# Patient Record
Sex: FEMALE | Race: WHITE | NOT HISPANIC OR LATINO | URBAN - METROPOLITAN AREA
[De-identification: names, ages, dates, MRNs, and addresses within clinical notes are randomized per-mention and may not be internally consistent; named-entity substitution may affect disease eponyms.]

---

## 2017-01-13 ENCOUNTER — INPATIENT (INPATIENT)
Facility: HOSPITAL | Age: 72
LOS: 1 days | Discharge: HOME | End: 2017-01-15
Attending: FAMILY MEDICINE | Admitting: FAMILY MEDICINE
Payer: MEDICARE

## 2017-01-13 DIAGNOSIS — Z98.89 OTHER SPECIFIED POSTPROCEDURAL STATES: Chronic | ICD-10-CM

## 2017-01-13 PROCEDURE — 99285 EMERGENCY DEPT VISIT HI MDM: CPT

## 2017-01-13 PROCEDURE — 74177 CT ABD & PELVIS W/CONTRAST: CPT | Mod: 26

## 2017-01-20 DIAGNOSIS — M81.0 AGE-RELATED OSTEOPOROSIS WITHOUT CURRENT PATHOLOGICAL FRACTURE: ICD-10-CM

## 2017-01-20 DIAGNOSIS — R10.9 UNSPECIFIED ABDOMINAL PAIN: ICD-10-CM

## 2017-01-20 DIAGNOSIS — E78.5 HYPERLIPIDEMIA, UNSPECIFIED: ICD-10-CM

## 2017-01-20 DIAGNOSIS — K86.1 OTHER CHRONIC PANCREATITIS: ICD-10-CM

## 2017-01-20 DIAGNOSIS — F41.9 ANXIETY DISORDER, UNSPECIFIED: ICD-10-CM

## 2017-01-20 DIAGNOSIS — K85.90 ACUTE PANCREATITIS WITHOUT NECROSIS OR INFECTION, UNSPECIFIED: ICD-10-CM

## 2017-01-20 DIAGNOSIS — I10 ESSENTIAL (PRIMARY) HYPERTENSION: ICD-10-CM

## 2017-02-04 ENCOUNTER — TRANSCRIPTION ENCOUNTER (OUTPATIENT)
Age: 72
End: 2017-02-04

## 2017-04-24 ENCOUNTER — RESULT REVIEW (OUTPATIENT)
Age: 72
End: 2017-04-24

## 2017-04-25 ENCOUNTER — OUTPATIENT (OUTPATIENT)
Dept: OUTPATIENT SERVICES | Facility: HOSPITAL | Age: 72
LOS: 1 days | Discharge: HOME | End: 2017-04-25
Payer: MEDICARE

## 2017-04-25 VITALS
OXYGEN SATURATION: 100 % | HEIGHT: 64 IN | WEIGHT: 145.06 LBS | SYSTOLIC BLOOD PRESSURE: 159 MMHG | HEART RATE: 60 BPM | DIASTOLIC BLOOD PRESSURE: 79 MMHG | TEMPERATURE: 97 F | RESPIRATION RATE: 12 BRPM

## 2017-04-25 DIAGNOSIS — Z98.89 OTHER SPECIFIED POSTPROCEDURAL STATES: Chronic | ICD-10-CM

## 2017-04-25 DIAGNOSIS — Z98.890 OTHER SPECIFIED POSTPROCEDURAL STATES: Chronic | ICD-10-CM

## 2017-04-25 DIAGNOSIS — Z90.89 ACQUIRED ABSENCE OF OTHER ORGANS: Chronic | ICD-10-CM

## 2017-04-25 PROCEDURE — 93010 ELECTROCARDIOGRAM REPORT: CPT

## 2017-04-25 PROCEDURE — 88305 TISSUE EXAM BY PATHOLOGIST: CPT | Mod: 26

## 2017-04-25 PROCEDURE — 88313 SPECIAL STAINS GROUP 2: CPT | Mod: 26

## 2017-04-25 PROCEDURE — 88312 SPECIAL STAINS GROUP 1: CPT | Mod: 26

## 2017-04-25 RX ORDER — SODIUM CHLORIDE 9 MG/ML
1000 INJECTION INTRAMUSCULAR; INTRAVENOUS; SUBCUTANEOUS
Qty: 0 | Refills: 0 | Status: DISCONTINUED | OUTPATIENT
Start: 2017-04-25 | End: 2017-05-10

## 2017-04-25 RX ADMIN — SODIUM CHLORIDE 75 MILLILITER(S): 9 INJECTION INTRAMUSCULAR; INTRAVENOUS; SUBCUTANEOUS at 10:34

## 2017-04-25 NOTE — ASU PATIENT PROFILE, ADULT - PMH
Arthritis    Fatty liver    GERD (gastroesophageal reflux disease)    High cholesterol    Hypertension    Hypothyroidism    Osteoarthritis    Pancreatitis

## 2017-04-25 NOTE — ASU PATIENT PROFILE, ADULT - PSH
History of back surgery    History of     History of laminectomy  with resection  History of tonsillectomy

## 2017-04-26 LAB — SURGICAL PATHOLOGY FINAL REPORT - CH: SIGNIFICANT CHANGE UP

## 2017-05-03 DIAGNOSIS — E78.5 HYPERLIPIDEMIA, UNSPECIFIED: ICD-10-CM

## 2017-05-03 DIAGNOSIS — E73.9 LACTOSE INTOLERANCE, UNSPECIFIED: ICD-10-CM

## 2017-05-03 DIAGNOSIS — M19.90 UNSPECIFIED OSTEOARTHRITIS, UNSPECIFIED SITE: ICD-10-CM

## 2017-05-03 DIAGNOSIS — Z87.891 PERSONAL HISTORY OF NICOTINE DEPENDENCE: ICD-10-CM

## 2017-05-03 DIAGNOSIS — K85.90 ACUTE PANCREATITIS WITHOUT NECROSIS OR INFECTION, UNSPECIFIED: ICD-10-CM

## 2017-05-03 DIAGNOSIS — K21.0 GASTRO-ESOPHAGEAL REFLUX DISEASE WITH ESOPHAGITIS: ICD-10-CM

## 2017-05-03 DIAGNOSIS — I10 ESSENTIAL (PRIMARY) HYPERTENSION: ICD-10-CM

## 2017-05-03 DIAGNOSIS — K29.50 UNSPECIFIED CHRONIC GASTRITIS WITHOUT BLEEDING: ICD-10-CM

## 2017-07-03 ENCOUNTER — TRANSCRIPTION ENCOUNTER (OUTPATIENT)
Age: 72
End: 2017-07-03

## 2017-10-25 ENCOUNTER — OUTPATIENT (OUTPATIENT)
Dept: OUTPATIENT SERVICES | Facility: HOSPITAL | Age: 72
LOS: 1 days | End: 2017-10-25
Payer: MEDICARE

## 2017-10-25 ENCOUNTER — TRANSCRIPTION ENCOUNTER (OUTPATIENT)
Age: 72
End: 2017-10-25

## 2017-10-25 VITALS
HEART RATE: 60 BPM | DIASTOLIC BLOOD PRESSURE: 54 MMHG | SYSTOLIC BLOOD PRESSURE: 119 MMHG | RESPIRATION RATE: 19 BRPM | OXYGEN SATURATION: 95 %

## 2017-10-25 VITALS
SYSTOLIC BLOOD PRESSURE: 128 MMHG | DIASTOLIC BLOOD PRESSURE: 57 MMHG | TEMPERATURE: 98 F | OXYGEN SATURATION: 98 % | WEIGHT: 143.3 LBS | HEIGHT: 64 IN | HEART RATE: 62 BPM | RESPIRATION RATE: 15 BRPM

## 2017-10-25 DIAGNOSIS — H25.12 AGE-RELATED NUCLEAR CATARACT, LEFT EYE: ICD-10-CM

## 2017-10-25 DIAGNOSIS — Z90.89 ACQUIRED ABSENCE OF OTHER ORGANS: Chronic | ICD-10-CM

## 2017-10-25 DIAGNOSIS — Z98.89 OTHER SPECIFIED POSTPROCEDURAL STATES: Chronic | ICD-10-CM

## 2017-10-25 DIAGNOSIS — Z98.890 OTHER SPECIFIED POSTPROCEDURAL STATES: Chronic | ICD-10-CM

## 2017-10-25 PROCEDURE — V2632: CPT

## 2017-10-25 PROCEDURE — 66984 XCAPSL CTRC RMVL W/O ECP: CPT | Mod: LT

## 2017-11-07 NOTE — ASU PATIENT PROFILE, ADULT - PSH
History of back surgery    History of     History of laminectomy  with resection  History of tonsillectomy History of back surgery    History of     History of laminectomy  with resection  History of tonsillectomy    S/P left cataract extraction

## 2017-11-08 ENCOUNTER — OUTPATIENT (OUTPATIENT)
Dept: OUTPATIENT SERVICES | Facility: HOSPITAL | Age: 72
LOS: 1 days | End: 2017-11-08
Payer: MEDICARE

## 2017-11-08 ENCOUNTER — TRANSCRIPTION ENCOUNTER (OUTPATIENT)
Age: 72
End: 2017-11-08

## 2017-11-08 VITALS
RESPIRATION RATE: 13 BRPM | HEIGHT: 64 IN | WEIGHT: 141.54 LBS | OXYGEN SATURATION: 94 % | HEART RATE: 63 BPM | TEMPERATURE: 98 F | DIASTOLIC BLOOD PRESSURE: 62 MMHG | SYSTOLIC BLOOD PRESSURE: 137 MMHG

## 2017-11-08 VITALS
SYSTOLIC BLOOD PRESSURE: 116 MMHG | HEART RATE: 62 BPM | DIASTOLIC BLOOD PRESSURE: 86 MMHG | OXYGEN SATURATION: 97 % | RESPIRATION RATE: 18 BRPM

## 2017-11-08 DIAGNOSIS — Z90.89 ACQUIRED ABSENCE OF OTHER ORGANS: Chronic | ICD-10-CM

## 2017-11-08 DIAGNOSIS — Z98.89 OTHER SPECIFIED POSTPROCEDURAL STATES: Chronic | ICD-10-CM

## 2017-11-08 DIAGNOSIS — Z98.42 CATARACT EXTRACTION STATUS, LEFT EYE: Chronic | ICD-10-CM

## 2017-11-08 DIAGNOSIS — Z98.890 OTHER SPECIFIED POSTPROCEDURAL STATES: Chronic | ICD-10-CM

## 2017-11-08 DIAGNOSIS — H25.11 AGE-RELATED NUCLEAR CATARACT, RIGHT EYE: ICD-10-CM

## 2017-11-08 PROCEDURE — 66984 XCAPSL CTRC RMVL W/O ECP: CPT | Mod: RT

## 2017-11-08 PROCEDURE — V2632: CPT

## 2017-12-28 ENCOUNTER — TRANSCRIPTION ENCOUNTER (OUTPATIENT)
Age: 72
End: 2017-12-28

## 2018-10-16 ENCOUNTER — TRANSCRIPTION ENCOUNTER (OUTPATIENT)
Age: 73
End: 2018-10-16

## 2018-10-25 ENCOUNTER — EMERGENCY (EMERGENCY)
Facility: HOSPITAL | Age: 73
LOS: 0 days | Discharge: HOME | End: 2018-10-25
Attending: EMERGENCY MEDICINE | Admitting: EMERGENCY MEDICINE
Payer: MEDICARE

## 2018-10-25 VITALS
DIASTOLIC BLOOD PRESSURE: 95 MMHG | HEIGHT: 63 IN | OXYGEN SATURATION: 99 % | HEART RATE: 70 BPM | RESPIRATION RATE: 16 BRPM | TEMPERATURE: 98 F | SYSTOLIC BLOOD PRESSURE: 162 MMHG | WEIGHT: 145.06 LBS

## 2018-10-25 VITALS
DIASTOLIC BLOOD PRESSURE: 71 MMHG | SYSTOLIC BLOOD PRESSURE: 138 MMHG | HEART RATE: 60 BPM | OXYGEN SATURATION: 100 % | RESPIRATION RATE: 16 BRPM | TEMPERATURE: 98 F

## 2018-10-25 DIAGNOSIS — Z90.89 ACQUIRED ABSENCE OF OTHER ORGANS: ICD-10-CM

## 2018-10-25 DIAGNOSIS — K21.9 GASTRO-ESOPHAGEAL REFLUX DISEASE WITHOUT ESOPHAGITIS: ICD-10-CM

## 2018-10-25 DIAGNOSIS — Z98.890 OTHER SPECIFIED POSTPROCEDURAL STATES: Chronic | ICD-10-CM

## 2018-10-25 DIAGNOSIS — Z98.89 OTHER SPECIFIED POSTPROCEDURAL STATES: Chronic | ICD-10-CM

## 2018-10-25 DIAGNOSIS — Z90.89 ACQUIRED ABSENCE OF OTHER ORGANS: Chronic | ICD-10-CM

## 2018-10-25 DIAGNOSIS — Z98.42 CATARACT EXTRACTION STATUS, LEFT EYE: ICD-10-CM

## 2018-10-25 DIAGNOSIS — R07.9 CHEST PAIN, UNSPECIFIED: ICD-10-CM

## 2018-10-25 DIAGNOSIS — Z87.19 PERSONAL HISTORY OF OTHER DISEASES OF THE DIGESTIVE SYSTEM: ICD-10-CM

## 2018-10-25 DIAGNOSIS — Z83.3 FAMILY HISTORY OF DIABETES MELLITUS: ICD-10-CM

## 2018-10-25 DIAGNOSIS — E78.00 PURE HYPERCHOLESTEROLEMIA, UNSPECIFIED: ICD-10-CM

## 2018-10-25 DIAGNOSIS — I10 ESSENTIAL (PRIMARY) HYPERTENSION: ICD-10-CM

## 2018-10-25 DIAGNOSIS — E03.9 HYPOTHYROIDISM, UNSPECIFIED: ICD-10-CM

## 2018-10-25 DIAGNOSIS — Z82.49 FAMILY HISTORY OF ISCHEMIC HEART DISEASE AND OTHER DISEASES OF THE CIRCULATORY SYSTEM: ICD-10-CM

## 2018-10-25 DIAGNOSIS — Z87.891 PERSONAL HISTORY OF NICOTINE DEPENDENCE: ICD-10-CM

## 2018-10-25 DIAGNOSIS — R07.89 OTHER CHEST PAIN: ICD-10-CM

## 2018-10-25 DIAGNOSIS — Z80.3 FAMILY HISTORY OF MALIGNANT NEOPLASM OF BREAST: ICD-10-CM

## 2018-10-25 DIAGNOSIS — Z98.42 CATARACT EXTRACTION STATUS, LEFT EYE: Chronic | ICD-10-CM

## 2018-10-25 DIAGNOSIS — M19.90 UNSPECIFIED OSTEOARTHRITIS, UNSPECIFIED SITE: ICD-10-CM

## 2018-10-25 DIAGNOSIS — K76.0 FATTY (CHANGE OF) LIVER, NOT ELSEWHERE CLASSIFIED: ICD-10-CM

## 2018-10-25 LAB
ADD ON TEST-SPECIMEN IN LAB: SIGNIFICANT CHANGE UP
ALBUMIN SERPL ELPH-MCNC: 3.9 G/DL — SIGNIFICANT CHANGE UP (ref 3.3–5)
ALP SERPL-CCNC: 63 U/L — SIGNIFICANT CHANGE UP (ref 40–120)
ALT FLD-CCNC: 39 U/L — SIGNIFICANT CHANGE UP (ref 12–78)
ANION GAP SERPL CALC-SCNC: 8 MMOL/L — SIGNIFICANT CHANGE UP (ref 5–17)
AST SERPL-CCNC: 23 U/L — SIGNIFICANT CHANGE UP (ref 15–37)
BASOPHILS # BLD AUTO: 0.03 K/UL — SIGNIFICANT CHANGE UP (ref 0–0.2)
BASOPHILS NFR BLD AUTO: 0.3 % — SIGNIFICANT CHANGE UP (ref 0–2)
BILIRUB SERPL-MCNC: 0.3 MG/DL — SIGNIFICANT CHANGE UP (ref 0.2–1.2)
BUN SERPL-MCNC: 13 MG/DL — SIGNIFICANT CHANGE UP (ref 7–23)
CALCIUM SERPL-MCNC: 10.6 MG/DL — HIGH (ref 8.5–10.1)
CHLORIDE SERPL-SCNC: 103 MMOL/L — SIGNIFICANT CHANGE UP (ref 96–108)
CO2 SERPL-SCNC: 26 MMOL/L — SIGNIFICANT CHANGE UP (ref 22–31)
CREAT SERPL-MCNC: 0.65 MG/DL — SIGNIFICANT CHANGE UP (ref 0.5–1.3)
EOSINOPHIL # BLD AUTO: 0.03 K/UL — SIGNIFICANT CHANGE UP (ref 0–0.5)
EOSINOPHIL NFR BLD AUTO: 0.3 % — SIGNIFICANT CHANGE UP (ref 0–6)
GLUCOSE SERPL-MCNC: 89 MG/DL — SIGNIFICANT CHANGE UP (ref 70–99)
HCT VFR BLD CALC: 41.8 % — SIGNIFICANT CHANGE UP (ref 34.5–45)
HGB BLD-MCNC: 13.9 G/DL — SIGNIFICANT CHANGE UP (ref 11.5–15.5)
IMM GRANULOCYTES NFR BLD AUTO: 0.6 % — SIGNIFICANT CHANGE UP (ref 0–1.5)
INR BLD: 1 RATIO — SIGNIFICANT CHANGE UP (ref 0.88–1.16)
LIDOCAIN IGE QN: 218 U/L — SIGNIFICANT CHANGE UP (ref 73–393)
LYMPHOCYTES # BLD AUTO: 27.6 % — SIGNIFICANT CHANGE UP (ref 13–44)
LYMPHOCYTES # BLD AUTO: 3 K/UL — SIGNIFICANT CHANGE UP (ref 1–3.3)
MAGNESIUM SERPL-MCNC: 2.1 MG/DL — SIGNIFICANT CHANGE UP (ref 1.6–2.6)
MCHC RBC-ENTMCNC: 29.8 PG — SIGNIFICANT CHANGE UP (ref 27–34)
MCHC RBC-ENTMCNC: 33.3 GM/DL — SIGNIFICANT CHANGE UP (ref 32–36)
MCV RBC AUTO: 89.7 FL — SIGNIFICANT CHANGE UP (ref 80–100)
MONOCYTES # BLD AUTO: 0.99 K/UL — HIGH (ref 0–0.9)
MONOCYTES NFR BLD AUTO: 9.1 % — SIGNIFICANT CHANGE UP (ref 2–14)
NEUTROPHILS # BLD AUTO: 6.75 K/UL — SIGNIFICANT CHANGE UP (ref 1.8–7.4)
NEUTROPHILS NFR BLD AUTO: 62.1 % — SIGNIFICANT CHANGE UP (ref 43–77)
NRBC # BLD: 0 /100 WBCS — SIGNIFICANT CHANGE UP (ref 0–0)
PLATELET # BLD AUTO: 261 K/UL — SIGNIFICANT CHANGE UP (ref 150–400)
POTASSIUM SERPL-MCNC: 4.1 MMOL/L — SIGNIFICANT CHANGE UP (ref 3.5–5.3)
POTASSIUM SERPL-SCNC: 4.1 MMOL/L — SIGNIFICANT CHANGE UP (ref 3.5–5.3)
PROT SERPL-MCNC: 7.7 GM/DL — SIGNIFICANT CHANGE UP (ref 6–8.3)
PROTHROM AB SERPL-ACNC: 10.8 SEC — SIGNIFICANT CHANGE UP (ref 9.8–12.7)
RBC # BLD: 4.66 M/UL — SIGNIFICANT CHANGE UP (ref 3.8–5.2)
RBC # FLD: 13.1 % — SIGNIFICANT CHANGE UP (ref 10.3–14.5)
SODIUM SERPL-SCNC: 137 MMOL/L — SIGNIFICANT CHANGE UP (ref 135–145)
TROPONIN I SERPL-MCNC: <0.015 NG/ML — SIGNIFICANT CHANGE UP (ref 0.01–0.04)
TROPONIN I SERPL-MCNC: <0.015 NG/ML — SIGNIFICANT CHANGE UP (ref 0.01–0.04)
WBC # BLD: 10.87 K/UL — HIGH (ref 3.8–10.5)
WBC # FLD AUTO: 10.87 K/UL — HIGH (ref 3.8–10.5)

## 2018-10-25 PROCEDURE — 93010 ELECTROCARDIOGRAM REPORT: CPT

## 2018-10-25 PROCEDURE — 99285 EMERGENCY DEPT VISIT HI MDM: CPT

## 2018-10-25 PROCEDURE — 71045 X-RAY EXAM CHEST 1 VIEW: CPT | Mod: 26

## 2018-10-25 RX ORDER — FAMOTIDINE 10 MG/ML
20 INJECTION INTRAVENOUS ONCE
Qty: 0 | Refills: 0 | Status: COMPLETED | OUTPATIENT
Start: 2018-10-25 | End: 2018-10-25

## 2018-10-25 RX ADMIN — FAMOTIDINE 20 MILLIGRAM(S): 10 INJECTION INTRAVENOUS at 17:33

## 2018-10-25 NOTE — ED ADULT NURSE REASSESSMENT NOTE - COMFORT CARE
ambulated to bathroom/plan of care explained
meal provided/plan of care explained
repositioned/plan of care explained/warm blanket provided/darkened lights/side rails up/wait time explained

## 2018-10-25 NOTE — ED ADULT NURSE NOTE - OBJECTIVE STATEMENT
sudden onset of substernal cp while getting her hair done. no n/v or diaphoresis.  cp has improved.  patient was given asa and ntg in the [place]

## 2018-10-25 NOTE — ED ADULT NURSE NOTE - NSIMPLEMENTINTERV_GEN_ALL_ED
Implemented All Fall with Harm Risk Interventions:  [place] to call system. Call bell, personal items and telephone within reach. Instruct patient to call for assistance. Room bathroom lighting operational. Non-slip footwear when patient is off stretcher. Physically safe environment: no spills, clutter or unnecessary equipment. Stretcher in lowest position, wheels locked, appropriate side rails in place. Provide visual cue, wrist band, yellow gown, etc. Monitor gait and stability. Monitor for mental status changes and reorient to person, place, and time. Review medications for side effects contributing to fall risk. Reinforce activity limits and safety measures with patient and family. Provide visual clues: red socks.

## 2018-10-25 NOTE — ED PROVIDER NOTE - OBJECTIVE STATEMENT
72 y/o female with a PMHx of HTN on Metoprolol, HLD on Simvastatin, hypothyroidism presents to the ED c/o sudden onset chest pain PTA. Pt was seated getting her hair done and had onset of sternal chest pain radiating to her throat. Pain is described as aching. Hair parlor called EMS and in ambulance pt received sublingual nitro and ASA, pt's blood pressure was 200 systolic. No associated SOB, diaphoresis, nausea, vomiting. No leg swelling. Pt is currently on abx for bronchitis, 2 more to take. Recently took Medrol dose pack. No h/o this pain. Former smoker for a few years (Quit 50 years ago). PMD: Dr. Jt Neely.

## 2018-10-25 NOTE — ED ADULT NURSE REASSESSMENT NOTE - NS ED NURSE REASSESS COMMENT FT1
patient ambulated to BR without sx. patient states she feels back to baseline. vss
Received pt awake A&Ox3 sitting erect in bed. Presented to ED c/o chest pain while at the hair salon today. Hx of pancreatitis and hyperparathyroidism. Lipase negative, 1st troponin negative.. Pending 2nd troponin and possible discharge. No acute distress noted. Safety/fall precautions maintained.

## 2018-10-25 NOTE — ED PROVIDER NOTE - PSH
History of back surgery    History of     History of laminectomy  with resection  History of tonsillectomy    S/P left cataract extraction

## 2018-10-26 PROBLEM — M19.90 UNSPECIFIED OSTEOARTHRITIS, UNSPECIFIED SITE: Chronic | Status: ACTIVE | Noted: 2017-04-25

## 2018-10-26 PROBLEM — K76.0 FATTY (CHANGE OF) LIVER, NOT ELSEWHERE CLASSIFIED: Chronic | Status: ACTIVE | Noted: 2017-04-25

## 2018-11-28 ENCOUNTER — RESULT REVIEW (OUTPATIENT)
Age: 73
End: 2018-11-28

## 2019-01-18 ENCOUNTER — TRANSCRIPTION ENCOUNTER (OUTPATIENT)
Age: 74
End: 2019-01-18

## 2019-04-26 ENCOUNTER — TRANSCRIPTION ENCOUNTER (OUTPATIENT)
Age: 74
End: 2019-04-26

## 2019-08-07 ENCOUNTER — INPATIENT (INPATIENT)
Facility: HOSPITAL | Age: 74
LOS: 0 days | Discharge: HOME | DRG: 312 | End: 2019-08-08
Attending: INTERNAL MEDICINE | Admitting: INTERNAL MEDICINE
Payer: MEDICARE

## 2019-08-07 VITALS
TEMPERATURE: 98 F | OXYGEN SATURATION: 96 % | HEIGHT: 63 IN | DIASTOLIC BLOOD PRESSURE: 53 MMHG | WEIGHT: 119.93 LBS | SYSTOLIC BLOOD PRESSURE: 119 MMHG | HEART RATE: 74 BPM | RESPIRATION RATE: 18 BRPM

## 2019-08-07 DIAGNOSIS — I10 ESSENTIAL (PRIMARY) HYPERTENSION: ICD-10-CM

## 2019-08-07 DIAGNOSIS — E78.00 PURE HYPERCHOLESTEROLEMIA, UNSPECIFIED: ICD-10-CM

## 2019-08-07 DIAGNOSIS — E03.9 HYPOTHYROIDISM, UNSPECIFIED: ICD-10-CM

## 2019-08-07 DIAGNOSIS — Z98.89 OTHER SPECIFIED POSTPROCEDURAL STATES: Chronic | ICD-10-CM

## 2019-08-07 DIAGNOSIS — Z90.89 ACQUIRED ABSENCE OF OTHER ORGANS: Chronic | ICD-10-CM

## 2019-08-07 DIAGNOSIS — K76.0 FATTY (CHANGE OF) LIVER, NOT ELSEWHERE CLASSIFIED: ICD-10-CM

## 2019-08-07 DIAGNOSIS — Z98.890 OTHER SPECIFIED POSTPROCEDURAL STATES: Chronic | ICD-10-CM

## 2019-08-07 DIAGNOSIS — K21.9 GASTRO-ESOPHAGEAL REFLUX DISEASE WITHOUT ESOPHAGITIS: ICD-10-CM

## 2019-08-07 DIAGNOSIS — R55 SYNCOPE AND COLLAPSE: ICD-10-CM

## 2019-08-07 DIAGNOSIS — Z98.42 CATARACT EXTRACTION STATUS, LEFT EYE: Chronic | ICD-10-CM

## 2019-08-07 LAB
ALBUMIN SERPL ELPH-MCNC: 3.8 G/DL — SIGNIFICANT CHANGE UP (ref 3.3–5)
ALP SERPL-CCNC: 42 U/L — SIGNIFICANT CHANGE UP (ref 40–120)
ALT FLD-CCNC: 34 U/L — SIGNIFICANT CHANGE UP (ref 12–78)
ANION GAP SERPL CALC-SCNC: 7 MMOL/L — SIGNIFICANT CHANGE UP (ref 5–17)
APPEARANCE UR: CLEAR — SIGNIFICANT CHANGE UP
APTT BLD: 31.4 SEC — SIGNIFICANT CHANGE UP (ref 27.5–36.3)
AST SERPL-CCNC: 32 U/L — SIGNIFICANT CHANGE UP (ref 15–37)
BILIRUB SERPL-MCNC: 0.3 MG/DL — SIGNIFICANT CHANGE UP (ref 0.2–1.2)
BILIRUB UR-MCNC: NEGATIVE — SIGNIFICANT CHANGE UP
BUN SERPL-MCNC: 14 MG/DL — SIGNIFICANT CHANGE UP (ref 7–23)
CALCIUM SERPL-MCNC: 8.9 MG/DL — SIGNIFICANT CHANGE UP (ref 8.5–10.1)
CHLORIDE SERPL-SCNC: 109 MMOL/L — HIGH (ref 96–108)
CHOLEST SERPL-MCNC: 151 MG/DL — SIGNIFICANT CHANGE UP (ref 10–199)
CO2 SERPL-SCNC: 25 MMOL/L — SIGNIFICANT CHANGE UP (ref 22–31)
COLOR SPEC: YELLOW — SIGNIFICANT CHANGE UP
CREAT SERPL-MCNC: 1.19 MG/DL — SIGNIFICANT CHANGE UP (ref 0.5–1.3)
D DIMER BLD IA.RAPID-MCNC: <150 NG/ML DDU — SIGNIFICANT CHANGE UP
DIFF PNL FLD: ABNORMAL
GLUCOSE SERPL-MCNC: 97 MG/DL — SIGNIFICANT CHANGE UP (ref 70–99)
GLUCOSE UR QL: NEGATIVE MG/DL — SIGNIFICANT CHANGE UP
HCT VFR BLD CALC: 38 % — SIGNIFICANT CHANGE UP (ref 34.5–45)
HDLC SERPL-MCNC: 81 MG/DL — SIGNIFICANT CHANGE UP
HGB BLD-MCNC: 12.7 G/DL — SIGNIFICANT CHANGE UP (ref 11.5–15.5)
INR BLD: 0.96 RATIO — SIGNIFICANT CHANGE UP (ref 0.88–1.16)
KETONES UR-MCNC: NEGATIVE — SIGNIFICANT CHANGE UP
LEUKOCYTE ESTERASE UR-ACNC: NEGATIVE — SIGNIFICANT CHANGE UP
LIPID PNL WITH DIRECT LDL SERPL: 62 MG/DL — SIGNIFICANT CHANGE UP
MAGNESIUM SERPL-MCNC: 2 MG/DL — SIGNIFICANT CHANGE UP (ref 1.6–2.6)
MCHC RBC-ENTMCNC: 30.8 PG — SIGNIFICANT CHANGE UP (ref 27–34)
MCHC RBC-ENTMCNC: 33.4 GM/DL — SIGNIFICANT CHANGE UP (ref 32–36)
MCV RBC AUTO: 92 FL — SIGNIFICANT CHANGE UP (ref 80–100)
NITRITE UR-MCNC: NEGATIVE — SIGNIFICANT CHANGE UP
NT-PROBNP SERPL-SCNC: 139 PG/ML — HIGH (ref 0–125)
PH UR: 6.5 — SIGNIFICANT CHANGE UP (ref 5–8)
PLATELET # BLD AUTO: 217 K/UL — SIGNIFICANT CHANGE UP (ref 150–400)
POTASSIUM SERPL-MCNC: 3.6 MMOL/L — SIGNIFICANT CHANGE UP (ref 3.5–5.3)
POTASSIUM SERPL-SCNC: 3.6 MMOL/L — SIGNIFICANT CHANGE UP (ref 3.5–5.3)
PROT SERPL-MCNC: 7.2 GM/DL — SIGNIFICANT CHANGE UP (ref 6–8.3)
PROT UR-MCNC: 30 MG/DL
PROTHROM AB SERPL-ACNC: 10.6 SEC — SIGNIFICANT CHANGE UP (ref 10–12.9)
RBC # BLD: 4.13 M/UL — SIGNIFICANT CHANGE UP (ref 3.8–5.2)
RBC # FLD: 14.5 % — SIGNIFICANT CHANGE UP (ref 10.3–14.5)
SODIUM SERPL-SCNC: 141 MMOL/L — SIGNIFICANT CHANGE UP (ref 135–145)
SP GR SPEC: 1 — LOW (ref 1.01–1.02)
TOTAL CHOLESTEROL/HDL RATIO MEASUREMENT: 1.9 RATIO — LOW (ref 3.3–7.1)
TRIGL SERPL-MCNC: 38 MG/DL — SIGNIFICANT CHANGE UP (ref 10–149)
TROPONIN I SERPL-MCNC: <0.015 NG/ML — SIGNIFICANT CHANGE UP (ref 0.01–0.04)
TSH SERPL-MCNC: 1.09 UU/ML — SIGNIFICANT CHANGE UP (ref 0.34–4.82)
UROBILINOGEN FLD QL: NEGATIVE MG/DL — SIGNIFICANT CHANGE UP
WBC # BLD: 7.76 K/UL — SIGNIFICANT CHANGE UP (ref 3.8–10.5)
WBC # FLD AUTO: 7.76 K/UL — SIGNIFICANT CHANGE UP (ref 3.8–10.5)

## 2019-08-07 PROCEDURE — 71250 CT THORAX DX C-: CPT | Mod: 26

## 2019-08-07 PROCEDURE — 84484 ASSAY OF TROPONIN QUANT: CPT | Mod: 91

## 2019-08-07 PROCEDURE — 85027 COMPLETE CBC AUTOMATED: CPT

## 2019-08-07 PROCEDURE — 93306 TTE W/DOPPLER COMPLETE: CPT

## 2019-08-07 PROCEDURE — 85379 FIBRIN DEGRADATION QUANT: CPT

## 2019-08-07 PROCEDURE — 36415 COLL VENOUS BLD VENIPUNCTURE: CPT

## 2019-08-07 PROCEDURE — 93306 TTE W/DOPPLER COMPLETE: CPT | Mod: 26

## 2019-08-07 PROCEDURE — 70450 CT HEAD/BRAIN W/O DYE: CPT | Mod: 26

## 2019-08-07 PROCEDURE — 80061 LIPID PANEL: CPT

## 2019-08-07 PROCEDURE — 84443 ASSAY THYROID STIM HORMONE: CPT

## 2019-08-07 PROCEDURE — 80048 BASIC METABOLIC PNL TOTAL CA: CPT

## 2019-08-07 PROCEDURE — 71045 X-RAY EXAM CHEST 1 VIEW: CPT | Mod: 26

## 2019-08-07 RX ORDER — ALPRAZOLAM 0.25 MG
0.25 TABLET ORAL DAILY
Refills: 0 | Status: DISCONTINUED | OUTPATIENT
Start: 2019-08-07 | End: 2019-08-08

## 2019-08-07 RX ORDER — ENOXAPARIN SODIUM 100 MG/ML
40 INJECTION SUBCUTANEOUS DAILY
Refills: 0 | Status: DISCONTINUED | OUTPATIENT
Start: 2019-08-07 | End: 2019-08-08

## 2019-08-07 RX ORDER — ASPIRIN/CALCIUM CARB/MAGNESIUM 324 MG
81 TABLET ORAL DAILY
Refills: 0 | Status: DISCONTINUED | OUTPATIENT
Start: 2019-08-07 | End: 2019-08-08

## 2019-08-07 RX ORDER — SIMVASTATIN 20 MG/1
20 TABLET, FILM COATED ORAL AT BEDTIME
Refills: 0 | Status: DISCONTINUED | OUTPATIENT
Start: 2019-08-07 | End: 2019-08-08

## 2019-08-07 RX ORDER — METOPROLOL TARTRATE 50 MG
25 TABLET ORAL DAILY
Refills: 0 | Status: DISCONTINUED | OUTPATIENT
Start: 2019-08-07 | End: 2019-08-08

## 2019-08-07 RX ORDER — ONDANSETRON 8 MG/1
4 TABLET, FILM COATED ORAL ONCE
Refills: 0 | Status: DISCONTINUED | OUTPATIENT
Start: 2019-08-07 | End: 2019-08-08

## 2019-08-07 RX ORDER — ACETAMINOPHEN 500 MG
650 TABLET ORAL EVERY 6 HOURS
Refills: 0 | Status: DISCONTINUED | OUTPATIENT
Start: 2019-08-07 | End: 2019-08-08

## 2019-08-07 RX ORDER — SIMVASTATIN 20 MG/1
0 TABLET, FILM COATED ORAL
Qty: 0 | Refills: 0 | DISCHARGE

## 2019-08-07 RX ORDER — SODIUM CHLORIDE 9 MG/ML
1000 INJECTION INTRAMUSCULAR; INTRAVENOUS; SUBCUTANEOUS ONCE
Refills: 0 | Status: COMPLETED | OUTPATIENT
Start: 2019-08-07 | End: 2019-08-07

## 2019-08-07 RX ORDER — POTASSIUM CHLORIDE 20 MEQ
20 PACKET (EA) ORAL ONCE
Refills: 0 | Status: COMPLETED | OUTPATIENT
Start: 2019-08-07 | End: 2019-08-07

## 2019-08-07 RX ORDER — PANTOPRAZOLE SODIUM 20 MG/1
40 TABLET, DELAYED RELEASE ORAL
Refills: 0 | Status: DISCONTINUED | OUTPATIENT
Start: 2019-08-07 | End: 2019-08-08

## 2019-08-07 RX ADMIN — Medication 81 MILLIGRAM(S): at 21:14

## 2019-08-07 RX ADMIN — Medication 25 MILLIGRAM(S): at 21:14

## 2019-08-07 RX ADMIN — Medication 0.25 MILLIGRAM(S): at 21:13

## 2019-08-07 RX ADMIN — SIMVASTATIN 20 MILLIGRAM(S): 20 TABLET, FILM COATED ORAL at 21:14

## 2019-08-07 RX ADMIN — Medication 650 MILLIGRAM(S): at 05:59

## 2019-08-07 RX ADMIN — Medication 650 MILLIGRAM(S): at 06:29

## 2019-08-07 RX ADMIN — SODIUM CHLORIDE 1000 MILLILITER(S): 9 INJECTION INTRAMUSCULAR; INTRAVENOUS; SUBCUTANEOUS at 02:28

## 2019-08-07 RX ADMIN — ENOXAPARIN SODIUM 40 MILLIGRAM(S): 100 INJECTION SUBCUTANEOUS at 13:43

## 2019-08-07 RX ADMIN — Medication 650 MILLIGRAM(S): at 21:14

## 2019-08-07 RX ADMIN — Medication 650 MILLIGRAM(S): at 21:44

## 2019-08-07 RX ADMIN — Medication 20 MILLIEQUIVALENT(S): at 13:44

## 2019-08-07 NOTE — PATIENT PROFILE ADULT - LAST BOWEL MOVEMENT DATE
PT order received, chart reviewed and contents noted.  PT eval completed and documented.
07-Aug-2019

## 2019-08-07 NOTE — ED ADULT TRIAGE NOTE - CHIEF COMPLAINT QUOTE
got up to use bathroom and passed out.  pt states she hasn't been feeling well the past few days.  denies pain or injury from syncope.

## 2019-08-07 NOTE — ED PROVIDER NOTE - OBJECTIVE STATEMENT
72 yo female biba from home asfter syncopal episode at home tonight. pt states she got up to go to the bathroom and then fond herself on the ground. She states she has not been feeling well all week,  mild myalgias, no fever, no recent travel

## 2019-08-07 NOTE — ED ADULT NURSE NOTE - NSIMPLEMENTINTERV_GEN_ALL_ED
Implemented All Fall Risk Interventions:  [place] to call system. Call bell, personal items and telephone within reach. Instruct patient to call for assistance. Room bathroom lighting operational. Non-slip footwear when patient is off stretcher. Physically safe environment: no spills, clutter or unnecessary equipment. Stretcher in lowest position, wheels locked, appropriate side rails in place. Provide visual cue, wrist band, yellow gown, etc. Monitor gait and stability. Monitor for mental status changes and reorient to person, place, and time. Review medications for side effects contributing to fall risk. Reinforce activity limits and safety measures with patient and family.

## 2019-08-07 NOTE — ED ADULT NURSE REASSESSMENT NOTE - NS ED NURSE REASSESS COMMENT FT1
Assumed care of patient. No signs of acute distress noted. Patient resting comfortably. Will continue to monitor.

## 2019-08-07 NOTE — H&P ADULT - HISTORY OF PRESENT ILLNESS
73 year old male with history of Arthritis/Osteoarthritis, Fatty liver, GERD (gastroesophageal reflux disease), High cholesterol, Hypertension, Hypothyroidism and pancreatitis 73 year old female with history of Arrhythmia, Arthritis/Osteoarthritis, Fatty liver, GERD (gastroesophageal reflux disease), High cholesterol, Hypertension, Hypothyroidism, pancreatitis and hypercalcemia s/p one parathyroid removal 03/2019 presented to ED via ambulance from home after an episode of passing out while sitting on toilet seat at home tonight. Patient states she got up to go to the bathroom and then fond herself on the ground. She states she has not been feeling well all week,  mild myalgias, no fever, no recent travel. Patient denies preceding chest pain, headache or dizziness. No bleeding or diarrhea. No fever, chills, shortness of breath. Patient has been feeling tired which is unusual and exercised for an hour on Monday after not feeling well last Thursday. 73 year old female with history of Arrhythmia, Arthritis/Osteoarthritis, Fatty liver, GERD (gastroesophageal reflux disease), High cholesterol, Hypertension, pancreatitis and hypercalcemia s/p one parathyroid removal 03/2019 presented to ED via ambulance from home after an episode of passing out while sitting on toilet seat at home tonight. Patient states she got up to go to the bathroom and then fond herself on the ground. She states she has not been feeling well all week,  mild myalgias, no fever, no recent travel. Patient denies preceding chest pain, headache or dizziness. No bleeding or diarrhea. No fever, chills, shortness of breath. Patient has been feeling tired which is unusual and exercised for an hour on Monday after not feeling well last Thursday.

## 2019-08-07 NOTE — H&P ADULT - NSICDXFAMILYHX_GEN_ALL_CORE_FT
FAMILY HISTORY:  Father  Still living? No  Family history of CHF (congestive heart failure), Age at diagnosis: Age Unknown  Family history of diabetes mellitus, Age at diagnosis: Age Unknown    Mother  Still living? No  Family history of breast cancer, Age at diagnosis: Age Unknown  Family history of diabetes mellitus, Age at diagnosis: Age Unknown    Sibling  Still living? No  Family history of AIDS, Age at diagnosis: Age Unknown

## 2019-08-07 NOTE — H&P ADULT - ATTENDING COMMENTS
Advance directives: Full code. Son Mr. Jamie Govea is the medical surrogate.     DVT prophylaxis: Lovenox subcu.

## 2019-08-07 NOTE — H&P ADULT - NSICDXPASTSURGICALHX_GEN_ALL_CORE_FT
PAST SURGICAL HISTORY:  History of back surgery     History of      History of laminectomy with resection    History of tonsillectomy     S/P left cataract extraction

## 2019-08-07 NOTE — H&P ADULT - NSICDXPASTMEDICALHX_GEN_ALL_CORE_FT
PAST MEDICAL HISTORY:  Arthritis     Fatty liver     GERD (gastroesophageal reflux disease)     High cholesterol     Hypertension     Hypothyroidism     Osteoarthritis     Pancreatitis

## 2019-08-07 NOTE — ED ADULT NURSE NOTE - OBJECTIVE STATEMENT
pt went to use b/r and passed out and woke up on the floor. pt unsure of how long she was "out" for. pt states she hasn't been feeling well the past few days. Went to a spin class this morning and unsure if she drank enough fluid. denies pain or injury from syncope, denies CP, SOB, palpitations, headache. c/o fatigue.

## 2019-08-07 NOTE — H&P ADULT - PROBLEM SELECTOR PLAN 1
Possible vasovagal etiology. CT head negative. CT chest non contrast negative. EKG NSR. Negative troponin x 2. Telemetry monitoring. Cardiology to advise. Check orthostatic vital signs. Possible vasovagal etiology. CT head negative. CT chest non contrast negative was done. EKG NSR. Negative troponin x 2. Telemetry monitoring. Cardiology to advise. Check orthostatic vital signs. No pleuritic chest pain in physically active person with no previous history of DVT/PE. Check D-dimer as BNP mildly elevated and if elevated will order VQ scan to rule out PE.

## 2019-08-07 NOTE — H&P ADULT - ASSESSMENT
73 year old female with history of Arrhythmia, Arthritis/Osteoarthritis, Fatty liver, GERD (gastroesophageal reflux disease), High cholesterol, Hypertension, Hypothyroidism, pancreatitis and hypercalcemia s/p one parathyroid removal 03/2019 presented to ED via ambulance from home after an episode of passing out while sitting on toilet seat at home tonight. Being admitted for Syncope with facial injury. 73 year old female with history of Arrhythmia, Arthritis/Osteoarthritis, Fatty liver, GERD (gastroesophageal reflux disease), High cholesterol, Hypertension, pancreatitis and hypercalcemia s/p one parathyroid removal 03/2019 presented to ED via ambulance from home after an episode of passing out while sitting on toilet seat at home tonight. Being admitted for Syncope with facial injury.

## 2019-08-08 ENCOUNTER — TRANSCRIPTION ENCOUNTER (OUTPATIENT)
Age: 74
End: 2019-08-08

## 2019-08-08 VITALS
SYSTOLIC BLOOD PRESSURE: 135 MMHG | OXYGEN SATURATION: 100 % | RESPIRATION RATE: 18 BRPM | DIASTOLIC BLOOD PRESSURE: 67 MMHG | HEART RATE: 64 BPM | TEMPERATURE: 98 F

## 2019-08-08 LAB
ANION GAP SERPL CALC-SCNC: 5 MMOL/L — SIGNIFICANT CHANGE UP (ref 5–17)
BUN SERPL-MCNC: 13 MG/DL — SIGNIFICANT CHANGE UP (ref 7–23)
CALCIUM SERPL-MCNC: 8.6 MG/DL — SIGNIFICANT CHANGE UP (ref 8.5–10.1)
CHLORIDE SERPL-SCNC: 108 MMOL/L — SIGNIFICANT CHANGE UP (ref 96–108)
CO2 SERPL-SCNC: 28 MMOL/L — SIGNIFICANT CHANGE UP (ref 22–31)
CREAT SERPL-MCNC: 0.69 MG/DL — SIGNIFICANT CHANGE UP (ref 0.5–1.3)
GLUCOSE SERPL-MCNC: 89 MG/DL — SIGNIFICANT CHANGE UP (ref 70–99)
HCT VFR BLD CALC: 37.1 % — SIGNIFICANT CHANGE UP (ref 34.5–45)
HCV AB S/CO SERPL IA: 0.08 S/CO — SIGNIFICANT CHANGE UP (ref 0–0.99)
HCV AB SERPL-IMP: SIGNIFICANT CHANGE UP
HGB BLD-MCNC: 12.2 G/DL — SIGNIFICANT CHANGE UP (ref 11.5–15.5)
MCHC RBC-ENTMCNC: 30.1 PG — SIGNIFICANT CHANGE UP (ref 27–34)
MCHC RBC-ENTMCNC: 32.9 GM/DL — SIGNIFICANT CHANGE UP (ref 32–36)
MCV RBC AUTO: 91.6 FL — SIGNIFICANT CHANGE UP (ref 80–100)
PLATELET # BLD AUTO: 201 K/UL — SIGNIFICANT CHANGE UP (ref 150–400)
POTASSIUM SERPL-MCNC: 4.1 MMOL/L — SIGNIFICANT CHANGE UP (ref 3.5–5.3)
POTASSIUM SERPL-SCNC: 4.1 MMOL/L — SIGNIFICANT CHANGE UP (ref 3.5–5.3)
RBC # BLD: 4.05 M/UL — SIGNIFICANT CHANGE UP (ref 3.8–5.2)
RBC # FLD: 14.4 % — SIGNIFICANT CHANGE UP (ref 10.3–14.5)
SODIUM SERPL-SCNC: 141 MMOL/L — SIGNIFICANT CHANGE UP (ref 135–145)
WBC # BLD: 6.5 K/UL — SIGNIFICANT CHANGE UP (ref 3.8–10.5)
WBC # FLD AUTO: 6.5 K/UL — SIGNIFICANT CHANGE UP (ref 3.8–10.5)

## 2019-08-08 RX ORDER — METOPROLOL TARTRATE 50 MG
25 TABLET ORAL AT BEDTIME
Refills: 0 | Status: DISCONTINUED | OUTPATIENT
Start: 2019-08-08 | End: 2019-08-08

## 2019-08-08 RX ADMIN — PANTOPRAZOLE SODIUM 40 MILLIGRAM(S): 20 TABLET, DELAYED RELEASE ORAL at 06:45

## 2019-08-08 NOTE — CONSULT NOTE ADULT - ASSESSMENT
8/8/19:  Pt with above history and syncope aggravated by her dehydration and ETOH along with her usual home stress.  She is eager to go home and stable from a cardiac standpoint so far for possible discharge if ok with medicine.  Will arrange for a multi-week home telemetry unit to continue to monitor her for arrhythmias.  Continue her other meds and treatment as outlined by medicine etal with continued hydration.  Will follow asa an outpt.

## 2019-08-08 NOTE — DISCHARGE NOTE PROVIDER - HOSPITAL COURSE
cc: Passed out.    History of Present Illness:     73 year old female with history of Arrhythmia, Arthritis/Osteoarthritis, Fatty liver, GERD (gastroesophageal reflux disease), High cholesterol, Hypertension, pancreatitis and hypercalcemia s/p one parathyroid removal 03/2019 presented to ED via ambulance from home after an episode of passing out while sitting on toilet seat at home tonight. Patient states she got up to go to the bathroom and then fond herself on the ground. She states she has not been feeling well all week,  mild myalgias, no fever, no recent travel. Patient denies preceding chest pain, headache or dizziness. No bleeding or diarrhea. No fever, chills, shortness of breath. Patient has been feeling tired which is unusual and exercised for an hour on Monday after not feeling well last Thursday.         8/8: No tele events. Pt feeling well, HD stable.  She is eager to go home.  Blood work without acute metabolic, electrolyte abnormalities.  Infectious process ruled out.  Pt cleared by cardio for d/c with outpatient cardiac monitoring.  CT head and chest unrevealing for acute pathology.         REVIEW OF SYSTEMS: All other review of systems is negative unless indicated above.        Vital Signs Last 24 Hrs    T(C): 36.4 (08 Aug 2019 06:40), Max: 36.7 (07 Aug 2019 20:18)    T(F): 97.5 (08 Aug 2019 06:40), Max: 98 (07 Aug 2019 20:18)    HR: 62 (08 Aug 2019 06:40) (62 - 66)    BP: 163/83 (08 Aug 2019 06:40) (147/61 - 163/83)    BP(mean): --    RR: 18 (08 Aug 2019 06:40) (18 - 18)    SpO2: 98% (08 Aug 2019 06:40) (98% - 100%)        PHYSICAL EXAM:        Constitutional: NAD, awake and alert, well-developed    HEENT: PERR, EOMI, Normal Hearing, MMM    Neck: Soft and supple    Respiratory: Breath sounds are clear bilaterally, No wheezing, rales or rhonchi    Cardiovascular: S1 and S2, regular rate and rhythm, no Murmurs, gallops or rubs    Gastrointestinal: Bowel Sounds present, soft, nontender, nondistended, no guarding, no rebound    Extremities: No peripheral edema    Neurological: A/O x 3, no focal deficits in my limited exam    Skin: No rashes        meds/labs: Reviewed        Assessment and Plan:  73 year old female with history of Arrhythmia, Arthritis/Osteoarthritis, Fatty liver, GERD (gastroesophageal reflux disease), High cholesterol, Hypertension, pancreatitis and hypercalcemia s/p one parathyroid removal 03/2019 presented to ED via ambulance from home after an episode of passing out while sitting on toilet seat at home tonight. Being admitted for Syncope with facial injury.         Syncope, unspecified syncope type.  Plan: Possible vasovagal etiology. CT head negative. CT chest non contrast negative was done. EKG NSR. Negative troponins. Telemetry monitoring unrevealing. Cardiology cleared for outpatient monitoring for arrythmia. No pleuritic chest pain in physically active person with no previous history of DVT/PE. d-dimer negative.        Problem/Plan - 2:    ·  Problem: Essential hypertension.  Plan: Continue medications. Monitor.         Problem/Plan - 3:    ·  Problem: Hypothyroidism, unspecified type.  Plan: Need to know synthroid dose. tsh wnl.        Problem/Plan - 4:    ·  Problem: Gastroesophageal reflux disease, esophagitis presence not specified.  Plan: PPI.         Problem/Plan - 5:    ·  Problem: High cholesterol with fatty later: statin therapy with outpatient follow up.        Problem/Plan - 6:    Problem: Fatty liver. Plan: LFTs with in normal limit.        Attending Statement: 40 minutes spent on total encounter and discharge planning.

## 2019-08-08 NOTE — CONSULT NOTE ADULT - SUBJECTIVE AND OBJECTIVE BOX
CHIEF COMPLAINT:  Patient is a 73y old  Female who presents with a chief complaint of Passed out. (07 Aug 2019 08:18)      HPI: 19:    73 year old female with history of Arrhythmia, Arthritis/Osteoarthritis, Fatty liver, GERD (gastroesophageal reflux disease), High cholesterol, Hypertension, pancreatitis and hypercalcemia s/p one parathyroid removal 2019 presented to ED via ambulance from home after an episode of passing out while sitting on toilet seat at home tonight. Patient states she got up to go to the bathroom and then fond herself on the ground. She states she has not been feeling well all week,  mild myalgias, no fever, no recent travel. Patient denies preceding chest pain, headache or dizziness. No bleeding or diarrhea. No fever, chills, shortness of breath. Patient has been feeling tired which is unusual and exercised for an hour on Monday after not feeling well last Thursday.  On the monitor on Telemetry, no significant clemente/tachyarrhythmias seen.  She is eager to go home.  She had a recent stress echo in my office showing no stress echo evidence for ischemia and an echo yesterday, here showing normal LV systolic function with LVEF=65-70 with insignificant MR and TR and mild-moderate pulmonary HTN as before.  She has been working out and had ETOH shortly before her syncopal event and has not been drinking much water and was likely dehydrated.        PMHx:  PAST MEDICAL & SURGICAL HISTORY:  Hypothyroidism  Osteoarthritis  Pancreatitis  Fatty liver  Arthritis  GERD (gastroesophageal reflux disease)  High cholesterol  Hypertension  S/P left cataract extraction  History of laminectomy: with resection  History of tonsillectomy  History of   History of back surgery      FAMILY HISTORY:   FAMILY HISTORY:  Family history of AIDS (Sibling): brother   Family history of CHF (congestive heart failure): Father   Family history of diabetes mellitus: Both parents -   Family history of breast cancer: Mother -  at age 99      ALLERGIES:  Allergies  adhesives (Rash)  Betadine (Unknown)  Sudafed (Other)        REVIEW OF SYSTEMS:    CONSTITUTIONAL: No fevers or chills  EYES/ENT: No visual changes;  No vertigo or throat pain   NECK: No pain or stiffness  RESPIRATORY: No cough, wheezing, hemoptysis; No shortness of breath  CARDIOVASCULAR: No chest pain or palpitations  GASTROINTESTINAL: No abdominal or epigastric pain. No nausea, vomiting, or hematemesis; No diarrhea or constipation. No melena or hematochezia.  GENITOURINARY: No dysuria, frequency or hematuria  NEUROLOGICAL: No numbness  SKIN: No itching, burning, rashes, or lesions   All other review of systems is negative unless indicated above    Vital Signs Last 24 Hrs  T(C): 36.7 (07 Aug 2019 20:18), Max: 36.8 (07 Aug 2019 11:00)  T(F): 98 (07 Aug 2019 20:18), Max: 98.2 (07 Aug 2019 11:00)  HR: 66 (07 Aug 2019 20:18) (65 - 66)  BP: 147/61 (07 Aug 2019 20:18) (132/78 - 147/61)  BP(mean): --  RR: 18 (07 Aug 2019 20:18) (17 - 18)  SpO2: 100% (07 Aug 2019 20:18) (97% - 100%)        PHYSICAL EXAM:   Constitutional: NAD, awake and alert, well-developed  HEENT: PERR, EOMI, Normal Hearing, MMM  Neck: Soft and supple, No LAD, No JVD  Respiratory: Breath sounds are clear bilaterally, No wheezing, rales or rhonchi  Cardiovascular: S1 and S2, regular rate and rhythm, soft UZAIR at LLSB and base as before, no gallops or rubs  Gastrointestinal: Bowel Sounds present, soft, nontender, nondistended, no guarding, no rebound  Extremities: No peripheral edema  Vascular: 2+ peripheral pulses  Neurological: A/O x 3, no focal deficits  Musculoskeletal: 5/5 strength b/l upper and lower extremities  Skin: No rashes      MEDICATIONS  (STANDING):  aspirin enteric coated 81 milliGRAM(s) Oral daily  enoxaparin Injectable 40 milliGRAM(s) SubCutaneous daily  metoprolol succinate ER 25 milliGRAM(s) Oral daily  pantoprazole    Tablet 40 milliGRAM(s) Oral before breakfast  simvastatin 20 milliGRAM(s) Oral at bedtime      LABS: All Labs Reviewed:                        12.7   7.76  )-----------( 217      ( 07 Aug 2019 02:10 )             38.0     08-07    141  |  109<H>  |  14  ----------------------------<  97  3.6   |  25  |  1.19    Ca    8.9      07 Aug 2019 02:10  Mg     2.0         TPro  7.2  /  Alb  3.8  /  TBili  0.3  /  DBili  x   /  AST  32  /  ALT  34  /  AlkPhos  42      PT/INR - ( 07 Aug 2019 02:10 )   PT: 10.6 sec;   INR: 0.96 ratio         PTT - ( 07 Aug 2019 02:10 )  PTT:31.4 sec  CARDIAC MARKERS ( 07 Aug 2019 13:07 )  <0.015 ng/mL / x     / x     / x     / x      CARDIAC MARKERS ( 07 Aug 2019 11:10 )  <0.015 ng/mL / x     / x     / x     / x      CARDIAC MARKERS ( 07 Aug 2019 05:41 )  <0.015 ng/mL / x     / x     / x     / x      CARDIAC MARKERS ( 07 Aug 2019 02:10 )  <0.015 ng/mL / x     / x     / x     / x          Serum Pro-Brain Natriuretic Peptide: 139 pg/mL ( @ 02:10)      BLOOD CULTURES:     LIPID PROFILE lipid profile                           @ 11:10  cholesterol           151 mg/dL  Direct LDL            62 mg/dL  HDL cholesterol       81 mg/dL  HDL/Total cholesterol --  Triglycerides, serum  38 mg/dL      RADIOLOGY:    CT of Chest: 19:  FINDINGS:  LUNGS AND AIRWAYS: Patent central airways.  No lung consolidation, suspicious nodule, or mass. Bibasilar dependent and platelike atelectasis. Punctate calcified granuloma in the right lower lobe.  PLEURA: No pleural effusion.  MEDIASTINUM AND DELFINO: No lymphadenopathy.  VESSELS: Normal caliber thoracic aorta and main pulmonary artery. Mild atherosclerotic calcification of the thoracic aorta.  HEART: Heart size is normal. No pericardial effusion.  CHEST WALL AND LOWER NECK: Surgical clip in the left side of the neck.  VISUALIZED UPPER ABDOMEN: Left kidney not well visualized.  BONES: Degenerative changes of the spine.  IMPRESSION:   No acute parenchymal or pleural lung disease.      CT of Brain: 19:  FINDINGS:  There is no acute intra-axial or extra-axial hemorrhage. There is no mass effect or shift of the midline. The basal cisterns are not effaced. There is mild cerebral and cerebellar volume loss with prominence of the ventricles, sulci, and cerebellar folia. There is symmetric prominence of the extra-axial spaces. A CSF filled sella turcica is noted. Minimal chronic ischemic changes are seen in the frontoparietal white matter. There is no CT evidence of an acute vascular territorial infarct. There are atherosclerotic calcifications of the intracranial carotid arteries.  The regional soft tissues and osseous structures are unremarkable apart from evidence of bilateral lens surgery. The visualized paranasal sinuses and tympanic/mastoid cavities are clear apart from minimal bilateral ethmoid mucosal thickening.  IMPRESSION:  No acute intracranial hemorrhage, mass effect, or CT evidence of an acute vascular territorial infarct.      CXR: 19:  Findings:   Lungs are clear. No pleural effusions. Cardiomediastinal silhouette is indeterminate in size due to technique. Bones are unremarkable.  IMPRESSION:  No radiographic evidence of active cardiopulmonary disease      EK19:  Normal sinus rhythm  Nonspecific ST and T wave abnormality  Otherwise WNL    TELEMETRY:  NSR    ECHO:  19:  M-Mode Measurements (cm)   LVEDd: 5.21 cm            LVESd: 2.43 cm   IVSEd: 0.96 cm   LVPWd: 1.1 cm             AO Root Dimension: 2.4 cm                        ACS: 2 cm    Doppler Measurements:                                  MV Peak E-Wave: 98.2 cm/s   TR Velocity:324 cm/s           MV Peak A-Wave: 96.3 cm/s   TR Gradient:41.9904 mmHg       MV E/A Ratio: 1.02 %   Estimated RAP:10 mmHg          MV Peak Gradient: 3.86 mmHg   RVSP:41 mmHg     Findings   Mitral Valve   Normal appearing mitral valve structure and function.   Trace mitral regurgitation is present.   EA reversal of the mitral inflow consistent with reduced compliance of the left ventricle     Aortic Valve   Mild aortic sclerosis is present with normal valvular opening.   Trace aortic regurgitation is present.     Tricuspid Valve   Mild (1+) tricuspid valve regurgitation is present.   Moderate pulmonary hypertension.     Pulmonic Valve   Normal appearing pulmonic valve structure and function.     Left Atrium   The left atrium is mildly dilated.     Left Ventricle   The left ventricle is normal in size, wall thickness, wall motion and contractility.   Estimated left ventricular ejection fraction is 65-70 %.     Right Atrium   Normal appearing right atrium.     Right Ventricle   Normal appearing right ventricle structure and function.     Pericardial Effusion   No evidence of pericardial effusion.     Pleural Effusion   No evidence of pleural effusion.     Miscellaneous   All visualized extra cardiac structures appears to be normal.     Summary   Mild aortic sclerosis is present with normal valvular opening.   Trace aortic regurgitation is present.   The left atrium is mildly dilated.   The left ventricle is normal in size, wall thickness, wall motion and contractility.   Estimated left ventricular ejection fraction is 65-70 %.   All visualized extra cardiac structures appears to be normal.   Normal appearing mitral valve structure and function.   Trace mitral regurgitation is present.  EA reversal of the mitral inflow consistent with reduced compliance of the left ventricle   No evidence of pericardial effusion.   No evidence of pleural effusion.   Normal appearing pulmonic valve structure and function.   Normal appearing right atrium.   Normal appearing right ventricle structure and function.   Mild (1+) tricuspid valve regurgitation is present.   Moderate pulmonary hypertension.

## 2019-08-08 NOTE — DISCHARGE NOTE PROVIDER - CARE PROVIDER_API CALL
Keshawn Neely)  Cardiovascular Disease; Internal Medicine  [address] [facility] [address]  [place] [address]  Phone: [phone]  Fax: [phone]  Follow Up Time: 1 week

## 2019-08-08 NOTE — DISCHARGE NOTE PROVIDER - NSDCCPCAREPLAN_GEN_ALL_CORE_FT
PRINCIPAL DISCHARGE DIAGNOSIS  Diagnosis: Syncope  Assessment and Plan of Treatment: probable vasovagal.  No acute neurological or cardiac abnormalities found.  Follow up with your cardiologist.      SECONDARY DISCHARGE DIAGNOSES  Diagnosis: Fatty liver  Assessment and Plan of Treatment: Diet modification and close outpatient follow up.

## 2019-08-08 NOTE — DISCHARGE NOTE NURSING/CASE MANAGEMENT/SOCIAL WORK - NSDCPEPTSTRK_GEN_ALL_CORE
Signs and symptoms of stroke/Stroke warning signs and symptoms/Stroke support groups for patients, families, and friends/Call 911 for stroke/Prescribed medications/Risk factors for stroke/Need for follow up after discharge/Stroke education booklet

## 2019-08-08 NOTE — DISCHARGE NOTE NURSING/CASE MANAGEMENT/SOCIAL WORK - NSDCDPATPORTLINK_GEN_ALL_CORE
You can access the [facility], offered by [facility], by registering with the following website: [email or web address]

## 2019-08-19 DIAGNOSIS — E86.0 DEHYDRATION: ICD-10-CM

## 2019-08-19 DIAGNOSIS — I10 ESSENTIAL (PRIMARY) HYPERTENSION: ICD-10-CM

## 2019-08-19 DIAGNOSIS — I49.9 CARDIAC ARRHYTHMIA, UNSPECIFIED: ICD-10-CM
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
## 2019-08-19 DIAGNOSIS — K21.9 GASTRO-ESOPHAGEAL REFLUX DISEASE WITHOUT ESOPHAGITIS: ICD-10-CM
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
## 2019-08-19 DIAGNOSIS — R55 SYNCOPE AND COLLAPSE: ICD-10-CM

## 2019-08-19 DIAGNOSIS — K76.0 FATTY (CHANGE OF) LIVER, NOT ELSEWHERE CLASSIFIED: ICD-10-CM
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
## 2019-08-19 DIAGNOSIS — E03.9 HYPOTHYROIDISM, UNSPECIFIED: ICD-10-CM

## 2019-08-19 DIAGNOSIS — E83.52 HYPERCALCEMIA: ICD-10-CM

## 2019-08-19 DIAGNOSIS — F43.9 REACTION TO SEVERE STRESS, UNSPECIFIED: ICD-10-CM

## 2019-08-19 DIAGNOSIS — Z88.8 ALLERGY STATUS TO OTHER DRUGS, MEDICAMENTS AND BIOLOGICAL SUBSTANCES STATUS: ICD-10-CM
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
## 2019-08-19 DIAGNOSIS — E89.2 POSTPROCEDURAL HYPOPARATHYROIDISM: ICD-10-CM

## 2019-08-19 DIAGNOSIS — Z91.048 OTHER NONMEDICINAL SUBSTANCE ALLERGY STATUS: ICD-10-CM

## 2019-08-19 DIAGNOSIS — S09.93XA UNSPECIFIED INJURY OF FACE, INITIAL ENCOUNTER: ICD-10-CM

## 2019-08-19 DIAGNOSIS — M19.90 UNSPECIFIED OSTEOARTHRITIS, UNSPECIFIED SITE: ICD-10-CM

## 2019-09-27 NOTE — ED PROVIDER NOTE - MEDICAL DECISION MAKING DETAILS
EKG nonischemic.  Troponin x 2.  CXR clear.  Pt feels better now.  Unlikely ACS.  Will have pt f/u with her PCP/Cardiologist.
No

## 2020-03-14 DIAGNOSIS — K21.9 GASTRO-ESOPHAGEAL REFLUX DISEASE W/OUT ESOPHAGITIS: ICD-10-CM

## 2020-09-01 ENCOUNTER — EMERGENCY (EMERGENCY)
Facility: HOSPITAL | Age: 75
LOS: 0 days | Discharge: HOME | End: 2020-09-01
Attending: EMERGENCY MEDICINE
Payer: MEDICARE

## 2020-09-01 VITALS
HEART RATE: 79 BPM | SYSTOLIC BLOOD PRESSURE: 167 MMHG | RESPIRATION RATE: 19 BRPM | TEMPERATURE: 100 F | OXYGEN SATURATION: 99 % | DIASTOLIC BLOOD PRESSURE: 80 MMHG

## 2020-09-01 VITALS — WEIGHT: 160.06 LBS | HEIGHT: 64 IN

## 2020-09-01 DIAGNOSIS — M19.90 UNSPECIFIED OSTEOARTHRITIS, UNSPECIFIED SITE: ICD-10-CM

## 2020-09-01 DIAGNOSIS — R21 RASH AND OTHER NONSPECIFIC SKIN ERUPTION: ICD-10-CM

## 2020-09-01 DIAGNOSIS — Z98.89 OTHER SPECIFIED POSTPROCEDURAL STATES: Chronic | ICD-10-CM

## 2020-09-01 DIAGNOSIS — B34.9 VIRAL INFECTION, UNSPECIFIED: ICD-10-CM

## 2020-09-01 DIAGNOSIS — R19.7 DIARRHEA, UNSPECIFIED: ICD-10-CM

## 2020-09-01 DIAGNOSIS — E78.5 HYPERLIPIDEMIA, UNSPECIFIED: ICD-10-CM

## 2020-09-01 DIAGNOSIS — Z98.42 CATARACT EXTRACTION STATUS, LEFT EYE: Chronic | ICD-10-CM

## 2020-09-01 DIAGNOSIS — E03.9 HYPOTHYROIDISM, UNSPECIFIED: ICD-10-CM

## 2020-09-01 DIAGNOSIS — R50.9 FEVER, UNSPECIFIED: ICD-10-CM

## 2020-09-01 DIAGNOSIS — I10 ESSENTIAL (PRIMARY) HYPERTENSION: ICD-10-CM

## 2020-09-01 DIAGNOSIS — Z90.89 ACQUIRED ABSENCE OF OTHER ORGANS: Chronic | ICD-10-CM

## 2020-09-01 DIAGNOSIS — Z98.890 OTHER SPECIFIED POSTPROCEDURAL STATES: Chronic | ICD-10-CM

## 2020-09-01 DIAGNOSIS — K21.9 GASTRO-ESOPHAGEAL REFLUX DISEASE WITHOUT ESOPHAGITIS: ICD-10-CM

## 2020-09-01 LAB
APPEARANCE UR: CLEAR — SIGNIFICANT CHANGE UP
BASOPHILS # BLD AUTO: 0.01 K/UL — SIGNIFICANT CHANGE UP (ref 0–0.2)
BASOPHILS NFR BLD AUTO: 0.2 % — SIGNIFICANT CHANGE UP (ref 0–2)
BILIRUB UR-MCNC: NEGATIVE — SIGNIFICANT CHANGE UP
COLOR SPEC: YELLOW — SIGNIFICANT CHANGE UP
DIFF PNL FLD: ABNORMAL
EOSINOPHIL # BLD AUTO: 0.01 K/UL — SIGNIFICANT CHANGE UP (ref 0–0.5)
EOSINOPHIL NFR BLD AUTO: 0.2 % — SIGNIFICANT CHANGE UP (ref 0–6)
GLUCOSE UR QL: NEGATIVE MG/DL — SIGNIFICANT CHANGE UP
HCT VFR BLD CALC: 40.9 % — SIGNIFICANT CHANGE UP (ref 34.5–45)
HGB BLD-MCNC: 13.7 G/DL — SIGNIFICANT CHANGE UP (ref 11.5–15.5)
IMM GRANULOCYTES NFR BLD AUTO: 0.2 % — SIGNIFICANT CHANGE UP (ref 0–1.5)
KETONES UR-MCNC: NEGATIVE — SIGNIFICANT CHANGE UP
LEUKOCYTE ESTERASE UR-ACNC: ABNORMAL
LYMPHOCYTES # BLD AUTO: 1.44 K/UL — SIGNIFICANT CHANGE UP (ref 1–3.3)
LYMPHOCYTES # BLD AUTO: 31.6 % — SIGNIFICANT CHANGE UP (ref 13–44)
MCHC RBC-ENTMCNC: 30.2 PG — SIGNIFICANT CHANGE UP (ref 27–34)
MCHC RBC-ENTMCNC: 33.5 GM/DL — SIGNIFICANT CHANGE UP (ref 32–36)
MCV RBC AUTO: 90.3 FL — SIGNIFICANT CHANGE UP (ref 80–100)
MONOCYTES # BLD AUTO: 0.34 K/UL — SIGNIFICANT CHANGE UP (ref 0–0.9)
MONOCYTES NFR BLD AUTO: 7.5 % — SIGNIFICANT CHANGE UP (ref 2–14)
NEUTROPHILS # BLD AUTO: 2.75 K/UL — SIGNIFICANT CHANGE UP (ref 1.8–7.4)
NEUTROPHILS NFR BLD AUTO: 60.3 % — SIGNIFICANT CHANGE UP (ref 43–77)
NITRITE UR-MCNC: NEGATIVE — SIGNIFICANT CHANGE UP
PH UR: 7 — SIGNIFICANT CHANGE UP (ref 5–8)
PLATELET # BLD AUTO: 212 K/UL — SIGNIFICANT CHANGE UP (ref 150–400)
PROT UR-MCNC: 30 MG/DL
RBC # BLD: 4.53 M/UL — SIGNIFICANT CHANGE UP (ref 3.8–5.2)
RBC # FLD: 13.3 % — SIGNIFICANT CHANGE UP (ref 10.3–14.5)
SARS-COV-2 RNA SPEC QL NAA+PROBE: SIGNIFICANT CHANGE UP
SP GR SPEC: 1 — LOW (ref 1.01–1.02)
UROBILINOGEN FLD QL: NEGATIVE MG/DL — SIGNIFICANT CHANGE UP
WBC # BLD: 4.56 K/UL — SIGNIFICANT CHANGE UP (ref 3.8–10.5)
WBC # FLD AUTO: 4.56 K/UL — SIGNIFICANT CHANGE UP (ref 3.8–10.5)

## 2020-09-01 PROCEDURE — 99284 EMERGENCY DEPT VISIT MOD MDM: CPT

## 2020-09-01 PROCEDURE — 71046 X-RAY EXAM CHEST 2 VIEWS: CPT | Mod: 26

## 2020-09-01 PROCEDURE — 83605 ASSAY OF LACTIC ACID: CPT

## 2020-09-01 PROCEDURE — 93005 ELECTROCARDIOGRAM TRACING: CPT

## 2020-09-01 PROCEDURE — 71046 X-RAY EXAM CHEST 2 VIEWS: CPT

## 2020-09-01 PROCEDURE — 87086 URINE CULTURE/COLONY COUNT: CPT

## 2020-09-01 PROCEDURE — 93010 ELECTROCARDIOGRAM REPORT: CPT

## 2020-09-01 PROCEDURE — U0003: CPT

## 2020-09-01 PROCEDURE — 36415 COLL VENOUS BLD VENIPUNCTURE: CPT

## 2020-09-01 PROCEDURE — 80053 COMPREHEN METABOLIC PANEL: CPT

## 2020-09-01 PROCEDURE — 85025 COMPLETE CBC W/AUTO DIFF WBC: CPT

## 2020-09-01 PROCEDURE — 99284 EMERGENCY DEPT VISIT MOD MDM: CPT | Mod: 25

## 2020-09-01 PROCEDURE — 81001 URINALYSIS AUTO W/SCOPE: CPT

## 2020-09-01 PROCEDURE — 83735 ASSAY OF MAGNESIUM: CPT

## 2020-09-01 PROCEDURE — 87040 BLOOD CULTURE FOR BACTERIA: CPT | Mod: 91

## 2020-09-01 PROCEDURE — 84100 ASSAY OF PHOSPHORUS: CPT

## 2020-09-01 RX ORDER — ACETAMINOPHEN 500 MG
1000 TABLET ORAL ONCE
Refills: 0 | Status: COMPLETED | OUTPATIENT
Start: 2020-09-01 | End: 2020-09-01

## 2020-09-01 RX ORDER — SODIUM CHLORIDE 9 MG/ML
1000 INJECTION INTRAMUSCULAR; INTRAVENOUS; SUBCUTANEOUS ONCE
Refills: 0 | Status: COMPLETED | OUTPATIENT
Start: 2020-09-01 | End: 2020-09-01

## 2020-09-01 RX ADMIN — SODIUM CHLORIDE 2000 MILLILITER(S): 9 INJECTION INTRAMUSCULAR; INTRAVENOUS; SUBCUTANEOUS at 14:24

## 2020-09-01 RX ADMIN — Medication 1000 MILLIGRAM(S): at 14:24

## 2020-09-01 NOTE — ED STATDOCS - ATTENDING CONTRIBUTION TO CARE
I, Modesta Flores MD,  performed the initial face to face bedside interview with this patient regarding history of present illness, review of symptoms and relevant past medical, social and family history.  I completed an independent physical examination.  I was the initial provider who evaluated this patient. I have signed out the follow up of any pending tests (i.e. labs, radiological studies) to the ACP.  I have communicated the patient’s plan of care and disposition with the ACP.  The history, relevant review of systems, past medical and surgical history, medical decision making, and physical examination was documented by the scribe in my presence and I attest to the accuracy of the documentation.

## 2020-09-01 NOTE — ED STATDOCS - OBJECTIVE STATEMENT
74 y/o female with a PMHx of arthritis, fatty liver, GERD, HLD, HTN, hypothyroid, osteoarthritis, pancreatitis, presents to the ED c/o fever (Tmax 100.2). +rash, +sores in mouth, +HA, +diarrhea. Denies cough. No recent travel. No known sick contacts. No other complaints at this time. PCP: Dr. Neely.

## 2020-09-01 NOTE — ED STATDOCS - ENMT, MLM
Nasal mucosa clear.  Canker shores right lower lip and back of tongue.  Throat has no vesicles, no oropharyngeal exudates and uvula is midline.

## 2020-09-01 NOTE — ED STATDOCS - PROGRESS NOTE DETAILS
Patient seen and evaluated s/p workup.  Feeling better.  TOlerating PO of sandwich.  No acute findings, possibly mild UTI, patient denies symptoms and would like to wait for culture results to confirm if she needs abx.  COVID test pending.  Patient likely with a viral syndrome.  Return precautions for new or worsening symptoms, persistent fevers reviewed with patient -Blake Oden PA-C

## 2020-09-01 NOTE — ED ADULT NURSE NOTE - OBJECTIVE STATEMENT
Patient presents to the ER not feeling well for the past week with fever, diarrhea, nausea, headaches, Canker sores in the mouth. She states that she has not been feeling well for the past week but fever started today and states it was 102 orally at home. Patient has red blotches on both of her arms and also on her face. Patient denies any SOB, and chest pain. Patient rates a pain score of a 3 with regards to her headache that is intermediate pain.

## 2020-09-01 NOTE — ED ADULT TRIAGE NOTE - CHIEF COMPLAINT QUOTE
c/o fatigue, chills, abdominal pain, rash to face and arms, c/o low grade temp 100.2 today, denies cough and sore throat

## 2020-09-02 LAB
CULTURE RESULTS: SIGNIFICANT CHANGE UP
SPECIMEN SOURCE: SIGNIFICANT CHANGE UP

## 2020-09-02 NOTE — ASU PREOP CHECKLIST - BP NONINVASIVE DIASTOLIC (MM HG)
Called and spoke to mom, scheduled virtual visit for this Friday 9/4/20 at [phone] in [place] office, mom states that she understands apt time and virtual visit instructions
Please schedule for virtual visit
57

## 2020-09-06 LAB
CULTURE RESULTS: SIGNIFICANT CHANGE UP
CULTURE RESULTS: SIGNIFICANT CHANGE UP
SPECIMEN SOURCE: SIGNIFICANT CHANGE UP
SPECIMEN SOURCE: SIGNIFICANT CHANGE UP

## 2020-09-15 ENCOUNTER — EMERGENCY (EMERGENCY)
Facility: HOSPITAL | Age: 75
LOS: 0 days | Discharge: HOME | End: 2020-09-15
Attending: STUDENT IN AN ORGANIZED HEALTH CARE EDUCATION/TRAINING PROGRAM
Payer: MEDICARE

## 2020-09-15 VITALS — HEIGHT: 64 IN

## 2020-09-15 VITALS — TEMPERATURE: 99 F

## 2020-09-15 DIAGNOSIS — E78.5 HYPERLIPIDEMIA, UNSPECIFIED: ICD-10-CM

## 2020-09-15 DIAGNOSIS — Z98.89 OTHER SPECIFIED POSTPROCEDURAL STATES: Chronic | ICD-10-CM

## 2020-09-15 DIAGNOSIS — K21.9 GASTRO-ESOPHAGEAL REFLUX DISEASE WITHOUT ESOPHAGITIS: ICD-10-CM

## 2020-09-15 DIAGNOSIS — Z98.890 OTHER SPECIFIED POSTPROCEDURAL STATES: Chronic | ICD-10-CM

## 2020-09-15 DIAGNOSIS — H53.8 OTHER VISUAL DISTURBANCES: ICD-10-CM

## 2020-09-15 DIAGNOSIS — R53.83 OTHER FATIGUE: ICD-10-CM

## 2020-09-15 DIAGNOSIS — Z98.42 CATARACT EXTRACTION STATUS, LEFT EYE: Chronic | ICD-10-CM

## 2020-09-15 DIAGNOSIS — I10 ESSENTIAL (PRIMARY) HYPERTENSION: ICD-10-CM

## 2020-09-15 DIAGNOSIS — Z90.89 ACQUIRED ABSENCE OF OTHER ORGANS: Chronic | ICD-10-CM

## 2020-09-15 DIAGNOSIS — R51 HEADACHE: ICD-10-CM

## 2020-09-15 DIAGNOSIS — E03.9 HYPOTHYROIDISM, UNSPECIFIED: ICD-10-CM

## 2020-09-15 DIAGNOSIS — R53.1 WEAKNESS: ICD-10-CM

## 2020-09-15 DIAGNOSIS — M19.90 UNSPECIFIED OSTEOARTHRITIS, UNSPECIFIED SITE: ICD-10-CM

## 2020-09-15 LAB
ADD ON TEST-SPECIMEN IN LAB: SIGNIFICANT CHANGE UP
ALBUMIN SERPL ELPH-MCNC: 4 G/DL — SIGNIFICANT CHANGE UP (ref 3.3–5)
ALP SERPL-CCNC: 46 U/L — SIGNIFICANT CHANGE UP (ref 40–120)
ALT FLD-CCNC: 35 U/L — SIGNIFICANT CHANGE UP (ref 12–78)
ANION GAP SERPL CALC-SCNC: 5 MMOL/L — SIGNIFICANT CHANGE UP (ref 5–17)
APPEARANCE UR: CLEAR — SIGNIFICANT CHANGE UP
AST SERPL-CCNC: 19 U/L — SIGNIFICANT CHANGE UP (ref 15–37)
BASOPHILS # BLD AUTO: 0.01 K/UL — SIGNIFICANT CHANGE UP (ref 0–0.2)
BASOPHILS NFR BLD AUTO: 0.1 % — SIGNIFICANT CHANGE UP (ref 0–2)
BILIRUB SERPL-MCNC: 0.4 MG/DL — SIGNIFICANT CHANGE UP (ref 0.2–1.2)
BILIRUB UR-MCNC: NEGATIVE — SIGNIFICANT CHANGE UP
BUN SERPL-MCNC: 8 MG/DL — SIGNIFICANT CHANGE UP (ref 7–23)
CALCIUM SERPL-MCNC: 9.3 MG/DL — SIGNIFICANT CHANGE UP (ref 8.5–10.1)
CHLORIDE SERPL-SCNC: 102 MMOL/L — SIGNIFICANT CHANGE UP (ref 96–108)
CO2 SERPL-SCNC: 27 MMOL/L — SIGNIFICANT CHANGE UP (ref 22–31)
COLOR SPEC: YELLOW — SIGNIFICANT CHANGE UP
CREAT SERPL-MCNC: 0.8 MG/DL — SIGNIFICANT CHANGE UP (ref 0.5–1.3)
DIFF PNL FLD: ABNORMAL
EOSINOPHIL # BLD AUTO: 0.02 K/UL — SIGNIFICANT CHANGE UP (ref 0–0.5)
EOSINOPHIL NFR BLD AUTO: 0.2 % — SIGNIFICANT CHANGE UP (ref 0–6)
GLUCOSE SERPL-MCNC: 90 MG/DL — SIGNIFICANT CHANGE UP (ref 70–99)
GLUCOSE UR QL: NEGATIVE MG/DL — SIGNIFICANT CHANGE UP
HCT VFR BLD CALC: 40.4 % — SIGNIFICANT CHANGE UP (ref 34.5–45)
HGB BLD-MCNC: 13.6 G/DL — SIGNIFICANT CHANGE UP (ref 11.5–15.5)
HIV 1 & 2 AB SERPL IA.RAPID: SIGNIFICANT CHANGE UP
IMM GRANULOCYTES NFR BLD AUTO: 0.2 % — SIGNIFICANT CHANGE UP (ref 0–1.5)
KETONES UR-MCNC: NEGATIVE — SIGNIFICANT CHANGE UP
LACTATE SERPL-SCNC: 1.5 MMOL/L — SIGNIFICANT CHANGE UP (ref 0.7–2)
LEUKOCYTE ESTERASE UR-ACNC: NEGATIVE — SIGNIFICANT CHANGE UP
LIDOCAIN IGE QN: 434 U/L — HIGH (ref 73–393)
LYMPHOCYTES # BLD AUTO: 1.81 K/UL — SIGNIFICANT CHANGE UP (ref 1–3.3)
LYMPHOCYTES # BLD AUTO: 20.2 % — SIGNIFICANT CHANGE UP (ref 13–44)
MAGNESIUM SERPL-MCNC: 2.2 MG/DL — SIGNIFICANT CHANGE UP (ref 1.6–2.6)
MCHC RBC-ENTMCNC: 30.3 PG — SIGNIFICANT CHANGE UP (ref 27–34)
MCHC RBC-ENTMCNC: 33.7 GM/DL — SIGNIFICANT CHANGE UP (ref 32–36)
MCV RBC AUTO: 90 FL — SIGNIFICANT CHANGE UP (ref 80–100)
MONOCYTES # BLD AUTO: 0.55 K/UL — SIGNIFICANT CHANGE UP (ref 0–0.9)
MONOCYTES NFR BLD AUTO: 6.1 % — SIGNIFICANT CHANGE UP (ref 2–14)
NEUTROPHILS # BLD AUTO: 6.54 K/UL — SIGNIFICANT CHANGE UP (ref 1.8–7.4)
NEUTROPHILS NFR BLD AUTO: 73.2 % — SIGNIFICANT CHANGE UP (ref 43–77)
NITRITE UR-MCNC: NEGATIVE — SIGNIFICANT CHANGE UP
PH UR: 7 — SIGNIFICANT CHANGE UP (ref 5–8)
PHOSPHATE SERPL-MCNC: 2.8 MG/DL — SIGNIFICANT CHANGE UP (ref 2.5–4.5)
PLATELET # BLD AUTO: 411 K/UL — HIGH (ref 150–400)
POTASSIUM SERPL-MCNC: 3.7 MMOL/L — SIGNIFICANT CHANGE UP (ref 3.5–5.3)
POTASSIUM SERPL-SCNC: 3.7 MMOL/L — SIGNIFICANT CHANGE UP (ref 3.5–5.3)
PROT SERPL-MCNC: 8.1 GM/DL — SIGNIFICANT CHANGE UP (ref 6–8.3)
PROT UR-MCNC: NEGATIVE MG/DL — SIGNIFICANT CHANGE UP
RBC # BLD: 4.49 M/UL — SIGNIFICANT CHANGE UP (ref 3.8–5.2)
RBC # FLD: 12.6 % — SIGNIFICANT CHANGE UP (ref 10.3–14.5)
SARS-COV-2 RNA SPEC QL NAA+PROBE: SIGNIFICANT CHANGE UP
SODIUM SERPL-SCNC: 134 MMOL/L — LOW (ref 135–145)
SP GR SPEC: 1.01 — SIGNIFICANT CHANGE UP (ref 1.01–1.02)
UROBILINOGEN FLD QL: NEGATIVE MG/DL — SIGNIFICANT CHANGE UP
WBC # BLD: 8.95 K/UL — SIGNIFICANT CHANGE UP (ref 3.8–10.5)
WBC # FLD AUTO: 8.95 K/UL — SIGNIFICANT CHANGE UP (ref 3.8–10.5)

## 2020-09-15 PROCEDURE — 85025 COMPLETE CBC W/AUTO DIFF WBC: CPT

## 2020-09-15 PROCEDURE — 71046 X-RAY EXAM CHEST 2 VIEWS: CPT

## 2020-09-15 PROCEDURE — 70450 CT HEAD/BRAIN W/O DYE: CPT | Mod: 26

## 2020-09-15 PROCEDURE — 71046 X-RAY EXAM CHEST 2 VIEWS: CPT | Mod: 26,CS

## 2020-09-15 PROCEDURE — 96360 HYDRATION IV INFUSION INIT: CPT

## 2020-09-15 PROCEDURE — 83735 ASSAY OF MAGNESIUM: CPT

## 2020-09-15 PROCEDURE — 70450 CT HEAD/BRAIN W/O DYE: CPT

## 2020-09-15 PROCEDURE — 84484 ASSAY OF TROPONIN QUANT: CPT

## 2020-09-15 PROCEDURE — 36415 COLL VENOUS BLD VENIPUNCTURE: CPT

## 2020-09-15 PROCEDURE — 99284 EMERGENCY DEPT VISIT MOD MDM: CPT | Mod: 25

## 2020-09-15 PROCEDURE — 80053 COMPREHEN METABOLIC PANEL: CPT

## 2020-09-15 PROCEDURE — 83605 ASSAY OF LACTIC ACID: CPT

## 2020-09-15 PROCEDURE — 83690 ASSAY OF LIPASE: CPT

## 2020-09-15 PROCEDURE — 99284 EMERGENCY DEPT VISIT MOD MDM: CPT

## 2020-09-15 PROCEDURE — 81001 URINALYSIS AUTO W/SCOPE: CPT

## 2020-09-15 PROCEDURE — U0003: CPT

## 2020-09-15 PROCEDURE — 86703 HIV-1/HIV-2 1 RESULT ANTBDY: CPT

## 2020-09-15 PROCEDURE — 84100 ASSAY OF PHOSPHORUS: CPT

## 2020-09-15 PROCEDURE — 87086 URINE CULTURE/COLONY COUNT: CPT

## 2020-09-15 RX ORDER — SODIUM CHLORIDE 9 MG/ML
1000 INJECTION INTRAMUSCULAR; INTRAVENOUS; SUBCUTANEOUS ONCE
Refills: 0 | Status: COMPLETED | OUTPATIENT
Start: 2020-09-15 | End: 2020-09-15

## 2020-09-15 RX ADMIN — SODIUM CHLORIDE 2000 MILLILITER(S): 9 INJECTION INTRAMUSCULAR; INTRAVENOUS; SUBCUTANEOUS at 13:44

## 2020-09-15 RX ADMIN — SODIUM CHLORIDE 1000 MILLILITER(S): 9 INJECTION INTRAMUSCULAR; INTRAVENOUS; SUBCUTANEOUS at 14:45

## 2020-09-15 NOTE — ED STATDOCS - PHYSICAL EXAMINATION
PHYSICAL EXAM:   General:  appears stated age, not in extremis   HEENT: NC/AT, PERRLA, EOMI full without nystagmus, visual fields full, no oral mucosal lesions noted  right eye 20/40  left eye 20/30  Both eyes: 20/25  airway patent  Cardiovascular: regular rate and rhythm, + S1/S2, no murmurs, rubs, gallops appreciated  Respiratory: clear to auscultation bilaterally, good aeration bilaterally, nonlabored respirations  Abdominal: soft, mild epigastric TTP, nondistended, no rebound, guarding or rigidity  Back: no rashes noted  Neuro: Alert and oriented x3. CN2-12 grossly intact, Strength 4/5 in b/l upper and 5/5 b/l lower extremities. Sensation intact to light touch in upper and lower extremities. broad based gait, finger-to-nose and rapid alternating hand movements WNL.   Psychiatric: flat affect  -Breanna Miles PGY-3

## 2020-09-15 NOTE — ED STATDOCS - CLINICAL SUMMARY MEDICAL DECISION MAKING FREE TEXT BOX
76 yo F PMH HTN, HLD, GERD, arthritis p/w generalized fatigue, intermittent b/l temporal/frontal HA, blurred vision, decreased appeatitis and unsteady gait in the setting of recent fevers/rash/mouth sores. fevers/mouth sores resolved. will get labs to eval for metabolic/electrolyte abnormalities, UA/chest xray/HIV to screen for infectious etiology, although not febrile and no localizing symptoms on exam. CT head given unsteady/broad based gait, reported blurred vision and mental haziness to eval for intracranial pathology, ekg/trop given age/generalized weakness to assess for cardiac etiology. Reassess. 76 yo F PMH HTN, HLD, GERD, arthritis p/w generalized fatigue, intermittent b/l temporal/frontal HA, blurred vision, decreased appeatitis and unsteady gait in the setting of recent fevers/rash/mouth sores. fevers/mouth sores/diarrhea resolved. will get labs to eval for metabolic/electrolyte abnormalities, UA/chest xray/HIV to screen for infectious etiology, although not febrile and no localizing symptoms on exam. CT head given unsteady/broad based gait, reported blurred vision and mental haziness to eval for intracranial pathology, ekg/trop given age/generalized weakness to assess for cardiac etiology. Reassess.

## 2020-09-15 NOTE — ED STATDOCS - ATTENDING CONTRIBUTION TO CARE
I, Asif Hackett DO, personally saw the patient with Resident.  I have personally performed a face to face diagnostic evaluation on this patient.  I have reviewed the Resident note and agree with the history, exam, and plan of care, except as noted.    75 F with generalized fatigue, headache    Exam:   Gen: non-toxic  Heart: regular rate and rhythm  Lungs: clear to ausculation  Neuro: moving all extremities, CN 2-12 intact.    Check labs, XR/CT brain, re-evaluate.

## 2020-09-15 NOTE — ED STATDOCS - PROGRESS NOTE DETAILS
Breanna Miles MD PGY-3 spoke with dr joaquim rodriguez radiologist regarding calcifications seen on CT head, calcification on 2-14 is pineal gland calcification and calcifications are likely unrelated to patients current presentation. .KL labs with nonspecific thrombocytosis. Also with elevated lipase (hx of pancreatitis) but clinical picture does not support acute pancreatitis. UA neg for infection, trace blood, cxray unremarkable for acute pathology. will d/c with return precautions and pmd follow up Breanna Miles MD PGY-3  labs with nonspecific thrombocytosis. Also with elevated lipase (hx of pancreatitis) but clinical picture does not support acute pancreatitis. UA neg for infection, trace blood, cxray unremarkable for acute pathology. will d/c with return precautions and pmd follow up Breanna Miles MD PGY-3  spoke with Dr. Neely, updated him of clinical findings, agrees with plan. will send patient home and Dr. Neely will make time to see her in office in next day or so. Breanna Miles MD PGY-3  pt informed of results and possible need for TSH testing outpatient Breanna Miles MD PGY-3  pt informed of results and possible need for TSH testing outpatient. Ambulated patient in ER, states she does not feel as though she will fall.

## 2020-09-15 NOTE — ED STATDOCS - OBJECTIVE STATEMENT
74 yo F PMH HTN, HLD, GERD, arthritis p/w generalized fatigue, intermittent b/l temporal/frontal HA, blurred vision, decreased appeatitis and unsteady gait. Symptoms began 8/25, was seen here 9/1 for fever, rash, mouth sores, diarrhea. states mouth sores resolved, fever resolved. Had one episode of vomiting 76 yo F PMH HTN, HLD, GERD, arthritis p/w generalized fatigue, intermittent b/l temporal/frontal HA, blurred vision, decreased appeatitis and unsteady gait. Symptoms began 8/25, was seen here 9/1 for fever, rash, mouth sores, diarrhea. states mouth sores resolved, fever resolved. Had one episode of vomiting after discharge. Called her PMD today who told her to come to the ER. Also endorses intermittent epigastric pain which improves with pepto bismol. No chest pain, no difficulty breathing. States she feels "mentally foggy" 76 yo F PMH HTN, HLD, GERD, arthritis p/w generalized fatigue, intermittent b/l temporal/frontal HA, blurred vision, decreased appeatitis and unsteady gait. Symptoms began 8/25, was seen here 9/1 for fever, rash, mouth sores, diarrhea, now resolved. Had one episode of vomiting after discharge. Called her PMD today who told her to come to the ER. Also endorses intermittent epigastric pain which improves with pepto bismol. No chest pain, no difficulty breathing. No melena, no hematochezia. States she feels "mentally foggy"

## 2020-09-15 NOTE — ED STATDOCS - NS ED ROS FT
REVIEW OF SYSTEMS:  General:  no fever except as per HPI  HEENT: +HA, vision changes per HPI,+mucosal lesions improved  Cardiac: no chest pain, no palpitations  Respiratory: no shortness of breath  Gastrointestinal:+ abdominal pain per HPI, no nausea, + one episode of vomiting per HPI  Genitourinary: no hematuria, no dysuria, no urinary frequency, no urinary hesitancy   Neuro: no focal weakness, no numbness/tingling of the extremities, no decreased sensation, +unsteady gait  -Breanna Miles PGY-3 REVIEW OF SYSTEMS:  General:  no fever except as per HPI  HEENT: +HA, vision changes per HPI,+mucosal lesions improved  Cardiac: no chest pain, no palpitations  Respiratory: no shortness of breath  Gastrointestinal:+ abdominal pain per HPI, no nausea, + one episode of vomiting per HPI, diarrhea resolved, no melena, no hematochezia  Genitourinary: no hematuria, no dysuria, no urinary frequency, no urinary hesitancy   Neuro: no focal weakness, no numbness/tingling of the extremities, no decreased sensation, +unsteady gait  -Breanna Miles PGY-3

## 2020-09-15 NOTE — ED STATDOCS - NSFOLLOWUPINSTRUCTIONS_ED_ALL_ED_FT
1. You were seen in the Emergency Room.  2. Please continue to take all medications as prescribed.   3. Please follow up with your primary doctor in 24-48 hours.   4. Return to the emergency room or seek immediate assistance for any new or concerning symptoms (such as fevers, chills, headache that is new, worse or different, confusion, changes in strength or sensation, worsening vision changes), or if you get worse.   5. Copies of your tests were discussed with you and provided to you for follow-up.  You must address all your findings with your doctor. 1. You were seen in the Emergency Room.  2. Please continue to take all medications as prescribed.   3. Please follow up with your primary doctor in 24-48 hours.   4. Return to the emergency room or seek immediate assistance for any new or concerning symptoms (such as fevers, chills, headache that is new, worse or different, confusion, changes in strength or sensation, worsening vision changes), or if you get worse.   5. Copies of your tests were discussed with you and provided to you for follow-up.  You must address all your findings with your doctor.    Please call Dr. Neely's office.   He will see you within the next few days

## 2020-09-15 NOTE — ED ADULT NURSE NOTE - OBJECTIVE STATEMENT
Pt presents with headache and weakness x days with no relief.  Denies fever, cough, chest pain or abdomijnal pain. VSS

## 2020-09-15 NOTE — ED ADULT NURSE NOTE - SUICIDE SCREENING QUESTION 1
"Chief Complaint   Patient presents with     Well Child       Initial Pulse 107  Temp 97.6  F (36.4  C) (Tympanic)  Ht 2' 7\" (0.787 m)  Wt 27 lb (12.2 kg)  HC 19\" (48.3 cm)  SpO2 100%  BMI 19.75 kg/m2 Estimated body mass index is 19.75 kg/(m^2) as calculated from the following:    Height as of this encounter: 2' 7\" (0.787 m).    Weight as of this encounter: 27 lb (12.2 kg).  Medication Reconciliation: complete  Buzz Gonzalez CMA    "
No

## 2020-09-15 NOTE — ED ADULT TRIAGE NOTE - CHIEF COMPLAINT QUOTE
c/o weakness , lethargy , decrease appetite since 09/01, pt was seen in ED for similar symptoms , oral sores and rash  healed

## 2020-09-16 LAB
CULTURE RESULTS: SIGNIFICANT CHANGE UP
SPECIMEN SOURCE: SIGNIFICANT CHANGE UP

## 2020-10-08 ENCOUNTER — APPOINTMENT (OUTPATIENT)
Dept: UROLOGY | Facility: CLINIC | Age: 75
End: 2020-10-08
Payer: MEDICARE

## 2020-10-08 VITALS
HEART RATE: 70 BPM | SYSTOLIC BLOOD PRESSURE: 177 MMHG | OXYGEN SATURATION: 98 % | TEMPERATURE: 98 F | WEIGHT: 153 LBS | HEIGHT: 64 IN | BODY MASS INDEX: 26.12 KG/M2 | DIASTOLIC BLOOD PRESSURE: 92 MMHG

## 2020-10-08 VITALS — SYSTOLIC BLOOD PRESSURE: 143 MMHG | DIASTOLIC BLOOD PRESSURE: 90 MMHG | OXYGEN SATURATION: 97 % | HEART RATE: 109 BPM

## 2020-10-08 DIAGNOSIS — Z82.49 FAMILY HISTORY OF ISCHEMIC HEART DISEASE AND OTHER DISEASES OF THE CIRCULATORY SYSTEM: ICD-10-CM

## 2020-10-08 DIAGNOSIS — Z72.89 OTHER PROBLEMS RELATED TO LIFESTYLE: ICD-10-CM

## 2020-10-08 DIAGNOSIS — Z87.891 PERSONAL HISTORY OF NICOTINE DEPENDENCE: ICD-10-CM

## 2020-10-08 DIAGNOSIS — Z63.5 DISRUPTION OF FAMILY BY SEPARATION AND DIVORCE: ICD-10-CM

## 2020-10-08 PROCEDURE — 99203 OFFICE O/P NEW LOW 30 MIN: CPT

## 2020-10-08 RX ORDER — ASPIRIN 81 MG
81 TABLET, DELAYED RELEASE (ENTERIC COATED) ORAL
Refills: 0 | Status: ACTIVE | COMMUNITY

## 2020-10-08 RX ORDER — CHOLECALCIFEROL (VITAMIN D3) 25 MCG
TABLET ORAL
Refills: 0 | Status: ACTIVE | COMMUNITY

## 2020-10-08 RX ORDER — SIMVASTATIN 20 MG/1
20 TABLET, FILM COATED ORAL
Refills: 0 | Status: ACTIVE | COMMUNITY

## 2020-10-08 RX ORDER — UBIDECARENONE 100 MG
100 CAPSULE ORAL
Refills: 0 | Status: ACTIVE | COMMUNITY

## 2020-10-08 RX ORDER — MULTIVITAMIN
TABLET ORAL
Refills: 0 | Status: ACTIVE | COMMUNITY

## 2020-10-08 RX ORDER — PSYLLIUM HUSK 0.4 G
CAPSULE ORAL
Refills: 0 | Status: ACTIVE | COMMUNITY

## 2020-10-08 SDOH — SOCIAL STABILITY - SOCIAL INSECURITY: DISRUPTION OF FAMILY BY SEPARATION AND DIVORCE: Z63.5

## 2020-10-08 NOTE — REVIEW OF SYSTEMS
[Fever] : fever [Chills] : chills [Feeling Tired] : feeling tired [Dry Eyes] : dryness of the eyes [Heartburn] : heartburn [see HPI] : see HPI [Loss of interest] : loss of interest in sexual activity [Other ___] : [unfilled] [Told you have blood in urine on a urine test] : told blood was present in a urine test [Dizziness] : dizziness [Feelings Of Weakness] : feelings of weakness [Negative] : Heme/Lymph

## 2020-10-08 NOTE — ASSESSMENT
[FreeTextEntry1] : 75 year old woman with BLOOD on UA, but not clinically significant number of RBCs on microscopy. 9/15/20 UA: 0-2 RBCs/HPF. We discussed that microscopic hematuria work up is recommended for >3 RBCs/hpf (>5 RBCs for [facility] lab). We discussed that for microscopic hematuria, the differential diagnosis includes both benign and malignant conditions including renal stones, BPH, urinary tract infections, and cancer of the bladder or ureter or kidney. Cystoscopy would be recommended to rule out pathology in the bladder. CT Urogram would be recommended to evaluate for presence of nephroureteral stones or malignancies. Urinalysis and urine culture were recommended to recheck for urinary RBCS and urinary tract infection. Pt agrees and understands.\par \par Abnormal Urinalysis\par - UA, UCx\par - AMH work up if UA reveals significant RBCs\par - If not, RTO in 3 months for repeat urine studies

## 2020-10-08 NOTE — HISTORY OF PRESENT ILLNESS
[FreeTextEntry1] : 75 year old woman  with complaint of microscopic hematuria. This began on 9/15/20 when it was seen on routine screening UA. Patient reports she was seen at  for a viral infection and then was found to have trace blood in her urine. She is asymptomatic and has never seen blood in her urine.  \par It is mild in severity as the patient denies any gross hematuria. Nothing makes symptoms occur. \par It is associated with nothing.\par No gross hematuria, no dysuria, no frequency, no urgency, no hesitancy, no straining. No incontinence. \par No fevers, no chills, no nausea, no vomiting, no flank pain. \par \par No family history contributory to microscopic hematuria.\par

## 2020-10-09 LAB
APPEARANCE: CLEAR
BACTERIA: NEGATIVE
BILIRUBIN URINE: NEGATIVE
BLOOD URINE: NEGATIVE
COLOR: NORMAL
GLUCOSE QUALITATIVE U: NEGATIVE
HYALINE CASTS: 0 /LPF
KETONES URINE: NEGATIVE
LEUKOCYTE ESTERASE URINE: NEGATIVE
MICROSCOPIC-UA: NORMAL
NITRITE URINE: NEGATIVE
PH URINE: 6.5
PROTEIN URINE: NEGATIVE
RED BLOOD CELLS URINE: 0 /HPF
SPECIFIC GRAVITY URINE: 1
SQUAMOUS EPITHELIAL CELLS: 0 /HPF
UROBILINOGEN URINE: NORMAL
WHITE BLOOD CELLS URINE: 0 /HPF

## 2020-10-12 LAB — BACTERIA UR CULT: NORMAL

## 2021-01-12 ENCOUNTER — APPOINTMENT (OUTPATIENT)
Dept: UROLOGY | Facility: CLINIC | Age: 76
End: 2021-01-12
Payer: MEDICARE

## 2021-01-12 VITALS
BODY MASS INDEX: 23.56 KG/M2 | SYSTOLIC BLOOD PRESSURE: 200 MMHG | OXYGEN SATURATION: 99 % | TEMPERATURE: 96.8 F | HEART RATE: 62 BPM | DIASTOLIC BLOOD PRESSURE: 84 MMHG | WEIGHT: 138 LBS | HEIGHT: 64 IN

## 2021-01-12 VITALS — DIASTOLIC BLOOD PRESSURE: 87 MMHG | SYSTOLIC BLOOD PRESSURE: 196 MMHG | HEART RATE: 65 BPM

## 2021-01-12 DIAGNOSIS — R31.21 ASYMPTOMATIC MICROSCOPIC HEMATURIA: ICD-10-CM

## 2021-01-12 PROCEDURE — 99072 ADDL SUPL MATRL&STAF TM PHE: CPT

## 2021-01-12 PROCEDURE — 99213 OFFICE O/P EST LOW 20 MIN: CPT

## 2021-01-12 RX ORDER — PAROXETINE HYDROCHLORIDE 10 MG/1
10 TABLET, FILM COATED ORAL
Qty: 30 | Refills: 0 | Status: ACTIVE | COMMUNITY
Start: 2020-12-26

## 2021-01-12 RX ORDER — ALPRAZOLAM 0.25 MG/1
0.25 TABLET ORAL
Qty: 90 | Refills: 0 | Status: ACTIVE | COMMUNITY
Start: 2020-11-30

## 2021-01-12 NOTE — ASSESSMENT
[FreeTextEntry1] : 75 year old woman with BLOOD on UA, but not clinically significant number of RBCs on microscopy. 9/15/20 UA: 0-2 RBCs/HPF, repeat 0 RBCs. Microscopic hematuria work up is recommended for >3 RBCs/hpf (>5 RBCs for [facility] lab). We discussed that for microscopic hematuria, the differential diagnosis includes both benign and malignant conditions including renal stones, urinary tract infections, and cancer of the bladder or ureter or kidney. Cystoscopy would be recommended to rule out pathology in the bladder. CT Urogram would be recommended to evaluate for presence of nephroureteral stones or malignancies. Urinalysis and urine culture were recommended to recheck for urinary RBCS and urinary tract infection. Pt agrees and understands.\par \par Abnormal Urinalysis\par - UA, UCx\par - AMH work up if UA reveals significant RBCs\par - If not, RTO PRN

## 2021-01-13 LAB
APPEARANCE: CLEAR
BACTERIA: NEGATIVE
BILIRUBIN URINE: NEGATIVE
BLOOD URINE: NEGATIVE
COLOR: COLORLESS
GLUCOSE QUALITATIVE U: NEGATIVE
HYALINE CASTS: 0 /LPF
KETONES URINE: NEGATIVE
LEUKOCYTE ESTERASE URINE: NEGATIVE
MICROSCOPIC-UA: NORMAL
NITRITE URINE: NEGATIVE
PH URINE: 6.5
PROTEIN URINE: NEGATIVE
RED BLOOD CELLS URINE: 0 /HPF
SPECIFIC GRAVITY URINE: 1
SQUAMOUS EPITHELIAL CELLS: 1 /HPF
UROBILINOGEN URINE: NORMAL
WHITE BLOOD CELLS URINE: 0 /HPF

## 2021-01-14 LAB — BACTERIA UR CULT: NORMAL

## 2021-01-15 ENCOUNTER — NON-APPOINTMENT (OUTPATIENT)
Age: 76
End: 2021-01-15

## 2021-07-16 ENCOUNTER — RX RENEWAL (OUTPATIENT)
Age: 76
End: 2021-07-16

## 2021-11-02 ENCOUNTER — INPATIENT (INPATIENT)
Facility: HOSPITAL | Age: 76
LOS: 1 days | Discharge: HOME | DRG: 439 | End: 2021-11-04
Attending: HOSPITALIST | Admitting: STUDENT IN AN ORGANIZED HEALTH CARE EDUCATION/TRAINING PROGRAM
Payer: MEDICARE

## 2021-11-02 VITALS — HEIGHT: 64 IN | WEIGHT: 154.98 LBS

## 2021-11-02 DIAGNOSIS — Z98.89 OTHER SPECIFIED POSTPROCEDURAL STATES: Chronic | ICD-10-CM

## 2021-11-02 DIAGNOSIS — K85.90 ACUTE PANCREATITIS WITHOUT NECROSIS OR INFECTION, UNSPECIFIED: ICD-10-CM

## 2021-11-02 DIAGNOSIS — Z90.89 ACQUIRED ABSENCE OF OTHER ORGANS: Chronic | ICD-10-CM

## 2021-11-02 DIAGNOSIS — Z98.890 OTHER SPECIFIED POSTPROCEDURAL STATES: Chronic | ICD-10-CM

## 2021-11-02 DIAGNOSIS — Z98.42 CATARACT EXTRACTION STATUS, LEFT EYE: Chronic | ICD-10-CM

## 2021-11-02 LAB
ALBUMIN SERPL ELPH-MCNC: 3.7 G/DL — SIGNIFICANT CHANGE UP (ref 3.3–5)
ALP SERPL-CCNC: 61 U/L — SIGNIFICANT CHANGE UP (ref 40–120)
ALT FLD-CCNC: 34 U/L — SIGNIFICANT CHANGE UP (ref 12–78)
ANION GAP SERPL CALC-SCNC: 8 MMOL/L — SIGNIFICANT CHANGE UP (ref 5–17)
APPEARANCE UR: CLEAR — SIGNIFICANT CHANGE UP
AST SERPL-CCNC: 23 U/L — SIGNIFICANT CHANGE UP (ref 15–37)
BASOPHILS # BLD AUTO: 0.02 K/UL — SIGNIFICANT CHANGE UP (ref 0–0.2)
BASOPHILS NFR BLD AUTO: 0.3 % — SIGNIFICANT CHANGE UP (ref 0–2)
BILIRUB SERPL-MCNC: 0.3 MG/DL — SIGNIFICANT CHANGE UP (ref 0.2–1.2)
BILIRUB UR-MCNC: NEGATIVE — SIGNIFICANT CHANGE UP
BUN SERPL-MCNC: 18 MG/DL — SIGNIFICANT CHANGE UP (ref 7–23)
CALCIUM SERPL-MCNC: 9 MG/DL — SIGNIFICANT CHANGE UP (ref 8.5–10.1)
CHLORIDE SERPL-SCNC: 101 MMOL/L — SIGNIFICANT CHANGE UP (ref 96–108)
CO2 SERPL-SCNC: 24 MMOL/L — SIGNIFICANT CHANGE UP (ref 22–31)
COLOR SPEC: YELLOW — SIGNIFICANT CHANGE UP
CREAT SERPL-MCNC: 0.59 MG/DL — SIGNIFICANT CHANGE UP (ref 0.5–1.3)
DIFF PNL FLD: ABNORMAL
EOSINOPHIL # BLD AUTO: 0.1 K/UL — SIGNIFICANT CHANGE UP (ref 0–0.5)
EOSINOPHIL NFR BLD AUTO: 1.3 % — SIGNIFICANT CHANGE UP (ref 0–6)
GLUCOSE SERPL-MCNC: 107 MG/DL — HIGH (ref 70–99)
GLUCOSE UR QL: NEGATIVE MG/DL — SIGNIFICANT CHANGE UP
HCT VFR BLD CALC: 37.2 % — SIGNIFICANT CHANGE UP (ref 34.5–45)
HGB BLD-MCNC: 12.7 G/DL — SIGNIFICANT CHANGE UP (ref 11.5–15.5)
IMM GRANULOCYTES NFR BLD AUTO: 0.3 % — SIGNIFICANT CHANGE UP (ref 0–1.5)
KETONES UR-MCNC: NEGATIVE — SIGNIFICANT CHANGE UP
LACTATE SERPL-SCNC: 0.8 MMOL/L — SIGNIFICANT CHANGE UP (ref 0.7–2)
LEUKOCYTE ESTERASE UR-ACNC: NEGATIVE — SIGNIFICANT CHANGE UP
LIDOCAIN IGE QN: HIGH U/L (ref 73–393)
LYMPHOCYTES # BLD AUTO: 1.8 K/UL — SIGNIFICANT CHANGE UP (ref 1–3.3)
LYMPHOCYTES # BLD AUTO: 23.7 % — SIGNIFICANT CHANGE UP (ref 13–44)
MCHC RBC-ENTMCNC: 30 PG — SIGNIFICANT CHANGE UP (ref 27–34)
MCHC RBC-ENTMCNC: 34.1 GM/DL — SIGNIFICANT CHANGE UP (ref 32–36)
MCV RBC AUTO: 87.9 FL — SIGNIFICANT CHANGE UP (ref 80–100)
MONOCYTES # BLD AUTO: 0.63 K/UL — SIGNIFICANT CHANGE UP (ref 0–0.9)
MONOCYTES NFR BLD AUTO: 8.3 % — SIGNIFICANT CHANGE UP (ref 2–14)
NEUTROPHILS # BLD AUTO: 5.04 K/UL — SIGNIFICANT CHANGE UP (ref 1.8–7.4)
NEUTROPHILS NFR BLD AUTO: 66.1 % — SIGNIFICANT CHANGE UP (ref 43–77)
NITRITE UR-MCNC: NEGATIVE — SIGNIFICANT CHANGE UP
PH UR: 5 — SIGNIFICANT CHANGE UP (ref 5–8)
PLATELET # BLD AUTO: 243 K/UL — SIGNIFICANT CHANGE UP (ref 150–400)
POTASSIUM SERPL-MCNC: 3.6 MMOL/L — SIGNIFICANT CHANGE UP (ref 3.5–5.3)
POTASSIUM SERPL-SCNC: 3.6 MMOL/L — SIGNIFICANT CHANGE UP (ref 3.5–5.3)
PROT SERPL-MCNC: 7.6 GM/DL — SIGNIFICANT CHANGE UP (ref 6–8.3)
PROT UR-MCNC: 15 MG/DL
RBC # BLD: 4.23 M/UL — SIGNIFICANT CHANGE UP (ref 3.8–5.2)
RBC # FLD: 12.5 % — SIGNIFICANT CHANGE UP (ref 10.3–14.5)
SODIUM SERPL-SCNC: 133 MMOL/L — LOW (ref 135–145)
SP GR SPEC: 1.02 — SIGNIFICANT CHANGE UP (ref 1.01–1.02)
TROPONIN I, HIGH SENSITIVITY RESULT: 4.95 NG/L — SIGNIFICANT CHANGE UP
UROBILINOGEN FLD QL: NEGATIVE MG/DL — SIGNIFICANT CHANGE UP
WBC # BLD: 7.61 K/UL — SIGNIFICANT CHANGE UP (ref 3.8–10.5)
WBC # FLD AUTO: 7.61 K/UL — SIGNIFICANT CHANGE UP (ref 3.8–10.5)

## 2021-11-02 PROCEDURE — 84100 ASSAY OF PHOSPHORUS: CPT

## 2021-11-02 PROCEDURE — 93005 ELECTROCARDIOGRAM TRACING: CPT

## 2021-11-02 PROCEDURE — 36415 COLL VENOUS BLD VENIPUNCTURE: CPT

## 2021-11-02 PROCEDURE — 83690 ASSAY OF LIPASE: CPT

## 2021-11-02 PROCEDURE — C9113: CPT

## 2021-11-02 PROCEDURE — 99285 EMERGENCY DEPT VISIT HI MDM: CPT

## 2021-11-02 PROCEDURE — 85025 COMPLETE CBC W/AUTO DIFF WBC: CPT

## 2021-11-02 PROCEDURE — U0005: CPT

## 2021-11-02 PROCEDURE — 83735 ASSAY OF MAGNESIUM: CPT

## 2021-11-02 PROCEDURE — 74177 CT ABD & PELVIS W/CONTRAST: CPT | Mod: 26,MA

## 2021-11-02 PROCEDURE — 74183 MRI ABD W/O CNTR FLWD CNTR: CPT

## 2021-11-02 PROCEDURE — U0003: CPT

## 2021-11-02 PROCEDURE — 80048 BASIC METABOLIC PNL TOTAL CA: CPT

## 2021-11-02 PROCEDURE — A9579: CPT

## 2021-11-02 PROCEDURE — 82248 BILIRUBIN DIRECT: CPT

## 2021-11-02 PROCEDURE — 86769 SARS-COV-2 COVID-19 ANTIBODY: CPT

## 2021-11-02 PROCEDURE — 80053 COMPREHEN METABOLIC PANEL: CPT

## 2021-11-02 PROCEDURE — 71045 X-RAY EXAM CHEST 1 VIEW: CPT | Mod: 26

## 2021-11-02 PROCEDURE — 80076 HEPATIC FUNCTION PANEL: CPT

## 2021-11-02 PROCEDURE — 80061 LIPID PANEL: CPT

## 2021-11-02 RX ORDER — SODIUM CHLORIDE 9 MG/ML
1000 INJECTION INTRAMUSCULAR; INTRAVENOUS; SUBCUTANEOUS
Refills: 0 | Status: COMPLETED | OUTPATIENT
Start: 2021-11-02 | End: 2021-11-03

## 2021-11-02 RX ORDER — FAMOTIDINE 10 MG/ML
20 INJECTION INTRAVENOUS ONCE
Refills: 0 | Status: COMPLETED | OUTPATIENT
Start: 2021-11-02 | End: 2021-11-02

## 2021-11-02 RX ORDER — SODIUM CHLORIDE 9 MG/ML
1000 INJECTION INTRAMUSCULAR; INTRAVENOUS; SUBCUTANEOUS ONCE
Refills: 0 | Status: COMPLETED | OUTPATIENT
Start: 2021-11-02 | End: 2021-11-02

## 2021-11-02 RX ORDER — HYDROMORPHONE HYDROCHLORIDE 2 MG/ML
0.5 INJECTION INTRAMUSCULAR; INTRAVENOUS; SUBCUTANEOUS ONCE
Refills: 0 | Status: DISCONTINUED | OUTPATIENT
Start: 2021-11-02 | End: 2021-11-02

## 2021-11-02 RX ORDER — ONDANSETRON 8 MG/1
4 TABLET, FILM COATED ORAL EVERY 6 HOURS
Refills: 0 | Status: DISCONTINUED | OUTPATIENT
Start: 2021-11-02 | End: 2021-11-04

## 2021-11-02 RX ORDER — ASPIRIN/CALCIUM CARB/MAGNESIUM 324 MG
1 TABLET ORAL
Qty: 0 | Refills: 0 | DISCHARGE

## 2021-11-02 RX ORDER — METOPROLOL TARTRATE 50 MG
1 TABLET ORAL
Qty: 0 | Refills: 0 | DISCHARGE

## 2021-11-02 RX ORDER — ONDANSETRON 8 MG/1
4 TABLET, FILM COATED ORAL ONCE
Refills: 0 | Status: COMPLETED | OUTPATIENT
Start: 2021-11-02 | End: 2021-11-02

## 2021-11-02 RX ORDER — MORPHINE SULFATE 50 MG/1
4 CAPSULE, EXTENDED RELEASE ORAL EVERY 4 HOURS
Refills: 0 | Status: DISCONTINUED | OUTPATIENT
Start: 2021-11-02 | End: 2021-11-03

## 2021-11-02 RX ADMIN — SODIUM CHLORIDE 1000 MILLILITER(S): 9 INJECTION INTRAMUSCULAR; INTRAVENOUS; SUBCUTANEOUS at 22:29

## 2021-11-02 RX ADMIN — SODIUM CHLORIDE 1000 MILLILITER(S): 9 INJECTION INTRAMUSCULAR; INTRAVENOUS; SUBCUTANEOUS at 23:10

## 2021-11-02 RX ADMIN — HYDROMORPHONE HYDROCHLORIDE 0.5 MILLIGRAM(S): 2 INJECTION INTRAMUSCULAR; INTRAVENOUS; SUBCUTANEOUS at 21:28

## 2021-11-02 RX ADMIN — HYDROMORPHONE HYDROCHLORIDE 0.5 MILLIGRAM(S): 2 INJECTION INTRAMUSCULAR; INTRAVENOUS; SUBCUTANEOUS at 23:10

## 2021-11-02 RX ADMIN — ONDANSETRON 4 MILLIGRAM(S): 8 TABLET, FILM COATED ORAL at 21:28

## 2021-11-02 RX ADMIN — FAMOTIDINE 20 MILLIGRAM(S): 10 INJECTION INTRAVENOUS at 21:28

## 2021-11-02 RX ADMIN — SODIUM CHLORIDE 1000 MILLILITER(S): 9 INJECTION INTRAMUSCULAR; INTRAVENOUS; SUBCUTANEOUS at 21:29

## 2021-11-02 NOTE — ED STATDOCS - NSICDXFAMILYHX_GEN_ALL_CORE_FT
FAMILY HISTORY:  Family history of breast cancer, Mother -  at age 99  Family history of CHF (congestive heart failure), Father   Family history of diabetes mellitus, Both parents -     Sibling  Still living? No  Family history of AIDS, Age at diagnosis: Age Unknown

## 2021-11-02 NOTE — ED ADULT TRIAGE NOTE - CHIEF COMPLAINT QUOTE
patient ambulatory into the ed co abdominal pain, ongoing for a week but worsening today. endorses associated diarrhea occasionally. denies n/v. able to tolerate po. also endorses occasional lightheadedness

## 2021-11-02 NOTE — ED STATDOCS - ATTENDING CONTRIBUTION TO CARE
Dr. Villanueva: I performed a face to face bedside interview with patient regarding history of present illness, review of symptoms and past medical history. I completed an independent physical exam.  I have discussed patient's plan of care with PA.   I agree with note as stated above, having amended the EMR as needed to reflect my findings.   This includes HISTORY OF PRESENT ILLNESS, HIV, PAST MEDICAL/SURGICAL/FAMILY/SOCIAL HISTORY, ALLERGIES AND HOME MEDICATIONS, REVIEW OF SYSTEMS, PHYSICAL EXAM, and any PROGRESS NOTES during the time I functioned as the attending physician for this patient.

## 2021-11-02 NOTE — ED STATDOCS - OBJECTIVE STATEMENT
77 y/o female with a PMHx of arthritis, fatty liver, GERD, HTN, HLD, hypothyroid, ostearthritis, pancreatitis, h/o cholecystectomy presents to the ED c/o epigastric pain. Pt states her brother passed away a few months ago and she has been drinking more than usual. Last EtOH use one week ago. Pt reports she developed epigastric pain 5 days ago. Pain improved over the weekend and returned again tonight. +nausea. Denies vomiting.  No other complaints at this time.

## 2021-11-02 NOTE — ED STATDOCS - PROGRESS NOTE DETAILS
signed Theresa Worthington PA-C Pt seen initially in intake by Dr. Villanueva.   76F c/o abdominal pain, intermittent since 10/29. Resolved for the past few days but then returned today. Lipase >30,000. Pt say she has had pancreatitis several times but then had her gallbladder removed several years ago. Pt says she used to drink but had stopped, then started again when her brother passed recently (1-2 drinks/day) but says she hasn't drank in over a week. denies fever/N/V. PMD/card [facility] [place]. Pt alert, NAD. Awaiting CT. Will admit for pancreatitis. Pt agrees with admission and plan of care. signed Theresa Worthington PA-C   Case discussed with and admission accepted by hospitalist Dr. Hampton. CT results still pending,

## 2021-11-03 DIAGNOSIS — Z90.49 ACQUIRED ABSENCE OF OTHER SPECIFIED PARTS OF DIGESTIVE TRACT: Chronic | ICD-10-CM

## 2021-11-03 DIAGNOSIS — E89.2 POSTPROCEDURAL HYPOPARATHYROIDISM: Chronic | ICD-10-CM

## 2021-11-03 DIAGNOSIS — Z98.890 OTHER SPECIFIED POSTPROCEDURAL STATES: Chronic | ICD-10-CM

## 2021-11-03 LAB
ALBUMIN SERPL ELPH-MCNC: 3.1 G/DL — LOW (ref 3.3–5)
ALP SERPL-CCNC: 52 U/L — SIGNIFICANT CHANGE UP (ref 40–120)
ALT FLD-CCNC: 26 U/L — SIGNIFICANT CHANGE UP (ref 12–78)
ANION GAP SERPL CALC-SCNC: 6 MMOL/L — SIGNIFICANT CHANGE UP (ref 5–17)
AST SERPL-CCNC: 20 U/L — SIGNIFICANT CHANGE UP (ref 15–37)
BASOPHILS # BLD AUTO: 0.02 K/UL — SIGNIFICANT CHANGE UP (ref 0–0.2)
BASOPHILS NFR BLD AUTO: 0.3 % — SIGNIFICANT CHANGE UP (ref 0–2)
BILIRUB DIRECT SERPL-MCNC: <0.1 MG/DL — SIGNIFICANT CHANGE UP (ref 0–0.2)
BILIRUB SERPL-MCNC: 0.3 MG/DL — SIGNIFICANT CHANGE UP (ref 0.2–1.2)
BUN SERPL-MCNC: 11 MG/DL — SIGNIFICANT CHANGE UP (ref 7–23)
CALCIUM SERPL-MCNC: 8.4 MG/DL — LOW (ref 8.5–10.1)
CHLORIDE SERPL-SCNC: 111 MMOL/L — HIGH (ref 96–108)
CHOLEST SERPL-MCNC: 109 MG/DL — SIGNIFICANT CHANGE UP
CO2 SERPL-SCNC: 25 MMOL/L — SIGNIFICANT CHANGE UP (ref 22–31)
COVID-19 NUCLEOCAPSID GAM AB INTERP: NEGATIVE — SIGNIFICANT CHANGE UP
COVID-19 NUCLEOCAPSID TOTAL GAM ANTIBODY RESULT: 0.08 INDEX — SIGNIFICANT CHANGE UP
COVID-19 SPIKE DOMAIN AB INTERP: POSITIVE
COVID-19 SPIKE DOMAIN ANTIBODY RESULT: >250 U/ML — HIGH
CREAT SERPL-MCNC: 0.52 MG/DL — SIGNIFICANT CHANGE UP (ref 0.5–1.3)
EOSINOPHIL # BLD AUTO: 0.08 K/UL — SIGNIFICANT CHANGE UP (ref 0–0.5)
EOSINOPHIL NFR BLD AUTO: 1.4 % — SIGNIFICANT CHANGE UP (ref 0–6)
GLUCOSE SERPL-MCNC: 95 MG/DL — SIGNIFICANT CHANGE UP (ref 70–99)
HCT VFR BLD CALC: 33.6 % — LOW (ref 34.5–45)
HDLC SERPL-MCNC: 43 MG/DL — LOW
HGB BLD-MCNC: 11.1 G/DL — LOW (ref 11.5–15.5)
IMM GRANULOCYTES NFR BLD AUTO: 0.3 % — SIGNIFICANT CHANGE UP (ref 0–1.5)
LIDOCAIN IGE QN: 3294 U/L — HIGH (ref 73–393)
LIPID PNL WITH DIRECT LDL SERPL: 57 MG/DL — SIGNIFICANT CHANGE UP
LYMPHOCYTES # BLD AUTO: 1.84 K/UL — SIGNIFICANT CHANGE UP (ref 1–3.3)
LYMPHOCYTES # BLD AUTO: 31.1 % — SIGNIFICANT CHANGE UP (ref 13–44)
MAGNESIUM SERPL-MCNC: 2 MG/DL — SIGNIFICANT CHANGE UP (ref 1.6–2.6)
MCHC RBC-ENTMCNC: 30 PG — SIGNIFICANT CHANGE UP (ref 27–34)
MCHC RBC-ENTMCNC: 33 GM/DL — SIGNIFICANT CHANGE UP (ref 32–36)
MCV RBC AUTO: 90.8 FL — SIGNIFICANT CHANGE UP (ref 80–100)
MONOCYTES # BLD AUTO: 0.63 K/UL — SIGNIFICANT CHANGE UP (ref 0–0.9)
MONOCYTES NFR BLD AUTO: 10.7 % — SIGNIFICANT CHANGE UP (ref 2–14)
NEUTROPHILS # BLD AUTO: 3.32 K/UL — SIGNIFICANT CHANGE UP (ref 1.8–7.4)
NEUTROPHILS NFR BLD AUTO: 56.2 % — SIGNIFICANT CHANGE UP (ref 43–77)
NON HDL CHOLESTEROL: 66 MG/DL — SIGNIFICANT CHANGE UP
PHOSPHATE SERPL-MCNC: 3.4 MG/DL — SIGNIFICANT CHANGE UP (ref 2.5–4.5)
PLATELET # BLD AUTO: 207 K/UL — SIGNIFICANT CHANGE UP (ref 150–400)
POTASSIUM SERPL-MCNC: 3.8 MMOL/L — SIGNIFICANT CHANGE UP (ref 3.5–5.3)
POTASSIUM SERPL-SCNC: 3.8 MMOL/L — SIGNIFICANT CHANGE UP (ref 3.5–5.3)
PROT SERPL-MCNC: 6.1 GM/DL — SIGNIFICANT CHANGE UP (ref 6–8.3)
RBC # BLD: 3.7 M/UL — LOW (ref 3.8–5.2)
RBC # FLD: 12.7 % — SIGNIFICANT CHANGE UP (ref 10.3–14.5)
SARS-COV-2 IGG+IGM SERPL QL IA: 0.08 INDEX — SIGNIFICANT CHANGE UP
SARS-COV-2 IGG+IGM SERPL QL IA: >250 U/ML — HIGH
SARS-COV-2 IGG+IGM SERPL QL IA: NEGATIVE — SIGNIFICANT CHANGE UP
SARS-COV-2 IGG+IGM SERPL QL IA: POSITIVE
SARS-COV-2 RNA SPEC QL NAA+PROBE: SIGNIFICANT CHANGE UP
SODIUM SERPL-SCNC: 142 MMOL/L — SIGNIFICANT CHANGE UP (ref 135–145)
TRIGL SERPL-MCNC: 44 MG/DL — SIGNIFICANT CHANGE UP
WBC # BLD: 5.91 K/UL — SIGNIFICANT CHANGE UP (ref 3.8–10.5)
WBC # FLD AUTO: 5.91 K/UL — SIGNIFICANT CHANGE UP (ref 3.8–10.5)

## 2021-11-03 PROCEDURE — 99222 1ST HOSP IP/OBS MODERATE 55: CPT

## 2021-11-03 PROCEDURE — 99223 1ST HOSP IP/OBS HIGH 75: CPT

## 2021-11-03 PROCEDURE — 74183 MRI ABD W/O CNTR FLWD CNTR: CPT | Mod: 26

## 2021-11-03 PROCEDURE — 93010 ELECTROCARDIOGRAM REPORT: CPT

## 2021-11-03 RX ORDER — SODIUM CHLORIDE 9 MG/ML
1000 INJECTION INTRAMUSCULAR; INTRAVENOUS; SUBCUTANEOUS
Refills: 0 | Status: DISCONTINUED | OUTPATIENT
Start: 2021-11-03 | End: 2021-11-03

## 2021-11-03 RX ORDER — ASPIRIN/CALCIUM CARB/MAGNESIUM 324 MG
81 TABLET ORAL DAILY
Refills: 0 | Status: DISCONTINUED | OUTPATIENT
Start: 2021-11-02 | End: 2021-11-04

## 2021-11-03 RX ORDER — MAGNESIUM OXIDE 400 MG ORAL TABLET 241.3 MG
400 TABLET ORAL DAILY
Refills: 0 | Status: DISCONTINUED | OUTPATIENT
Start: 2021-11-02 | End: 2021-11-04

## 2021-11-03 RX ORDER — SIMVASTATIN 20 MG/1
20 TABLET, FILM COATED ORAL AT BEDTIME
Refills: 0 | Status: DISCONTINUED | OUTPATIENT
Start: 2021-11-02 | End: 2021-11-04

## 2021-11-03 RX ORDER — FOLIC ACID 0.8 MG
1 TABLET ORAL DAILY
Refills: 0 | Status: DISCONTINUED | OUTPATIENT
Start: 2021-11-03 | End: 2021-11-04

## 2021-11-03 RX ORDER — ENOXAPARIN SODIUM 100 MG/ML
40 INJECTION SUBCUTANEOUS DAILY
Refills: 0 | Status: DISCONTINUED | OUTPATIENT
Start: 2021-11-03 | End: 2021-11-04

## 2021-11-03 RX ORDER — CALCIUM CARBONATE 500(1250)
1 TABLET ORAL DAILY
Refills: 0 | Status: DISCONTINUED | OUTPATIENT
Start: 2021-11-02 | End: 2021-11-04

## 2021-11-03 RX ORDER — LANOLIN ALCOHOL/MO/W.PET/CERES
3 CREAM (GRAM) TOPICAL AT BEDTIME
Refills: 0 | Status: DISCONTINUED | OUTPATIENT
Start: 2021-11-03 | End: 2021-11-04

## 2021-11-03 RX ORDER — HYDROMORPHONE HYDROCHLORIDE 2 MG/ML
0.5 INJECTION INTRAMUSCULAR; INTRAVENOUS; SUBCUTANEOUS EVERY 4 HOURS
Refills: 0 | Status: DISCONTINUED | OUTPATIENT
Start: 2021-11-03 | End: 2021-11-04

## 2021-11-03 RX ORDER — ACETAMINOPHEN 500 MG
650 TABLET ORAL EVERY 6 HOURS
Refills: 0 | Status: DISCONTINUED | OUTPATIENT
Start: 2021-11-03 | End: 2021-11-04

## 2021-11-03 RX ORDER — SODIUM CHLORIDE 9 MG/ML
1000 INJECTION, SOLUTION INTRAVENOUS
Refills: 0 | Status: DISCONTINUED | OUTPATIENT
Start: 2021-11-03 | End: 2021-11-03

## 2021-11-03 RX ORDER — HYDROMORPHONE HYDROCHLORIDE 2 MG/ML
1 INJECTION INTRAMUSCULAR; INTRAVENOUS; SUBCUTANEOUS EVERY 4 HOURS
Refills: 0 | Status: DISCONTINUED | OUTPATIENT
Start: 2021-11-03 | End: 2021-11-04

## 2021-11-03 RX ORDER — HYDROCHLOROTHIAZIDE 25 MG
12.5 TABLET ORAL DAILY
Refills: 0 | Status: DISCONTINUED | OUTPATIENT
Start: 2021-11-02 | End: 2021-11-03

## 2021-11-03 RX ORDER — LOSARTAN POTASSIUM 100 MG/1
100 TABLET, FILM COATED ORAL DAILY
Refills: 0 | Status: DISCONTINUED | OUTPATIENT
Start: 2021-11-02 | End: 2021-11-04

## 2021-11-03 RX ORDER — METOPROLOL TARTRATE 50 MG
25 TABLET ORAL ONCE
Refills: 0 | Status: COMPLETED | OUTPATIENT
Start: 2021-11-03 | End: 2021-11-03

## 2021-11-03 RX ORDER — HYDRALAZINE HCL 50 MG
10 TABLET ORAL EVERY 6 HOURS
Refills: 0 | Status: DISCONTINUED | OUTPATIENT
Start: 2021-11-03 | End: 2021-11-04

## 2021-11-03 RX ORDER — THIAMINE MONONITRATE (VIT B1) 100 MG
100 TABLET ORAL DAILY
Refills: 0 | Status: DISCONTINUED | OUTPATIENT
Start: 2021-11-03 | End: 2021-11-04

## 2021-11-03 RX ORDER — METOPROLOL TARTRATE 50 MG
25 TABLET ORAL DAILY
Refills: 0 | Status: DISCONTINUED | OUTPATIENT
Start: 2021-11-02 | End: 2021-11-03

## 2021-11-03 RX ORDER — ASCORBIC ACID 60 MG
500 TABLET,CHEWABLE ORAL DAILY
Refills: 0 | Status: DISCONTINUED | OUTPATIENT
Start: 2021-11-03 | End: 2021-11-04

## 2021-11-03 RX ORDER — HYDROMORPHONE HYDROCHLORIDE 2 MG/ML
1 INJECTION INTRAMUSCULAR; INTRAVENOUS; SUBCUTANEOUS ONCE
Refills: 0 | Status: DISCONTINUED | OUTPATIENT
Start: 2021-11-03 | End: 2021-11-03

## 2021-11-03 RX ORDER — METOPROLOL TARTRATE 50 MG
1 TABLET ORAL
Qty: 0 | Refills: 0 | DISCHARGE

## 2021-11-03 RX ORDER — CHOLECALCIFEROL (VITAMIN D3) 125 MCG
1000 CAPSULE ORAL DAILY
Refills: 0 | Status: DISCONTINUED | OUTPATIENT
Start: 2021-11-03 | End: 2021-11-04

## 2021-11-03 RX ORDER — METOPROLOL TARTRATE 50 MG
25 TABLET ORAL AT BEDTIME
Refills: 0 | Status: DISCONTINUED | OUTPATIENT
Start: 2021-11-03 | End: 2021-11-04

## 2021-11-03 RX ORDER — OMEGA-3 ACID ETHYL ESTERS 1 G
4 CAPSULE ORAL DAILY
Refills: 0 | Status: DISCONTINUED | OUTPATIENT
Start: 2021-11-03 | End: 2021-11-04

## 2021-11-03 RX ORDER — SODIUM CHLORIDE 9 MG/ML
1000 INJECTION, SOLUTION INTRAVENOUS
Refills: 0 | Status: COMPLETED | OUTPATIENT
Start: 2021-11-03 | End: 2021-11-04

## 2021-11-03 RX ORDER — PANTOPRAZOLE SODIUM 20 MG/1
40 TABLET, DELAYED RELEASE ORAL DAILY
Refills: 0 | Status: DISCONTINUED | OUTPATIENT
Start: 2021-11-03 | End: 2021-11-04

## 2021-11-03 RX ADMIN — SODIUM CHLORIDE 250 MILLILITER(S): 9 INJECTION INTRAMUSCULAR; INTRAVENOUS; SUBCUTANEOUS at 07:02

## 2021-11-03 RX ADMIN — SODIUM CHLORIDE 250 MILLILITER(S): 9 INJECTION, SOLUTION INTRAVENOUS at 21:36

## 2021-11-03 RX ADMIN — Medication 100 MILLIGRAM(S): at 09:48

## 2021-11-03 RX ADMIN — SIMVASTATIN 20 MILLIGRAM(S): 20 TABLET, FILM COATED ORAL at 21:36

## 2021-11-03 RX ADMIN — Medication 1 TABLET(S): at 09:48

## 2021-11-03 RX ADMIN — Medication 12.5 MILLIGRAM(S): at 09:48

## 2021-11-03 RX ADMIN — SODIUM CHLORIDE 250 MILLILITER(S): 9 INJECTION, SOLUTION INTRAVENOUS at 11:07

## 2021-11-03 RX ADMIN — HYDROMORPHONE HYDROCHLORIDE 1 MILLIGRAM(S): 2 INJECTION INTRAMUSCULAR; INTRAVENOUS; SUBCUTANEOUS at 18:26

## 2021-11-03 RX ADMIN — Medication 4 GRAM(S): at 10:01

## 2021-11-03 RX ADMIN — SODIUM CHLORIDE 250 MILLILITER(S): 9 INJECTION, SOLUTION INTRAVENOUS at 15:24

## 2021-11-03 RX ADMIN — Medication 10 MILLIGRAM(S): at 11:05

## 2021-11-03 RX ADMIN — Medication 1 MILLIGRAM(S): at 09:48

## 2021-11-03 RX ADMIN — PANTOPRAZOLE SODIUM 40 MILLIGRAM(S): 20 TABLET, DELAYED RELEASE ORAL at 09:48

## 2021-11-03 RX ADMIN — HYDROMORPHONE HYDROCHLORIDE 1 MILLIGRAM(S): 2 INJECTION INTRAMUSCULAR; INTRAVENOUS; SUBCUTANEOUS at 11:00

## 2021-11-03 RX ADMIN — Medication 500 MILLIGRAM(S): at 09:49

## 2021-11-03 RX ADMIN — Medication 25 MILLIGRAM(S): at 21:36

## 2021-11-03 RX ADMIN — ENOXAPARIN SODIUM 40 MILLIGRAM(S): 100 INJECTION SUBCUTANEOUS at 09:48

## 2021-11-03 RX ADMIN — HYDROMORPHONE HYDROCHLORIDE 1 MILLIGRAM(S): 2 INJECTION INTRAMUSCULAR; INTRAVENOUS; SUBCUTANEOUS at 02:39

## 2021-11-03 RX ADMIN — MAGNESIUM OXIDE 400 MG ORAL TABLET 400 MILLIGRAM(S): 241.3 TABLET ORAL at 09:48

## 2021-11-03 RX ADMIN — Medication 1 TABLET(S): at 10:01

## 2021-11-03 RX ADMIN — LOSARTAN POTASSIUM 100 MILLIGRAM(S): 100 TABLET, FILM COATED ORAL at 10:02

## 2021-11-03 RX ADMIN — Medication 1000 UNIT(S): at 09:48

## 2021-11-03 RX ADMIN — SODIUM CHLORIDE 250 MILLILITER(S): 9 INJECTION INTRAMUSCULAR; INTRAVENOUS; SUBCUTANEOUS at 02:20

## 2021-11-03 RX ADMIN — HYDROMORPHONE HYDROCHLORIDE 1 MILLIGRAM(S): 2 INJECTION INTRAMUSCULAR; INTRAVENOUS; SUBCUTANEOUS at 12:18

## 2021-11-03 RX ADMIN — Medication 25 MILLIGRAM(S): at 01:31

## 2021-11-03 RX ADMIN — HYDROMORPHONE HYDROCHLORIDE 1 MILLIGRAM(S): 2 INJECTION INTRAMUSCULAR; INTRAVENOUS; SUBCUTANEOUS at 03:10

## 2021-11-03 RX ADMIN — Medication 81 MILLIGRAM(S): at 09:48

## 2021-11-03 NOTE — ED ADULT NURSE NOTE - HOW OFTEN DO YOU HAVE A DRINK CONTAINING ALCOHOL?
Bibasilar dependent atelectasis  ?aspiration event inta-op  -Incentive spirometer/OOB, ambulate as tolerated Two to four times a month

## 2021-11-03 NOTE — ED ADULT NURSE NOTE - OBJECTIVE STATEMENT
Pt. reports to ED for abdominal pain.  Pt reports for past 5 days has had epigastric pain and nausea. Pt. reports today had general abdominal pain.   Pt. denies vomiting.  Pt. reports increased drinking. Pt. reports to ED for abdominal pain.  Pt reports for past 5 days has had epigastric pain and nausea. Pt. reports today had general abdominal pain.   Pt. denies vomiting, diarrhea.  VSS.

## 2021-11-03 NOTE — H&P ADULT - NSHPREVIEWOFSYSTEMS_GEN_ALL_CORE
Constitutional: positive for fatigue, negative for fever, negative for chills, positive for decreased appetite.  Skin: negative for rashes, negative for open wounds, negative for jaundice.   Eyes: negative for blurry vision, negative for double vision.   Ears, nose, throat: negative for ear pain, negative for nasal congestion, negative for sore throat, negative for lymph node swelling.   Cardiovascular: negative for chest pain, negative for palpitations, negative for lower extremity swelling.   Respiratory: negative for shortness of breath, negative for wheezing, negative for cough.   Gastrointestinal: positive for abdominal pain, negative for nausea, negative for vomiting, positive for 1 loose stool, negative for diarrhea, negative for constipation, negative for blood in the stool, negative for black tarry stools.   Genitourinary: negative for burning on urination, negative for urinary urgency or frequency, negative for blood in the urine.   Endocrine: negative for cold intolerance, negative for heat intolerance, negative for increased thirst.   Hematologic: negative for easy bruising or bleeding.   Musculoskeletal: negative for muscle/joint pain, negative for decreased range of motion.   Neurological: negative for dizziness, positive for headaches, negative for loss of consciousness, negative for motor weakness, negative for sensory deficits.   Psychiatric: negative for depression, negative for anxiety.

## 2021-11-03 NOTE — H&P ADULT - ASSESSMENT
77 y/o F presents with abdominal pain    1. Pancreatitis likely secondary to alcohol use   - Admit to med/surg   - s/p 2L of NS in the ER   - c/w NS at 250 ml/hr   - NPO, advance diet as tolerated   - Protonix for heartburn, Zofran for nausea   - Pain control with Tylenol, Dilaudid IVP   - GI consult - Dr. Terrell   - JOHNNYWA scale   - Multvitamin, folate, thiamine     2. Hypertensive urgency   - /79, monitor closely   - Hydralazine PRN for SBP > 160   - c/w home blood pressure medications     3. Hematuria   - Would repeat UA in 4-6 weeks as an outpatient   - If persistent hematuria, pt will need urology f/u     4. History of arthritis, fatty liver, GERD, HLD, HTN, hypothyroidism, osteoarthritis, pancreatitis   - c/w home medications   - Counseled on alcohol cessation     DVT ppx: Lovenox 40 mg subcutaneous daily   Code status: Full code (pt agrees to chest compressions and intubation if required).  75 y/o F presents with abdominal pain    1. Pancreatitis likely secondary to alcohol use   - Admit to med/surg   - s/p 2L of NS in the ER   - c/w NS at 250 ml/hr   - NPO, advance diet as tolerated   - Protonix for heartburn, Zofran for nausea   - Pain control with Tylenol, Dilaudid IVP   - f/u AM lipase level   - CIWA scale in place for possible alcohol withdrawal   - Multivitamin, thiamine, folate   - GI consult - Dr. Terrell     2. Hypertensive urgency   - /79, monitor closely   - Hydralazine PRN for SBP > 160   - c/w home blood pressure medications     3. Hematuria   - Would repeat UA in 4-6 weeks as an outpatient   - If persistent hematuria, pt will need urology f/u     4. History of arthritis, fatty liver, GERD, HLD, HTN, hypothyroidism, osteoarthritis, pancreatitis   - c/w home medications   - Counseled on alcohol cessation     DVT ppx: Lovenox 40 mg subcutaneous daily   Code status: Full code (pt agrees to chest compressions and intubation if required).

## 2021-11-03 NOTE — H&P ADULT - NSICDXPASTSURGICALHX_GEN_ALL_CORE_FT
PAST SURGICAL HISTORY:  H/O hand surgery     H/O parathyroidectomy     History of back surgery     History of      History of laminectomy with resection    History of tonsillectomy     S/P cholecystectomy     S/P left cataract extraction

## 2021-11-03 NOTE — H&P ADULT - NSHPPHYSICALEXAM_GEN_ALL_CORE
ICU Vital Signs Last 24 Hrs  T(C): 36.8 (02 Nov 2021 20:10), Max: 36.8 (02 Nov 2021 20:10)  T(F): 98.2 (02 Nov 2021 20:10), Max: 98.2 (02 Nov 2021 20:10)  HR: 75 (02 Nov 2021 20:10) (75 - 75)  BP: 185/79 (02 Nov 2021 20:10) (185/79 - 185/79)  RR: 18 (02 Nov 2021 20:10) (18 - 18)  SpO2: 95% (02 Nov 2021 20:10) (95% - 95%)    General: Awake and alert, cooperative with exam. No acute distress.   Skin: Warm, dry, and pink.   Eyes: Pupils equal and reactive to light. Extraocular eye movements intact. No conjunctival injection, discharge, or scleral icterus.   HEENT: Atraumatic, normocephalic. Moist mucus membranes.   Cardiology: Normal S1, S2. No murmurs, rubs, or gallops. Regular rate and rhythm.   Respiratory: Lungs clear to ascultation bilaterally. Good air exchange. No wheezes, rales, or rhonchi. Normal chest expansion.   Gastrointestinal: Positive bowel sounds. Soft, non-distended. Tenderness on palpation of epigastric region. No guarding, rigidity, or rebound tenderness. No hepatosplenomegaly. No CVA tenderness.   Musculoskeletal: 5/5 motor strength in all extremities. Normal range of motion.   Extremities: No peripheral edema bilaterally. Dorsalis pedis pulses 2+ bilaterally.   Neurological: A+Ox3 (person, place, and time). Cranial nerves 2-12 intact. Normal speech. No facial droop. No focal neurological deficits.   Psychiatric: Normal affect. Normal mood.

## 2021-11-03 NOTE — CONSULT NOTE ADULT - ASSESSMENT
ASSESSMENT:  75 y/o female PMHX fatty liver, HTN, HLD, hypothyroidism, pancreatitis presenting c/o progressively worsening abdominal pain.    PLAN:  MRI MRCP with and w/o contrast to r/o biliary cause  Increase LR to 250 mL/hour  Pain control as needed  Advance diet as tolerated  Encourage ETOH cessation     ASSESSMENT:  77 y/o female PMHX fatty liver, HTN, HLD, hypothyroidism, pancreatitis presenting c/o progressively worsening abdominal pain.    PLAN:  MRI MRCP with and w/o contrast to r/o biliary cause  Increase LR to 250 mL/hour  Pain control as needed  Advance diet as tolerated  Encourage ETOH cessation    Discussed with Dr. Mckay.

## 2021-11-03 NOTE — H&P ADULT - NSHPSOCIALHISTORY_GEN_ALL_CORE
Lives by herself. Independent with ADLs and IADLs. Smoked during her teens, quit many years ago. Patient quit drinking alcohol for a while, but pt's brother passed away 5 months ago and she started drinking 2 jiggers of liquor nightly (3 oz) for the past 5 months.

## 2021-11-03 NOTE — CONSULT NOTE ADULT - SUBJECTIVE AND OBJECTIVE BOX
History of Present Illness:     77 y/o female PMHX fatty liver, HTN, HLD, hypothyroidism, pancreatitis presenting c/o progressively worsening abdominal pain. Epigastric pain initially began on Friday AM, and worsened throughout the day, but suddenly "disappeared". Pain began to return on , but was manageable.  Yesterday pain worsened to an 8/10 and is described as sharp. Reports pain does not radiate. Of note she reports 1 loose BM yesterday, but took Pepto Bismol which relived some of her abd pain. No recent ABX. use. Pain has improved since arrival, reports last dose of pain medication was @ 0400. Previous pancreatitis episode was s/p cholecystectomy. Reports increased nightly ETOH use since her brother passed away 5 months ago.     Denies radiation of the pain, nausea, vomiting, chest pain, palpitations, heart burn, burning on urination, blurry vision.     ROS:  Constitutional: + fatigue, decreased PO intake, negative fever/chills chills,  Skin: negative jaundice.   Eyes: negative scleral icterus  Cardiovascular: negative cp, palpitations  Respiratory: negative shortness of breath  Gastrointestinal: + abdominal pain, 1 episode diarrhea, negative n/v, constipation, hematochezia, melena.     Allergies and Intolerances:        Allergies:  	adhesives: Miscellaneous, Rash  	Sudafed: Drug, Other, Originally Entered as [Other allergic reaction: UNABLE TO SLEEP, racing heart, high blood pressure] reaction to [Sudafed]  	Betadine: Drug, Unknown, rash and racing heart    Home Medications:   * Patient Currently Takes Medications as of 2021 23:58 documented in Structured Notes  · 	simvastatin 20 mg oral tablet: Last Dose Taken:  , 1 tab(s) orally once a day (at bedtime)  · 	Aspirin Enteric Coated 81 mg oral delayed release tablet: Last Dose Taken:  , 1 tab(s) orally once a day  · 	CoQ10 300 mg oral capsule: Last Dose Taken:  , 1 cap(s) orally once a day  · 	Fish Oil oral capsule: Last Dose Taken:  , 1 cap(s) orally once a day  · 	Vitamin D3 25 mcg (1000 intl units) oral tablet: Last Dose Taken:  , 1 tab(s) orally once a day  · 	Oysco 500 (1250 mg calcium carbonate) oral tablet: Last Dose Taken:  , 1 tab(s) orally once a day  · 	omeprazole 20 mg oral delayed release capsule: Last Dose Taken:  , 1 cap(s) orally once a day  · 	olmesartan 40 mg oral tablet: Last Dose Taken:  , 1 tab(s) orally once a day  · 	hydroCHLOROthiazide 12.5 mg oral capsule: Last Dose Taken:  , 1 cap(s) orally once a day  · 	metoprolol succinate 25 mg oral tablet, extended release: Last Dose Taken:  , 1 tab(s) orally once a day  · 	PARoxetine 10 mg oral tablet: Last Dose Taken:  , 1 tab(s) orally once a day  · 	Multiple Vitamins oral tablet: Last Dose Taken:  , 1 tab(s) orally once a day  · 	ascorbic acid 500 mg oral tablet: Last Dose Taken:  , 1 tab(s) orally once a day  · 	Mag-Ox 400 oral tablet: Last Dose Taken:  , 1 tab(s) orally once a day      PAST MEDICAL HISTORY:  Arthritis   Fatty liver   GERD (gastroesophageal reflux disease)   High cholesterol   Hypertension   Osteoarthritis   Pancreatitis.     PAST SURGICAL HISTORY:  H/O hand surgery   H/O parathyroidectomy   History of back surgery   History of    History of laminectomy with resection  History of tonsillectomy   S/P cholecystectomy   S/P left cataract extraction.     FAMILY HISTORY:  Family history of breast cancer, Mother -  at age 99  Family history of CHF (congestive heart failure), Father   Family history of diabetes mellitus, Both parents -     Social History:   Lives alone, independent with ADLs. Former smoker. Drinking 2 liquor drinks per night for the last 5 months.      Physical Exam:   Physical Exam: ICU Vital Signs Last 24 Hrs  T(C): 36.8 (2021 20:10), Max: 36.8 (2021 20:10)  T(F): 98.2 (2021 20:10), Max: 98.2 (2021 20:10)  HR: 75 (2021 20:10) (75 - 75)  BP: 185/79 (2021 20:10) (185/79 - 185/79)  RR: 18 (2021 20:10) (18 - 18)  SpO2: 95% (2021 20:10) (95% - 95%)    General: Awake and alert, cooperative with exam. No acute distress.   Skin: Warm, dry, and pink.   Eyes: Extraocular eye movements intact. No conjunctival injection, discharge, or scleral icterus.   HEENT: Atraumatic, normocephalic. Moist mucus membranes.   Cardiology: Normal S1, S2. No murmurs, rubs, or gallops. Regular rate and rhythm.   Respiratory: Lungs clear to ascultation bilaterally. No adventitious breath sounds. Normal chest expansion.   Gastrointestinal: Soft, non-distended, mild tenderness present in the epigastric region. No guarding.  Extremities: No peripheral edema bilaterally.  Neurological: A+Ox3 (person, place, and time).  Normal speech. No facial droop.  Psychiatric: Normal affect. Normal mood.      Laboratory:   General Chemistry:    2021 21:08, Comprehensive Metabolic Panel  Sodium, Serum:   133, [135 - 145 mmol/L]  Potassium, Serum: 3.6, [3.5 - 5.3 mmol/L]  Chloride, Serum: 101, [96 - 108 mmol/L]  Carbon Dioxide, Serum: 24, [22 - 31 mmol/L]  Anion Gap, Serum: 8, [5 - 17 mmol/L]  Blood Urea Nitrogen, Serum: 18, [7 - 23 mg/dL]  Creatinine, Serum: 0.59, [0.50 - 1.30 mg/dL]  Glucose, Serum:   107, [70 - 99 mg/dL]  Calcium, Total Serum: 9.0, [8.5 - 10.1 mg/dL]  Protein Total, Serum: 7.6, [6.0 - 8.3 gm/dL]  Albumin, Serum: 3.7, [3.3 - 5.0 g/dL]  Bilirubin Total, Serum: 0.3, [0.2 - 1.2 mg/dL]  Alkaline Phosphatase, Serum: 61, [40 - 120 U/L]  Aspartate Aminotransferase (AST/SGOT): 23, [15 - 37 U/L]  Alanine Aminotransferase (ALT/SGPT): 34, [12 - 78 U/L]  eGFR if Non African American: 90, [>=60 mL/min/1.73M2], Interpretative comment  	The units for eGFR are mL/min/1.73M2 (normalized body surface area). The  	eGFR is calculated from a serum creatinine using the CKD-EPI equation.  	Other variables required for calculation are race, age and sex. Among  	patients with chronic kidney disease (CKD), the eGFR is useful in  	determining the stage of disease according to KDOQI CKD classification.  	All eGFR results are reported numerically with the following  	interpretation.  	        GFR                    With                 Without  	   (ml/min/1.73 m2)    Kidney Damage       Kidney Damage  	      >= 90                    Stage 1                     Normal  	      60-89                    Stage 2                     Decreased GFR  	      30-59     Stage 3                     Stage 3  	      15-29                    Stage 4                     Stage 4  	      < 15                      Stage 5                     Stage 5  	Each stage of CKD assumes that the associated GFR level has been in  	effect for at least 3 months. Determination of stages one and two (with  	eGFR > 59 ml/min/m2) requires estimation of kidney damage for at least 3  	months as defined by structural or functional abnormalities.  	Limitations: All estimates of GFR will be less accurate for patients at  	extremes of muscle mass (including but not limited to frail elderly,  	critically ill, or cancer patients), those with unusual diets, and those  	with conditions associated with reduced secretion or extrarenal  	elimination of creatinine. The eGFR equation is not recommended for use  	in patients with unstable creatinine levels.  eGFR if : 104, [>=60 mL/min/1.73M2]    2021 21:08, Lactate, Blood  Lactate, Blood: 0.8, [0.7 - 2.0 mmol/L]    2021 21:08, Lipase, Serum  Lipase, Serum:   >77562, [73 - 393 U/L]  Hematology:    2021 21:08, Complete Blood Count + Automated Diff  WBC Count: 7.61, [3.80 - 10.50 K/uL]  RBC Count: 4.23, [3.80 - 5.20 M/uL]  Hemoglobin: 12.7, [11.5 - 15.5 g/dL]  Hematocrit: 37.2, [34.5 - 45.0 %]  Mean Cell Volume: 87.9, [80.0 - 100.0 fl]  Mean Cell Hemoglobin: 30.0, [27.0 - 34.0 pg]  Mean Cell Hemoglobin Conc: 34.1, [32.0 - 36.0 gm/dL]  Red Cell Distrib Width: 12.5, [10.3 - 14.5 %]  Platelet Count - Automated: 243, [150 - 400 K/uL]  Auto Neutrophil #: 5.04, [1.80 - 7.40 K/uL]  Auto Lymphocyte #: 1.80, [1.00 - 3.30 K/uL]  Auto Monocyte #: 0.63, [0.00 - 0.90 K/uL]  Auto Eosinophil #: 0.10, [0.00 - 0.50 K/uL]  Auto Basophil #: 0.02, [0.00 - 0.20 K/uL]  Auto Neutrophil %: 66.1, [43.0 - 77.0 %], Differential percentages must be correlated with absolute numbers for  	clinical significance.  Auto Lymphocyte %: 23.7, [13.0 - 44.0 %]  Auto Monocyte %: 8.3, [2.0 - 14.0 %]  Auto Eosinophil %: 1.3, [0.0 - 6.0 %]  Auto Basophil %: 0.3, [0.0 - 2.0 %]  Auto Immature Granulocyte %: 0.3, [0.0 - 1.5 %], (Includes meta, myelo and promyelocytes)  Urine:    2021 21:12, Urinalysis  Color: Yellow, [Yellow]  Urine Appearance: Clear, [Clear]  Bilirubin: Negative, [Negative]  Ketone - Urine: Negative, [Negative]  Specific Gravity: 1.020, [1.010 - 1.025]  Protein, Urine:   15, [Negative mg/dL]  Urobilinogen: Negative, [Negative mg/dL]  Nitrite: Negative, [Negative]  Leukocyte Esterase Concentration: Negative, [Negative]  pH Urine: 5.0, [5.0 - 8.0]  Blood, Urine:   Moderate, [Negative]  Glucose Qualitative, Urine: Negative, [Negative mg/dL]    2021 21:12, Urine Microscopic-Add On (NC)  White Blood Cell - Urine: Negative, [Negative]  Red Blood Cell - Urine:   6-10, [0 - 4 /HPF]  Epithelial Cells: Occasional, [Neg. - Few]  Bacteria: Negative, [Negative]  Comment - Urine: Few mucous    Radiology:   CT A&P - mild inflammatory changes present in the fat adjacent to the pancreatic body/tail reflecting pancreatitis.    History of Present Illness:     75 y/o female PMHX fatty liver, HTN, HLD, hypothyroidism, pancreatitis presenting c/o progressively worsening abdominal pain. Epigastric pain initially began on Friday AM, and worsened throughout the day, but suddenly "disappeared". Pain began to return on , but was manageable.  Yesterday pain worsened to an 8/10 and is described as sharp. Reports pain does not radiate. Of note she reports 1 loose BM yesterday, but took Pepto Bismol which relieved some of her abd pain. No recent ABX. use. Pain has improved since arrival, reports last dose of pain medication was @ 0400. Previous pancreatitis episode was s/p cholecystectomy. Reports increased nightly ETOH use since her brother passed away 5 months ago.    ROS:  Constitutional: + fatigue, decreased PO intake, denies fever/chills.  Skin: denies jaundice.   Eyes: denies scleral icterus  Cardiovascular: denies cp, palpitations  Respiratory: denies shortness of breath  Gastrointestinal: + abdominal pain, 1 episode diarrhea, denies n/v, constipation, hematochezia, melena.     Allergies and Intolerances:        Allergies:  	adhesives: Miscellaneous, Rash  	Sudafed: Drug, Other, Originally Entered as [Other allergic reaction: UNABLE TO SLEEP, racing heart, high blood pressure] reaction to [Sudafed]  	Betadine: Drug, Unknown, rash and racing heart    Home Medications:   * Patient Currently Takes Medications as of 2021 23:58 documented in Structured Notes  · 	simvastatin 20 mg oral tablet: Last Dose Taken:  , 1 tab(s) orally once a day (at bedtime)  · 	Aspirin Enteric Coated 81 mg oral delayed release tablet: Last Dose Taken:  , 1 tab(s) orally once a day  · 	CoQ10 300 mg oral capsule: Last Dose Taken:  , 1 cap(s) orally once a day  · 	Fish Oil oral capsule: Last Dose Taken:  , 1 cap(s) orally once a day  · 	Vitamin D3 25 mcg (1000 intl units) oral tablet: Last Dose Taken:  , 1 tab(s) orally once a day  · 	Oysco 500 (1250 mg calcium carbonate) oral tablet: Last Dose Taken:  , 1 tab(s) orally once a day  · 	omeprazole 20 mg oral delayed release capsule: Last Dose Taken:  , 1 cap(s) orally once a day  · 	olmesartan 40 mg oral tablet: Last Dose Taken:  , 1 tab(s) orally once a day  · 	hydroCHLOROthiazide 12.5 mg oral capsule: Last Dose Taken:  , 1 cap(s) orally once a day  · 	metoprolol succinate 25 mg oral tablet, extended release: Last Dose Taken:  , 1 tab(s) orally once a day  · 	PARoxetine 10 mg oral tablet: Last Dose Taken:  , 1 tab(s) orally once a day  · 	Multiple Vitamins oral tablet: Last Dose Taken:  , 1 tab(s) orally once a day  · 	ascorbic acid 500 mg oral tablet: Last Dose Taken:  , 1 tab(s) orally once a day  · 	Mag-Ox 400 oral tablet: Last Dose Taken:  , 1 tab(s) orally once a day      PAST MEDICAL HISTORY:  Arthritis   Fatty liver   GERD (gastroesophageal reflux disease)   High cholesterol   Hypertension   Osteoarthritis   Pancreatitis.     PAST SURGICAL HISTORY:  H/O hand surgery   H/O parathyroidectomy   History of back surgery   History of    History of laminectomy with resection  History of tonsillectomy   S/P cholecystectomy   S/P left cataract extraction.     FAMILY HISTORY:  Family history of breast cancer, Mother -  at age 99  Family history of CHF (congestive heart failure), Father   Family history of diabetes mellitus, Both parents -     Social History:   Lives alone, independent with ADLs. Former smoker. Drinking 2 liquor drinks per night for the last 5 months.    Physical Exam:   Physical Exam: ICU Vital Signs Last 24 Hrs  T(C): 36.8 (2021 20:10), Max: 36.8 (2021 20:10)  T(F): 98.2 (2021 20:10), Max: 98.2 (2021 20:10)  HR: 75 (2021 20:10) (75 - 75)  BP: 185/79 (2021 20:10) (185/79 - 185/79)  RR: 18 (2021 20:10) (18 - 18)  SpO2: 95% (2021 20:10) (95% - 95%)    General: Awake and alert, cooperative with exam. No acute distress.   Skin: Warm, dry, and pink.   Eyes: Extraocular eye movements intact. No conjunctival injection, discharge, or scleral icterus.   HEENT: Atraumatic, normocephalic. Moist mucus membranes.   Cardiology: Normal S1, S2. No murmurs, rubs, or gallops. Regular rate and rhythm.   Respiratory: Lungs clear to ascultation bilaterally. No adventitious breath sounds. Normal chest expansion.   Gastrointestinal: Soft, non-distended, mild tenderness present in the epigastric region. No guarding.  Extremities: No peripheral edema bilaterally.  Neurological: A+Ox3 (person, place, and time).  Normal speech. No facial droop.  Psychiatric: Normal affect. Normal mood.      Laboratory:   General Chemistry:    2021 21:08, Comprehensive Metabolic Panel  Sodium, Serum:   133, [135 - 145 mmol/L]  Potassium, Serum: 3.6, [3.5 - 5.3 mmol/L]  Chloride, Serum: 101, [96 - 108 mmol/L]  Carbon Dioxide, Serum: 24, [22 - 31 mmol/L]  Anion Gap, Serum: 8, [5 - 17 mmol/L]  Blood Urea Nitrogen, Serum: 18, [7 - 23 mg/dL]  Creatinine, Serum: 0.59, [0.50 - 1.30 mg/dL]  Glucose, Serum:   107, [70 - 99 mg/dL]  Calcium, Total Serum: 9.0, [8.5 - 10.1 mg/dL]  Protein Total, Serum: 7.6, [6.0 - 8.3 gm/dL]  Albumin, Serum: 3.7, [3.3 - 5.0 g/dL]  Bilirubin Total, Serum: 0.3, [0.2 - 1.2 mg/dL]  Alkaline Phosphatase, Serum: 61, [40 - 120 U/L]  Aspartate Aminotransferase (AST/SGOT): 23, [15 - 37 U/L]  Alanine Aminotransferase (ALT/SGPT): 34, [12 - 78 U/L]  eGFR if Non African American: 90, [>=60 mL/min/1.73M2], Interpretative comment  	The units for eGFR are mL/min/1.73M2 (normalized body surface area). The  	eGFR is calculated from a serum creatinine using the CKD-EPI equation.  	Other variables required for calculation are race, age and sex. Among  	patients with chronic kidney disease (CKD), the eGFR is useful in  	determining the stage of disease according to KDOQI CKD classification.  	All eGFR results are reported numerically with the following  	interpretation.  	        GFR                    With                 Without  	   (ml/min/1.73 m2)    Kidney Damage       Kidney Damage  	      >= 90                    Stage 1                     Normal  	      60-89                    Stage 2                     Decreased GFR  	      30-59     Stage 3                     Stage 3  	      15-29                    Stage 4                     Stage 4  	      < 15                      Stage 5                     Stage 5  	Each stage of CKD assumes that the associated GFR level has been in  	effect for at least 3 months. Determination of stages one and two (with  	eGFR > 59 ml/min/m2) requires estimation of kidney damage for at least 3  	months as defined by structural or functional abnormalities.  	Limitations: All estimates of GFR will be less accurate for patients at  	extremes of muscle mass (including but not limited to frail elderly,  	critically ill, or cancer patients), those with unusual diets, and those  	with conditions associated with reduced secretion or extrarenal  	elimination of creatinine. The eGFR equation is not recommended for use  	in patients with unstable creatinine levels.  eGFR if : 104, [>=60 mL/min/1.73M2]    2021 21:08, Lactate, Blood  Lactate, Blood: 0.8, [0.7 - 2.0 mmol/L]    2021 21:08, Lipase, Serum  Lipase, Serum:   >[id number], [73 - 393 U/L]  Hematology:    2021 21:08, Complete Blood Count + Automated Diff  WBC Count: 7.61, [3.80 - 10.50 K/uL]  RBC Count: 4.23, [3.80 - 5.20 M/uL]  Hemoglobin: 12.7, [11.5 - 15.5 g/dL]  Hematocrit: 37.2, [34.5 - 45.0 %]  Mean Cell Volume: 87.9, [80.0 - 100.0 fl]  Mean Cell Hemoglobin: 30.0, [27.0 - 34.0 pg]  Mean Cell Hemoglobin Conc: 34.1, [32.0 - 36.0 gm/dL]  Red Cell Distrib Width: 12.5, [10.3 - 14.5 %]  Platelet Count - Automated: 243, [150 - 400 K/uL]  Auto Neutrophil #: 5.04, [1.80 - 7.40 K/uL]  Auto Lymphocyte #: 1.80, [1.00 - 3.30 K/uL]  Auto Monocyte #: 0.63, [0.00 - 0.90 K/uL]  Auto Eosinophil #: 0.10, [0.00 - 0.50 K/uL]  Auto Basophil #: 0.02, [0.00 - 0.20 K/uL]  Auto Neutrophil %: 66.1, [43.0 - 77.0 %], Differential percentages must be correlated with absolute numbers for  	clinical significance.  Auto Lymphocyte %: 23.7, [13.0 - 44.0 %]  Auto Monocyte %: 8.3, [2.0 - 14.0 %]  Auto Eosinophil %: 1.3, [0.0 - 6.0 %]  Auto Basophil %: 0.3, [0.0 - 2.0 %]  Auto Immature Granulocyte %: 0.3, [0.0 - 1.5 %], (Includes meta, myelo and promyelocytes)  Urine:    2021 21:12, Urinalysis  Color: Yellow, [Yellow]  Urine Appearance: Clear, [Clear]  Bilirubin: Negative, [Negative]  Ketone - Urine: Negative, [Negative]  Specific Gravity: 1.020, [1.010 - 1.025]  Protein, Urine:   15, [Negative mg/dL]  Urobilinogen: Negative, [Negative mg/dL]  Nitrite: Negative, [Negative]  Leukocyte Esterase Concentration: Negative, [Negative]  pH Urine: 5.0, [5.0 - 8.0]  Blood, Urine:   Moderate, [Negative]  Glucose Qualitative, Urine: Negative, [Negative mg/dL]    2021 21:12, Urine Microscopic-Add On (NC)  White Blood Cell - Urine: Negative, [Negative]  Red Blood Cell - Urine:   6-10, [0 - 4 /HPF]  Epithelial Cells: Occasional, [Neg. - Few]  Bacteria: Negative, [Negative]  Comment - Urine: Few mucous    Radiology:   CT A&P - mild inflammatory changes present in the fat adjacent to the pancreatic body/tail reflecting pancreatitis.

## 2021-11-03 NOTE — CONSULT NOTE ADULT - ATTENDING COMMENTS
76 yaer old woman with recurrent pancreatitis, panc divisum, with acute pancreatitis.     Likely due to combination of divisum (which primes her pancreas with issues, ?cause previously) and heavy drinking due to her alcohol use secondary to brother's death.     MRCP to confirm divisum, look for stricture, mass  Aggressive LR in the first 24 hours.   Advance diet as tolerated, early refeeding important in pancreatitis.

## 2021-11-03 NOTE — PROGRESS NOTE ADULT - ASSESSMENT
75 y/o woman with HTN, HLD, fatty liver, past pancreatitis s/p cholecystectomy, also hx of hypothyroidism, presenting c/o progressively worsening abdominal pain, epigastric, initially began 10/29, worsened throughout the day then resolved, but once again recurred 10/31, manageable at that time but on 11/1 became severe, sharp, partially relieved with some Pepto Bismol, sought evaluation in the ED here where she was found to have markedly elevated lipase and CT findings consistent with acute pancreatitis, no collection, no necrosis. Reports increased alcohol use, several days per week, few mixed drinks per sitting, related to depressed mood and stress ever since her brother passed away 5 months ago. Given aggressive IV fluid hydration, analgesics, and admitted to Medicine.    Acute pancreatitis  Thought to be due to increased alcohol intake since her brother's death several months ago. 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 Would defer to GI as to whether that anatomic abnormality is contributing. Lipid profile is ok. Overall feeling a bit better today and eager to return home in the next 24-48 hrs. Continue supportive care with aggressive hydration, pain control, trend WBC, Hgb, lytes, Cr. F/u with GI. Would stop thiazide diuretic as well.     HTN  Continue losartan. Would stop HCTZ given the pancreatitis.      Diet: clears, advance as tolerated  DVT px: LMWH  Code status: Full  Dispo: Anticipate DC to home once clinically improved and inpatient workup is complete, possible in 24-48 hrs

## 2021-11-03 NOTE — H&P ADULT - HISTORY OF PRESENT ILLNESS
75 y/o F with PMH arthritis, fatty liver, GERD, HLD, HTN, hypothyroidism, osteoarthritis, pancreatitis presents for abdominal pain. Her pain initially started Friday morning and was located in the epigastric region. It worsened over the course of the day but improved. Today she felt fine until 7:30 pm when she started having severe abdominal pain that was generalized. Described as an aching pain rated 8/10 in intensity. Dilaudid decreased her pain to 6/10 in intensity. She had 1 loose stool today, fatigue, headache. Denies radiation of the pain, nausea, vomiting, chest pain, palpitations, heart burn, burning on urination, blurry vision. The patient has a history of pancreatitis which was thought to be related to gallstones and she had a cholecystecomy. She did have 1 episode of pancreatitis after her gallbladder was removed. Of note, pt's brother passed away 5 months ago and she started drinking 2 jiggers of liquor nightly (3 oz) for the past 5 months.     ER course: /79. Labs: CBC unremarkable. Na 133, Glucose 107, Lipase >30,000. UA: moderate blood, RBC 6-10.     EKG: pending.     Imaging:   - CXR: no consolidation, effusion, or pneumothorax (personally reviewed).   - CT abd/pelvis: Inflammatory changes in the fat adjacent to the pancreatic body and tail likely reflecting pancreatitis. Correlate clinically. Pancreatic divisium with prominent dorsal duct. No evidence for pancreatic necrosis or abscess or pseudocyst. The adjacent vessels appear normal.    Pt was given 2L of NS, pepcid 20 mg IVP, Dilaudid, Zofran. She was placed NPO and is being admitted to med/surg for further management.

## 2021-11-03 NOTE — PROGRESS NOTE ADULT - SUBJECTIVE AND OBJECTIVE BOX
Chief complaint: Abdominal pain    Interval History: No acute events overnight. Patient seen and evaluated at bedside this AM. Continues to report epigastric pain, radiating to back, overall improved compared to yesterday, relieved with Dilaudid. Eager to advance diet. Has been tolerating clears. No fevers or chills. No nausea or emesis. No other complaints.     ROS: Multisystem review is comprehensively negative x 10 systems except for the lingering abdominal discomfort    Exam:  Vital Signs Last 24 Hrs  T(F): 98.1 (03 Nov 2021 18:00), Max: 98.6 (03 Nov 2021 05:45)  HR: 80 (03 Nov 2021 18:00) (68 - 83)  BP: 159/74 (03 Nov 2021 18:00) (124/61 - 185/79)  RR: 18 (03 Nov 2021 18:00) (16 - 18)  SpO2: 98% (03 Nov 2021 18:00) (95% - 98%)    Gen: No acute distress, appears slightly uncomfortable due to abdominal pain  HEENT: NCAT PERRL EOMI  Neck: Supple  Chest: CTA B/L  CVS: s1 s2 normal RRR  Abd: +BS, soft, minimally tender in epigastrium, no distension, no guarding or rebound  Ext: No edema or calf tenderness  Skin: Warm, dry and intact  Neuro: AO+x3, CN intact, no focal deficits, no tremulousness  Mood: Calm and pleasant    Labs:    CBC 11/3                        11.1   5.91  )-----------( 207               33.6     BMP 11/3    142  |  111 |  11  ----------------------------<  95  3.8   |   25  |  0.52    Ca 8.4  Phos 3.4  Mg 2.0    LFTs 11/3    TPro  6.1  /  Alb  3.1<L>  /  TBili  0.3  /  DBili  <0.1  /  AST  20  /  ALT  26  /  AlkPhos  52      Lipase 3284 (11/3) <-- >30,000 (11/2)    Lipid profile: Trig 44 HDL 43 LDL 57    Blood lactate WNL 11/2    UA negative (11/2)    Micro:  COVID19 PCR 11/2: Negative    Imaging:  MRCP 11/3: Mild pancreatitis. Pancreas ductus divisum. Ptotic left kidney.    CT AP W/ 11/2: Inflammatory changes in the fat adjacent to the pancreatic body and tail likely reflecting pancreatitis. Correlate clinically. Pancreatic divisium with prominent dorsal duct. No evidence for pancreatic necrosis or abscess or pseudocyst. The adjacent vessels appear normal.    CXR 11/2: clear lungs    Meds:  MEDICATIONS  (STANDING):  ascorbic acid 500 milliGRAM(s) Oral daily  aspirin enteric coated 81 milliGRAM(s) Oral daily  calcium carbonate   1250 mG (OsCal) 1 Tablet(s) Oral daily  cholecalciferol 1000 Unit(s) Oral daily  enoxaparin Injectable 40 milliGRAM(s) SubCutaneous daily  folic acid 1 milliGRAM(s) Oral daily  hydrochlorothiazide 12.5 milliGRAM(s) Oral daily  lactated ringers. 1000 milliLiter(s) (250 mL/Hr) IV Continuous <Continuous>  losartan 100 milliGRAM(s) Oral daily  magnesium oxide 400 milliGRAM(s) Oral daily  metoprolol succinate ER 25 milliGRAM(s) Oral at bedtime  multivitamin 1 Tablet(s) Oral daily  omega-3-Acid Ethyl Esters 4 Gram(s) Oral daily  pantoprazole  Injectable 40 milliGRAM(s) IV Push daily  PARoxetine 10 milliGRAM(s) Oral daily  simvastatin 20 milliGRAM(s) Oral at bedtime  thiamine 100 milliGRAM(s) Oral daily    MEDICATIONS  (PRN):  acetaminophen     Tablet .. 650 milliGRAM(s) Oral every 6 hours PRN Temp greater or equal to 38C (100.4F), Mild Pain (1 - 3)  aluminum hydroxide/magnesium hydroxide/simethicone Suspension 30 milliLiter(s) Oral every 4 hours PRN Dyspepsia  hydrALAZINE Injectable 10 milliGRAM(s) IV Push every 6 hours PRN SBP > 160  HYDROmorphone  Injectable 0.5 milliGRAM(s) IV Push every 4 hours PRN Moderate Pain (4 - 6)  HYDROmorphone  Injectable 1 milliGRAM(s) IV Push every 4 hours PRN Severe Pain (7 - 10)  LORazepam   Injectable 2 milliGRAM(s) IV Push every 2 hours PRN Symptom-triggered: 2 point increase in CIWA -Ar score and a total score of 7 or LESS  melatonin 3 milliGRAM(s) Oral at bedtime PRN Insomnia  ondansetron Injectable 4 milliGRAM(s) IV Push every 6 hours PRN Nausea and/or Vomiting

## 2021-11-03 NOTE — H&P ADULT - NSICDXPASTMEDICALHX_GEN_ALL_CORE_FT
PAST MEDICAL HISTORY:  Arthritis     Fatty liver     GERD (gastroesophageal reflux disease)     High cholesterol     Hypertension     Osteoarthritis     Pancreatitis

## 2021-11-04 ENCOUNTER — TRANSCRIPTION ENCOUNTER (OUTPATIENT)
Age: 76
End: 2021-11-04

## 2021-11-04 VITALS
DIASTOLIC BLOOD PRESSURE: 69 MMHG | RESPIRATION RATE: 18 BRPM | SYSTOLIC BLOOD PRESSURE: 150 MMHG | HEART RATE: 82 BPM | OXYGEN SATURATION: 98 % | TEMPERATURE: 99 F

## 2021-11-04 DIAGNOSIS — E78.00 PURE HYPERCHOLESTEROLEMIA, UNSPECIFIED: ICD-10-CM

## 2021-11-04 DIAGNOSIS — I35.1 NONRHEUMATIC AORTIC (VALVE) INSUFFICIENCY: ICD-10-CM

## 2021-11-04 DIAGNOSIS — I10 ESSENTIAL (PRIMARY) HYPERTENSION: ICD-10-CM

## 2021-11-04 DIAGNOSIS — K85.90 ACUTE PANCREATITIS WITHOUT NECROSIS OR INFECTION, UNSPECIFIED: ICD-10-CM

## 2021-11-04 DIAGNOSIS — I36.1 NONRHEUMATIC TRICUSPID (VALVE) INSUFFICIENCY: ICD-10-CM

## 2021-11-04 DIAGNOSIS — I34.0 NONRHEUMATIC MITRAL (VALVE) INSUFFICIENCY: ICD-10-CM

## 2021-11-04 LAB
ALBUMIN SERPL ELPH-MCNC: 3.1 G/DL — LOW (ref 3.3–5)
ALP SERPL-CCNC: 57 U/L — SIGNIFICANT CHANGE UP (ref 40–120)
ALT FLD-CCNC: 27 U/L — SIGNIFICANT CHANGE UP (ref 12–78)
ANION GAP SERPL CALC-SCNC: 7 MMOL/L — SIGNIFICANT CHANGE UP (ref 5–17)
AST SERPL-CCNC: 21 U/L — SIGNIFICANT CHANGE UP (ref 15–37)
BASOPHILS # BLD AUTO: 0.03 K/UL — SIGNIFICANT CHANGE UP (ref 0–0.2)
BASOPHILS NFR BLD AUTO: 0.5 % — SIGNIFICANT CHANGE UP (ref 0–2)
BILIRUB DIRECT SERPL-MCNC: 0.1 MG/DL — SIGNIFICANT CHANGE UP (ref 0–0.2)
BILIRUB INDIRECT FLD-MCNC: 0.3 MG/DL — SIGNIFICANT CHANGE UP (ref 0.2–1)
BILIRUB SERPL-MCNC: 0.4 MG/DL — SIGNIFICANT CHANGE UP (ref 0.2–1.2)
BUN SERPL-MCNC: 4 MG/DL — LOW (ref 7–23)
CALCIUM SERPL-MCNC: 9 MG/DL — SIGNIFICANT CHANGE UP (ref 8.5–10.1)
CHLORIDE SERPL-SCNC: 105 MMOL/L — SIGNIFICANT CHANGE UP (ref 96–108)
CO2 SERPL-SCNC: 28 MMOL/L — SIGNIFICANT CHANGE UP (ref 22–31)
CREAT SERPL-MCNC: 0.58 MG/DL — SIGNIFICANT CHANGE UP (ref 0.5–1.3)
EOSINOPHIL # BLD AUTO: 0.25 K/UL — SIGNIFICANT CHANGE UP (ref 0–0.5)
EOSINOPHIL NFR BLD AUTO: 4.4 % — SIGNIFICANT CHANGE UP (ref 0–6)
GLUCOSE SERPL-MCNC: 156 MG/DL — HIGH (ref 70–99)
HCT VFR BLD CALC: 33.2 % — LOW (ref 34.5–45)
HGB BLD-MCNC: 11.2 G/DL — LOW (ref 11.5–15.5)
IMM GRANULOCYTES NFR BLD AUTO: 0.3 % — SIGNIFICANT CHANGE UP (ref 0–1.5)
LIDOCAIN IGE QN: 479 U/L — HIGH (ref 73–393)
LYMPHOCYTES # BLD AUTO: 1.87 K/UL — SIGNIFICANT CHANGE UP (ref 1–3.3)
LYMPHOCYTES # BLD AUTO: 32.6 % — SIGNIFICANT CHANGE UP (ref 13–44)
MAGNESIUM SERPL-MCNC: 1.8 MG/DL — SIGNIFICANT CHANGE UP (ref 1.6–2.6)
MCHC RBC-ENTMCNC: 30.3 PG — SIGNIFICANT CHANGE UP (ref 27–34)
MCHC RBC-ENTMCNC: 33.7 GM/DL — SIGNIFICANT CHANGE UP (ref 32–36)
MCV RBC AUTO: 89.7 FL — SIGNIFICANT CHANGE UP (ref 80–100)
MONOCYTES # BLD AUTO: 0.59 K/UL — SIGNIFICANT CHANGE UP (ref 0–0.9)
MONOCYTES NFR BLD AUTO: 10.3 % — SIGNIFICANT CHANGE UP (ref 2–14)
NEUTROPHILS # BLD AUTO: 2.98 K/UL — SIGNIFICANT CHANGE UP (ref 1.8–7.4)
NEUTROPHILS NFR BLD AUTO: 51.9 % — SIGNIFICANT CHANGE UP (ref 43–77)
PHOSPHATE SERPL-MCNC: 3.4 MG/DL — SIGNIFICANT CHANGE UP (ref 2.5–4.5)
PLATELET # BLD AUTO: 214 K/UL — SIGNIFICANT CHANGE UP (ref 150–400)
POTASSIUM SERPL-MCNC: 3.4 MMOL/L — LOW (ref 3.5–5.3)
POTASSIUM SERPL-SCNC: 3.4 MMOL/L — LOW (ref 3.5–5.3)
PROT SERPL-MCNC: 6.2 GM/DL — SIGNIFICANT CHANGE UP (ref 6–8.3)
RBC # BLD: 3.7 M/UL — LOW (ref 3.8–5.2)
RBC # FLD: 12.5 % — SIGNIFICANT CHANGE UP (ref 10.3–14.5)
SODIUM SERPL-SCNC: 140 MMOL/L — SIGNIFICANT CHANGE UP (ref 135–145)
WBC # BLD: 5.74 K/UL — SIGNIFICANT CHANGE UP (ref 3.8–10.5)
WBC # FLD AUTO: 5.74 K/UL — SIGNIFICANT CHANGE UP (ref 3.8–10.5)

## 2021-11-04 PROCEDURE — 99232 SBSQ HOSP IP/OBS MODERATE 35: CPT

## 2021-11-04 PROCEDURE — 99239 HOSP IP/OBS DSCHRG MGMT >30: CPT

## 2021-11-04 RX ORDER — CX-024414 0.2 MG/ML
0.25 INJECTION, SUSPENSION INTRAMUSCULAR ONCE
Refills: 0 | Status: COMPLETED | OUTPATIENT
Start: 2021-11-04 | End: 2021-11-04

## 2021-11-04 RX ORDER — THIAMINE MONONITRATE (VIT B1) 100 MG
1 TABLET ORAL
Qty: 30 | Refills: 0
Start: 2021-11-04

## 2021-11-04 RX ORDER — FOLIC ACID 0.8 MG
1 TABLET ORAL
Qty: 30 | Refills: 0
Start: 2021-11-04

## 2021-11-04 RX ORDER — SODIUM CHLORIDE 9 MG/ML
1000 INJECTION, SOLUTION INTRAVENOUS
Refills: 0 | Status: DISCONTINUED | OUTPATIENT
Start: 2021-11-04 | End: 2021-11-04

## 2021-11-04 RX ADMIN — SODIUM CHLORIDE 250 MILLILITER(S): 9 INJECTION, SOLUTION INTRAVENOUS at 01:20

## 2021-11-04 RX ADMIN — SODIUM CHLORIDE 250 MILLILITER(S): 9 INJECTION, SOLUTION INTRAVENOUS at 05:30

## 2021-11-04 RX ADMIN — Medication 100 MILLIGRAM(S): at 10:45

## 2021-11-04 RX ADMIN — HYDROMORPHONE HYDROCHLORIDE 0.5 MILLIGRAM(S): 2 INJECTION INTRAMUSCULAR; INTRAVENOUS; SUBCUTANEOUS at 05:49

## 2021-11-04 RX ADMIN — PANTOPRAZOLE SODIUM 40 MILLIGRAM(S): 20 TABLET, DELAYED RELEASE ORAL at 10:45

## 2021-11-04 RX ADMIN — Medication 1 TABLET(S): at 10:46

## 2021-11-04 RX ADMIN — LOSARTAN POTASSIUM 100 MILLIGRAM(S): 100 TABLET, FILM COATED ORAL at 10:46

## 2021-11-04 RX ADMIN — Medication 81 MILLIGRAM(S): at 10:45

## 2021-11-04 RX ADMIN — SODIUM CHLORIDE 125 MILLILITER(S): 9 INJECTION, SOLUTION INTRAVENOUS at 11:54

## 2021-11-04 RX ADMIN — Medication 4 GRAM(S): at 10:46

## 2021-11-04 RX ADMIN — ENOXAPARIN SODIUM 40 MILLIGRAM(S): 100 INJECTION SUBCUTANEOUS at 10:45

## 2021-11-04 RX ADMIN — CX-024414 0.25 MILLILITER(S): 0.2 INJECTION, SUSPENSION INTRAMUSCULAR at 17:08

## 2021-11-04 RX ADMIN — MAGNESIUM OXIDE 400 MG ORAL TABLET 400 MILLIGRAM(S): 241.3 TABLET ORAL at 10:45

## 2021-11-04 RX ADMIN — Medication 1000 UNIT(S): at 10:45

## 2021-11-04 RX ADMIN — Medication 500 MILLIGRAM(S): at 10:45

## 2021-11-04 RX ADMIN — Medication 1 MILLIGRAM(S): at 10:46

## 2021-11-04 RX ADMIN — SODIUM CHLORIDE 250 MILLILITER(S): 9 INJECTION, SOLUTION INTRAVENOUS at 09:40

## 2021-11-04 RX ADMIN — Medication 10 MILLIGRAM(S): at 10:47

## 2021-11-04 RX ADMIN — Medication 1 TABLET(S): at 10:45

## 2021-11-04 NOTE — PROGRESS NOTE ADULT - SUBJECTIVE AND OBJECTIVE BOX
HPI:  77 y/o female with PMH arthritis, fatty liver, GERD, HLD, HTN, hypothyroidism, osteoarthritis, pancreatitis admitted for abdominal pain.  Epigastric pain began on Friday AM, and worsened over the course of the day, but improved. On Monday pain returned and pain became severe, and persisted 8/10. At time of evaluation, denies abd. pain, nausea, vomiting, diarrhea, constipation, chest pain, palpitations, heart burn, burning on urination, blurry vision. The patient has a history of pancreatitis which was thought to be related to gallstones and she had a cholecystecomy. She did have 1 episode of pancreatitis after her gallbladder was removed. Of note, pt's brother passed away 5 months ago and she started drinking 2 jiggers of liquor nightly (3 oz) for the past 5 months. At time of evaluation patient is well appearing.    PAST MEDICAL & SURGICAL HISTORY:  Hypertension  High cholesterol  GERD (gastroesophageal reflux disease)  Arthritis  Fatty liver  Pancreatitis  Osteoarthritis  History of back surgery  History of   History of tonsillectomy  History of laminectomy with resection  S/P left cataract extraction  S/P cholecystectomy  H/O parathyroidectomy  H/O hand surgery    MEDICATIONS  (STANDING):  ascorbic acid 500 milliGRAM(s) Oral daily  aspirin enteric coated 81 milliGRAM(s) Oral daily  calcium carbonate   1250 mG (OsCal) 1 Tablet(s) Oral daily  cholecalciferol 1000 Unit(s) Oral daily  enoxaparin Injectable 40 milliGRAM(s) SubCutaneous daily  folic acid 1 milliGRAM(s) Oral daily  lactated ringers. 1000 milliLiter(s) (125 mL/Hr) IV Continuous <Continuous>  losartan 100 milliGRAM(s) Oral daily  magnesium oxide 400 milliGRAM(s) Oral daily  metoprolol succinate ER 25 milliGRAM(s) Oral at bedtime  multivitamin 1 Tablet(s) Oral daily  omega-3-Acid Ethyl Esters 4 Gram(s) Oral daily  pantoprazole  Injectable 40 milliGRAM(s) IV Push daily  PARoxetine 10 milliGRAM(s) Oral daily  simvastatin 20 milliGRAM(s) Oral at bedtime  thiamine 100 milliGRAM(s) Oral daily    MEDICATIONS  (PRN):  acetaminophen     Tablet .. 650 milliGRAM(s) Oral every 6 hours PRN Temp greater or equal to 38C (100.4F), Mild Pain (1 - 3)  aluminum hydroxide/magnesium hydroxide/simethicone Suspension 30 milliLiter(s) Oral every 4 hours PRN Dyspepsia  hydrALAZINE Injectable 10 milliGRAM(s) IV Push every 6 hours PRN SBP > 160  HYDROmorphone  Injectable 0.5 milliGRAM(s) IV Push every 4 hours PRN Moderate Pain (4 - 6)  HYDROmorphone  Injectable 1 milliGRAM(s) IV Push every 4 hours PRN Severe Pain (7 - 10)  LORazepam   Injectable 2 milliGRAM(s) IV Push every 2 hours PRN Symptom-triggered: 2 point increase in CIWA -Ar score and a total score of 7 or LESS  melatonin 3 milliGRAM(s) Oral at bedtime PRN Insomnia  ondansetron Injectable 4 milliGRAM(s) IV Push every 6 hours PRN Nausea and/or Vomiting    Allergie  adhesives (Rash)  Betadine (Unknown)  Sudafed (Other)    FAMILY HISTORY:  Family history of breast cancer Mother -  at age 99  Family history of diabetes mellitus  Both parents -   Family history of CHF (congestive heart failure)  Father   Family history of AIDS (Sibling)  brother       REVIEW OF SYSTEMS:  CONSTITUTIONAL: No weakness, fevers or chills  EYES/ENT: No visual changes;  No vertigo or throat pain   NECK: No pain or stiffness  RESPIRATORY: No cough, wheezing, hemoptysis; No shortness of breath  CARDIOVASCULAR: No chest pain or palpitations  GASTROINTESTINAL: No abdominal or epigastric pain. No nausea, vomiting, or hematemesis; No diarrhea or constipation. No melena or hematochezia.  GENITOURINARY: No dysuria, frequency or hematuria  NEUROLOGICAL: No numbness or weakness  SKIN: No itching, burning, rashes, or lesions   PSYCH: Normal mood and affect  All other review of systems is negative unless indicated above.  Vital Signs Last 24 Hrs  T(C): 36.6 (2021 10:30), Max: 37.2 (2021 21:33)  T(F): 97.9 (2021 10:30), Max: 98.9 (2021 21:33)  HR: 67 (2021 10:30) (67 - 83)  BP: 144/62 (2021 10:30) (141/61 - 173/70)  BP(mean): --  RR: 18 (2021 10:30) (16 - 18)  SpO2: 95% (2021 10:30) (95% - 98%)    PHYSICAL EXAM:  Constitutional: NAD, well-developed  HEENT: MMM  Neck: No LAD  Respiratory: CTAB  Cardiovascular: S1 and S2, RRR, no M/G/R  Gastrointestinal: BS+, soft, NT/ND  Extremities: No peripheral edema  Vascular: 2+ peripheral pulses  Neurological: A/O x 3, no focal deficits  Psychiatric: Normal mood, normal affect  Skin: No rashes    LABS:                        11.2   5.74  )-----------( 214      ( 2021 09:05 )             33.2     11-04    140  |  105  |  4<L>  ----------------------------<  156<H>  3.4<L>   |  28  |  0.58    Ca    9.0      2021 09:05  Phos  3.4     11-04  Mg     1.8     -    TPro  6.2  /  Alb  3.1<L>  /  TBili  0.4  /  DBili  0.1  /  AST  21  /  ALT  27  /  AlkPhos  57  11-04      LIVER FUNCTIONS - ( 2021 09:05 )  Alb: 3.1 g/dL / Pro: 6.2 gm/dL / ALK PHOS: 57 U/L / ALT: 27 U/L / AST: 21 U/L / GGT: x             RADIOLOGY & ADDITIONAL STUDIES:  11/3/2021 MRCP shows mild pancreatitis Patient admitted with chief complaint of abdominal pain, follow up for ongoing symptom assessment.     Patent today toleratiung PO intake. No acute complaints. Went for MRI.     PAST MEDICAL & SURGICAL HISTORY:  Hypertension  High cholesterol  GERD (gastroesophageal reflux disease)  Arthritis  Fatty liver  Pancreatitis  Osteoarthritis  History of back surgery  History of   History of tonsillectomy  History of laminectomy with resection  S/P left cataract extraction  S/P cholecystectomy  H/O parathyroidectomy  H/O hand surgery    MEDICATIONS  (STANDING):  ascorbic acid 500 milliGRAM(s) Oral daily  aspirin enteric coated 81 milliGRAM(s) Oral daily  calcium carbonate   1250 mG (OsCal) 1 Tablet(s) Oral daily  cholecalciferol 1000 Unit(s) Oral daily  enoxaparin Injectable 40 milliGRAM(s) SubCutaneous daily  folic acid 1 milliGRAM(s) Oral daily  lactated ringers. 1000 milliLiter(s) (125 mL/Hr) IV Continuous <Continuous>  losartan 100 milliGRAM(s) Oral daily  magnesium oxide 400 milliGRAM(s) Oral daily  metoprolol succinate ER 25 milliGRAM(s) Oral at bedtime  multivitamin 1 Tablet(s) Oral daily  omega-3-Acid Ethyl Esters 4 Gram(s) Oral daily  pantoprazole  Injectable 40 milliGRAM(s) IV Push daily  PARoxetine 10 milliGRAM(s) Oral daily  simvastatin 20 milliGRAM(s) Oral at bedtime  thiamine 100 milliGRAM(s) Oral daily    MEDICATIONS  (PRN):  acetaminophen     Tablet .. 650 milliGRAM(s) Oral every 6 hours PRN Temp greater or equal to 38C (100.4F), Mild Pain (1 - 3)  aluminum hydroxide/magnesium hydroxide/simethicone Suspension 30 milliLiter(s) Oral every 4 hours PRN Dyspepsia  hydrALAZINE Injectable 10 milliGRAM(s) IV Push every 6 hours PRN SBP > 160  HYDROmorphone  Injectable 0.5 milliGRAM(s) IV Push every 4 hours PRN Moderate Pain (4 - 6)  HYDROmorphone  Injectable 1 milliGRAM(s) IV Push every 4 hours PRN Severe Pain (7 - 10)  LORazepam   Injectable 2 milliGRAM(s) IV Push every 2 hours PRN Symptom-triggered: 2 point increase in CIWA -Ar score and a total score of 7 or LESS  melatonin 3 milliGRAM(s) Oral at bedtime PRN Insomnia  ondansetron Injectable 4 milliGRAM(s) IV Push every 6 hours PRN Nausea and/or Vomiting    Allergie  adhesives (Rash)  Betadine (Unknown)  Sudafed (Other)    FAMILY HISTORY:  Family history of breast cancer Mother -  at age 99  Family history of diabetes mellitus  Both parents -   Family history of CHF (congestive heart failure)  Father   Family history of AIDS (Sibling)  brother       REVIEW OF SYSTEMS:  CONSTITUTIONAL: No weakness, fevers or chills  EYES/ENT: No visual changes;  No vertigo or throat pain   NECK: No pain or stiffness  RESPIRATORY: No cough, wheezing, hemoptysis; No shortness of breath  CARDIOVASCULAR: No chest pain or palpitations  GASTROINTESTINAL: No abdominal or epigastric pain. No nausea, vomiting, or hematemesis; No diarrhea or constipation. No melena or hematochezia.  GENITOURINARY: No dysuria, frequency or hematuria  NEUROLOGICAL: No numbness or weakness  SKIN: No itching, burning, rashes, or lesions   PSYCH: Normal mood and affect  All other review of systems is negative unless indicated above.  Vital Signs Last 24 Hrs  T(C): 36.6 (2021 10:30), Max: 37.2 (2021 21:33)  T(F): 97.9 (2021 10:30), Max: 98.9 (2021 21:33)  HR: 67 (2021 10:30) (67 - 83)  BP: 144/62 (2021 10:30) (141/61 - 173/70)  BP(mean): --  RR: 18 (2021 10:30) (16 - 18)  SpO2: 95% (2021 10:30) (95% - 98%)    PHYSICAL EXAM:  Constitutional: NAD, well-developed  HEENT: MMM  Neck: No LAD  Respiratory: CTAB  Cardiovascular: S1 and S2, RRR, no M/G/R  Gastrointestinal: BS+, soft, NT/ND  Extremities: No peripheral edema  Vascular: 2+ peripheral pulses  Neurological: A/O x 3, no focal deficits  Psychiatric: Normal mood, normal affect  Skin: No rashes    LABS:                        11.2   5.74  )-----------( 214      ( 2021 09:05 )             33.2     11-04    140  |  105  |  4<L>  ----------------------------<  156<H>  3.4<L>   |  28  |  0.58    Ca    9.0      2021 09:05  Phos  3.4       Mg     1.8         TPro  6.2  /  Alb  3.1<L>  /  TBili  0.4  /  DBili  0.1  /  AST  21  /  ALT  27  /  AlkPhos  57        LIVER FUNCTIONS - ( 2021 09:05 )  Alb: 3.1 g/dL / Pro: 6.2 gm/dL / ALK PHOS: 57 U/L / ALT: 27 U/L / AST: 21 U/L / GGT: x             RADIOLOGY & ADDITIONAL STUDIES:  11/3/2021 MRCP shows mild pancreatitis

## 2021-11-04 NOTE — CONSULT NOTE ADULT - ASSESSMENT
11/4/21;  Pt with above history and acute pancreatitis.  She has been drinking more ETOH since the death of her brother but MRCP showing Pancreatic Ductus Divisum and ? whether this is also contributing to her pancreatitis.  Agree with stopping the HCTZ and will monitor her BP as an outpt.  No indication for further cardiac testing at this time.  Continue as per medicine etal.  Home when ok with above.  Will follow intermittently prn and as an outpt as needed.

## 2021-11-04 NOTE — DISCHARGE NOTE NURSING/CASE MANAGEMENT/SOCIAL WORK - NSDCVIVACCINE_GEN_ALL_CORE_FT
COVID-19, mRNA, LNP-S, PF, 100 mcg/ 0.5 mL dose (Moderna); 04-Nov-2021 17:08; Yanci Garcia (MAGAL[employer]); [employer] [id number] (Exp. Date: 14-Nov-2021); IntraMuscular; Deltoid Left.; 0.25 milliLiter(s);

## 2021-11-04 NOTE — DISCHARGE NOTE NURSING/CASE MANAGEMENT/SOCIAL WORK - PATIENT PORTAL LINK FT
You can access the FollowMyHealth Patient Portal offered by [facility] by registering at the following website: [email or web address]. By joining [facility]’s FollowMyHealth portal, you will also be able to view your health information using other applications (apps) compatible with our system.

## 2021-11-04 NOTE — PROGRESS NOTE ADULT - SUBJECTIVE AND OBJECTIVE BOX
Chief complaint: Abdominal pain    Interval History: No acute events overnight. Patient seen and evaluated at bedside this AM. Reports significant improvement in epigastric pain. Minimal at this point. Tolerated clears yesterday. Advanced to low fiber bland diet, tolerated that as well but did have a bit of loose stool. No other complaints. No fever. No rigors. No vomiting. Still eager to leave hospital today.     ROS: Multisystem review is comprehensively negative x 10 systems except for the lingering abdominal discomfort    Exam:  Vital Signs Last 24 Hrs  T(C): 37.3 (04 Nov 2021 15:09), Max: 37.3 (04 Nov 2021 15:09)  T(F): 99.2 (04 Nov 2021 15:09), Max: 99.2 (04 Nov 2021 15:09)  HR: 82 (04 Nov 2021 15:09) (67 - 82)  BP: 150/69 (04 Nov 2021 15:09) (143/53 - 173/70)  RR: 18 (04 Nov 2021 15:09) (16 - 18)  SpO2: 98% (04 Nov 2021 15:09) (95% - 98%)    Gen: Comfortable  HEENT: NCAT PERRL EOMI  Neck: Supple  Chest: CTA B/L  CVS: s1 s2 normal RRR  Abd: +BS, soft, NT ND  Ext: No edema or calf tenderness  Skin: Warm, dry and intact  Neuro: AO+x3, CN intact, no focal deficits, no tremulousness  Mood: Calm and pleasant    Labs:    CBC 11/4                        11.2   5.74  )-----------( 214               33.2     BMP 11/4    140  |  105 |  28  ----------------------------<  156  3.4   |   28  |  0.58    Ca 9.0  Phos 3.4  Mg 1.8    LFTs 11/4    TPro  6.2  /  Alb  3.1  /  TBili 0.4  /  DBili 0.1  /  AST  21  /  ALT  27  /  AlkPhos  57      Lipase 479 (11/4) <-- 3284 (11/3) <-- >30,000 (11/2)    Lipid profile: Trig 44 HDL 43 LDL 57    Blood lactate WNL 11/2    UA negative (11/2)    Micro:  COVID19 PCR 11/2: Negative    Imaging:  MRCP 11/3: Mild pancreatitis. Pancreas ductus divisum. Ptotic left kidney.    CT AP W/ 11/2: Inflammatory changes in the fat adjacent to the pancreatic body and tail likely reflecting pancreatitis. Correlate clinically. Pancreatic divisium with prominent dorsal duct. No evidence for pancreatic necrosis or abscess or pseudocyst. The adjacent vessels appear normal.    CXR 11/2: clear lungs    Meds:  MEDICATIONS  (STANDING):  ascorbic acid 500 milliGRAM(s) Oral daily  aspirin enteric coated 81 milliGRAM(s) Oral daily  calcium carbonate   1250 mG (OsCal) 1 Tablet(s) Oral daily  cholecalciferol 1000 Unit(s) Oral daily  coronavirus (EUA) Booster Vaccine (MODERNA) 0.25 milliLiter(s) IntraMuscular once  enoxaparin Injectable 40 milliGRAM(s) SubCutaneous daily  folic acid 1 milliGRAM(s) Oral daily  lactated ringers. 1000 milliLiter(s) (125 mL/Hr) IV Continuous <Continuous>  losartan 100 milliGRAM(s) Oral daily  magnesium oxide 400 milliGRAM(s) Oral daily  metoprolol succinate ER 25 milliGRAM(s) Oral at bedtime  multivitamin 1 Tablet(s) Oral daily  omega-3-Acid Ethyl Esters 4 Gram(s) Oral daily  pantoprazole  Injectable 40 milliGRAM(s) IV Push daily  PARoxetine 10 milliGRAM(s) Oral daily  simvastatin 20 milliGRAM(s) Oral at bedtime  thiamine 100 milliGRAM(s) Oral daily    MEDICATIONS  (PRN):  acetaminophen     Tablet .. 650 milliGRAM(s) Oral every 6 hours PRN Temp greater or equal to 38C (100.4F), Mild Pain (1 - 3)  aluminum hydroxide/magnesium hydroxide/simethicone Suspension 30 milliLiter(s) Oral every 4 hours PRN Dyspepsia  hydrALAZINE Injectable 10 milliGRAM(s) IV Push every 6 hours PRN SBP > 160  HYDROmorphone  Injectable 0.5 milliGRAM(s) IV Push every 4 hours PRN Moderate Pain (4 - 6)  HYDROmorphone  Injectable 1 milliGRAM(s) IV Push every 4 hours PRN Severe Pain (7 - 10)  LORazepam   Injectable 2 milliGRAM(s) IV Push every 2 hours PRN Symptom-triggered: 2 point increase in CIWA -Ar score and a total score of 7 or LESS  melatonin 3 milliGRAM(s) Oral at bedtime PRN Insomnia  ondansetron Injectable 4 milliGRAM(s) IV Push every 6 hours PRN Nausea and/or Vomiting

## 2021-11-04 NOTE — DISCHARGE NOTE PROVIDER - HOSPITAL COURSE
75 y/o woman with HTN, HLD, fatty liver, past pancreatitis s/p cholecystectomy, also hx of hypothyroidism, presenting c/o progressively worsening abdominal pain, epigastric, initially began 10/29, worsened throughout the day then resolved, but once again recurred 10/31, manageable at that time but on 11/1 became severe, sharp, partially relieved with some Pepto Bismol, sought evaluation in the ED here where she was found to have markedly elevated lipase and CT findings consistent with acute pancreatitis, no collection, no necrosis. Reports increased alcohol use, several days per week, few mixed drinks per sitting, related to depressed mood and stress ever since her brother passed away 5 months ago. Given aggressive IV fluid hydration, analgesics, and admitted to Medicine.    Acute pancreatitis  Thought to be due to increased alcohol intake since her brother's death several months ago. However, MRCP done this admission showed pancreas ductus divisum. Would defer to GI as to whether that anatomic abnormality is contributing. Lipid profile is ok. Also has been on thiazide diuretic, stopped this admission. Overall feeling markedly better now having received aggressive hydration, pain control. Lipase improved >26984 now down to 49.     Alcohol use disorder  Complicated by pancreatitis. Encouraged abstaining or substantially cutting back on alcohol use. SW referral. May benefit from AA. Prescribed thiamine, folate, continue home MVI.     Possible adjustment disorder  Depressed mood and increased stress since the loss of her brother. Outpatient Behavioral Health advised. May benefit from SSRI if abstaining from alcohol.     HTN  Stopped home HCTZ given the pancreatitis. Continue other home BP medications. PCP follow up for BP check.     Exam:  Afebrile  /60 HR 80 RR 18 O2 98%  Gen: Comfortable  HEENT: NCAT PERRL EOMI  Neck: Supple  Chest: CTA B/L  CVS: s1 s2 normal RRR  Abd: +BS, soft, NT ND  Ext: No edema or calf tenderness  Skin: Warm, dry and intact  Neuro: AO+x3, CN intact, no focal deficits, no tremulousness  Mood: Calm and pleasant    Labs:    CBC 11/4                        11.2   5.74  )-----------( 214               33.2     BMP 11/4    140  |  105 |  28  ----------------------------<  156  3.4   |   28  |  0.58    Ca 9.0  Phos 3.4  Mg 1.8    LFTs 11/4    TPro  6.2  /  Alb  3.1  /  TBili 0.4  /  DBili 0.1  /  AST  21  /  ALT  27  /  AlkPhos  57      Lipase 479 (11/4) <-- 3284 (11/3) <-- >30,000 (11/2)    Lipid profile: Trig 44 HDL 43 LDL 57    Blood lactate WNL 11/2    UA negative (11/2)    Micro:  COVID19 PCR 11/2: Negative    Imaging:  MRCP 11/3: Mild pancreatitis. Pancreas ductus divisum. Ptotic left kidney.    CT AP W/ 11/2: Inflammatory changes in the fat adjacent to the pancreatic body and tail likely reflecting pancreatitis. Correlate clinically. Pancreatic divisium with prominent dorsal duct. No evidence for pancreatic necrosis or abscess or pseudocyst. The adjacent vessels appear normal.    CXR 11/2: Clear lungs 77 y/o woman with HTN, HLD, fatty liver, past pancreatitis s/p cholecystectomy, also hx of hypothyroidism, presenting c/o progressively worsening abdominal pain, epigastric, initially began 10/29, worsened throughout the day then resolved, but once again recurred 10/31, manageable at that time but on 11/1 became severe, sharp, partially relieved with some Pepto Bismol, sought evaluation in the ED here where she was found to have markedly elevated lipase and CT findings consistent with acute pancreatitis, no collection, no necrosis. Reports increased alcohol use, several days per week, few mixed drinks per sitting, related to depressed mood and stress ever since her brother passed away 5 months ago. Given aggressive IV fluid hydration, analgesics, and admitted to Medicine.    Acute pancreatitis  Thought to be due to increased alcohol intake since her brother's death several months ago. However, MRCP done this admission showed pancreas ductus divisum. Would defer to GI as to whether that anatomic abnormality is contributing. Lipid profile is ok. Also has been on thiazide diuretic, stopped this admission. Overall feeling markedly better now having received aggressive hydration, pain control. Lipase improved >55665 now down to 49.     Alcohol use disorder  Complicated by pancreatitis. Encouraged abstaining or substantially cutting back on alcohol use. SW referral. May benefit from AA. Prescribed thiamine, folate, continue home MVI.     Depressed mood, anxiety, possible adjustment disorder  Depressed mood and increased stress since the loss of her brother. Outpatient Behavioral Health advised. On Paxil as outpatient but not feeling much benefit. May need a dose adjustment or switch over to another SSRI.    HTN  Stopped home HCTZ given the pancreatitis. Continue other home BP medications. PCP follow up for BP check.     Exam:  Afebrile  /60 HR 80 RR 18 O2 98%  Gen: Comfortable  HEENT: NCAT PERRL EOMI  Neck: Supple  Chest: CTA B/L  CVS: s1 s2 normal RRR  Abd: +BS, soft, NT ND  Ext: No edema or calf tenderness  Skin: Warm, dry and intact  Neuro: AO+x3, CN intact, no focal deficits, no tremulousness  Mood: Calm and pleasant    Labs:    CBC 11/4                        11.2   5.74  )-----------( 214               33.2     BMP 11/4    140  |  105 |  28  ----------------------------<  156  3.4   |   28  |  0.58    Ca 9.0  Phos 3.4  Mg 1.8    LFTs 11/4    TPro  6.2  /  Alb  3.1  /  TBili 0.4  /  DBili 0.1  /  AST  21  /  ALT  27  /  AlkPhos  57      Lipase 479 (11/4) <-- 3284 (11/3) <-- >30,000 (11/2)    Lipid profile: Trig 44 HDL 43 LDL 57    Blood lactate WNL 11/2    UA negative (11/2)    Micro:  COVID19 PCR 11/2: Negative    Imaging:  MRCP 11/3: Mild pancreatitis. Pancreas ductus divisum. Ptotic left kidney.    CT AP W/ 11/2: Inflammatory changes in the fat adjacent to the pancreatic body and tail likely reflecting pancreatitis. Correlate clinically. Pancreatic divisium with prominent dorsal duct. No evidence for pancreatic necrosis or abscess or pseudocyst. The adjacent vessels appear normal.    CXR 11/2: Clear lungs

## 2021-11-04 NOTE — CONSULT NOTE ADULT - SUBJECTIVE AND OBJECTIVE BOX
CHIEF COMPLAINT:  Patient is a 76y old  Female who presents with a chief complaint of Abdominal pain (2021 18:46)      HPI: 21:  77 y/o F well known to me with PMH arthritis, fatty liver, GERD, HLD, HTN, hypothyroidism, osteoarthritis, pancreatitis presents for abdominal pain. Her pain initially started Friday morning and was located in the epigastric region. It worsened over the course of the day but improved. Today she felt fine until 7:30 pm when she started having severe abdominal pain that was generalized. Described as an aching pain rated 8/10 in intensity. Dilaudid decreased her pain to 6/10 in intensity. She had 1 loose stool today, fatigue, headache. Denies radiation of the pain, nausea, vomiting, chest pain, palpitations, heart burn, burning on urination, blurry vision. The patient has a history of pancreatitis which was thought to be related to gallstones and she had a cholecystecomy. She did have 1 episode of pancreatitis after her gallbladder was removed. Of note, pt's brother passed away 5 months ago and she started drinking 2 jiggers of liquor nightly (3 oz) for the past 5 months.      ER course: /79. Labs: CBC unremarkable. Na 133, Glucose 107, Lipase >30,000. UA: moderate blood, RBC 6-10.   Imaging:   - CXR: no consolidation, effusion, or pneumothorax (personally reviewed).   - CT abd/pelvis: Inflammatory changes in the fat adjacent to the pancreatic body and tail likely reflecting pancreatitis. Correlate clinically. Pancreatic divisium with prominent dorsal duct. No evidence for pancreatic necrosis or abscess or pseudocyst. The adjacent vessels appear normal.  Pt was given 2L of NS, pepcid 20 mg IVP, Dilaudid, Zofran. She was placed NPO and is being admitted to med/surg for further management.    She had a stress echo in my office on 2020 showing no evidence for ischemia with LVEF=65-70% and mild MR and TR but otherwise a normal study.  Currently she is improving without anginal chest pains or increased SOB.  Abd pains improving.  Pt eager to go home.  No new cardiac issues so far.        PMHx:  PAST MEDICAL & SURGICAL HISTORY:  Hypertension  High cholesterol  GERD (gastroesophageal reflux disease)  Arthritis  Fatty liver  Pancreatitis  Osteoarthritis  History of back surgery  History of   History of tonsillectomy  History of laminectomy with resection  S/P left cataract extraction  S/P cholecystectomy  H/O parathyroidectomy  H/O hand surgery      FAMILY HISTORY:   FAMILY HISTORY:  Family history of breast cancer-Mother -  at age 99  Family history of diabetes mellitus-Both parents -   Family history of CHF (congestive heart failure)-Father  at age 87  Family history of AIDS (Sibling)-brother         ALLERGIES:  Allergies  adhesives (Rash)  Betadine (Unknown)  Sudafed (Other)      REVIEW OF SYSTEMS:  10 point ROS was obtained  Pertinent positives and negatives are as above  All other review of systems is negative unless indicated above      Vital Signs Last 24 Hrs  T(C): 36.7 (:22), Max: 37.2 (2021 21:33)  T(F): 98 (:), Max: 98.9 (2021 21:33)  HR: 73 (:) (70 - 83)  BP: 143/53 (:22) (124/61 - 159/74)  BP(mean): --  RR: 16 (:22) (16 - 18)  SpO2: 98% (:22) (95% - 98%)    I&O's Summary    2021 07:01  -  2021 07:00  --------------------------------------------------------  IN: 1000 mL / OUT: 0 mL / NET: 1000 mL    2021 07:01  -  2021 06:48  --------------------------------------------------------  IN: 3000 mL / OUT: 0 mL / NET: 3000 mL      PHYSICAL EXAM:   Constitutional: NAD, awake and alert, well-developed  HEENT: PERR, EOMI, Normal Hearing, MMM  Neck: Soft and supple, No LAD, No JVD  Respiratory: Breath sounds are clear bilaterally, No wheezing, rales or rhonchi  Cardiovascular: S1 and S2, regular rate and rhythm, soft UZAIR at LLSB and base as before, no gallops or rubs  Gastrointestinal: Bowel Sounds present, soft, minimallytender, nondistended, no guarding, no rebound  Extremities: No peripheral edema  Vascular: 2+ peripheral pulses  Neurological: A/O x 3, no focal deficits  Musculoskeletal: 5/5 strength b/l upper and lower extremities  Skin: No rashes      MEDICATIONS  (STANDING):  ascorbic acid 500 milliGRAM(s) Oral daily  aspirin enteric coated 81 milliGRAM(s) Oral daily  calcium carbonate   1250 mG (OsCal) 1 Tablet(s) Oral daily  cholecalciferol 1000 Unit(s) Oral daily  enoxaparin Injectable 40 milliGRAM(s) SubCutaneous daily  folic acid 1 milliGRAM(s) Oral daily  lactated ringers. 1000 milliLiter(s) (250 mL/Hr) IV Continuous <Continuous>  losartan 100 milliGRAM(s) Oral daily  magnesium oxide 400 milliGRAM(s) Oral daily  metoprolol succinate ER 25 milliGRAM(s) Oral at bedtime  multivitamin 1 Tablet(s) Oral daily  omega-3-Acid Ethyl Esters 4 Gram(s) Oral daily  pantoprazole  Injectable 40 milliGRAM(s) IV Push daily  PARoxetine 10 milliGRAM(s) Oral daily  simvastatin 20 milliGRAM(s) Oral at bedtime  thiamine 100 milliGRAM(s) Oral daily    MEDICATIONS  (PRN):  acetaminophen     Tablet .. 650 milliGRAM(s) Oral every 6 hours PRN Temp greater or equal to 38C (100.4F), Mild Pain (1 - 3)  aluminum hydroxide/magnesium hydroxide/simethicone Suspension 30 milliLiter(s) Oral every 4 hours PRN Dyspepsia  hydrALAZINE Injectable 10 milliGRAM(s) IV Push every 6 hours PRN SBP > 160  HYDROmorphone  Injectable 0.5 milliGRAM(s) IV Push every 4 hours PRN Moderate Pain (4 - 6)  HYDROmorphone  Injectable 1 milliGRAM(s) IV Push every 4 hours PRN Severe Pain (7 - 10)  LORazepam   Injectable 2 milliGRAM(s) IV Push every 2 hours PRN Symptom-triggered: 2 point increase in CIWA -Ar score and a total score of 7 or LESS  melatonin 3 milliGRAM(s) Oral at bedtime PRN Insomnia  ondansetron Injectable 4 milliGRAM(s) IV Push every 6 hours PRN Nausea and/or Vomiting      LABS: All Labs Reviewed:                           5.  )-----------( 207      ( 2021 08:30 )             33.6     -    142  |  111<H>  |  11  ----------------------------<  95  3.8   |  25  |  0.52    Ca    8.4<L>      2021 08:30  Phos  3.4       Mg     2.0         TPro  6.1  /  Alb  3.1<L>  /  TBili  0.3  /  DBili  <0.1  /  AST  20  /  ALT  26  /  AlkPhos  52  -      BLOOD CULTURES:     LIPID PROFILE lipid profile                           @ 08:30  cholesterol           109 mg/dL  Direct LDL            --  HDL cholesterol       43 mg/dL  HDL/Total cholesterol --  Triglycerides, serum  44 mg/dL      RADIOLOGY:    CXR: 21:  FINDINGS:  Redemonstration of upper chest surgical clip to the left of the midline.  The cardiomediastinal silhouette is within normal limits.  Lungs are clear. No pleural effusion or pneumothorax.  Degenerative changes of the thoracic spine.  No free subdiaphragmatic air.  IMPRESSION:  Clear lungs.  No free subdiaphragmatic air.    MRCP: 11/3/21:  FINDINGS:  LOWER CHEST: Within normal limits.  LIVER: Within normal limits.  BILE DUCTS: There is no intra or extrahepatic biliary ductal dilatation. The common bile duct measures 5 mm. There is no evidence of filling defect in the common bile duct to suggest choledocholithiasis. No stricturing or beading of the biliary tree is noted.  GALLBLADDER: Removed.  SPLEEN: Within normal limits. Splenic artery and vein are patent.  PANCREAS: Again is noted a mildly prominent pancreas ductus divisum. There is mildT2 hyperintense fullness of the pancreas with mild peripancreatic stranding extending into the retroperitoneum and left anterior pararenal space, compatible with pancreatitis. No evidence of pancreatic necrosis or intraparenchymal pseudocyst.  ADRENALS: Within normal limits.  KIDNEYS/URETERS: Ptotic left kidney. Small bilateral renal cysts. 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 Otherwise, the kidneys are symmetric in appearance, signal characteristics, and enhancement.  VISUALIZED PORTIONS:  BOWEL: Within normal limits.  PERITONEUM: No ascites.  VESSELS: Within normal limits.  RETROPERITONEUM/LYMPH NODES: No lymphadenopathy.  ABDOMINAL WALL: Within normal limits.  BONES: Transpedicular screw and kathya fixation of the lower lumbar spine.  IMPRESSION: Mild pancreatitis. Pancreas ductus divisum. Ptotic left kidney.    CT of Abd & Pelvis: 21:  FINDINGS:  LOWER CHEST: Within normal limits.  LIVER: Within normal limits.  BILE DUCTS: Normal caliber.  GALLBLADDER: Not visualized likely cholecystectomy.  SPLEEN: Within normal limits.  PANCREAS: Inflammatory changes in the fat adjacent to the pancreatic body and tail likely reflecting pancreatitis. Correlate clinically. Pancreatic divisium with prominent dorsal duct. No evidence for pancreatic necrosis or abscess or pseudocyst. The adjacent vessels appear normal.  ADRENALS: Within normal limits.  KIDNEYS/URETERS: Ectopic left kidney and lower abdomen/pelvis. Small right renal cyst and hypodensity too small to characterize. No hydronephrosis.  BLADDER: Within normal limits.  REPRODUCTIVE ORGANS: Probable large fundal uterine fibroid. Poorly evaluated on this exam  BOWEL: No bowel obstruction. Appendix is normal. Diverticulosis.  PERITONEUM: No ascites.  VESSELS: Atherosclerotic changes.  RETROPERITONEUM/LYMPH NODES: No lymphadenopathy.  ABDOMINAL WALL: Within normal limits.  BONES: Degenerative changes. Lumbar orthopedic hardware. Grade 1 anterolisthesis of L3 on 4, slightly more pronounced since the previous exam  IMPRESSION:  Inflammatory changes in the fat adjacent to the pancreatic body and tail likely reflecting pancreatitis. Correlate clinically. Pancreatic divisium with prominent dorsal duct. No evidence for pancreatic necrosis or abscess or pseudocyst. The adjacent vessels appear normal.      EK/3/21:   Normal sinus rhythm  Normal ECG      TELEMETRY:      ECHO:    Stress echo: 2020:  stress echo in my office on 2020 showing no evidence for ischemia with LVEF=65-70% and mild MR, AI and TR but otherwise a normal study.    TTE: 19:  M-Mode Measurements (cm)   LVEDd: 5.21 cm            LVESd: 2.43 cm   IVSEd: 0.96 cm   LVPWd: 1.1 cm             AO Root Dimension: 2.4 cm                        ACS: 2 cm  Doppler Measurements:                                  MV Peak E-Wave: 98.2 cm/s   TR Velocity:324 cm/s           MV Peak A-Wave: 96.3 cm/s   TR Gradient:41.9904 mmHg       MV E/A Ratio: 1.02 %   Estimated RAP:10 mmHg          MV Peak Gradient: 3.86 mmHg   RVSP:41 mmHg    Findings  Mitral Valve   Normal appearing mitral valve structure and function.   Trace mitral regurgitation is present.   EA reversal of the mitral inflow consistent with reduced compliance of the left ventricle    Aortic Valve   Mild aortic sclerosis is present with normal valvular opening.   Trace aortic regurgitation is present.    Tricuspid Valve   Mild (1+) tricuspid valve regurgitation is present.   Moderate pulmonary hypertension.    Pulmonic Valve   Normal appearing pulmonic valve structure and function.    Left Atrium   The left atrium is mildly dilated.    Left Ventricle   The left ventricle is normal in size, wall thickness, wall motion and contractility.   Estimated left ventricular ejection fraction is 65-70 %.    Right Atrium   Normal appearing right atrium.    Right Ventricle   Normal appearing right ventricle structure and function.    Pericardial Effusion   No evidence of pericardial effusion.    Pleural Effusion   No evidence of pleural effusion.    Miscellaneous   All visualized extra cardiac structures appears to be normal.    Summary   Mild aortic sclerosis is present with normal valvular opening.   Trace aortic regurgitation is present.   The left atrium is mildly dilated.   The left ventricle is normal in size, wall thickness, wall motion and contractility.   Estimated left ventricular ejection fraction is 65-70 %.   All visualized extra cardiac structures appears to be normal.   Normal appearing mitral valve structure and function.   Trace mitral regurgitation is present.  EA reversal of the mitral inflow consistent with reduced compliance of the left ventricle   No evidence of pericardial effusion.   No evidence of pleural effusion.   Normal appearing pulmonic valve structure and function.   Normal appearing right atrium.   Normal appearing right ventricle structure and function.   Mild (1+) tricuspid valve regurgitation is present.   Moderate pulmonary hypertension.

## 2021-11-04 NOTE — DISCHARGE NOTE PROVIDER - NSDCMRMEDTOKEN_GEN_ALL_CORE_FT
ascorbic acid 500 mg oral tablet: 1 tab(s) orally once a day  Aspirin Enteric Coated 81 mg oral delayed release tablet: 1 tab(s) orally once a day  CoQ10 300 mg oral capsule: 1 cap(s) orally once a day  Fish Oil oral capsule: 1 cap(s) orally once a day  folic acid 1 mg oral tablet: 1 tab(s) orally once a day  Mag-Ox 400 oral tablet: 1 tab(s) orally once a day  metoprolol succinate 25 mg oral tablet, extended release: 1 tab(s) orally once a day (at bedtime)  Multiple Vitamins oral tablet: 1 tab(s) orally once a day  olmesartan 40 mg oral tablet: 1 tab(s) orally once a day  omeprazole 20 mg oral delayed release capsule: 1 cap(s) orally once a day  Oysco 500 (1250 mg calcium carbonate) oral tablet: 1 tab(s) orally once a day  PARoxetine 10 mg oral tablet: 1 tab(s) orally once a day  simvastatin 20 mg oral tablet: 1 tab(s) orally once a day (at bedtime)  thiamine 100 mg oral tablet: 1 tab(s) orally once a day  Vitamin D3 25 mcg (1000 intl units) oral tablet: 1 tab(s) orally once a day

## 2021-11-04 NOTE — PROGRESS NOTE ADULT - ATTENDING COMMENTS
76 year old woman admitted with ETOH pancreatitis, doing well.     MRCP without ductal stricture, no mass. or pseudocyst.    Outpatient follow up for repeat imaging in 2 months.   Needs AA, social work for counselling.

## 2021-11-04 NOTE — DISCHARGE NOTE PROVIDER - CARE PROVIDER_API CALL
Primary Care Provider,   Follow up with Primary Care Provider for post discharge check up and BP check within 1 week  Phone: (   )    -  Fax: (   )    -  Follow Up Time: 1 week    Behavioral Health,   Advise follow up with Behavioral Health (Psychology and or Psychiatry) for further management of possible adjustment disorder, depressed mood, stress  Phone: (   )    -  Fax: (   )    -  Follow Up Time: 2 weeks

## 2021-11-04 NOTE — PROGRESS NOTE ADULT - ASSESSMENT
75 y/o female with PMH arthritis, fatty liver, GERD, HLD, HTN, hypothyroidism, osteoarthritis, pancreatitis admitted for abdominal pain. Well appearing    PLAN:  tolerating PO well, advance diet as tolerated  encourage ETOH cessation  follow up with GI outpatient  can be d/c from a GI perspective   77 y/o female with PMH arthritis, fatty liver, GERD, HLD, HTN, hypothyroidism, osteoarthritis, pancreatitis admitted for abdominal pain. Well appearing    PLAN:  tolerating PO well, advance diet as tolerated  encourage ETOH cessation  follow up with GI outpatient  can be d/c from a GI perspective    Discussed with Dr. Mckay

## 2021-11-04 NOTE — DISCHARGE NOTE PROVIDER - NSDCFUADDINST_GEN_ALL_CORE_FT
Consider clear liquid diet for another 24 hours then gradually advance diet to bland diet. Follow up with outpatient providers. Stop hydrochlorothiazide.

## 2021-11-04 NOTE — PROGRESS NOTE ADULT - ASSESSMENT
75 y/o woman with HTN, HLD, fatty liver, past pancreatitis s/p cholecystectomy, also hx of hypothyroidism, presenting c/o progressively worsening abdominal pain, epigastric, initially began 10/29, worsened throughout the day then resolved, but once again recurred 10/31, manageable at that time but on 11/1 became severe, sharp, partially relieved with some Pepto Bismol, sought evaluation in the ED here where she was found to have markedly elevated lipase and CT findings consistent with acute pancreatitis, no collection, no necrosis. Reports increased alcohol use, several days per week, few mixed drinks per sitting, related to depressed mood and stress ever since her brother passed away 5 months ago. Given aggressive IV fluid hydration, analgesics, and admitted to Medicine.    Acute pancreatitis  Thought to be due to increased alcohol intake since her brother's death several months ago. However, MRCP done this admission showed pancreas ductus divisum. Would defer to GI as to whether that anatomic abnormality is contributing. Lipid profile is ok. Also has been on thiazide diuretic, stopped this admission. Overall feeling markedly better now having received aggressive hydration, pain control. Lipase improved >80386 now down to 49. Encouraged abstaining or substantially cutting back on alcohol use. SW referral. May benefit from AA.     HTN  Continue losartan. Stopped HCTZ given the pancreatitis. PCP follow up for BP check.       Diet: [place] diet  DVT px: LMWH  Code status: Full  Dispo: Anticipate DC to home, possibly later today   77 y/o woman with HTN, HLD, fatty liver, past pancreatitis s/p cholecystectomy, also hx of hypothyroidism, presenting c/o progressively worsening abdominal pain, epigastric, initially began 10/29, worsened throughout the day then resolved, but once again recurred 10/31, manageable at that time but on 11/1 became severe, sharp, partially relieved with some Pepto Bismol, sought evaluation in the ED here where she was found to have markedly elevated lipase and CT findings consistent with acute pancreatitis, no collection, no necrosis. Reports increased alcohol use, several days per week, few mixed drinks per sitting, related to depressed mood and stress ever since her brother passed away 5 months ago. Given aggressive IV fluid hydration, analgesics, and admitted to Medicine.    Acute pancreatitis  Thought to be due to increased alcohol intake since her brother's death several months ago. However, MRCP done this admission showed pancreas ductus divisum. Would defer to GI as to whether that anatomic abnormality is contributing. Lipid profile is ok. Also has been on thiazide diuretic, stopped this admission. Overall feeling markedly better now having received aggressive hydration, pain control. Lipase improved >17249 now down to 49. Encouraged abstaining or substantially cutting back on alcohol use. SW referral. May benefit from AA.     HTN  Continue losartan. Stopped HCTZ given the pancreatitis. PCP follow up for BP check.     Depressed mood, anxiety, possible adjustment disorder  On Paxil as outpatient but not feeling much benefit. May need a dose adjustment or switch over to another SSRI      Diet: Opp diet  DVT px: LMWH  Code status: Full  Dispo: Anticipate DC to home, possibly later today

## 2021-11-04 NOTE — DISCHARGE NOTE PROVIDER - NSDCCPCAREPLAN_GEN_ALL_CORE_FT
PRINCIPAL DISCHARGE DIAGNOSIS  Diagnosis: Acute pancreatitis  Assessment and Plan of Treatment: Thought to be due to increased alcohol intake since her brother's death several months ago. However, MRCP done this admission showed pancreas ductus divisum. Would defer to GI as to whether that anatomic abnormality is contributing. Lipid profile is ok. Also has been on thiazide diuretic, stopped this admission. Overall feeling markedly better now having received aggressive hydration, pain control. Lipase improved >84806 now down to 49.      SECONDARY DISCHARGE DIAGNOSES  Diagnosis: Mild alcohol use disorder  Assessment and Plan of Treatment: No hx of withdrawl. However, has alcohol intake complicated by pancreatitis. Encouraged abstaining or substantially cutting back on alcohol use. SW referral. May benefit from AA. Prescribed thiamine, folate, continue home MVI.    Diagnosis: Adjustment disorder  Assessment and Plan of Treatment: Diagnosis considered, suspected. Depressed mood and increased stress since the loss of her brother. Outpatient Behavioral Health advised. May benefit from SSRI if abstaining from alcohol.    Diagnosis: HTN (hypertension)  Assessment and Plan of Treatment: Stopped home HCTZ given the pancreatitis. Continue other home BP medications. PCP follow up for BP check.     PRINCIPAL DISCHARGE DIAGNOSIS  Diagnosis: Acute pancreatitis  Assessment and Plan of Treatment: Thought to be due to increased alcohol intake since her brother's death several months ago. However, MRCP done this admission showed pancreas ductus divisum. Would defer to GI as to whether that anatomic abnormality is contributing. Lipid profile is ok. Also has been on thiazide diuretic, stopped this admission. Overall feeling markedly better now having received aggressive hydration, pain control. Lipase improved >77597 now down to 49.      SECONDARY DISCHARGE DIAGNOSES  Diagnosis: Mild alcohol use disorder  Assessment and Plan of Treatment: No hx of withdrawl. However, has alcohol intake complicated by pancreatitis. Encouraged abstaining or substantially cutting back on alcohol use. SW referral. May benefit from AA. Prescribed thiamine, folate, continue home MVI.    Diagnosis: Adjustment disorder  Assessment and Plan of Treatment: Depressed mood and increased stress since the loss of her brother. Outpatient Behavioral Health advised. On Paxil as outpatient but not feeling much benefit. May need a dose adjustment or switch over to another SSRI.    Diagnosis: HTN (hypertension)  Assessment and Plan of Treatment: Stopped home HCTZ given the pancreatitis. Continue other home BP medications. PCP follow up for BP check.

## 2021-11-04 NOTE — DISCHARGE NOTE PROVIDER - PROVIDER TOKENS
FREE:[LAST:[Primary Care Provider],PHONE:[(   )    -],FAX:[(   )    -],ADDRESS:[Follow up with Primary Care Provider for post discharge check up and BP check within 1 week],FOLLOWUP:[1 week]],FREE:[LAST:[Behavioral Health],PHONE:[(   )    -],FAX:[(   )    -],ADDRESS:[Advise follow up with Behavioral Health (Psychology and or Psychiatry) for further management of possible adjustment disorder, depressed mood, stress],FOLLOWUP:[2 weeks]]

## 2021-11-09 DIAGNOSIS — I16.0 HYPERTENSIVE URGENCY: ICD-10-CM

## 2021-11-09 DIAGNOSIS — Z79.82 LONG TERM (CURRENT) USE OF ASPIRIN: ICD-10-CM

## 2021-11-09 DIAGNOSIS — I10 ESSENTIAL (PRIMARY) HYPERTENSION: ICD-10-CM

## 2021-11-09 DIAGNOSIS — K85.20 ALCOHOL INDUCED ACUTE PANCREATITIS WITHOUT NECROSIS OR INFECTION: ICD-10-CM

## 2021-11-09 DIAGNOSIS — I08.3 COMBINED RHEUMATIC DISORDERS OF MITRAL, AORTIC AND TRICUSPID VALVES: ICD-10-CM

## 2021-11-09 DIAGNOSIS — Z88.8 ALLERGY STATUS TO OTHER DRUGS, MEDICAMENTS AND BIOLOGICAL SUBSTANCES STATUS: ICD-10-CM

## 2021-11-09 DIAGNOSIS — R19.7 DIARRHEA, UNSPECIFIED: ICD-10-CM

## 2021-11-09 DIAGNOSIS — E03.9 HYPOTHYROIDISM, UNSPECIFIED: ICD-10-CM

## 2021-11-09 DIAGNOSIS — K21.9 GASTRO-ESOPHAGEAL REFLUX DISEASE WITHOUT ESOPHAGITIS: ICD-10-CM

## 2021-11-09 DIAGNOSIS — F32.A DEPRESSION, UNSPECIFIED: ICD-10-CM

## 2021-11-09 DIAGNOSIS — F41.9 ANXIETY DISORDER, UNSPECIFIED: ICD-10-CM

## 2021-11-09 DIAGNOSIS — E78.5 HYPERLIPIDEMIA, UNSPECIFIED: ICD-10-CM

## 2021-11-09 DIAGNOSIS — M19.90 UNSPECIFIED OSTEOARTHRITIS, UNSPECIFIED SITE: ICD-10-CM

## 2021-11-09 DIAGNOSIS — K85.90 ACUTE PANCREATITIS WITHOUT NECROSIS OR INFECTION, UNSPECIFIED: ICD-10-CM

## 2021-11-09 DIAGNOSIS — F43.20 ADJUSTMENT DISORDER, UNSPECIFIED: ICD-10-CM

## 2021-11-09 DIAGNOSIS — R31.9 HEMATURIA, UNSPECIFIED: ICD-10-CM

## 2021-11-09 DIAGNOSIS — Q45.3 OTHER CONGENITAL MALFORMATIONS OF PANCREAS AND PANCREATIC DUCT: ICD-10-CM

## 2021-11-09 DIAGNOSIS — Z87.891 PERSONAL HISTORY OF NICOTINE DEPENDENCE: ICD-10-CM

## 2021-11-09 DIAGNOSIS — K76.0 FATTY (CHANGE OF) LIVER, NOT ELSEWHERE CLASSIFIED: ICD-10-CM

## 2021-11-23 ENCOUNTER — APPOINTMENT (OUTPATIENT)
Dept: GASTROENTEROLOGY | Facility: CLINIC | Age: 76
End: 2021-11-23
Payer: MEDICARE

## 2021-11-23 VITALS — HEIGHT: 64 IN | WEIGHT: 160 LBS | BODY MASS INDEX: 27.31 KG/M2

## 2021-11-23 PROCEDURE — 99213 OFFICE O/P EST LOW 20 MIN: CPT

## 2021-11-23 NOTE — ASSESSMENT
[FreeTextEntry1] : 75 y/o female presenting for evaluation post hospitalization for pancreatitis.\par Patient should continue with ETOH cessation, healthy diet and exercise.\par Advise to have repeat EGD/Colonoscopy in 2023 as polyps were seen on last evaluation.\par \par TO continue with omeprazole, and follow up in office as needed.\par \par Discussed with Dr. Brown

## 2021-11-23 NOTE — PHYSICAL EXAM
[General Appearance - Alert] : alert [General Appearance - In No Acute Distress] : in no acute distress [Sclera] : the sclera and conjunctiva were normal [PERRL With Normal Accommodation] : pupils were equal in size, round, and reactive to light [Extraocular Movements] : extraocular movements were intact [Auscultation Breath Sounds / Voice Sounds] : lungs were clear to auscultation bilaterally [Heart Rate And Rhythm] : heart rate was normal and rhythm regular [Heart Sounds] : normal S1 and S2 [Heart Sounds Gallop] : no gallops [Murmurs] : no murmurs [Heart Sounds Pericardial Friction Rub] : no pericardial rub [Edema] : there was no peripheral edema [Bowel Sounds] : normal bowel sounds [Abdomen Soft] : soft [Abdomen Tenderness] : non-tender [Abdomen Mass (___ Cm)] : no abdominal mass palpated [Abnormal Walk] : normal gait [Nail Clubbing] : no clubbing  or cyanosis of the fingernails [Musculoskeletal - Swelling] : no joint swelling seen [Motor Tone] : muscle strength and tone were normal [Skin Color & Pigmentation] : normal skin color and pigmentation [Skin Turgor] : normal skin turgor [] : no rash [Oriented To Time, Place, And Person] : oriented to person, place, and time [Impaired Insight] : insight and judgment were intact [Affect] : the affect was normal

## 2021-12-18 ENCOUNTER — INPATIENT (INPATIENT)
Facility: HOSPITAL | Age: 76
LOS: 1 days | Discharge: HOME | DRG: 439 | End: 2021-12-20
Attending: HOSPITALIST | Admitting: INTERNAL MEDICINE
Payer: MEDICARE

## 2021-12-18 VITALS — HEIGHT: 64 IN | WEIGHT: 149.91 LBS

## 2021-12-18 DIAGNOSIS — E89.2 POSTPROCEDURAL HYPOPARATHYROIDISM: Chronic | ICD-10-CM

## 2021-12-18 DIAGNOSIS — Z98.890 OTHER SPECIFIED POSTPROCEDURAL STATES: Chronic | ICD-10-CM

## 2021-12-18 DIAGNOSIS — K85.90 ACUTE PANCREATITIS WITHOUT NECROSIS OR INFECTION, UNSPECIFIED: ICD-10-CM

## 2021-12-18 DIAGNOSIS — Z98.89 OTHER SPECIFIED POSTPROCEDURAL STATES: Chronic | ICD-10-CM

## 2021-12-18 DIAGNOSIS — Z98.42 CATARACT EXTRACTION STATUS, LEFT EYE: Chronic | ICD-10-CM

## 2021-12-18 DIAGNOSIS — Z90.49 ACQUIRED ABSENCE OF OTHER SPECIFIED PARTS OF DIGESTIVE TRACT: Chronic | ICD-10-CM

## 2021-12-18 DIAGNOSIS — Z90.89 ACQUIRED ABSENCE OF OTHER ORGANS: Chronic | ICD-10-CM

## 2021-12-18 LAB
ALBUMIN SERPL ELPH-MCNC: 3.8 G/DL — SIGNIFICANT CHANGE UP (ref 3.3–5)
ALP SERPL-CCNC: 59 U/L — SIGNIFICANT CHANGE UP (ref 40–120)
ALT FLD-CCNC: 45 U/L — SIGNIFICANT CHANGE UP (ref 12–78)
ANION GAP SERPL CALC-SCNC: 3 MMOL/L — LOW (ref 5–17)
APPEARANCE UR: CLEAR — SIGNIFICANT CHANGE UP
AST SERPL-CCNC: 36 U/L — SIGNIFICANT CHANGE UP (ref 15–37)
BASOPHILS # BLD AUTO: 0.04 K/UL — SIGNIFICANT CHANGE UP (ref 0–0.2)
BASOPHILS NFR BLD AUTO: 0.3 % — SIGNIFICANT CHANGE UP (ref 0–2)
BILIRUB SERPL-MCNC: 0.3 MG/DL — SIGNIFICANT CHANGE UP (ref 0.2–1.2)
BILIRUB UR-MCNC: NEGATIVE — SIGNIFICANT CHANGE UP
BUN SERPL-MCNC: 17 MG/DL — SIGNIFICANT CHANGE UP (ref 7–23)
CALCIUM SERPL-MCNC: 9.5 MG/DL — SIGNIFICANT CHANGE UP (ref 8.5–10.1)
CHLORIDE SERPL-SCNC: 106 MMOL/L — SIGNIFICANT CHANGE UP (ref 96–108)
CHOLEST SERPL-MCNC: 153 MG/DL — SIGNIFICANT CHANGE UP
CO2 SERPL-SCNC: 28 MMOL/L — SIGNIFICANT CHANGE UP (ref 22–31)
COLOR SPEC: YELLOW — SIGNIFICANT CHANGE UP
CREAT SERPL-MCNC: 0.66 MG/DL — SIGNIFICANT CHANGE UP (ref 0.5–1.3)
DIFF PNL FLD: ABNORMAL
EOSINOPHIL # BLD AUTO: 0.03 K/UL — SIGNIFICANT CHANGE UP (ref 0–0.5)
EOSINOPHIL NFR BLD AUTO: 0.3 % — SIGNIFICANT CHANGE UP (ref 0–6)
FLUAV AG NPH QL: SIGNIFICANT CHANGE UP
FLUBV AG NPH QL: SIGNIFICANT CHANGE UP
GLUCOSE SERPL-MCNC: 140 MG/DL — HIGH (ref 70–99)
GLUCOSE UR QL: NEGATIVE MG/DL — SIGNIFICANT CHANGE UP
HCT VFR BLD CALC: 44 % — SIGNIFICANT CHANGE UP (ref 34.5–45)
HDLC SERPL-MCNC: 62 MG/DL — SIGNIFICANT CHANGE UP
HGB BLD-MCNC: 14.3 G/DL — SIGNIFICANT CHANGE UP (ref 11.5–15.5)
IMM GRANULOCYTES NFR BLD AUTO: 0.3 % — SIGNIFICANT CHANGE UP (ref 0–1.5)
KETONES UR-MCNC: NEGATIVE — SIGNIFICANT CHANGE UP
LEUKOCYTE ESTERASE UR-ACNC: NEGATIVE — SIGNIFICANT CHANGE UP
LIDOCAIN IGE QN: HIGH U/L (ref 73–393)
LIPID PNL WITH DIRECT LDL SERPL: 75 MG/DL — SIGNIFICANT CHANGE UP
LYMPHOCYTES # BLD AUTO: 1.4 K/UL — SIGNIFICANT CHANGE UP (ref 1–3.3)
LYMPHOCYTES # BLD AUTO: 12 % — LOW (ref 13–44)
MCHC RBC-ENTMCNC: 29.2 PG — SIGNIFICANT CHANGE UP (ref 27–34)
MCHC RBC-ENTMCNC: 32.5 GM/DL — SIGNIFICANT CHANGE UP (ref 32–36)
MCV RBC AUTO: 90 FL — SIGNIFICANT CHANGE UP (ref 80–100)
MONOCYTES # BLD AUTO: 0.54 K/UL — SIGNIFICANT CHANGE UP (ref 0–0.9)
MONOCYTES NFR BLD AUTO: 4.6 % — SIGNIFICANT CHANGE UP (ref 2–14)
NEUTROPHILS # BLD AUTO: 9.62 K/UL — HIGH (ref 1.8–7.4)
NEUTROPHILS NFR BLD AUTO: 82.5 % — HIGH (ref 43–77)
NITRITE UR-MCNC: NEGATIVE — SIGNIFICANT CHANGE UP
NON HDL CHOLESTEROL: 92 MG/DL — SIGNIFICANT CHANGE UP
PH UR: 6.5 — SIGNIFICANT CHANGE UP (ref 5–8)
PLATELET # BLD AUTO: 242 K/UL — SIGNIFICANT CHANGE UP (ref 150–400)
POTASSIUM SERPL-MCNC: 4.6 MMOL/L — SIGNIFICANT CHANGE UP (ref 3.5–5.3)
POTASSIUM SERPL-SCNC: 4.6 MMOL/L — SIGNIFICANT CHANGE UP (ref 3.5–5.3)
PROT SERPL-MCNC: 7.6 GM/DL — SIGNIFICANT CHANGE UP (ref 6–8.3)
PROT UR-MCNC: NEGATIVE MG/DL — SIGNIFICANT CHANGE UP
RBC # BLD: 4.89 M/UL — SIGNIFICANT CHANGE UP (ref 3.8–5.2)
RBC # FLD: 13.1 % — SIGNIFICANT CHANGE UP (ref 10.3–14.5)
RSV RNA NPH QL NAA+NON-PROBE: SIGNIFICANT CHANGE UP
SARS-COV-2 RNA SPEC QL NAA+PROBE: SIGNIFICANT CHANGE UP
SODIUM SERPL-SCNC: 137 MMOL/L — SIGNIFICANT CHANGE UP (ref 135–145)
SP GR SPEC: 1 — LOW (ref 1.01–1.02)
TRIGL SERPL-MCNC: 81 MG/DL — SIGNIFICANT CHANGE UP
UROBILINOGEN FLD QL: NEGATIVE MG/DL — SIGNIFICANT CHANGE UP
WBC # BLD: 11.66 K/UL — HIGH (ref 3.8–10.5)
WBC # FLD AUTO: 11.66 K/UL — HIGH (ref 3.8–10.5)

## 2021-12-18 PROCEDURE — 85025 COMPLETE CBC W/AUTO DIFF WBC: CPT

## 2021-12-18 PROCEDURE — 84100 ASSAY OF PHOSPHORUS: CPT

## 2021-12-18 PROCEDURE — 36415 COLL VENOUS BLD VENIPUNCTURE: CPT

## 2021-12-18 PROCEDURE — 80053 COMPREHEN METABOLIC PANEL: CPT

## 2021-12-18 PROCEDURE — 74177 CT ABD & PELVIS W/CONTRAST: CPT | Mod: 26,MG

## 2021-12-18 PROCEDURE — G1004: CPT

## 2021-12-18 PROCEDURE — 99285 EMERGENCY DEPT VISIT HI MDM: CPT

## 2021-12-18 PROCEDURE — 81001 URINALYSIS AUTO W/SCOPE: CPT

## 2021-12-18 PROCEDURE — 83690 ASSAY OF LIPASE: CPT

## 2021-12-18 PROCEDURE — 99223 1ST HOSP IP/OBS HIGH 75: CPT

## 2021-12-18 PROCEDURE — 83735 ASSAY OF MAGNESIUM: CPT

## 2021-12-18 RX ORDER — CHOLECALCIFEROL (VITAMIN D3) 125 MCG
1000 CAPSULE ORAL DAILY
Refills: 0 | Status: DISCONTINUED | OUTPATIENT
Start: 2021-12-18 | End: 2021-12-20

## 2021-12-18 RX ORDER — FOLIC ACID 0.8 MG
1 TABLET ORAL DAILY
Refills: 0 | Status: DISCONTINUED | OUTPATIENT
Start: 2021-12-18 | End: 2021-12-20

## 2021-12-18 RX ORDER — ONDANSETRON 8 MG/1
4 TABLET, FILM COATED ORAL ONCE
Refills: 0 | Status: COMPLETED | OUTPATIENT
Start: 2021-12-18 | End: 2021-12-18

## 2021-12-18 RX ORDER — CALCIUM CARBONATE 500(1250)
1 TABLET ORAL DAILY
Refills: 0 | Status: DISCONTINUED | OUTPATIENT
Start: 2021-12-18 | End: 2021-12-20

## 2021-12-18 RX ORDER — ONDANSETRON 8 MG/1
4 TABLET, FILM COATED ORAL EVERY 8 HOURS
Refills: 0 | Status: DISCONTINUED | OUTPATIENT
Start: 2021-12-18 | End: 2021-12-20

## 2021-12-18 RX ORDER — SODIUM CHLORIDE 9 MG/ML
1000 INJECTION INTRAMUSCULAR; INTRAVENOUS; SUBCUTANEOUS ONCE
Refills: 0 | Status: COMPLETED | OUTPATIENT
Start: 2021-12-18 | End: 2021-12-18

## 2021-12-18 RX ORDER — ASPIRIN/CALCIUM CARB/MAGNESIUM 324 MG
81 TABLET ORAL DAILY
Refills: 0 | Status: DISCONTINUED | OUTPATIENT
Start: 2021-12-18 | End: 2021-12-20

## 2021-12-18 RX ORDER — METOPROLOL TARTRATE 50 MG
25 TABLET ORAL AT BEDTIME
Refills: 0 | Status: DISCONTINUED | OUTPATIENT
Start: 2021-12-18 | End: 2021-12-20

## 2021-12-18 RX ORDER — HYDROMORPHONE HYDROCHLORIDE 2 MG/ML
1 INJECTION INTRAMUSCULAR; INTRAVENOUS; SUBCUTANEOUS
Refills: 0 | Status: DISCONTINUED | OUTPATIENT
Start: 2021-12-18 | End: 2021-12-20

## 2021-12-18 RX ORDER — HYDROMORPHONE HYDROCHLORIDE 2 MG/ML
1 INJECTION INTRAMUSCULAR; INTRAVENOUS; SUBCUTANEOUS ONCE
Refills: 0 | Status: DISCONTINUED | OUTPATIENT
Start: 2021-12-18 | End: 2021-12-18

## 2021-12-18 RX ORDER — LOSARTAN POTASSIUM 100 MG/1
100 TABLET, FILM COATED ORAL DAILY
Refills: 0 | Status: DISCONTINUED | OUTPATIENT
Start: 2021-12-18 | End: 2021-12-20

## 2021-12-18 RX ORDER — SODIUM CHLORIDE 9 MG/ML
1000 INJECTION INTRAMUSCULAR; INTRAVENOUS; SUBCUTANEOUS
Refills: 0 | Status: COMPLETED | OUTPATIENT
Start: 2021-12-18 | End: 2021-12-18

## 2021-12-18 RX ORDER — ASCORBIC ACID 60 MG
500 TABLET,CHEWABLE ORAL DAILY
Refills: 0 | Status: DISCONTINUED | OUTPATIENT
Start: 2021-12-18 | End: 2021-12-20

## 2021-12-18 RX ORDER — ACETAMINOPHEN 500 MG
650 TABLET ORAL EVERY 6 HOURS
Refills: 0 | Status: DISCONTINUED | OUTPATIENT
Start: 2021-12-18 | End: 2021-12-20

## 2021-12-18 RX ORDER — SIMVASTATIN 20 MG/1
20 TABLET, FILM COATED ORAL AT BEDTIME
Refills: 0 | Status: DISCONTINUED | OUTPATIENT
Start: 2021-12-18 | End: 2021-12-20

## 2021-12-18 RX ORDER — THIAMINE MONONITRATE (VIT B1) 100 MG
100 TABLET ORAL DAILY
Refills: 0 | Status: DISCONTINUED | OUTPATIENT
Start: 2021-12-18 | End: 2021-12-20

## 2021-12-18 RX ORDER — HYDROMORPHONE HYDROCHLORIDE 2 MG/ML
0.5 INJECTION INTRAMUSCULAR; INTRAVENOUS; SUBCUTANEOUS
Refills: 0 | Status: DISCONTINUED | OUTPATIENT
Start: 2021-12-18 | End: 2021-12-20

## 2021-12-18 RX ORDER — PANTOPRAZOLE SODIUM 20 MG/1
40 TABLET, DELAYED RELEASE ORAL
Refills: 0 | Status: DISCONTINUED | OUTPATIENT
Start: 2021-12-18 | End: 2021-12-20

## 2021-12-18 RX ADMIN — HYDROMORPHONE HYDROCHLORIDE 1 MILLIGRAM(S): 2 INJECTION INTRAMUSCULAR; INTRAVENOUS; SUBCUTANEOUS at 16:17

## 2021-12-18 RX ADMIN — HYDROMORPHONE HYDROCHLORIDE 1 MILLIGRAM(S): 2 INJECTION INTRAMUSCULAR; INTRAVENOUS; SUBCUTANEOUS at 23:29

## 2021-12-18 RX ADMIN — SODIUM CHLORIDE 200 MILLILITER(S): 9 INJECTION INTRAMUSCULAR; INTRAVENOUS; SUBCUTANEOUS at 23:29

## 2021-12-18 RX ADMIN — ONDANSETRON 4 MILLIGRAM(S): 8 TABLET, FILM COATED ORAL at 18:56

## 2021-12-18 RX ADMIN — ONDANSETRON 4 MILLIGRAM(S): 8 TABLET, FILM COATED ORAL at 16:16

## 2021-12-18 RX ADMIN — SODIUM CHLORIDE 2000 MILLILITER(S): 9 INJECTION INTRAMUSCULAR; INTRAVENOUS; SUBCUTANEOUS at 16:17

## 2021-12-18 RX ADMIN — HYDROMORPHONE HYDROCHLORIDE 1 MILLIGRAM(S): 2 INJECTION INTRAMUSCULAR; INTRAVENOUS; SUBCUTANEOUS at 18:56

## 2021-12-18 RX ADMIN — HYDROMORPHONE HYDROCHLORIDE 1 MILLIGRAM(S): 2 INJECTION INTRAMUSCULAR; INTRAVENOUS; SUBCUTANEOUS at 23:59

## 2021-12-18 NOTE — ED STATDOCS - OBJECTIVE STATEMENT
75 y/o female with a PMHx of arthritis, fatty liver, GERD, HTN, HLD, osteoarthritis, pancreatitis, h/o cholecystectomy presents to the ED c/o abd pain and n/v starting earlier today. Denies fevers. Pt had flu vaccine this year. No other complaints at this time. GI: Dr. Terrell.

## 2021-12-18 NOTE — H&P ADULT - NSHPPHYSICALEXAM_GEN_ALL_CORE
Vital Signs Last 24 Hrs  T(C): 36.9 (18 Dec 2021 15:30), Max: 36.9 (18 Dec 2021 15:30)  T(F): 98.5 (18 Dec 2021 15:30), Max: 98.5 (18 Dec 2021 15:30)  HR: 58 (18 Dec 2021 15:30) (58 - 58)  BP: 124/64 (18 Dec 2021 15:30) (124/64 - 124/64)  BP(mean): 81 (18 Dec 2021 15:30) (81 - 81)  RR: 17 (18 Dec 2021 15:30) (17 - 17)  SpO2: 100% (18 Dec 2021 15:30) (100% - 100%)

## 2021-12-18 NOTE — ED STATDOCS - GASTROINTESTINAL, MLM
abdomen soft, and non-distended. Bowel sounds present. Diffuse abd tenderness, worse in epigastrium.

## 2021-12-18 NOTE — ED ADULT NURSE NOTE - OBJECTIVE STATEMENT
Pt reports hx pancreatitis, was a former drinker and last drink was 1 month ago.  Pt reports N/V, abdominal pain.

## 2021-12-18 NOTE — H&P ADULT - HISTORY OF PRESENT ILLNESS
77 y/o F PMHx arthritis, fatty liver, GERD, HTN, HLD, osteoarthritis, pancreatitis, h/o cholecystectomy presents to the [facility] with c/o severe generalized abdominal pain associated with nausea and vomiting (non-bloody). The patient states that her symptoms began earlier in the day. She denies any associated fevers, or sick contacts. Of note the patient was recently hospitalized from 11/2 - 11/4/2021 for management of pancreatitis. Of note the patient denies any recent hx of alcohol use/abuse. Labs => WBC 11.66, Glu 140, Lipase >78363. CT ABD/Pelvis => Mild acute pancreatitis without complication

## 2021-12-18 NOTE — H&P ADULT - ASSESSMENT
77 y/o F PMHx arthritis, fatty liver, GERD, HTN, HLD, osteoarthritis, pancreatitis, h/o cholecystectomy presents to the [facility] with c/o severe generalized abdominal pain associated with nausea and vomiting (non-bloody). The patient states that her symptoms began earlier in the day. She denies any associated fevers, or sick contacts. Of note the patient was recently hospitalized from 11/2 - 11/4/2021 for management of pancreatitis. Of note the patient denies any recent hx of alcohol use/abuse. Labs => WBC 11.66, Glu 140, Lipase >03503. CT ABD/Pelvis => Mild acute pancreatitis without complication.    #Acute pancreatitis  ~as noted the etiology of her pancreatitis was thought to be due to increased alcohol intake, although the patient denies having any alcohol intake prior to her last admission over one month ago. Of note the patient had an MRI on last admission which revealed an anatomic anomaly called pancreatic ductal divisum which can lead to recurrent pancreatitis.  ~cont. pain management  ~cont. IV hydration  ~strict I/Os  ~advance diet as tolerated  ~per patient had had her "diuretic " stopped since last admission as was thought to be a contributing agent.  ~f/u repeat labs including Lipase.    #Alcohol use disorder  ~as noted above patient has abstained from drinking alcohol since prior to her last admission on 11/2/2021.  ~would cont. Thiamine 100mg po daily  ~would cont. Folic acis 1mg po daily  ~would cont. her MVI 1tab po daily    #Depressed mood, anxiety, possible adjustment disorder  ~cont. Paroxetine 10mg po daily    #HTN  ~as noted above patient had her home HCTZ discontinued on prior admission as was thought to be a possible causative agent in her pancreatitis.  ~cont. Olmesartan 40mg po daily  ~cont. Metoprolol ER 25mg po qhs    #Hyperlipidemia  ~cont. Simvastatin 20mg po qhs    #Vte ppx  ~IMPROVE Vte Risk Score is 1  ~cont. SCDs for now

## 2021-12-18 NOTE — ED ADULT NURSE NOTE - SUICIDE SCREENING QUESTION 1
Chief Complaint   Patient presents with   • Office Visit   • Follow-up     3 months, not fasting       HPI  Here for f/u.  Last visit 9/14/20.  Had been tried on escitalopram but didn't make a difference and med was dc'ed.     \"doing better.\"  Saw the heart doctor last week, \"he was happy with all the stuff that was going on.\"  No changes in med.      DM:  No polydipsia.  +urinary frequency for long time.      Htn:  No CP.  No SOB.    Lipid:  No myalgia.      Review of Systems   Constitutional: Negative.    Respiratory: Negative.    Cardiovascular: Negative.    Endocrine: Negative.    Psychiatric/Behavioral: Positive for dysphoric mood.        \"sad once in a while.\"       Past Medical History:   Diagnosis Date   • Arthritis    • Chronic kidney disease    • Depression    • Diabetes (CMS/HCC)    • Essential (primary) hypertension    • High cholesterol    • Hyperlipoproteinemia    • Sleep apnea     patient is not compaint with CPAP   • TIA (transient ischemic attack)    • Urinary tract infection    • Vertigo     acute   • Viral pneumonia 1/13/2020       Patient Active Problem List   Diagnosis   • Essential hypertension   • Hypercholesterolemia   • Glaucoma   • GERD (gastroesophageal reflux disease)   • Type 2 diabetes mellitus with other diabetic kidney complication (CMS/HCC)   • Chronic renal insufficiency, stage III (moderate) (CMS/HCC)   • Obstructive sleep apnea syndrome   • Idiopathic sleep related nonobstructive alveolar hypoventilation   • Personal history of TIA (transient ischemic attack)   • Vitamin D deficiency   • Renal osteodystrophy   • Proteinuria   • Memory loss   • Encephalopathy   • Cerebrovascular disease   • Abnormal gait   • Cataract   • Mild major depression (CMS/HCC)       Past Surgical History:   Procedure Laterality Date   • Cholecystectomy     • Cholecystectomy     • Total knee replacement Right    • Total knee replacement Left        Family History   Problem Relation Age of Onset   • Other  Mother         Hemorrahge    • Myocardial Infarction Father    • Stroke Father        Social History     Socioeconomic History   • Marital status: /Civil Union     Spouse name: Not on file   • Number of children: Not on file   • Years of education: Not on file   • Highest education level: Not on file   Occupational History   • Not on file   Social Needs   • Financial resource strain: Not on file   • Food insecurity     Worry: Not on file     Inability: Not on file   • Transportation needs     Medical: Not on file     Non-medical: Not on file   Tobacco Use   • Smoking status: Never Smoker   • Smokeless tobacco: Never Used   Substance and Sexual Activity   • Alcohol use: Never     Frequency: Never   • Drug use: Never   • Sexual activity: Not on file   Lifestyle   • Physical activity     Days per week: 0 days     Minutes per session: 0 min   • Stress: Not on file   Relationships   • Social connections     Talks on phone: Not on file     Gets together: Not on file     Attends [employer] service: Not on file     Active member of club or organization: Not on file     Attends meetings of clubs or organizations: Not on file     Relationship status: Not on file   • Intimate partner violence     Fear of current or ex partner: Not on file     Emotionally abused: Not on file     Physically abused: Not on file     Forced sexual activity: Not on file   Other Topics Concern   • Not on file   Social History Narrative   • Not on file       Current Outpatient Medications   Medication Sig Dispense Refill   • atenolol (TENORMIN) 50 MG tablet Take 1 tablet by mouth nightly. 90 tablet 1   • atorvastatin (LIPITOR) 80 MG tablet Take 1 tablet by mouth nightly. 90 tablet 1   • metFORMIN (GLUCOPHAGE) 500 MG tablet Take 1 tablet by mouth 2 times daily (with meals). 180 tablet 0   • Omeprazole 20 MG Tablet Delayed Release Dispersible Take 20 mg by mouth daily. 30 tablet 11   • rivastigmine (EXELON) 4.6 MG/24HR patch ALBERT 1 PA EXT TO THE  SKIN QD     • amLODIPine (NORVASC) 5 MG tablet Take 1 tablet by mouth daily. 90 tablet 1   • clopidogrel (PLAVIX) 75 MG tablet Take 1 tablet by mouth daily. 90 tablet 1   • losartan (COZAAR) 25 MG tablet Take 1 tablet by mouth daily. 90 tablet 1   • LANTUS SOLOSTAR 100 UNIT/ML pen-injector 25 units qhs subcutaneously 15 mL 1   • Microlet Lancets Misc tid 100 each 11   • SEGUNDO CONTOUR NEXT TEST test strip USE TWICE DAILY AS DIRECTED 200 strip 0   • Insulin Pen Needle (B-D U/F PEN NEEDLE 5/16\") 31G X 8 MM Misc TWICE DAILY 200 each 0   • donepezil (ARICEPT) 10 MG tablet Take 10 mg by mouth nightly. Do not start before Winsome 3, 2020.     • famotidine (PEPCID) 20 MG tablet Take 1 tablet by mouth 2 times daily. 180 tablet 3   • hydrALAZINE (APRESOLINE) 25 MG tablet TAKE 1 TABLET BY MOUTH THREE TIMES DAILY (Patient taking differently: Take 25 mg by mouth 2 times daily. ) 270 tablet 0   • magnesium 250 MG tablet Take 250 mg by mouth daily.     • dorzolamide-timolol (COSOPT) 22.3-6.8 MG/ML ophthalmic solution Place 1 drop into both eyes 2 times daily.     • acetaminophen (TYLENOL) 650 MG CR tablet Take 650 mg by mouth every 8 hours as needed for Pain. Do not start before January 14, 2020. mild pain     • latanoprost (XALATAN) 0.005 % ophthalmic solution Place 1 drop into both eyes nightly.       No current facility-administered medications for this visit.        ALLERGIES:   Allergen Reactions   • Shellfish Allergy   (Food Or Med) HIVES and RASH   • Eggs Or Egg-Derived Products   (Food Or Med) RASH   • Hydrocodone NAUSEA   • Rofecoxib Other (See Comments)       Visit Vitals  BP (!) 146/77   Pulse 72   Temp 98.4 °F (36.9 °C)   Ht 5' 1\" (1.549 m)   Wt 79.2 kg (174 lb 11.4 oz)   LMP  (LMP Unknown)   BMI 33.01 kg/m²       Physical Exam   Constitutional: She appears well-developed and well-nourished.   Cardiovascular: Normal rate, regular rhythm and normal heart sounds.   Pulmonary/Chest: Effort normal and breath sounds normal.    Abdominal: Soft. There is no abdominal tenderness.   Musculoskeletal:         General: No tenderness or edema.   Psychiatric: She has a normal mood and affect. Her behavior is normal. Judgment and thought content normal.       Assessment and Plan  Type 2 diabetes mellitus with other diabetic kidney complication (CMS/HCC)  (primary encounter diagnosis)  Comment:  Doing well.  Urinary frequency for a long time.  Plan: GLYCOHEMOGLOBIN            Essential hypertension  Comment:  Reasonable control.  Cont current med.  Plan: atenolol (TENORMIN) 50 MG tablet, BASIC         METABOLIC PANEL            Hypercholesterolemia  Comment: tolerating high dose statin.    Plan: atorvastatin (LIPITOR) 80 MG tablet, LIPID         PANEL WITHOUT REFLEX, SGOT, SGPT               No

## 2021-12-18 NOTE — ED STATDOCS - PROGRESS NOTE DETAILS
Pt. is a 76 year old female Hx OA, fatty liver, HTN, HLD, GERD, pancreatitis, cholecystectomy presents to ED with N/V and abdominal pain.  Pt. states symptoms started today.  Pt. hospitalized early last month for the same.  History of alcoholic pancreatitis  GI Dr. Terrell.    Audrey Medina PA-C LIpase over 30,000, CT demonstrating mild pancreatitis.  will admit.  Audrey Medina PA-C

## 2021-12-18 NOTE — ED ADULT TRIAGE NOTE - CHIEF COMPLAINT QUOTE
Pt. to the ED C/O Severe Abdominal Pain with Nausea- Pt. reports hx of Pancreatitis x 1 month ago and ETOH Abuse ( Last Drink One month ago)

## 2021-12-18 NOTE — ED STATDOCS - CLINICAL SUMMARY MEDICAL DECISION MAKING FREE TEXT BOX
Pt with hx of pancreatitis with epigastric abd pain consistent with prior episodes of pancreatitis. Will pain control, antiemetics, likely admit.

## 2021-12-19 LAB
ADD ON TEST-SPECIMEN IN LAB: SIGNIFICANT CHANGE UP
ALBUMIN SERPL ELPH-MCNC: 3 G/DL — LOW (ref 3.3–5)
ALP SERPL-CCNC: 47 U/L — SIGNIFICANT CHANGE UP (ref 40–120)
ALT FLD-CCNC: 34 U/L — SIGNIFICANT CHANGE UP (ref 12–78)
ANION GAP SERPL CALC-SCNC: 3 MMOL/L — LOW (ref 5–17)
AST SERPL-CCNC: 24 U/L — SIGNIFICANT CHANGE UP (ref 15–37)
BASOPHILS # BLD AUTO: 0.02 K/UL — SIGNIFICANT CHANGE UP (ref 0–0.2)
BASOPHILS NFR BLD AUTO: 0.3 % — SIGNIFICANT CHANGE UP (ref 0–2)
BILIRUB SERPL-MCNC: 0.4 MG/DL — SIGNIFICANT CHANGE UP (ref 0.2–1.2)
BUN SERPL-MCNC: 11 MG/DL — SIGNIFICANT CHANGE UP (ref 7–23)
CALCIUM SERPL-MCNC: 8.5 MG/DL — SIGNIFICANT CHANGE UP (ref 8.5–10.1)
CHLORIDE SERPL-SCNC: 111 MMOL/L — HIGH (ref 96–108)
CO2 SERPL-SCNC: 28 MMOL/L — SIGNIFICANT CHANGE UP (ref 22–31)
CREAT SERPL-MCNC: 0.62 MG/DL — SIGNIFICANT CHANGE UP (ref 0.5–1.3)
EOSINOPHIL # BLD AUTO: 0.06 K/UL — SIGNIFICANT CHANGE UP (ref 0–0.5)
EOSINOPHIL NFR BLD AUTO: 0.9 % — SIGNIFICANT CHANGE UP (ref 0–6)
GLUCOSE SERPL-MCNC: 94 MG/DL — SIGNIFICANT CHANGE UP (ref 70–99)
HCT VFR BLD CALC: 35.9 % — SIGNIFICANT CHANGE UP (ref 34.5–45)
HGB BLD-MCNC: 11.7 G/DL — SIGNIFICANT CHANGE UP (ref 11.5–15.5)
IMM GRANULOCYTES NFR BLD AUTO: 0.3 % — SIGNIFICANT CHANGE UP (ref 0–1.5)
LIDOCAIN IGE QN: HIGH U/L (ref 73–393)
LYMPHOCYTES # BLD AUTO: 2.24 K/UL — SIGNIFICANT CHANGE UP (ref 1–3.3)
LYMPHOCYTES # BLD AUTO: 34.3 % — SIGNIFICANT CHANGE UP (ref 13–44)
MCHC RBC-ENTMCNC: 29.6 PG — SIGNIFICANT CHANGE UP (ref 27–34)
MCHC RBC-ENTMCNC: 32.6 GM/DL — SIGNIFICANT CHANGE UP (ref 32–36)
MCV RBC AUTO: 90.9 FL — SIGNIFICANT CHANGE UP (ref 80–100)
MONOCYTES # BLD AUTO: 0.52 K/UL — SIGNIFICANT CHANGE UP (ref 0–0.9)
MONOCYTES NFR BLD AUTO: 8 % — SIGNIFICANT CHANGE UP (ref 2–14)
NEUTROPHILS # BLD AUTO: 3.68 K/UL — SIGNIFICANT CHANGE UP (ref 1.8–7.4)
NEUTROPHILS NFR BLD AUTO: 56.2 % — SIGNIFICANT CHANGE UP (ref 43–77)
PLATELET # BLD AUTO: 211 K/UL — SIGNIFICANT CHANGE UP (ref 150–400)
POTASSIUM SERPL-MCNC: 4 MMOL/L — SIGNIFICANT CHANGE UP (ref 3.5–5.3)
POTASSIUM SERPL-SCNC: 4 MMOL/L — SIGNIFICANT CHANGE UP (ref 3.5–5.3)
PROT SERPL-MCNC: 6.1 GM/DL — SIGNIFICANT CHANGE UP (ref 6–8.3)
RBC # BLD: 3.95 M/UL — SIGNIFICANT CHANGE UP (ref 3.8–5.2)
RBC # FLD: 13.2 % — SIGNIFICANT CHANGE UP (ref 10.3–14.5)
SODIUM SERPL-SCNC: 142 MMOL/L — SIGNIFICANT CHANGE UP (ref 135–145)
WBC # BLD: 6.54 K/UL — SIGNIFICANT CHANGE UP (ref 3.8–10.5)
WBC # FLD AUTO: 6.54 K/UL — SIGNIFICANT CHANGE UP (ref 3.8–10.5)

## 2021-12-19 PROCEDURE — 99233 SBSQ HOSP IP/OBS HIGH 50: CPT

## 2021-12-19 RX ORDER — SODIUM CHLORIDE 9 MG/ML
1000 INJECTION, SOLUTION INTRAVENOUS
Refills: 0 | Status: DISCONTINUED | OUTPATIENT
Start: 2021-12-19 | End: 2021-12-20

## 2021-12-19 RX ORDER — POLYETHYLENE GLYCOL 3350 17 G/17G
17 POWDER, FOR SOLUTION ORAL DAILY
Refills: 0 | Status: DISCONTINUED | OUTPATIENT
Start: 2021-12-19 | End: 2021-12-20

## 2021-12-19 RX ORDER — SENNA PLUS 8.6 MG/1
2 TABLET ORAL AT BEDTIME
Refills: 0 | Status: DISCONTINUED | OUTPATIENT
Start: 2021-12-19 | End: 2021-12-20

## 2021-12-19 RX ADMIN — Medication 1 TABLET(S): at 10:07

## 2021-12-19 RX ADMIN — SODIUM CHLORIDE 250 MILLILITER(S): 9 INJECTION, SOLUTION INTRAVENOUS at 14:12

## 2021-12-19 RX ADMIN — HYDROMORPHONE HYDROCHLORIDE 0.5 MILLIGRAM(S): 2 INJECTION INTRAMUSCULAR; INTRAVENOUS; SUBCUTANEOUS at 07:20

## 2021-12-19 RX ADMIN — Medication 25 MILLIGRAM(S): at 01:16

## 2021-12-19 RX ADMIN — Medication 100 MILLIGRAM(S): at 10:07

## 2021-12-19 RX ADMIN — HYDROMORPHONE HYDROCHLORIDE 0.5 MILLIGRAM(S): 2 INJECTION INTRAMUSCULAR; INTRAVENOUS; SUBCUTANEOUS at 13:59

## 2021-12-19 RX ADMIN — HYDROMORPHONE HYDROCHLORIDE 0.5 MILLIGRAM(S): 2 INJECTION INTRAMUSCULAR; INTRAVENOUS; SUBCUTANEOUS at 01:16

## 2021-12-19 RX ADMIN — HYDROMORPHONE HYDROCHLORIDE 0.5 MILLIGRAM(S): 2 INJECTION INTRAMUSCULAR; INTRAVENOUS; SUBCUTANEOUS at 04:49

## 2021-12-19 RX ADMIN — Medication 10 MILLIGRAM(S): at 10:07

## 2021-12-19 RX ADMIN — Medication 81 MILLIGRAM(S): at 10:07

## 2021-12-19 RX ADMIN — Medication 1000 UNIT(S): at 10:07

## 2021-12-19 RX ADMIN — HYDROMORPHONE HYDROCHLORIDE 1 MILLIGRAM(S): 2 INJECTION INTRAMUSCULAR; INTRAVENOUS; SUBCUTANEOUS at 09:10

## 2021-12-19 RX ADMIN — LOSARTAN POTASSIUM 100 MILLIGRAM(S): 100 TABLET, FILM COATED ORAL at 10:07

## 2021-12-19 RX ADMIN — SIMVASTATIN 20 MILLIGRAM(S): 20 TABLET, FILM COATED ORAL at 21:26

## 2021-12-19 RX ADMIN — Medication 1 MILLIGRAM(S): at 10:07

## 2021-12-19 RX ADMIN — SODIUM CHLORIDE 250 MILLILITER(S): 9 INJECTION, SOLUTION INTRAVENOUS at 17:55

## 2021-12-19 RX ADMIN — HYDROMORPHONE HYDROCHLORIDE 1 MILLIGRAM(S): 2 INJECTION INTRAMUSCULAR; INTRAVENOUS; SUBCUTANEOUS at 18:52

## 2021-12-19 RX ADMIN — SODIUM CHLORIDE 250 MILLILITER(S): 9 INJECTION, SOLUTION INTRAVENOUS at 21:26

## 2021-12-19 RX ADMIN — SIMVASTATIN 20 MILLIGRAM(S): 20 TABLET, FILM COATED ORAL at 01:16

## 2021-12-19 RX ADMIN — HYDROMORPHONE HYDROCHLORIDE 1 MILLIGRAM(S): 2 INJECTION INTRAMUSCULAR; INTRAVENOUS; SUBCUTANEOUS at 10:00

## 2021-12-19 RX ADMIN — HYDROMORPHONE HYDROCHLORIDE 0.5 MILLIGRAM(S): 2 INJECTION INTRAMUSCULAR; INTRAVENOUS; SUBCUTANEOUS at 14:44

## 2021-12-19 RX ADMIN — PANTOPRAZOLE SODIUM 40 MILLIGRAM(S): 20 TABLET, DELAYED RELEASE ORAL at 07:01

## 2021-12-19 RX ADMIN — SODIUM CHLORIDE 250 MILLILITER(S): 9 INJECTION, SOLUTION INTRAVENOUS at 10:07

## 2021-12-19 RX ADMIN — Medication 25 MILLIGRAM(S): at 21:26

## 2021-12-19 RX ADMIN — Medication 500 MILLIGRAM(S): at 10:07

## 2021-12-19 NOTE — PATIENT PROFILE ADULT - DO YOU FEEL THREATENED BY OTHERS?
Contact patient to schedule R urs with laser litho with dr pierre pt undecided on the date given(3-4) at this time  
no

## 2021-12-19 NOTE — PATIENT PROFILE ADULT - FALL HARM RISK - HARM RISK INTERVENTIONS

## 2021-12-19 NOTE — PATIENT PROFILE ADULT - VISION (WITH CORRECTIVE LENSES IF THE PATIENT USUALLY WEARS THEM):
pt wears eyeglasses/Normal vision: sees adequately in most situations; can see medication labels, newsprint

## 2021-12-20 ENCOUNTER — TRANSCRIPTION ENCOUNTER (OUTPATIENT)
Age: 76
End: 2021-12-20

## 2021-12-20 VITALS
TEMPERATURE: 98 F | SYSTOLIC BLOOD PRESSURE: 142 MMHG | HEART RATE: 65 BPM | DIASTOLIC BLOOD PRESSURE: 64 MMHG | RESPIRATION RATE: 17 BRPM | OXYGEN SATURATION: 95 %

## 2021-12-20 DIAGNOSIS — I10 ESSENTIAL (PRIMARY) HYPERTENSION: ICD-10-CM

## 2021-12-20 DIAGNOSIS — K85.90 ACUTE PANCREATITIS WITHOUT NECROSIS OR INFECTION, UNSPECIFIED: ICD-10-CM

## 2021-12-20 DIAGNOSIS — I34.0 NONRHEUMATIC MITRAL (VALVE) INSUFFICIENCY: ICD-10-CM

## 2021-12-20 DIAGNOSIS — I36.1 NONRHEUMATIC TRICUSPID (VALVE) INSUFFICIENCY: ICD-10-CM

## 2021-12-20 LAB
ALBUMIN SERPL ELPH-MCNC: 2.9 G/DL — LOW (ref 3.3–5)
ALP SERPL-CCNC: 49 U/L — SIGNIFICANT CHANGE UP (ref 40–120)
ALT FLD-CCNC: 32 U/L — SIGNIFICANT CHANGE UP (ref 12–78)
ANION GAP SERPL CALC-SCNC: 3 MMOL/L — LOW (ref 5–17)
AST SERPL-CCNC: 22 U/L — SIGNIFICANT CHANGE UP (ref 15–37)
BASOPHILS # BLD AUTO: 0.01 K/UL — SIGNIFICANT CHANGE UP (ref 0–0.2)
BASOPHILS NFR BLD AUTO: 0.2 % — SIGNIFICANT CHANGE UP (ref 0–2)
BILIRUB SERPL-MCNC: 0.4 MG/DL — SIGNIFICANT CHANGE UP (ref 0.2–1.2)
BUN SERPL-MCNC: 7 MG/DL — SIGNIFICANT CHANGE UP (ref 7–23)
CALCIUM SERPL-MCNC: 8.8 MG/DL — SIGNIFICANT CHANGE UP (ref 8.5–10.1)
CHLORIDE SERPL-SCNC: 108 MMOL/L — SIGNIFICANT CHANGE UP (ref 96–108)
CO2 SERPL-SCNC: 31 MMOL/L — SIGNIFICANT CHANGE UP (ref 22–31)
CREAT SERPL-MCNC: 0.56 MG/DL — SIGNIFICANT CHANGE UP (ref 0.5–1.3)
EOSINOPHIL # BLD AUTO: 0.15 K/UL — SIGNIFICANT CHANGE UP (ref 0–0.5)
EOSINOPHIL NFR BLD AUTO: 2.4 % — SIGNIFICANT CHANGE UP (ref 0–6)
GLUCOSE SERPL-MCNC: 100 MG/DL — HIGH (ref 70–99)
HCT VFR BLD CALC: 33.5 % — LOW (ref 34.5–45)
HGB BLD-MCNC: 11.1 G/DL — LOW (ref 11.5–15.5)
IMM GRANULOCYTES NFR BLD AUTO: 0.2 % — SIGNIFICANT CHANGE UP (ref 0–1.5)
LIDOCAIN IGE QN: 1055 U/L — HIGH (ref 73–393)
LYMPHOCYTES # BLD AUTO: 2.15 K/UL — SIGNIFICANT CHANGE UP (ref 1–3.3)
LYMPHOCYTES # BLD AUTO: 35 % — SIGNIFICANT CHANGE UP (ref 13–44)
MAGNESIUM SERPL-MCNC: 1.7 MG/DL — SIGNIFICANT CHANGE UP (ref 1.6–2.6)
MCHC RBC-ENTMCNC: 29.6 PG — SIGNIFICANT CHANGE UP (ref 27–34)
MCHC RBC-ENTMCNC: 33.1 GM/DL — SIGNIFICANT CHANGE UP (ref 32–36)
MCV RBC AUTO: 89.3 FL — SIGNIFICANT CHANGE UP (ref 80–100)
MONOCYTES # BLD AUTO: 0.6 K/UL — SIGNIFICANT CHANGE UP (ref 0–0.9)
MONOCYTES NFR BLD AUTO: 9.8 % — SIGNIFICANT CHANGE UP (ref 2–14)
NEUTROPHILS # BLD AUTO: 3.22 K/UL — SIGNIFICANT CHANGE UP (ref 1.8–7.4)
NEUTROPHILS NFR BLD AUTO: 52.4 % — SIGNIFICANT CHANGE UP (ref 43–77)
PHOSPHATE SERPL-MCNC: 4 MG/DL — SIGNIFICANT CHANGE UP (ref 2.5–4.5)
PLATELET # BLD AUTO: 201 K/UL — SIGNIFICANT CHANGE UP (ref 150–400)
POTASSIUM SERPL-MCNC: 3.7 MMOL/L — SIGNIFICANT CHANGE UP (ref 3.5–5.3)
POTASSIUM SERPL-SCNC: 3.7 MMOL/L — SIGNIFICANT CHANGE UP (ref 3.5–5.3)
PROT SERPL-MCNC: 5.9 GM/DL — LOW (ref 6–8.3)
RBC # BLD: 3.75 M/UL — LOW (ref 3.8–5.2)
RBC # FLD: 13.1 % — SIGNIFICANT CHANGE UP (ref 10.3–14.5)
SODIUM SERPL-SCNC: 142 MMOL/L — SIGNIFICANT CHANGE UP (ref 135–145)
WBC # BLD: 6.14 K/UL — SIGNIFICANT CHANGE UP (ref 3.8–10.5)
WBC # FLD AUTO: 6.14 K/UL — SIGNIFICANT CHANGE UP (ref 3.8–10.5)

## 2021-12-20 PROCEDURE — 99232 SBSQ HOSP IP/OBS MODERATE 35: CPT

## 2021-12-20 PROCEDURE — 99239 HOSP IP/OBS DSCHRG MGMT >30: CPT

## 2021-12-20 RX ADMIN — LOSARTAN POTASSIUM 100 MILLIGRAM(S): 100 TABLET, FILM COATED ORAL at 10:45

## 2021-12-20 RX ADMIN — Medication 10 MILLIGRAM(S): at 10:45

## 2021-12-20 RX ADMIN — Medication 1 TABLET(S): at 10:45

## 2021-12-20 RX ADMIN — Medication 1000 UNIT(S): at 10:45

## 2021-12-20 RX ADMIN — Medication 81 MILLIGRAM(S): at 10:45

## 2021-12-20 RX ADMIN — PANTOPRAZOLE SODIUM 40 MILLIGRAM(S): 20 TABLET, DELAYED RELEASE ORAL at 05:11

## 2021-12-20 RX ADMIN — POLYETHYLENE GLYCOL 3350 17 GRAM(S): 17 POWDER, FOR SOLUTION ORAL at 06:45

## 2021-12-20 RX ADMIN — SODIUM CHLORIDE 250 MILLILITER(S): 9 INJECTION, SOLUTION INTRAVENOUS at 05:11

## 2021-12-20 RX ADMIN — SODIUM CHLORIDE 250 MILLILITER(S): 9 INJECTION, SOLUTION INTRAVENOUS at 06:45

## 2021-12-20 RX ADMIN — Medication 100 MILLIGRAM(S): at 10:45

## 2021-12-20 RX ADMIN — SODIUM CHLORIDE 250 MILLILITER(S): 9 INJECTION, SOLUTION INTRAVENOUS at 02:36

## 2021-12-20 RX ADMIN — Medication 1 MILLIGRAM(S): at 10:45

## 2021-12-20 RX ADMIN — Medication 500 MILLIGRAM(S): at 10:45

## 2021-12-20 NOTE — PROGRESS NOTE ADULT - ATTENDING COMMENTS
76 yaer old woman with recent ETOH induced pancreatitis, readmitted with pancreatitis.     Patient adamant that hasn't been drinking (was drinking heavilty due to the death of her brother). Ddx now could be PD stricture, or now her panc divisum as now her pancreas is "activated" from her previous episode of pancreatitis.     Will need MRI/MRCP in 6-8 weeks and likely an EUS. She is feeling much better and can be discharged today and follow up as outpatient.

## 2021-12-20 NOTE — DISCHARGE NOTE NURSING/CASE MANAGEMENT/SOCIAL WORK - NSDCPEFALRISK_GEN_ALL_CORE
For information on Fall & Injury Prevention, visit: [email or web address] OR  [email or web address] OR  https://www.cdc.gov/steadi/patient.html

## 2021-12-20 NOTE — DISCHARGE NOTE NURSING/CASE MANAGEMENT/SOCIAL WORK - PATIENT PORTAL LINK FT
You can access the FollowMyHealth Patient Portal offered by [facility] by registering at the following website: [email or web address]. By joining [facility]’s FollowMyHealth portal, you will also be able to view your health information using other applications (apps) compatible with our system.

## 2021-12-20 NOTE — CONSULT NOTE ADULT - SUBJECTIVE AND OBJECTIVE BOX
GI consult  Pt of Dr. Terrell    HPI:  77 y/o F PMHx arthritis, fatty liver, GERD, HTN, HLD, osteoarthritis, pancreatitis, h/o cholecystectomy presents to the [facility] with c/o severe generalized abdominal pain associated with nausea and vomiting (non-bloody). The patient states that her symptoms began earlier in the day. She denies any associated fevers, or sick contacts. Of note the patient was recently hospitalized from  - 2021 for management of pancreatitis. Of note the patient denies any recent hx of alcohol use/abuse. Labs => WBC 11.66, Glu 140, Lipase >46309. CT ABD/Pelvis => Mild acute pancreatitis without complication (18 Dec 2021 18:14)    Abd pain started yesterday. Gas and RUQ pain, vomited once. No EtOH x 2 months. History of cholecystectomy for sludge in past. Today was given low fat meal and tolerated. Has pancreas divisum. First episode of pancreatitis in 60s.    PAST MEDICAL & SURGICAL HISTORY:  Hypertension    High cholesterol    GERD (gastroesophageal reflux disease)    Arthritis    Fatty liver    Pancreatitis    Osteoarthritis    History of back surgery    History of     History of tonsillectomy    History of laminectomy  with resection    S/P left cataract extraction    S/P cholecystectomy    H/O parathyroidectomy    H/O hand surgery        Home Medications:  ascorbic acid 500 mg oral tablet: 1 tab(s) orally once a day (:13)  Aspirin Enteric Coated 81 mg oral delayed release tablet: 1 tab(s) orally once a day (:13)  CoQ10 300 mg oral capsule: 1 cap(s) orally once a day (:13)  Fish Oil oral capsule: 1 cap(s) orally once a day (:13)  Mag-Ox 400 oral tablet: 1 tab(s) orally once a day (:13)  metoprolol succinate 25 mg oral tablet, extended release: 1 tab(s) orally once a day (at bedtime) (:13)  Multiple Vitamins oral tablet: 1 tab(s) orally once a day (:13)  olmesartan 40 mg oral tablet: 1 tab(s) orally once a day (:13)  omeprazole 20 mg oral delayed release capsule: 1 cap(s) orally once a day (2021 01:13)  Oysco 500 (1250 mg calcium carbonate) oral tablet: 1 tab(s) orally once a day (2021 01:13)  PARoxetine 10 mg oral tablet: 1 tab(s) orally once a day (2021 01:13)  simvastatin 20 mg oral tablet: 1 tab(s) orally once a day (at bedtime) (2021 01:13)  Vitamin D3 25 mcg (1000 intl units) oral tablet: 1 tab(s) orally once a day (2021 01:13)      MEDICATIONS  (STANDING):  ascorbic acid 500 milliGRAM(s) Oral daily  aspirin enteric coated 81 milliGRAM(s) Oral daily  calcium carbonate   1250 mG (OsCal) 1 Tablet(s) Oral daily  cholecalciferol 1000 Unit(s) Oral daily  folic acid 1 milliGRAM(s) Oral daily  lactated ringers. 1000 milliLiter(s) (250 mL/Hr) IV Continuous <Continuous>  losartan 100 milliGRAM(s) Oral daily  metoprolol succinate ER 25 milliGRAM(s) Oral at bedtime  multivitamin 1 Tablet(s) Oral daily  pantoprazole    Tablet 40 milliGRAM(s) Oral before breakfast  PARoxetine 10 milliGRAM(s) Oral daily  simvastatin 20 milliGRAM(s) Oral at bedtime  thiamine 100 milliGRAM(s) Oral daily    MEDICATIONS  (PRN):  acetaminophen     Tablet .. 650 milliGRAM(s) Oral every 6 hours PRN Temp greater or equal to 38C (100.4F), Mild Pain (1 - 3)  aluminum hydroxide/magnesium hydroxide/simethicone Suspension 30 milliLiter(s) Oral every 4 hours PRN Dyspepsia  HYDROmorphone  Injectable 0.5 milliGRAM(s) IV Push every 3 hours PRN Moderate Pain (4 - 6)  HYDROmorphone  Injectable 1 milliGRAM(s) IV Push every 3 hours PRN Severe Pain (7 - 10)  ondansetron Injectable 4 milliGRAM(s) IV Push every 8 hours PRN Nausea and/or Vomiting  polyethylene glycol 3350 17 Gram(s) Oral daily PRN Constipation  senna 2 Tablet(s) Oral at bedtime PRN Constipation      Allergies    adhesives (Rash)  Betadine (Unknown)  Sudafed (Other)    Intolerances        SOCIAL HISTORY: no EtOH x 2 m    FAMILY HISTORY:  Family history of breast cancer  Mother -  at age 99    Family history of diabetes mellitus  Both parents -     Family history of CHF (congestive heart failure)  Father     Family history of AIDS (Sibling)  brother         ROS  As above  Otherwise unremarkable, all systems reviewed    PE:  Vital Signs Last 24 Hrs  T(C): 37.4 (19 Dec 2021 15:32), Max: 37.4 (19 Dec 2021 15:32)  T(F): 99.3 (19 Dec 2021 15:32), Max: 99.3 (19 Dec 2021 15:32)  HR: 73 (19 Dec 2021 15:32) (62 - 73)  BP: 143/63 (19 Dec 2021 15:32) (125/48 - 153/62)  BP(mean): 82 (18 Dec 2021 23:23) (82 - 84)  RR: 16 (19 Dec 2021 15:32) (16 - 18)  SpO2: 92% (19 Dec 2021 15:32) (92% - 100%)    Constitutional: NAD, well-developed, A+Ox3  Anicteric   Respiratory: CTABL, breathing comfortably  Cardiovascular: S1 and S2, RRR  Gastrointestinal: +BS, soft, mild upper abdominal tenderness, non distended, no mass  Extremities: warm, well perfused, no edema  Psychiatric: Normal mood, normal affect  Neuro: moves all extremities, grossly intact  Skin: No rashes or lesions    LABS:                        11.7   6.54  )-----------( 211      ( 19 Dec 2021 07:38 )             35.9     12-    142  |  111<H>  |  11  ----------------------------<  94  4.0   |  28  |  0.62    Ca    8.5      19 Dec 2021 07:38  Phos  4.1       Mg     2.1         TPro  6.1  /  Alb  3.0<L>  /  TBili  0.4  /  DBili  x   /  AST  24  /  ALT  34  /  AlkPhos  47        LIVER FUNCTIONS - ( 19 Dec 2021 07:38 )  Alb: 3.0 g/dL / Pro: 6.1 gm/dL / ALK PHOS: 47 U/L / ALT: 34 U/L / AST: 24 U/L / GGT: x           Lipase 30K->10K today    RADIOLOGY & ADDITIONAL STUDIES:    ACC: [id number] EXAM:  CT ABDOMEN AND PELVIS IC                          PROCEDURE DATE:  2021          INTERPRETATION:  CLINICAL INFORMATION: Abdominal pain. Pancreatitis   suspected.    COMPARISON: MRI 11/3/2021, CT 2021    CONTRAST/COMPLICATIONS:  IV Contrast: Omnipaque 350  90 cc administered   10 cc discarded  Oral Contrast: NONE  Complications: None reported at time of study completion    PROCEDURE:  CT of the Abdomen and Pelvis was performed.  Sagittal and coronal reformats were performed.    FINDINGS:  LOWER CHEST: Mild bibasilar linear side atelectasis.    LIVER: Within normal limits.  BILE DUCTS: Normal caliber.  GALLBLADDER: Cholecystectomy.  SPLEEN: Within normal limits.  PANCREAS: Homogeneous pancreatic enhancement. Peripancreatic inflammation   and small fluid consistent with acute pancreatitis. No discrete   peripancreatic collection. Pancreatic duct is normal in caliber.   Pancreatic divisum again noted.  ADRENALS: Within normal limits.  KIDNEYS/URETERS: Small right renal cyst. Ptotic left kidney.    BLADDER: Within normal limits.  REPRODUCTIVE ORGANS: Fibroid uterus. No adnexal mass.    BOWEL: No bowel obstruction. Colonic diverticulosis. Appendix is normal.  PERITONEUM: No ascites.  VESSELS: Splenic vein, SMV and portal veins are patent.  RETROPERITONEUM/LYMPH NODES: No lymphadenopathy.  ABDOMINAL WALL: Within normal limits.  BONES: Degenerative changes. Status post L4-L5 fusion.    IMPRESSION:  Mild acute pancreatitis without complication.        --- End of Report ---            IVÁN JAMIL MD; Attending Radiologist  This document has been electronically signed. Dec 18 2021  5:36PM  
  CHIEF COMPLAINT:  Patient is a 76y old  Female who presents with a chief complaint of Abdominal Pain (19 Dec 2021 16:59)      HPI: 21:  75 y/o F well known tome with PMHx arthritis, fatty liver, GERD, HTN, HLD, osteoarthritis, pancreatitis, h/o cholecystectomy presents to the [facility] with c/o severe generalized abdominal pain associated with nausea and vomiting (non-bloody). The patient states that her symptoms began earlier in the day. She denies any associated fevers, or sick contacts. Of note the patient was recently hospitalized from  - 2021 for management of pancreatitis. Of note the patient denies any recent hx of alcohol use/abuse. Labs => WBC 11.66, Glu 140, Lipase >41898. CT ABD/Pelvis => Mild acute pancreatitis without complication.  She had a stress echo in my office on 2020 showing no evidence for ischemia with LVEF=65-70% and mild MR and TR but otherwise a normal study.  Currently she is improving without anginal chest pains or increased SOB.  Abd pains improving.  No new cardiac issues so far.        PMHx:  PAST MEDICAL & SURGICAL HISTORY:  High cholesterol  GERD (gastroesophageal reflux disease)  Arthritis  Fatty liver  Osteoarthritis  Hypertension  Pancreatitis  History of back surgery  History of   History of tonsillectomy  History of laminectomy-with resection  S/P left cataract extraction  S/P cholecystectomy  H/O parathyroidectomy  H/O hand surgery        FAMILY HISTORY:   FAMILY HISTORY:  Family history of breast cancer-Mother -  at age 99  Family history of diabetes mellitus-Both parents -   Family history of CHF (congestive heart failure)-Father   Family history of AIDS (Sibling)-brother       ALLERGIES:  Allergies  adhesives (Rash)  Betadine (Unknown)  Sudafed (Other)        REVIEW OF SYSTEMS:  10 point ROS was obtained  Pertinent positives and negatives are as above  All other review of systems is negative unless indicated above      Vital Signs Last 24 Hrs  T(C): 37.2 (20 Dec 2021 05:11), Max: 37.4 (19 Dec 2021 15:32)  T(F): 98.9 (20 Dec 2021 05:11), Max: 99.3 (19 Dec 2021 15:32)  HR: 77 (20 Dec 2021 05:11) (65 - 85)  BP: 143/65 (20 Dec 2021 05:11) (125/48 - 151/64)  BP(mean): --  RR: 17 (20 Dec 2021 05:11) (16 - 17)  SpO2: 95% (20 Dec 2021 05:11) (92% - 95%)    I&O's Summary    19 Dec 2021 07:01  -  20 Dec 2021 07:00  --------------------------------------------------------  IN: 1900 mL / OUT: 0 mL / NET: 1900 mL        PHYSICAL EXAM:   Constitutional: NAD, awake and alert, well-developed  HEENT: PERR, EOMI, Normal Hearing, MMM  Neck: Soft and supple, No LAD, No JVD  Respiratory: Breath sounds are clear bilaterally, No wheezing, rales or rhonchi  Cardiovascular: S1 and S2, regular rate and rhythm, soft UZAIR at LLSB and base as before, no gallops or rubs  Gastrointestinal: Bowel Sounds present, soft, mildly tender, nondistended, no guarding, no rebound  Extremities: No peripheral edema  Vascular: 2+ peripheral pulses  Neurological: A/O x 3, no focal deficits  Musculoskeletal: 5/5 strength b/l upper and lower extremities  Skin: No rashes      MEDICATIONS  (STANDING):  ascorbic acid 500 milliGRAM(s) Oral daily  aspirin enteric coated 81 milliGRAM(s) Oral daily  calcium carbonate   1250 mG (OsCal) 1 Tablet(s) Oral daily  cholecalciferol 1000 Unit(s) Oral daily  folic acid 1 milliGRAM(s) Oral daily  lactated ringers. 1000 milliLiter(s) (250 mL/Hr) IV Continuous <Continuous>  losartan 100 milliGRAM(s) Oral daily  metoprolol succinate ER 25 milliGRAM(s) Oral at bedtime  multivitamin 1 Tablet(s) Oral daily  pantoprazole    Tablet 40 milliGRAM(s) Oral before breakfast  PARoxetine 10 milliGRAM(s) Oral daily  simvastatin 20 milliGRAM(s) Oral at bedtime  thiamine 100 milliGRAM(s) Oral daily    MEDICATIONS  (PRN):  acetaminophen     Tablet .. 650 milliGRAM(s) Oral every 6 hours PRN Temp greater or equal to 38C (100.4F), Mild Pain (1 - 3)  aluminum hydroxide/magnesium hydroxide/simethicone Suspension 30 milliLiter(s) Oral every 4 hours PRN Dyspepsia  HYDROmorphone  Injectable 0.5 milliGRAM(s) IV Push every 3 hours PRN Moderate Pain (4 - 6)  HYDROmorphone  Injectable 1 milliGRAM(s) IV Push every 3 hours PRN Severe Pain (7 - 10)  ondansetron Injectable 4 milliGRAM(s) IV Push every 8 hours PRN Nausea and/or Vomiting  polyethylene glycol 3350 17 Gram(s) Oral daily PRN Constipation  senna 2 Tablet(s) Oral at bedtime PRN Constipation      LABS: All Labs Reviewed:                        11.7   6.54  )-----------( 211      ( 19 Dec 2021 07:38 )             35.9     -    142  |  111<H>  |  11  ----------------------------<  94  4.0   |  28  |  0.62    Ca    8.5      19 Dec 2021 07:38  Phos  4.1       Mg     2.1         TPro  6.1  /  Alb  3.0<L>  /  TBili  0.4  /  DBili  x   /  AST  24  /  ALT  34  /  AlkPhos  47      Lipase, Serum in AM (21 @ 07:38): 90452 U/L     BLOOD CULTURES:     LIPID PROFILE lipid profile                           @ 15:55  cholesterol           153 mg/dL  Direct LDL            --  HDL cholesterol       62 mg/dL  HDL/Total cholesterol --  Triglycerides, serum  81 mg/dL      RADIOLOGY:    CT of Abd & Pelvis: 21:  FINDINGS:  LOWER CHEST: Mild bibasilar linear side atelectasis.  LIVER: Within normal limits.  BILE DUCTS: Normal caliber.  GALLBLADDER: Cholecystectomy.  SPLEEN: Within normal limits.  PANCREAS: Homogeneous pancreatic enhancement. Peripancreatic inflammation   and small fluid consistent with acute pancreatitis. No discrete   peripancreatic collection. Pancreatic duct is normal in caliber.   Pancreatic divisum again noted.  ADRENALS: Within normal limits.  KIDNEYS/URETERS: Small right renal cyst. Ptotic left kidney.  BLADDER: Within normal limits.  REPRODUCTIVE ORGANS: Fibroid uterus. No adnexal mass.  BOWEL: No bowel obstruction. Colonic diverticulosis. Appendix is normal.  PERITONEUM: No ascites.  VESSELS: Splenic vein, SMV and portal veins are patent.  RETROPERITONEUM/LYMPH NODES: No lymphadenopathy.  ABDOMINAL WALL: Within normal limits.  BONES: Degenerative changes. Status post L4-L5 fusion.  IMPRESSION:  Mild acute pancreatitis without complication.      EK/3/21:  Normal sinus rhythm  Normal ECG      TELEMETRY:      ECHO:    Stress echo: 2020:  stress echo in my office on 2020 showing no evidence for ischemia with LVEF=65-70% and mild MR, AI and TR but otherwise a normal study.    TTE: 19:  M-Mode Measurements (cm)   LVEDd: 5.21 cm            LVESd: 2.43 cm   IVSEd: 0.96 cm   LVPWd: 1.1 cm             AO Root Dimension: 2.4 cm                        ACS: 2 cm  Doppler Measurements:                                  MV Peak E-Wave: 98.2 cm/s   TR Velocity:324 cm/s           MV Peak A-Wave: 96.3 cm/s   TR Gradient:41.9904 mmHg       MV E/A Ratio: 1.02 %   Estimated RAP:10 mmHg          MV Peak Gradient: 3.86 mmHg   RVSP:41 mmHg    Findings  Mitral Valve   Normal appearing mitral valve structure and function.   Trace mitral regurgitation is present.   EA reversal of the mitral inflow consistent with reduced compliance of the left ventricle    Aortic Valve   Mild aortic sclerosis is present with normal valvular opening.   Trace aortic regurgitation is present.    Tricuspid Valve   Mild (1+) tricuspid valve regurgitation is present.   Moderate pulmonary hypertension.    Pulmonic Valve   Normal appearing pulmonic valve structure and function.    Left Atrium   The left atrium is mildly dilated.    Left Ventricle   The left ventricle is normal in size, wall thickness, wall motion and contractility.   Estimated left ventricular ejection fraction is 65-70 %.    Right Atrium   Normal appearing right atrium.    Right Ventricle   Normal appearing right ventricle structure and function.    Pericardial Effusion   No evidence of pericardial effusion.    Pleural Effusion   No evidence of pleural effusion.    Miscellaneous   All visualized extra cardiac structures appears to be normal.    Summary   Mild aortic sclerosis is present with normal valvular opening.   Trace aortic regurgitation is present.   The left atrium is mildly dilated.   The left ventricle is normal in size, wall thickness, wall motion and contractility.   Estimated left ventricular ejection fraction is 65-70 %.   All visualized extra cardiac structures appears to be normal.   Normal appearing mitral valve structure and function.   Trace mitral regurgitation is present.  EA reversal of the mitral inflow consistent with reduced compliance of the left ventricle   No evidence of pericardial effusion.   No evidence of pleural effusion.   Normal appearing pulmonic valve structure and function.   Normal appearing right atrium.   Normal appearing right ventricle structure and function.   Mild (1+) tricuspid valve regurgitation is present.   Moderate pulmonary hypertension.

## 2021-12-20 NOTE — CONSULT NOTE ADULT - ASSESSMENT
Recurrent mild pancreatitis, ?related to recent episode, PD stones, retained CBD stones/sludge, EtOH?    -cont supportive care with IVF  -low fat diet as vera  -Dr. Mckay to eval patient for EUS, but likely can be done as outpt  -Continue to avoid EtOH.  -d/w pt in detail
12/20/21:  Pt with above history and recurrent pancreatitis.  No new cardiac symptoms ro ne cardiac issues.  She was scheduled for a repeat stress echo in my office this week and I will cancel that.  No need to do in the hospital and will do when more stable after the holidays.  She is stable from acardiac standpoint for procedures should they be needed and other plans as outlined by medicine and GI etal.  Will follow as work up and treatment progresses.

## 2021-12-20 NOTE — PROGRESS NOTE ADULT - ASSESSMENT
77 y/o F PMHx arthritis, fatty liver, GERD, HTN, HLD, osteoarthritis, pancreatitis, h/o cholecystectomy presents to the [facility] with c/o severe generalized abdominal pain associated with nausea and vomiting (non-bloody). The patient states that her symptoms began earlier in the day. She denies any associated fevers, or sick contacts. Of note the patient was recently hospitalized from 11/2 - 11/4/2021 for management of pancreatitis. Of note the patient denies any recent hx of alcohol use/abuse. Labs => WBC 11.66, Glu 140, Lipase >09508. CT ABD/Pelvis => Mild acute pancreatitis without complication.    #Acute on Chronic pancreatitis  ~as noted the etiology of her pancreatitis was thought to be due to increased alcohol intake, although the patient denies having any alcohol intake prior to her last admission over one month ago. Of note the patient had an MRI on last admission which revealed an anatomic anomaly called pancreatic ductal divisum which can lead to recurrent pancreatitis.  ~Tri WNL  ~cont. pain management  ~cont. IV hydration  ~strict I/Os  ~advance diet as tolerated  ~per patient had had her "diuretic " stopped since last admission as was thought to be a contributing agent.  ~f/u repeat labs including Lipase.    #Alcohol use disorder  ~as noted above patient has abstained from drinking alcohol since prior to her last admission on 11/2/2021.  ~would cont. Thiamine 100mg po daily  ~would cont. Folic acis 1mg po daily  ~would cont. her MVI 1tab po daily    #Depressed mood, anxiety, possible adjustment disorder  ~cont. Paroxetine 10mg po daily    #HTN  ~as noted above patient had her home HCTZ discontinued on prior admission as was thought to be a possible causative agent in her pancreatitis.  ~cont. Olmesartan 40mg po daily  ~cont. Metoprolol ER 25mg po qhs    #Hyperlipidemia  ~cont. Simvastatin 20mg po qhs    #Vte ppx  ~cont. SCDs for now.   
77 yo female w/ abdominal pain with significant hx of pancreatitis of unclear etilogy.    Possibly ?related to recent episode (few weeks ago), known pancreas divisum, ETOH (reports no significant use since last occurrence retained CBD stones/sludge, but less likely as LFTs and TB are normal but can't fully exclude.      Overall patient appears to be improving and she is tolerating PO. ETOH cessation further detailed.     If pt continues to tolerate PO, ok to d/c home and would repeat EUS as an outpatient  Patient reports last EUS was about 7 years ago and was normal (done in [place] by Dr. WOOD? but can't remember full name).     Discussed with Dr. Mckay.

## 2021-12-20 NOTE — DISCHARGE NOTE PROVIDER - HOSPITAL COURSE
FROM H&P:    "77 y/o F PMHx arthritis, fatty liver, GERD, HTN, HLD, osteoarthritis, pancreatitis, h/o cholecystectomy presents to the [facility] with c/o severe generalized abdominal pain associated with nausea and vomiting (non-bloody). The patient states that her symptoms began earlier in the day. She denies any associated fevers, or sick contacts. Of note the patient was recently hospitalized from 11/2 - 11/4/2021 for management of pancreatitis. Of note the patient denies any recent hx of alcohol use/abuse. Labs => WBC 11.66, Glu 140, Lipase >01123. CT ABD/Pelvis => Mild acute pancreatitis without complication  "    #Acute on Chronic pancreatitis  ~as noted the etiology of her pancreatitis was thought to be due to increased alcohol intake, although the patient denies having any alcohol intake prior to her last admission over one month ago. Of note the patient had an MRI on last admission which revealed an anatomic anomaly called pancreatic ductal divisum which can lead to recurrent pancreatitis.  ~Tri WNL  ~cont. pain management  ~cont. IV hydration  ~strict I/Os  ~advance diet as tolerated  ~per patient had had her "diuretic " stopped since last admission as was thought to be a contributing agent.  ~f/u repeat labs including Lipase.    #Alcohol use disorder  ~as noted above patient has abstained from drinking alcohol since prior to her last admission on 11/2/2021.  ~would cont. Thiamine 100mg po daily  ~would cont. Folic acis 1mg po daily  ~would cont. her MVI 1tab po daily    #Depressed mood, anxiety, possible adjustment disorder  ~cont. Paroxetine 10mg po daily    #HTN  ~as noted above patient had her home HCTZ discontinued on prior admission as was thought to be a possible causative agent in her pancreatitis.  ~cont. Olmesartan 40mg po daily  ~cont. Metoprolol ER 25mg po qhs    #Hyperlipidemia  ~cont. Simvastatin 20mg po qhs  Symptoms resolved. Evaluated by GI. Cleared for close outpatient follow up and EUS in the near futures. Patient asymptomatic, hemodynamically stable and tolerating diet. Discharge home in stable condition and close outpatient follow up with PMD and GI.   PHYSICAL EXAM:    T(C): 36.8 (12-20-21 @ 09:41), Max: 37.4 (12-19-21 @ 15:32)  HR: 65 (12-20-21 @ 09:41) (65 - 85)  BP: 142/64 (12-20-21 @ 09:41) (142/64 - 151/64)  RR: 17 (12-20-21 @ 09:41) (16 - 17)  SpO2: 95% (12-20-21 @ 09:41) (92% - 95%)    General: AAOx3; NAD  Head: AT/NC  ENT: Moist Mucous Membranes; No Injury  Eyes: EOMI; PERRL  Neck: Non-tender; No JVD  CVS: RRR, S1&S2, No murmur, No edema  Respiratory: Lungs CTA B/L; Normal Respiratory Effort  Abdomen/GI: Soft, non-tender, non-distended, no guarding, no rebound, normal bowel sounds  : No bladder distention, No Nathan  Extremities: No cyanosis, No clubbing, No edema  MSK: No CVA tenderness, Normal ROM, No injury  Neuro: AAOx3, CNII-XII grossly intact, non-focal  Psych: Appropriate, Cooperative, No depression, No anxiety  Skin: Clean, Dry and Intact  Discharge Management: 35 minutes  Date of Discharge/Service: 12/20/2021

## 2021-12-20 NOTE — DISCHARGE NOTE PROVIDER - PROVIDER TOKENS
PROVIDER:[TOKEN:[[id number]],FOLLOWUP:[1 week],ESTABLISHEDPATIENT:[T]],PROVIDER:[TOKEN:[[id number]],FOLLOWUP:[2 weeks],ESTABLISHEDPATIENT:[T]]

## 2021-12-20 NOTE — PROGRESS NOTE ADULT - SUBJECTIVE AND OBJECTIVE BOX
CHIEF COMPLAINT: Abdominal pain, nausea, vomiting    SUBJECTIVE: Abdominal pain improved with Rx. Nausea resolved. Denies fever, chills, chest pain, SOB    SIGNIFICANT INTERVAL EVENTS/OVERNIGHT EVENTS: None    Review of Systems: 14 Point review of systems reviewed and reported as negative unless otherwise stated in HPI    FROM H&P:  "77 y/o F PMHx arthritis, fatty liver, GERD, HTN, HLD, osteoarthritis, pancreatitis, h/o cholecystectomy presents to the [facility] with c/o severe generalized abdominal pain associated with nausea and vomiting (non-bloody). The patient states that her symptoms began earlier in the day. She denies any associated fevers, or sick contacts. Of note the patient was recently hospitalized from 11/2 - 11/4/2021 for management of pancreatitis. Of note the patient denies any recent hx of alcohol use/abuse. Labs => WBC 11.66, Glu 140, Lipase >29032. CT ABD/Pelvis => Mild acute pancreatitis without complication  "    PHYSICAL EXAM:    T(C): 36.8 (12-19-21 @ 07:40), Max: 36.9 (12-18-21 @ 15:30)  HR: 65 (12-19-21 @ 07:40) (58 - 66)  BP: 125/48 (12-19-21 @ 07:40) (124/64 - 153/62)  RR: 17 (12-19-21 @ 07:40) (16 - 18)  SpO2: 95% (12-19-21 @ 07:40) (95% - 100%)    General: AAOx3; NAD  Head: AT/NC  ENT: Moist Mucous Membranes; No Injury  Eyes: EOMI; PERRL  Neck: Non-tender; No JVD  CVS: RRR, S1&S2, No murmur, No edema  Respiratory: Lungs CTA B/L; Normal Respiratory Effort  Abdomen/GI: Soft, Epigastric/RUQ abdominal tenderness, non-distended, no guarding, no rebound, normal bowel sounds  : No bladder distention, No Nathan  Extremities: No cyanosis, No clubbing, No edema  MSK: No CVA tenderness, Normal ROM, No injury  Neuro: AAOx3, CNII-XII grossly intact, non-focal  Psych: Appropriate, Cooperative, No depression, No anxiety  Skin: Clean, Dry and Intact      LABS:                          11.7   6.54  )-----------( 211      ( 19 Dec 2021 07:38 )             35.9     12-19    142  |  111<H>  |  11  ----------------------------<  94  4.0   |  28  |  0.62    Ca    8.5      19 Dec 2021 07:38  Phos  4.1     12-19  Mg     2.1     12-19    TPro  6.1  /  Alb  3.0<L>  /  TBili  0.4  /  DBili  x   /  AST  24  /  ALT  34  /  AlkPhos  47  12-19    COVID-19 PCR: NotDetec (02 Nov 2021 23:06)    CAPILLARY BLOOD GLUCOSE  Lipase, Serum: 64425 U/L (12.19.21 @ 07:38)   Lipase, Serum: >75874 U/L (12.18.21 @ 15:55) Urinalysis (12.18.21 @ 18:59)   pH Urine: 6.5   Glucose Qualitative, Urine: Negative mg/dL   Blood, Urine: Trace   Color: Yellow   Urine Appearance: Clear   Bilirubin: Negative   Ketone - Urine: Negative   Specific Gravity: 1.005   Protein, Urine: Negative mg/dL   Urobilinogen: Negative mg/dL   Nitrite: Negative   Leukocyte Esterase Concentration: Negative Lipid Profile (12.18.21 @ 15:55)   Cholesterol, Serum: 153 mg/dL   Triglycerides, Serum: 81 mg/dL   HDL Cholesterol, Serum: 62 mg/dL   Non HDL Cholesterol: 92            RADIOLOGY:  < from: CT Abdomen and Pelvis w/ IV Cont (12.18.21 @ 17:24) >  IMPRESSION:  Mild acute pancreatitis without complication.    < end of copied text >  < from: MR MRCP w/wo IV Cont (11.03.21 @ 17:31) >  IMPRESSION: Mild pancreatitis. Pancreas ductus divisum. Ptotic left kidney.    < end of copied text >          I personally reviewed labs, imaging, orders and vitals.    Discussed case with:  [X]RN  []REMA/DAVE  [X]Patient  []Family  []Specialist:        MEDICATIONS  (STANDING):  ascorbic acid 500 milliGRAM(s) Oral daily  aspirin enteric coated 81 milliGRAM(s) Oral daily  calcium carbonate   1250 mG (OsCal) 1 Tablet(s) Oral daily  cholecalciferol 1000 Unit(s) Oral daily  folic acid 1 milliGRAM(s) Oral daily  lactated ringers. 1000 milliLiter(s) (250 mL/Hr) IV Continuous <Continuous>  losartan 100 milliGRAM(s) Oral daily  metoprolol succinate ER 25 milliGRAM(s) Oral at bedtime  multivitamin 1 Tablet(s) Oral daily  pantoprazole    Tablet 40 milliGRAM(s) Oral before breakfast  PARoxetine 10 milliGRAM(s) Oral daily  simvastatin 20 milliGRAM(s) Oral at bedtime  thiamine 100 milliGRAM(s) Oral daily    MEDICATIONS  (PRN):  acetaminophen     Tablet .. 650 milliGRAM(s) Oral every 6 hours PRN Temp greater or equal to 38C (100.4F), Mild Pain (1 - 3)  aluminum hydroxide/magnesium hydroxide/simethicone Suspension 30 milliLiter(s) Oral every 4 hours PRN Dyspepsia  HYDROmorphone  Injectable 0.5 milliGRAM(s) IV Push every 3 hours PRN Moderate Pain (4 - 6)  HYDROmorphone  Injectable 1 milliGRAM(s) IV Push every 3 hours PRN Severe Pain (7 - 10)  ondansetron Injectable 4 milliGRAM(s) IV Push every 8 hours PRN Nausea and/or Vomiting  polyethylene glycol 3350 17 Gram(s) Oral daily PRN Constipation  senna 2 Tablet(s) Oral at bedtime PRN Constipation    
Patient is a 76y old  Female who presents with a chief complaint of Abdominal Pain (20 Dec 2021 07:34)    HPI:  77 yo female slowly improving.   Abdominal pain improving, no nausea.   Tolerating PO.   Last pain medication was taken around 7pm.   + constipation.     PAST MEDICAL & SURGICAL HISTORY:  Hypertension    High cholesterol    GERD (gastroesophageal reflux disease)    Arthritis    Fatty liver    Pancreatitis    Osteoarthritis    History of back surgery    History of     History of tonsillectomy    History of laminectomy  with resection    S/P left cataract extraction    S/P cholecystectomy    H/O parathyroidectomy    H/O hand surgery      MEDICATIONS  (STANDING):  ascorbic acid 500 milliGRAM(s) Oral daily  aspirin enteric coated 81 milliGRAM(s) Oral daily  calcium carbonate   1250 mG (OsCal) 1 Tablet(s) Oral daily  cholecalciferol 1000 Unit(s) Oral daily  folic acid 1 milliGRAM(s) Oral daily  lactated ringers. 1000 milliLiter(s) (250 mL/Hr) IV Continuous <Continuous>  losartan 100 milliGRAM(s) Oral daily  metoprolol succinate ER 25 milliGRAM(s) Oral at bedtime  multivitamin 1 Tablet(s) Oral daily  pantoprazole    Tablet 40 milliGRAM(s) Oral before breakfast  PARoxetine 10 milliGRAM(s) Oral daily  simvastatin 20 milliGRAM(s) Oral at bedtime  thiamine 100 milliGRAM(s) Oral daily    MEDICATIONS  (PRN):  acetaminophen     Tablet .. 650 milliGRAM(s) Oral every 6 hours PRN Temp greater or equal to 38C (100.4F), Mild Pain (1 - 3)  aluminum hydroxide/magnesium hydroxide/simethicone Suspension 30 milliLiter(s) Oral every 4 hours PRN Dyspepsia  HYDROmorphone  Injectable 0.5 milliGRAM(s) IV Push every 3 hours PRN Moderate Pain (4 - 6)  HYDROmorphone  Injectable 1 milliGRAM(s) IV Push every 3 hours PRN Severe Pain (7 - 10)  ondansetron Injectable 4 milliGRAM(s) IV Push every 8 hours PRN Nausea and/or Vomiting  polyethylene glycol 3350 17 Gram(s) Oral daily PRN Constipation  senna 2 Tablet(s) Oral at bedtime PRN Constipation    Allergies    adhesives (Rash)  Betadine (Unknown)  Sudafed (Other)    Intolerances      SOCIAL HISTORY: etoh abuse.   FAMILY HISTORY:  Family history of breast cancer  Mother -  at age 99    Family history of diabetes mellitus  Both parents -     Family history of CHF (congestive heart failure)  Father     Family history of AIDS (Sibling)  brother       REVIEW OF SYSTEMS:  CONSTITUTIONAL: No weakness, fevers or chills  RESPIRATORY: No cough, wheezing, hemoptysis; No shortness of breath  CARDIOVASCULAR: No chest pain or palpitations  GASTROINTESTINAL: Per HPI.     Vital Signs Last 24 Hrs  T(C): 37.2 (20 Dec 2021 05:11), Max: 37.4 (19 Dec 2021 15:32)  T(F): 98.9 (20 Dec 2021 05:11), Max: 99.3 (19 Dec 2021 15:32)  HR: 77 (20 Dec 2021 05:11) (73 - 85)  BP: 143/65 (20 Dec 2021 05:11) (143/63 - 151/64)  BP(mean): --  RR: 17 (20 Dec 2021 05:11) (16 - 17)  SpO2: 95% (20 Dec 2021 05:11) (92% - 95%)    PHYSICAL EXAM:  Constitutional: NAD, well-developed  Respiratory: CTAB  Cardiovascular: S1 and S2, RRR, no M/G/R  Gastrointestinal: + epigastric tenderness, nd, +BS    LABS:                        11.1   6.14  )-----------( 201      ( 20 Dec 2021 07:25 )             33.5     12-20    142  |  108  |  7   ----------------------------<  100<H>  3.7   |  31  |  0.56    Ca    8.8      20 Dec 2021 07:25  Phos  4.0     12-20  Mg     1.7     12-20    TPro  5.9<L>  /  Alb  2.9<L>  /  TBili  0.4  /  DBili  x   /  AST  22  /  ALT  32  /  AlkPhos  49  12-20      LIVER FUNCTIONS - ( 20 Dec 2021 07:25 )  Alb: 2.9 g/dL / Pro: 5.9 gm/dL / ALK PHOS: 49 U/L / ALT: 32 U/L / AST: 22 U/L / GGT: x             RADIOLOGY & ADDITIONAL STUDIES:

## 2021-12-20 NOTE — DISCHARGE NOTE NURSING/CASE MANAGEMENT/SOCIAL WORK - NSDCVIVACCINE_GEN_ALL_CORE_FT
COVID-19, mRNA, LNP-S, PF, 100 mcg/ 0.5 mL dose (Moderna); 04-Nov-2021 17:08; Yanci Garcia (MAGALIS); [employer] [id number] (Exp. Date: 14-Nov-2021); IntraMuscular; Deltoid Left.; 0.25 milliLiter(s);

## 2021-12-21 ENCOUNTER — APPOINTMENT (OUTPATIENT)
Dept: GASTROENTEROLOGY | Facility: CLINIC | Age: 76
End: 2021-12-21

## 2021-12-27 DIAGNOSIS — Z88.8 ALLERGY STATUS TO OTHER DRUGS, MEDICAMENTS AND BIOLOGICAL SUBSTANCES STATUS: ICD-10-CM

## 2021-12-27 DIAGNOSIS — K86.1 OTHER CHRONIC PANCREATITIS: ICD-10-CM

## 2021-12-27 DIAGNOSIS — M19.90 UNSPECIFIED OSTEOARTHRITIS, UNSPECIFIED SITE: ICD-10-CM

## 2021-12-27 DIAGNOSIS — F41.9 ANXIETY DISORDER, UNSPECIFIED: ICD-10-CM

## 2021-12-27 DIAGNOSIS — K76.0 FATTY (CHANGE OF) LIVER, NOT ELSEWHERE CLASSIFIED: ICD-10-CM

## 2021-12-27 DIAGNOSIS — I07.1 RHEUMATIC TRICUSPID INSUFFICIENCY: ICD-10-CM

## 2021-12-27 DIAGNOSIS — K85.90 ACUTE PANCREATITIS WITHOUT NECROSIS OR INFECTION, UNSPECIFIED: ICD-10-CM

## 2021-12-27 DIAGNOSIS — K21.9 GASTRO-ESOPHAGEAL REFLUX DISEASE WITHOUT ESOPHAGITIS: ICD-10-CM

## 2021-12-27 DIAGNOSIS — F43.21 ADJUSTMENT DISORDER WITH DEPRESSED MOOD: ICD-10-CM

## 2021-12-27 DIAGNOSIS — F10.21 ALCOHOL DEPENDENCE, IN REMISSION: ICD-10-CM

## 2021-12-27 DIAGNOSIS — Q45.3 OTHER CONGENITAL MALFORMATIONS OF PANCREAS AND PANCREATIC DUCT: ICD-10-CM

## 2021-12-27 DIAGNOSIS — I10 ESSENTIAL (PRIMARY) HYPERTENSION: ICD-10-CM

## 2021-12-27 DIAGNOSIS — I34.0 NONRHEUMATIC MITRAL (VALVE) INSUFFICIENCY: ICD-10-CM

## 2021-12-27 DIAGNOSIS — E78.5 HYPERLIPIDEMIA, UNSPECIFIED: ICD-10-CM

## 2022-02-01 ENCOUNTER — APPOINTMENT (OUTPATIENT)
Dept: GASTROENTEROLOGY | Facility: CLINIC | Age: 77
End: 2022-02-01
Payer: COMMERCIAL

## 2022-02-01 VITALS — WEIGHT: 161 LBS | HEIGHT: 64 IN | BODY MASS INDEX: 27.49 KG/M2

## 2022-02-01 PROCEDURE — 99213 OFFICE O/P EST LOW 20 MIN: CPT

## 2022-02-02 LAB
ALBUMIN SERPL ELPH-MCNC: 4.8 G/DL
ALP BLD-CCNC: 66 U/L
ALT SERPL-CCNC: 28 U/L
ANION GAP SERPL CALC-SCNC: 15 MMOL/L
AST SERPL-CCNC: 30 U/L
BASOPHILS # BLD AUTO: 0.02 K/UL
BASOPHILS NFR BLD AUTO: 0.3 %
BILIRUB SERPL-MCNC: 0.2 MG/DL
BUN SERPL-MCNC: 17 MG/DL
CALCIUM SERPL-MCNC: 10.2 MG/DL
CHLORIDE SERPL-SCNC: 99 MMOL/L
CO2 SERPL-SCNC: 22 MMOL/L
CREAT SERPL-MCNC: 0.72 MG/DL
EOSINOPHIL # BLD AUTO: 0.08 K/UL
EOSINOPHIL NFR BLD AUTO: 1.2 %
GLUCOSE SERPL-MCNC: 83 MG/DL
HCT VFR BLD CALC: 40.8 %
HGB BLD-MCNC: 13.3 G/DL
IMM GRANULOCYTES NFR BLD AUTO: 0.2 %
LPL SERPL-CCNC: 76 U/L
LYMPHOCYTES # BLD AUTO: 2.56 K/UL
LYMPHOCYTES NFR BLD AUTO: 39.1 %
MAN DIFF?: NORMAL
MCHC RBC-ENTMCNC: 28.5 PG
MCHC RBC-ENTMCNC: 32.6 GM/DL
MCV RBC AUTO: 87.6 FL
MONOCYTES # BLD AUTO: 0.6 K/UL
MONOCYTES NFR BLD AUTO: 9.2 %
NEUTROPHILS # BLD AUTO: 3.28 K/UL
NEUTROPHILS NFR BLD AUTO: 50 %
PLATELET # BLD AUTO: 267 K/UL
POTASSIUM SERPL-SCNC: 5.4 MMOL/L
PROT SERPL-MCNC: 7.1 G/DL
RBC # BLD: 4.66 M/UL
RBC # FLD: 13.5 %
SODIUM SERPL-SCNC: 137 MMOL/L
WBC # FLD AUTO: 6.55 K/UL

## 2022-02-03 NOTE — ASSESSMENT
[FreeTextEntry1] : 75 y/o female presenting for f/u after pancreatitis. She no longer has pain, and has been tolerating PO well. Reviewed hospital record, last CT was 12/18/21 showing mild pancreatitis. Will check labs to assess pancreas. She can follow up PRN. Will have repeat colonoscopy next year. \par \par Discussed with Dr. Brown

## 2022-02-03 NOTE — HISTORY OF PRESENT ILLNESS
[FreeTextEntry1] : 75 y/o female presenting for f/u after hospitalization for pancreatitis. Reports she has overall been doing well, and has not had pain. She has been tolerating PO well, and has not had any ETOH beverages in "months". Denies chest pain, shortness of breath, nausea, vomiting, abdominal pain, diarrhea, constipation, melena, hematochezia, unintentional weight changes, fevers, chills. Last EGD/Colon was in 2018, will repeat next year @ 5 years.

## 2022-02-28 NOTE — ED PROVIDER NOTE - CPE EDP NEURO NORM
normal... V-Y Plasty Text: The defect edges were debeveled with a #15 scalpel blade.  Given the location of the defect, shape of the defect and the proximity to free margins an V-Y advancement flap was deemed most appropriate.  Using a sterile surgical marker, an appropriate advancement flap was drawn incorporating the defect and placing the expected incisions within the relaxed skin tension lines where possible.    The area thus outlined was incised deep to adipose tissue with a #15 scalpel blade.  The skin margins were undermined to an appropriate distance in all directions utilizing iris scissors.

## 2022-03-17 NOTE — ED ADULT TRIAGE NOTE - WEIGHT METHOD
Please approve or deny this refill request. The order is pended. Thank you.     LOV 1/24/2022    Next Visit Date:  Future Appointments   Date Time Provider Lashonda Villagran   5/23/2022  1:00 PM Sourav Palma MD [facility] stated

## 2022-03-21 ENCOUNTER — OUTPATIENT (OUTPATIENT)
Dept: OUTPATIENT SERVICES | Facility: HOSPITAL | Age: 77
LOS: 1 days | End: 2022-03-21
Payer: MEDICARE

## 2022-03-21 VITALS
RESPIRATION RATE: 18 BRPM | TEMPERATURE: 98 F | WEIGHT: 154.98 LBS | HEART RATE: 82 BPM | OXYGEN SATURATION: 100 % | SYSTOLIC BLOOD PRESSURE: 160 MMHG | DIASTOLIC BLOOD PRESSURE: 90 MMHG | HEIGHT: 64 IN

## 2022-03-21 DIAGNOSIS — Z90.89 ACQUIRED ABSENCE OF OTHER ORGANS: Chronic | ICD-10-CM

## 2022-03-21 DIAGNOSIS — Z98.890 OTHER SPECIFIED POSTPROCEDURAL STATES: Chronic | ICD-10-CM

## 2022-03-21 DIAGNOSIS — M20.41 OTHER HAMMER TOE(S) (ACQUIRED), RIGHT FOOT: ICD-10-CM

## 2022-03-21 DIAGNOSIS — Z98.89 OTHER SPECIFIED POSTPROCEDURAL STATES: Chronic | ICD-10-CM

## 2022-03-21 DIAGNOSIS — E89.2 POSTPROCEDURAL HYPOPARATHYROIDISM: Chronic | ICD-10-CM

## 2022-03-21 DIAGNOSIS — Z98.42 CATARACT EXTRACTION STATUS, LEFT EYE: Chronic | ICD-10-CM

## 2022-03-21 DIAGNOSIS — Z90.49 ACQUIRED ABSENCE OF OTHER SPECIFIED PARTS OF DIGESTIVE TRACT: Chronic | ICD-10-CM

## 2022-03-21 DIAGNOSIS — Z01.818 ENCOUNTER FOR OTHER PREPROCEDURAL EXAMINATION: ICD-10-CM

## 2022-03-21 LAB
ANION GAP SERPL CALC-SCNC: 4 MMOL/L — LOW (ref 5–17)
BASOPHILS # BLD AUTO: 0.02 K/UL — SIGNIFICANT CHANGE UP (ref 0–0.2)
BASOPHILS NFR BLD AUTO: 0.4 % — SIGNIFICANT CHANGE UP (ref 0–2)
BUN SERPL-MCNC: 20 MG/DL — SIGNIFICANT CHANGE UP (ref 7–23)
CALCIUM SERPL-MCNC: 9.5 MG/DL — SIGNIFICANT CHANGE UP (ref 8.5–10.1)
CHLORIDE SERPL-SCNC: 108 MMOL/L — SIGNIFICANT CHANGE UP (ref 96–108)
CO2 SERPL-SCNC: 26 MMOL/L — SIGNIFICANT CHANGE UP (ref 22–31)
CREAT SERPL-MCNC: 0.66 MG/DL — SIGNIFICANT CHANGE UP (ref 0.5–1.3)
EGFR: 91 ML/MIN/1.73M2 — SIGNIFICANT CHANGE UP
EOSINOPHIL # BLD AUTO: 0.06 K/UL — SIGNIFICANT CHANGE UP (ref 0–0.5)
EOSINOPHIL NFR BLD AUTO: 1.1 % — SIGNIFICANT CHANGE UP (ref 0–6)
GLUCOSE SERPL-MCNC: 105 MG/DL — HIGH (ref 70–99)
HCT VFR BLD CALC: 41.7 % — SIGNIFICANT CHANGE UP (ref 34.5–45)
HGB BLD-MCNC: 13.6 G/DL — SIGNIFICANT CHANGE UP (ref 11.5–15.5)
IMM GRANULOCYTES NFR BLD AUTO: 0.2 % — SIGNIFICANT CHANGE UP (ref 0–1.5)
LYMPHOCYTES # BLD AUTO: 1.9 K/UL — SIGNIFICANT CHANGE UP (ref 1–3.3)
LYMPHOCYTES # BLD AUTO: 34.7 % — SIGNIFICANT CHANGE UP (ref 13–44)
MCHC RBC-ENTMCNC: 28.5 PG — SIGNIFICANT CHANGE UP (ref 27–34)
MCHC RBC-ENTMCNC: 32.6 GM/DL — SIGNIFICANT CHANGE UP (ref 32–36)
MCV RBC AUTO: 87.2 FL — SIGNIFICANT CHANGE UP (ref 80–100)
MONOCYTES # BLD AUTO: 0.47 K/UL — SIGNIFICANT CHANGE UP (ref 0–0.9)
MONOCYTES NFR BLD AUTO: 8.6 % — SIGNIFICANT CHANGE UP (ref 2–14)
NEUTROPHILS # BLD AUTO: 3.02 K/UL — SIGNIFICANT CHANGE UP (ref 1.8–7.4)
NEUTROPHILS NFR BLD AUTO: 55 % — SIGNIFICANT CHANGE UP (ref 43–77)
PLATELET # BLD AUTO: 223 K/UL — SIGNIFICANT CHANGE UP (ref 150–400)
POTASSIUM SERPL-MCNC: 4.3 MMOL/L — SIGNIFICANT CHANGE UP (ref 3.5–5.3)
POTASSIUM SERPL-SCNC: 4.3 MMOL/L — SIGNIFICANT CHANGE UP (ref 3.5–5.3)
RBC # BLD: 4.78 M/UL — SIGNIFICANT CHANGE UP (ref 3.8–5.2)
RBC # FLD: 13.6 % — SIGNIFICANT CHANGE UP (ref 10.3–14.5)
SODIUM SERPL-SCNC: 138 MMOL/L — SIGNIFICANT CHANGE UP (ref 135–145)
WBC # BLD: 5.48 K/UL — SIGNIFICANT CHANGE UP (ref 3.8–10.5)
WBC # FLD AUTO: 5.48 K/UL — SIGNIFICANT CHANGE UP (ref 3.8–10.5)

## 2022-03-21 PROCEDURE — 36415 COLL VENOUS BLD VENIPUNCTURE: CPT

## 2022-03-21 PROCEDURE — 85025 COMPLETE CBC W/AUTO DIFF WBC: CPT

## 2022-03-21 PROCEDURE — 80048 BASIC METABOLIC PNL TOTAL CA: CPT

## 2022-03-21 RX ORDER — UBIDECARENONE 100 MG
1 CAPSULE ORAL
Qty: 0 | Refills: 0 | DISCHARGE

## 2022-03-21 RX ORDER — OMEGA-3 ACID ETHYL ESTERS 1 G
1 CAPSULE ORAL
Qty: 0 | Refills: 0 | DISCHARGE

## 2022-03-21 RX ORDER — MAGNESIUM OXIDE 400 MG ORAL TABLET 241.3 MG
1 TABLET ORAL
Qty: 0 | Refills: 0 | DISCHARGE

## 2022-03-21 RX ORDER — OMEPRAZOLE 10 MG/1
1 CAPSULE, DELAYED RELEASE ORAL
Qty: 0 | Refills: 0 | DISCHARGE

## 2022-03-21 RX ORDER — CALCIUM CARBONATE 500(1250)
1 TABLET ORAL
Qty: 0 | Refills: 0 | DISCHARGE

## 2022-03-21 NOTE — H&P PST ADULT - ASSESSMENT
75 y/o female with hammertoe right toe #3. Pt complain of right 3rd toe pain when using closed shoes. She is scheduled for Hammertoe correction right toe #3.  Plan:  1. NPO as per ASU instructions.  2. Instructed to take Olmesartan, Prilosec, Paxil with sips of water the morning of surgery.  3. COVID test apt on 03/22/2022, pt instructed to make appointment, tel # given.  4. PMD visit for optimization as per surgeon.  77 y/o female with hammertoe right toe #3. Pt complain of right 3rd toe pain when using closed shoes. She is scheduled for Hammertoe correction right toe #3.  Plan:  1. NPO as per ASU instructions.  2. Instructed to take Olmesartan, Prilosec, Paxil with sips of water the morning of surgery.  3. COVID test apt on 03/22/2022, pt instructed to make appointment, tel # given.  4. PMD visit for optimization as per surgeon.   5. No NSAIDS, vit. E, Fish oil, herbal medications starting today.

## 2022-03-21 NOTE — H&P PST ADULT - ADMIT DATE
Problem: Patient Care Overview  Goal: Plan of Care Review  Outcome: Ongoing (interventions implemented as appropriate)   06/04/19 0322   Coping/Psychosocial   Plan of Care Reviewed With patient   Plan of Care Review   Progress improving   OTHER   Outcome Summary No major events overnight. No complaints voiced. Had BM. Adequate UOP. VSS. No SOB/CP reported.           21-Mar-2022

## 2022-03-21 NOTE — H&P PST ADULT - NSICDXPASTMEDICALHX_GEN_ALL_CORE_FT
PAST MEDICAL HISTORY:  Anxiety     Arthritis     Fatty liver     GERD (gastroesophageal reflux disease)     Hammertoe of right foot     High cholesterol     Hypertension     Osteoarthritis     Pancreatitis h/o

## 2022-03-21 NOTE — H&P PST ADULT - HISTORY OF PRESENT ILLNESS
75  75 y/o female with hammertoe right toe #3. Pt complain of right 3rd toe pain when using closed shoes. She is here for PST for planned Hammertoe correction right toe #3.

## 2022-03-21 NOTE — H&P PST ADULT - NSANTHOSAYNRD_GEN_A_CORE
No. WILMAR screening performed.  STOP BANG Legend: 0-2 = LOW Risk; 3-4 = INTERMEDIATE Risk; 5-8 = HIGH Risk

## 2022-03-22 DIAGNOSIS — M20.41 OTHER HAMMER TOE(S) (ACQUIRED), RIGHT FOOT: ICD-10-CM

## 2022-03-22 DIAGNOSIS — Z01.818 ENCOUNTER FOR OTHER PREPROCEDURAL EXAMINATION: ICD-10-CM

## 2022-03-25 ENCOUNTER — TRANSCRIPTION ENCOUNTER (OUTPATIENT)
Age: 77
End: 2022-03-25

## 2022-03-25 ENCOUNTER — OUTPATIENT (OUTPATIENT)
Dept: INPATIENT UNIT | Facility: HOSPITAL | Age: 77
LOS: 1 days | Discharge: HOME | End: 2022-03-25
Payer: MEDICARE

## 2022-03-25 VITALS
TEMPERATURE: 98 F | HEIGHT: 64 IN | DIASTOLIC BLOOD PRESSURE: 74 MMHG | HEART RATE: 66 BPM | OXYGEN SATURATION: 98 % | SYSTOLIC BLOOD PRESSURE: 140 MMHG | RESPIRATION RATE: 18 BRPM | WEIGHT: 154.98 LBS

## 2022-03-25 VITALS
RESPIRATION RATE: 16 BRPM | DIASTOLIC BLOOD PRESSURE: 61 MMHG | HEART RATE: 65 BPM | SYSTOLIC BLOOD PRESSURE: 132 MMHG | OXYGEN SATURATION: 96 %

## 2022-03-25 DIAGNOSIS — Z98.42 CATARACT EXTRACTION STATUS, LEFT EYE: Chronic | ICD-10-CM

## 2022-03-25 DIAGNOSIS — M79.674 PAIN IN RIGHT TOE(S): ICD-10-CM

## 2022-03-25 DIAGNOSIS — Z98.89 OTHER SPECIFIED POSTPROCEDURAL STATES: Chronic | ICD-10-CM

## 2022-03-25 DIAGNOSIS — E89.2 POSTPROCEDURAL HYPOPARATHYROIDISM: Chronic | ICD-10-CM

## 2022-03-25 DIAGNOSIS — M20.41 OTHER HAMMER TOE(S) (ACQUIRED), RIGHT FOOT: ICD-10-CM

## 2022-03-25 DIAGNOSIS — Z98.890 OTHER SPECIFIED POSTPROCEDURAL STATES: Chronic | ICD-10-CM

## 2022-03-25 DIAGNOSIS — Z90.49 ACQUIRED ABSENCE OF OTHER SPECIFIED PARTS OF DIGESTIVE TRACT: Chronic | ICD-10-CM

## 2022-03-25 DIAGNOSIS — Z90.89 ACQUIRED ABSENCE OF OTHER ORGANS: Chronic | ICD-10-CM

## 2022-03-25 PROCEDURE — 73630 X-RAY EXAM OF FOOT: CPT | Mod: 26,RT

## 2022-03-25 PROCEDURE — 73630 X-RAY EXAM OF FOOT: CPT | Mod: RT

## 2022-03-25 RX ORDER — HYDROMORPHONE HYDROCHLORIDE 2 MG/ML
0.5 INJECTION INTRAMUSCULAR; INTRAVENOUS; SUBCUTANEOUS
Refills: 0 | Status: DISCONTINUED | OUTPATIENT
Start: 2022-03-25 | End: 2022-03-25

## 2022-03-25 RX ORDER — ACETAMINOPHEN 500 MG
1000 TABLET ORAL ONCE
Refills: 0 | Status: DISCONTINUED | OUTPATIENT
Start: 2022-03-25 | End: 2022-03-25

## 2022-03-25 RX ORDER — PROCHLORPERAZINE MALEATE 5 MG
5 TABLET ORAL ONCE
Refills: 0 | Status: DISCONTINUED | OUTPATIENT
Start: 2022-03-25 | End: 2022-03-25

## 2022-03-25 RX ORDER — ONDANSETRON 8 MG/1
4 TABLET, FILM COATED ORAL ONCE
Refills: 0 | Status: DISCONTINUED | OUTPATIENT
Start: 2022-03-25 | End: 2022-03-25

## 2022-03-25 RX ORDER — FENTANYL CITRATE 50 UG/ML
50 INJECTION INTRAVENOUS
Refills: 0 | Status: DISCONTINUED | OUTPATIENT
Start: 2022-03-25 | End: 2022-03-25

## 2022-03-25 RX ORDER — OXYCODONE HYDROCHLORIDE 5 MG/1
5 TABLET ORAL ONCE
Refills: 0 | Status: DISCONTINUED | OUTPATIENT
Start: 2022-03-25 | End: 2022-03-25

## 2022-03-25 RX ORDER — SODIUM CHLORIDE 9 MG/ML
1000 INJECTION, SOLUTION INTRAVENOUS
Refills: 0 | Status: DISCONTINUED | OUTPATIENT
Start: 2022-03-25 | End: 2022-03-25

## 2022-03-25 NOTE — ASU DISCHARGE PLAN (ADULT/PEDIATRIC) - CARE PROVIDER_API CALL
Srini Rosario J  ORTHOPAEDIC SURGERY  [address] [place] [address]  Phone: [phone]  Fax: [phone]  Follow Up Time:

## 2022-03-25 NOTE — ASU PATIENT PROFILE, ADULT - FALL HARM RISK - UNIVERSAL INTERVENTIONS
Bed in lowest position, wheels locked, appropriate side rails in place/Call bell, personal items and telephone in reach/Instruct patient to call for assistance before getting out of bed or chair/Non-slip footwear when patient is out of bed/[place] to call system/Physically safe environment - no spills, clutter or unnecessary equipment/Purposeful Proactive Rounding/Room/bathroom lighting operational, light cord in reach

## 2022-03-25 NOTE — ASU DISCHARGE PLAN (ADULT/PEDIATRIC) - NS MD DC FALL RISK RISK
For information on Fall & Injury Prevention, visit: [email or web address] OR  [email or web address] OR  https://www.cdc.gov/steadi/patient.html

## 2022-03-25 NOTE — BRIEF OPERATIVE NOTE - OPERATION/FINDINGS
Right hammertoe correction 3rd toe, 3rd MTPJ release, k wire fixation. Right hammertoe correction 3rd toe, 3rd MTPJ capsulotendon reabalancing via release, k wire fixation.

## 2022-03-25 NOTE — ASU DISCHARGE PLAN (ADULT/PEDIATRIC) - ASU DC SPECIAL INSTRUCTIONSFT
Follow up with Dr. Rosario in 1 week, take pain medication as needed, weight bearing with crutches and a surgical shoe to right foot. Follow up with Dr. Rosario in 1 week, take pain medication as needed, weight bearing with crutches and a surgical shoe to right foot. Rest, ice (behind the right knee 15 minutes every hour), elevate right foot.

## 2022-03-25 NOTE — ASU DISCHARGE PLAN (ADULT/PEDIATRIC) - CALL YOUR DOCTOR IF YOU HAVE ANY OF THE FOLLOWING:
Swelling that gets worse/Pain not relieved by Medications/Wound/Surgical Site with redness, or foul smelling discharge or pus/Numbness, tingling, color or temperature change to extremity/Nausea and vomiting that does not stop

## 2022-03-31 DIAGNOSIS — Z79.82 LONG TERM (CURRENT) USE OF ASPIRIN: ICD-10-CM

## 2022-03-31 DIAGNOSIS — M20.41 OTHER HAMMER TOE(S) (ACQUIRED), RIGHT FOOT: ICD-10-CM

## 2022-03-31 DIAGNOSIS — F32.A DEPRESSION, UNSPECIFIED: ICD-10-CM

## 2022-03-31 DIAGNOSIS — M19.90 UNSPECIFIED OSTEOARTHRITIS, UNSPECIFIED SITE: ICD-10-CM

## 2022-03-31 DIAGNOSIS — F41.9 ANXIETY DISORDER, UNSPECIFIED: ICD-10-CM

## 2022-03-31 DIAGNOSIS — Z88.2 ALLERGY STATUS TO SULFONAMIDES: ICD-10-CM

## 2022-03-31 DIAGNOSIS — Z88.3 ALLERGY STATUS TO OTHER ANTI-INFECTIVE AGENTS: ICD-10-CM

## 2022-03-31 DIAGNOSIS — E78.00 PURE HYPERCHOLESTEROLEMIA, UNSPECIFIED: ICD-10-CM

## 2022-03-31 DIAGNOSIS — Z87.891 PERSONAL HISTORY OF NICOTINE DEPENDENCE: ICD-10-CM

## 2022-03-31 DIAGNOSIS — K21.9 GASTRO-ESOPHAGEAL REFLUX DISEASE WITHOUT ESOPHAGITIS: ICD-10-CM

## 2022-03-31 DIAGNOSIS — Z98.1 ARTHRODESIS STATUS: ICD-10-CM

## 2022-03-31 DIAGNOSIS — I10 ESSENTIAL (PRIMARY) HYPERTENSION: ICD-10-CM

## 2022-03-31 DIAGNOSIS — Z90.49 ACQUIRED ABSENCE OF OTHER SPECIFIED PARTS OF DIGESTIVE TRACT: ICD-10-CM

## 2022-03-31 DIAGNOSIS — E89.2 POSTPROCEDURAL HYPOPARATHYROIDISM: ICD-10-CM

## 2022-06-30 ENCOUNTER — INPATIENT (INPATIENT)
Facility: HOSPITAL | Age: 77
LOS: 3 days | Discharge: HOME | DRG: 438 | End: 2022-07-04
Attending: HOSPITALIST | Admitting: INTERNAL MEDICINE
Payer: MEDICARE

## 2022-06-30 VITALS
SYSTOLIC BLOOD PRESSURE: 156 MMHG | OXYGEN SATURATION: 98 % | WEIGHT: 156.09 LBS | TEMPERATURE: 98 F | RESPIRATION RATE: 18 BRPM | HEIGHT: 64 IN | HEART RATE: 62 BPM | DIASTOLIC BLOOD PRESSURE: 84 MMHG

## 2022-06-30 DIAGNOSIS — Z98.89 OTHER SPECIFIED POSTPROCEDURAL STATES: Chronic | ICD-10-CM

## 2022-06-30 DIAGNOSIS — K85.90 ACUTE PANCREATITIS WITHOUT NECROSIS OR INFECTION, UNSPECIFIED: ICD-10-CM

## 2022-06-30 DIAGNOSIS — Z98.890 OTHER SPECIFIED POSTPROCEDURAL STATES: Chronic | ICD-10-CM

## 2022-06-30 DIAGNOSIS — Z90.89 ACQUIRED ABSENCE OF OTHER ORGANS: Chronic | ICD-10-CM

## 2022-06-30 DIAGNOSIS — E89.2 POSTPROCEDURAL HYPOPARATHYROIDISM: Chronic | ICD-10-CM

## 2022-06-30 DIAGNOSIS — Z98.42 CATARACT EXTRACTION STATUS, LEFT EYE: Chronic | ICD-10-CM

## 2022-06-30 DIAGNOSIS — Z90.49 ACQUIRED ABSENCE OF OTHER SPECIFIED PARTS OF DIGESTIVE TRACT: Chronic | ICD-10-CM

## 2022-06-30 PROBLEM — F41.9 ANXIETY DISORDER, UNSPECIFIED: Chronic | Status: ACTIVE | Noted: 2022-03-21

## 2022-06-30 PROBLEM — M20.41 OTHER HAMMER TOE(S) (ACQUIRED), RIGHT FOOT: Chronic | Status: ACTIVE | Noted: 2022-03-21

## 2022-06-30 LAB
ADD ON TEST-SPECIMEN IN LAB: SIGNIFICANT CHANGE UP
ALBUMIN SERPL ELPH-MCNC: 3.8 G/DL — SIGNIFICANT CHANGE UP (ref 3.3–5)
ALP SERPL-CCNC: 61 U/L — SIGNIFICANT CHANGE UP (ref 40–120)
ALT FLD-CCNC: 46 U/L — SIGNIFICANT CHANGE UP (ref 12–78)
ANION GAP SERPL CALC-SCNC: 4 MMOL/L — LOW (ref 5–17)
APPEARANCE UR: CLEAR — SIGNIFICANT CHANGE UP
AST SERPL-CCNC: 33 U/L — SIGNIFICANT CHANGE UP (ref 15–37)
BASOPHILS # BLD AUTO: 0.02 K/UL — SIGNIFICANT CHANGE UP (ref 0–0.2)
BASOPHILS NFR BLD AUTO: 0.2 % — SIGNIFICANT CHANGE UP (ref 0–2)
BILIRUB SERPL-MCNC: 0.3 MG/DL — SIGNIFICANT CHANGE UP (ref 0.2–1.2)
BILIRUB UR-MCNC: NEGATIVE — SIGNIFICANT CHANGE UP
BUN SERPL-MCNC: 17 MG/DL — SIGNIFICANT CHANGE UP (ref 7–23)
CALCIUM SERPL-MCNC: 9.4 MG/DL — SIGNIFICANT CHANGE UP (ref 8.5–10.1)
CHLORIDE SERPL-SCNC: 107 MMOL/L — SIGNIFICANT CHANGE UP (ref 96–108)
CO2 SERPL-SCNC: 28 MMOL/L — SIGNIFICANT CHANGE UP (ref 22–31)
COLOR SPEC: YELLOW — SIGNIFICANT CHANGE UP
CREAT SERPL-MCNC: 0.62 MG/DL — SIGNIFICANT CHANGE UP (ref 0.5–1.3)
DIFF PNL FLD: ABNORMAL
EGFR: 93 ML/MIN/1.73M2 — SIGNIFICANT CHANGE UP
EOSINOPHIL # BLD AUTO: 0.05 K/UL — SIGNIFICANT CHANGE UP (ref 0–0.5)
EOSINOPHIL NFR BLD AUTO: 0.5 % — SIGNIFICANT CHANGE UP (ref 0–6)
GLUCOSE SERPL-MCNC: 125 MG/DL — HIGH (ref 70–99)
GLUCOSE UR QL: NEGATIVE — SIGNIFICANT CHANGE UP
HCT VFR BLD CALC: 40.9 % — SIGNIFICANT CHANGE UP (ref 34.5–45)
HGB BLD-MCNC: 13.6 G/DL — SIGNIFICANT CHANGE UP (ref 11.5–15.5)
IMM GRANULOCYTES NFR BLD AUTO: 0.3 % — SIGNIFICANT CHANGE UP (ref 0–1.5)
KETONES UR-MCNC: NEGATIVE — SIGNIFICANT CHANGE UP
LEUKOCYTE ESTERASE UR-ACNC: NEGATIVE — SIGNIFICANT CHANGE UP
LIDOCAIN IGE QN: HIGH U/L (ref 73–393)
LYMPHOCYTES # BLD AUTO: 1.45 K/UL — SIGNIFICANT CHANGE UP (ref 1–3.3)
LYMPHOCYTES # BLD AUTO: 13.4 % — SIGNIFICANT CHANGE UP (ref 13–44)
MCHC RBC-ENTMCNC: 29.4 PG — SIGNIFICANT CHANGE UP (ref 27–34)
MCHC RBC-ENTMCNC: 33.3 GM/DL — SIGNIFICANT CHANGE UP (ref 32–36)
MCV RBC AUTO: 88.3 FL — SIGNIFICANT CHANGE UP (ref 80–100)
MONOCYTES # BLD AUTO: 0.48 K/UL — SIGNIFICANT CHANGE UP (ref 0–0.9)
MONOCYTES NFR BLD AUTO: 4.4 % — SIGNIFICANT CHANGE UP (ref 2–14)
NEUTROPHILS # BLD AUTO: 8.79 K/UL — HIGH (ref 1.8–7.4)
NEUTROPHILS NFR BLD AUTO: 81.2 % — HIGH (ref 43–77)
NITRITE UR-MCNC: NEGATIVE — SIGNIFICANT CHANGE UP
PH UR: 5 — SIGNIFICANT CHANGE UP (ref 5–8)
PLATELET # BLD AUTO: 214 K/UL — SIGNIFICANT CHANGE UP (ref 150–400)
POTASSIUM SERPL-MCNC: 4.2 MMOL/L — SIGNIFICANT CHANGE UP (ref 3.5–5.3)
POTASSIUM SERPL-SCNC: 4.2 MMOL/L — SIGNIFICANT CHANGE UP (ref 3.5–5.3)
PROT SERPL-MCNC: 6.9 GM/DL — SIGNIFICANT CHANGE UP (ref 6–8.3)
PROT UR-MCNC: 30 MG/DL
RBC # BLD: 4.63 M/UL — SIGNIFICANT CHANGE UP (ref 3.8–5.2)
RBC # FLD: 13.8 % — SIGNIFICANT CHANGE UP (ref 10.3–14.5)
SODIUM SERPL-SCNC: 139 MMOL/L — SIGNIFICANT CHANGE UP (ref 135–145)
SP GR SPEC: 1.01 — SIGNIFICANT CHANGE UP (ref 1.01–1.02)
UROBILINOGEN FLD QL: NEGATIVE — SIGNIFICANT CHANGE UP
WBC # BLD: 10.82 K/UL — HIGH (ref 3.8–10.5)
WBC # FLD AUTO: 10.82 K/UL — HIGH (ref 3.8–10.5)

## 2022-06-30 PROCEDURE — 80053 COMPREHEN METABOLIC PANEL: CPT

## 2022-06-30 PROCEDURE — 36415 COLL VENOUS BLD VENIPUNCTURE: CPT

## 2022-06-30 PROCEDURE — 83690 ASSAY OF LIPASE: CPT

## 2022-06-30 PROCEDURE — 74177 CT ABD & PELVIS W/CONTRAST: CPT | Mod: 26,MA

## 2022-06-30 PROCEDURE — 74183 MRI ABD W/O CNTR FLWD CNTR: CPT

## 2022-06-30 PROCEDURE — 83735 ASSAY OF MAGNESIUM: CPT

## 2022-06-30 PROCEDURE — 85027 COMPLETE CBC AUTOMATED: CPT

## 2022-06-30 PROCEDURE — 99285 EMERGENCY DEPT VISIT HI MDM: CPT

## 2022-06-30 PROCEDURE — 93005 ELECTROCARDIOGRAM TRACING: CPT

## 2022-06-30 PROCEDURE — 80061 LIPID PANEL: CPT

## 2022-06-30 PROCEDURE — 93010 ELECTROCARDIOGRAM REPORT: CPT | Mod: 76

## 2022-06-30 PROCEDURE — 80048 BASIC METABOLIC PNL TOTAL CA: CPT

## 2022-06-30 PROCEDURE — 99223 1ST HOSP IP/OBS HIGH 75: CPT | Mod: GC

## 2022-06-30 PROCEDURE — 71045 X-RAY EXAM CHEST 1 VIEW: CPT

## 2022-06-30 PROCEDURE — A9579: CPT

## 2022-06-30 PROCEDURE — 83880 ASSAY OF NATRIURETIC PEPTIDE: CPT

## 2022-06-30 PROCEDURE — 85025 COMPLETE CBC W/AUTO DIFF WBC: CPT

## 2022-06-30 PROCEDURE — 84100 ASSAY OF PHOSPHORUS: CPT

## 2022-06-30 RX ORDER — CHOLECALCIFEROL (VITAMIN D3) 125 MCG
1 CAPSULE ORAL
Qty: 0 | Refills: 0 | DISCHARGE

## 2022-06-30 RX ORDER — HYDROMORPHONE HYDROCHLORIDE 2 MG/ML
0.5 INJECTION INTRAMUSCULAR; INTRAVENOUS; SUBCUTANEOUS
Refills: 0 | Status: DISCONTINUED | OUTPATIENT
Start: 2022-06-30 | End: 2022-06-30

## 2022-06-30 RX ORDER — SODIUM CHLORIDE 9 MG/ML
1000 INJECTION INTRAMUSCULAR; INTRAVENOUS; SUBCUTANEOUS ONCE
Refills: 0 | Status: COMPLETED | OUTPATIENT
Start: 2022-06-30 | End: 2022-06-30

## 2022-06-30 RX ORDER — PANTOPRAZOLE SODIUM 20 MG/1
40 TABLET, DELAYED RELEASE ORAL
Refills: 0 | Status: DISCONTINUED | OUTPATIENT
Start: 2022-06-30 | End: 2022-07-04

## 2022-06-30 RX ORDER — ONDANSETRON 8 MG/1
4 TABLET, FILM COATED ORAL ONCE
Refills: 0 | Status: COMPLETED | OUTPATIENT
Start: 2022-06-30 | End: 2022-06-30

## 2022-06-30 RX ORDER — LOSARTAN POTASSIUM 100 MG/1
100 TABLET, FILM COATED ORAL DAILY
Refills: 0 | Status: DISCONTINUED | OUTPATIENT
Start: 2022-06-30 | End: 2022-07-02

## 2022-06-30 RX ORDER — HYDROMORPHONE HYDROCHLORIDE 2 MG/ML
0.5 INJECTION INTRAMUSCULAR; INTRAVENOUS; SUBCUTANEOUS EVERY 4 HOURS
Refills: 0 | Status: DISCONTINUED | OUTPATIENT
Start: 2022-06-30 | End: 2022-07-01

## 2022-06-30 RX ORDER — MORPHINE SULFATE 50 MG/1
2 CAPSULE, EXTENDED RELEASE ORAL EVERY 4 HOURS
Refills: 0 | Status: DISCONTINUED | OUTPATIENT
Start: 2022-06-30 | End: 2022-07-04

## 2022-06-30 RX ORDER — ACETAMINOPHEN 500 MG
650 TABLET ORAL EVERY 6 HOURS
Refills: 0 | Status: DISCONTINUED | OUTPATIENT
Start: 2022-06-30 | End: 2022-07-04

## 2022-06-30 RX ORDER — ONDANSETRON 8 MG/1
4 TABLET, FILM COATED ORAL EVERY 6 HOURS
Refills: 0 | Status: DISCONTINUED | OUTPATIENT
Start: 2022-06-30 | End: 2022-07-04

## 2022-06-30 RX ORDER — ASCORBIC ACID 60 MG
1 TABLET,CHEWABLE ORAL
Qty: 0 | Refills: 0 | DISCHARGE

## 2022-06-30 RX ORDER — METOPROLOL TARTRATE 50 MG
25 TABLET ORAL DAILY
Refills: 0 | Status: DISCONTINUED | OUTPATIENT
Start: 2022-06-30 | End: 2022-07-01

## 2022-06-30 RX ORDER — HYDROMORPHONE HYDROCHLORIDE 2 MG/ML
0.5 INJECTION INTRAMUSCULAR; INTRAVENOUS; SUBCUTANEOUS ONCE
Refills: 0 | Status: DISCONTINUED | OUTPATIENT
Start: 2022-06-30 | End: 2022-06-30

## 2022-06-30 RX ORDER — SIMVASTATIN 20 MG/1
20 TABLET, FILM COATED ORAL AT BEDTIME
Refills: 0 | Status: DISCONTINUED | OUTPATIENT
Start: 2022-06-30 | End: 2022-07-04

## 2022-06-30 RX ORDER — SODIUM CHLORIDE 9 MG/ML
1000 INJECTION, SOLUTION INTRAVENOUS
Refills: 0 | Status: DISCONTINUED | OUTPATIENT
Start: 2022-06-30 | End: 2022-06-30

## 2022-06-30 RX ORDER — ASPIRIN/CALCIUM CARB/MAGNESIUM 324 MG
81 TABLET ORAL DAILY
Refills: 0 | Status: DISCONTINUED | OUTPATIENT
Start: 2022-06-30 | End: 2022-07-04

## 2022-06-30 RX ORDER — MORPHINE SULFATE 50 MG/1
4 CAPSULE, EXTENDED RELEASE ORAL EVERY 4 HOURS
Refills: 0 | Status: DISCONTINUED | OUTPATIENT
Start: 2022-06-30 | End: 2022-06-30

## 2022-06-30 RX ORDER — ASPIRIN/CALCIUM CARB/MAGNESIUM 324 MG
1 TABLET ORAL
Qty: 0 | Refills: 0 | DISCHARGE

## 2022-06-30 RX ORDER — LOSARTAN POTASSIUM 100 MG/1
100 TABLET, FILM COATED ORAL DAILY
Refills: 0 | Status: DISCONTINUED | OUTPATIENT
Start: 2022-06-30 | End: 2022-06-30

## 2022-06-30 RX ORDER — MORPHINE SULFATE 50 MG/1
4 CAPSULE, EXTENDED RELEASE ORAL ONCE
Refills: 0 | Status: DISCONTINUED | OUTPATIENT
Start: 2022-06-30 | End: 2022-06-30

## 2022-06-30 RX ORDER — SODIUM CHLORIDE 9 MG/ML
1000 INJECTION, SOLUTION INTRAVENOUS
Refills: 0 | Status: DISCONTINUED | OUTPATIENT
Start: 2022-06-30 | End: 2022-07-01

## 2022-06-30 RX ORDER — ALPRAZOLAM 0.25 MG
0.25 TABLET ORAL THREE TIMES A DAY
Refills: 0 | Status: DISCONTINUED | OUTPATIENT
Start: 2022-06-30 | End: 2022-07-04

## 2022-06-30 RX ADMIN — HYDROMORPHONE HYDROCHLORIDE 0.5 MILLIGRAM(S): 2 INJECTION INTRAMUSCULAR; INTRAVENOUS; SUBCUTANEOUS at 17:53

## 2022-06-30 RX ADMIN — SODIUM CHLORIDE 1000 MILLILITER(S): 9 INJECTION INTRAMUSCULAR; INTRAVENOUS; SUBCUTANEOUS at 18:45

## 2022-06-30 RX ADMIN — ONDANSETRON 4 MILLIGRAM(S): 8 TABLET, FILM COATED ORAL at 22:04

## 2022-06-30 RX ADMIN — MORPHINE SULFATE 4 MILLIGRAM(S): 50 CAPSULE, EXTENDED RELEASE ORAL at 16:41

## 2022-06-30 RX ADMIN — SODIUM CHLORIDE 2000 MILLILITER(S): 9 INJECTION INTRAMUSCULAR; INTRAVENOUS; SUBCUTANEOUS at 16:35

## 2022-06-30 RX ADMIN — SODIUM CHLORIDE 1000 MILLILITER(S): 9 INJECTION INTRAMUSCULAR; INTRAVENOUS; SUBCUTANEOUS at 17:35

## 2022-06-30 RX ADMIN — HYDROMORPHONE HYDROCHLORIDE 0.5 MILLIGRAM(S): 2 INJECTION INTRAMUSCULAR; INTRAVENOUS; SUBCUTANEOUS at 22:04

## 2022-06-30 RX ADMIN — SODIUM CHLORIDE 1000 MILLILITER(S): 9 INJECTION INTRAMUSCULAR; INTRAVENOUS; SUBCUTANEOUS at 18:41

## 2022-06-30 RX ADMIN — ONDANSETRON 4 MILLIGRAM(S): 8 TABLET, FILM COATED ORAL at 16:40

## 2022-06-30 NOTE — ED ADULT NURSE NOTE - OBJECTIVE STATEMENT
----- Message from Evelin Gardner sent at 12/11/2020 10:37 AM CST -----  Regarding: therapy  Pt is requesting call back in regards to questions about continuation of therapy    [facility] call [phone]     Patient comes in c/o abdominal pain starting at 1pm. Patient rates 10/10 with nausea, no diarrhea, no fever, no CP, no SOB.

## 2022-06-30 NOTE — ED ADULT NURSE NOTE - NSIMPLEMENTINTERV_GEN_ALL_ED
Implemented All Universal Safety Interventions:  [place] to call system. Call bell, personal items and telephone within reach. Instruct patient to call for assistance. Room bathroom lighting operational. Non-slip footwear when patient is off stretcher. Physically safe environment: no spills, clutter or unnecessary equipment. Stretcher in lowest position, wheels locked, appropriate side rails in place.

## 2022-06-30 NOTE — H&P ADULT - HISTORY OF PRESENT ILLNESS
77 y/o F with PMH fatty liver, GERD, HLD, HTN, hypothyroidism, osteoarthritis, pancreatitis presents for abdominal pain. Pt states started to have severe abdominal pain around 1 pm today. Described as generalized pain, nonradiating, rated 8/10, no aggravating or alleviating factors. Associated wit nausea and one episode of vomiting. Denies fever, chills, chest pain, SOB, diarrhea, constipation, lightheadedness, dizziness. Pt has hx of recurrent pancreatitis, last hospitalized in December 2021. In past thought due to be related gallstones, had cholecystectomy. Had period of few months last year when drinking  last year when brother passed away, has not drank alcohol since November 2021. 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 GI recommended upon discharge repeat MRCP and EUS, pt did followup with GI but did not have this done.    In the ED initial /84, afebrile. Had transient episode of hypotension 72/55 that improved to 156/73 s/p fluids. WBC 10.82, lipase >30,000. CT A/P showed acute interstitial pancreatitis  77 y/o F with PMH fatty liver, GERD, HLD, HTN, hypothyroidism, osteoarthritis, h/o cholecystectomy, pancreatitis presents for abdominal pain. Pt states started to have severe abdominal pain around 1 pm today. Described as generalized pain, nonradiating, rated 8/10, no aggravating or alleviating factors. Associated wit nausea and one episode of vomiting. Denies fever, chills, chest pain, SOB, diarrhea, constipation, lightheadedness, dizziness. Pt has hx of recurrent pancreatitis, last hospitalized in December 2021. In past thought due to be related gallstones, had cholecystectomy. Had period of few months last year when drinking  last year when brother passed away, has not drank alcohol since November 2021. 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 GI recommended upon discharge repeat MRCP and EUS, pt did followup with GI but did not have this done.    In the ED initial /84, afebrile. Had transient episode of hypotension 72/55 that improved to 156/73 s/p fluids. WBC 10.82, lipase >30,000. CT A/P showed acute interstitial pancreatitis  77 y/o F with PMH fatty liver, GERD, HLD, HTN, hypothyroidism, osteoarthritis, h/o cholecystectomy, pancreatitis presents for abdominal pain. Pt states started to have severe abdominal pain around 1 pm today. Described as generalized pain, nonradiating, rated 8/10, no aggravating or alleviating factors. Associated wit nausea and one episode of vomiting. Denies fever, chills, chest pain, SOB, diarrhea, constipation, lightheadedness, dizziness. Pt has hx of recurrent pancreatitis, last hospitalized in December 2021. In the past thought due to be related gallstones, had cholecystectomy. Had period of few months in 2021 when was drinking alcohol everyday since brother passed away. Pt states has not drank alcohol since November 2021. 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 GI recommended upon discharge repeat MRCP and EUS, pt did followup with GI but did not have this done.    In the ED initial /84, afebrile. Had transient episode of hypotension 72/55 that improved to 156/73 s/p fluids. WBC 10.82, lipase >30,000. CT A/P showed acute interstitial pancreatitis  75 y/o F with PMH fatty liver, GERD, HLD, HTN, hypothyroidism, osteoarthritis, h/o cholecystectomy, pancreatitis presents for abdominal pain. Pt states started to have severe abdominal pain around 1 pm today. Described as generalized pain, nonradiating, rated 8/10, no aggravating or alleviating factors. Associated wit nausea and one episode of vomiting. Denies fever, chills, chest pain, SOB, diarrhea, constipation, lightheadedness, dizziness. Pt has hx of recurrent pancreatitis, last hospitalized in December 2021. In the past thought due to be related gallstones, had cholecystectomy. Had period of few months in 2021 when was drinking alcohol everyday since brother passed away. Pt states has not drank alcohol since November 2021. 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    In the ED initial /84, afebrile. Had transient episode of hypotension 72/55 that improved to 156/73 s/p fluids. WBC 10.82, lipase >30,000. CT A/P showed acute interstitial pancreatitis

## 2022-06-30 NOTE — H&P ADULT - ASSESSMENT
77 y/o F with PMH fatty liver, GERD, HLD, HTN, hypothyroidism, osteoarthritis, pancreatitis presents for abdominal pain    #Acute pancreatitis  -admit to med/surg  -hx of recurrent pancreatitis, last admitted in December 2021  -MRCP 11/2021 done on previous admission showed anatomic anomaly pancreatic ductal divisum  -CT abdomen/pelvis shows acute interstitial pancreatitis   -s/p 3 L IV fluid bolus in ED, continue IV fluids   -lipase >30,000 on admission, f/u lipase level AM  -zofran 4mg IVP Q6H PRN nausea and/or vomiting  -pain management  -NPO, advance diet as tolerated  -f/u GI consult     #HTN  -had transient episode of hypotension 72/55 that improved to 156/73 s/p fluids  -takes olmesartan 40 mg at home, continue with losartan 100 mg here  -continue metoprolol succinate 25 mg    #HLD  -continue simvastatin 20 mg   -f/u lipid profile    #GERD  -take omeprazole 20 mg at home, continue with pantoprazole 40 mg    #Depression/Anxiety  -continue paroxetine 10 mg daily   -continue xanax PRN  #DVT prophylaxis   -SCDS  IMPROVE VTE Individual Risk Assessment          RISK                                                          Points  [  ] Previous VTE                                                3  [  ] Thrombophilia                                             2  [  ] Lower limb paralysis                                   2        (unable to hold up >15 seconds)    [  ] Current Cancer                                             2         (within 6 months)  [  ] Immobilization > 24 hrs                              1  [  ] ICU/CCU stay > 24 hours                             1  [  ] Age > 60                                                         1    IMPROVE VTE Score:         [     1    ]    Total Risk Factor Score:    0 - 1:   Consider IPC  >2 - 3:  Thromboprophylaxis required (enoxaparin or SQ heparin)        >4:   High Risk: Thromboprophylaxis required (enoxaparin or SQ heparin), optional add IPC  **If CONTRAINDICATION to enoxaparin or SQ heparin, USE IPCs**      #Advanced Directives  -full code    Case discussed with Dr. Story   77 y/o F PMHx arthritis, fatty liver, GERD, HTN, HLD, osteoarthritis, h/o cholecystectomy, pancreatitis presents for abdominal pain     #Acute pancreatitis  -admit to med/surg  -hx of recurrent pancreatitis, last admitted in December 2021  -MRCP 11/2021 done on previous admission showed anatomic anomaly pancreatic ductal divisum  -CT abdomen/pelvis shows acute interstitial pancreatitis   -s/p 3 L IV fluid bolus in ED, continue IV fluids   -lipase >30,000 on admission, f/u lipase level AM  -zofran 4mg IVP Q6H PRN nausea and/or vomiting  -pain management  -NPO, advance diet as tolerated  -f/u GI consult     #HTN  -had transient episode of hypotension 72/55 that improved to 156/73 s/p fluids  -takes olmesartan 40 mg at home, continue with losartan 100 mg here  -continue metoprolol succinate 25 mg    #HLD  -continue simvastatin 20 mg   -f/u lipid profile    #GERD  -take omeprazole 20 mg at home, continue with pantoprazole 40 mg    #Depression/Anxiety  -continue paroxetine 10 mg daily   -continue xanax PRN    #DVT prophylaxis   -SCDS  IMPROVE VTE Individual Risk Assessment          RISK                                                          Points  [  ] Previous VTE                                                3  [  ] Thrombophilia                                             2  [  ] Lower limb paralysis                                   2        (unable to hold up >15 seconds)    [  ] Current Cancer                                             2         (within 6 months)  [  ] Immobilization > 24 hrs                              1  [  ] ICU/CCU stay > 24 hours                             1  [  ] Age > 60                                                         1    IMPROVE VTE Score:         [     1    ]    Total Risk Factor Score:    0 - 1:   Consider IPC  >2 - 3:  Thromboprophylaxis required (enoxaparin or SQ heparin)        >4:   High Risk: Thromboprophylaxis required (enoxaparin or SQ heparin), optional add IPC  **If CONTRAINDICATION to enoxaparin or SQ heparin, USE IPCs**      #Advanced Directives  -full code    Case discussed with Dr. Story   77 y/o F PMHx arthritis, fatty liver, GERD, HTN, HLD, osteoarthritis, h/o cholecystectomy, pancreatitis presents for abdominal pain     #Acute pancreatitis  -admit to med/surg  -hx of recurrent pancreatitis, last admitted in December 2021  -MRCP 11/2021 done on previous admission showed anatomic anomaly pancreatic ductal divisum  -CT abdomen/pelvis shows acute interstitial pancreatitis   -s/p 3 L IV fluid bolus in ED, continue IV fluids   -lipase >30,000 on admission, f/u lipase level AM  -zofran 4mg IVP Q6H PRN nausea and/or vomiting  -pain management  -NPO, advance diet as tolerated  -f/u GI consult     #HTN  -had transient episode of hypotension 72/55 that improved to 156/73 s/p fluids  -takes olmesartan 40 mg at home, continue with losartan 100 mg here  -continue metoprolol succinate 25 mg    #HLD  -continue simvastatin 20 mg   -f/u lipid profile    #GERD  -take omeprazole 20 mg at home, continue with pantoprazole 40 mg    #Depression/Anxiety  -continue paroxetine 10 mg daily   -continue xanax PRN    #DVT prophylaxis   -SCDS    IMPROVE VTE Individual Risk Assessment          RISK                                                          Points  [  ] Previous VTE                                                3  [  ] Thrombophilia                                             2  [  ] Lower limb paralysis                                   2        (unable to hold up >15 seconds)    [  ] Current Cancer                                             2         (within 6 months)  [  ] Immobilization > 24 hrs                              1  [  ] ICU/CCU stay > 24 hours                             1  [  ] Age > 60                                                         1    IMPROVE VTE Score:         [     1    ]    Total Risk Factor Score:    0 - 1:   Consider IPC  >2 - 3:  Thromboprophylaxis required (enoxaparin or SQ heparin)        >4:   High Risk: Thromboprophylaxis required (enoxaparin or SQ heparin), optional add IPC  **If CONTRAINDICATION to enoxaparin or SQ heparin, USE IPCs**    #Advanced Directives  -full code    Case discussed with Dr. Stoyr     77 y/o F PMHx arthritis, fatty liver, GERD, HTN, HLD, osteoarthritis, h/o cholecystectomy, pancreatitis presents for abdominal pain     #Acute pancreatitis  -admit to med/surg  -hx of recurrent pancreatitis, last admitted in December 2021  -MRCP 11/2021 done on previous admission showed anatomic anomaly pancreatic ductal divisum  -CT abdomen/pelvis shows acute interstitial pancreatitis   -s/p 3 L IV fluid bolus in ED, continue IV fluids LR at 150 cc/hr  -lipase >30,000 on admission, f/u lipase level AM  -zofran 4mg IVP Q6H PRN nausea and/or vomiting  -pain management  -NPO, advance diet as tolerated  -f/u GI consult     #HTN  -had transient episode of hypotension 72/55 that improved to 156/73 s/p fluids  -takes olmesartan 40 mg at home, continue with losartan 100 mg here  -continue metoprolol succinate 25 mg    #HLD  -continue simvastatin 20 mg   -f/u lipid profile    #GERD  -take omeprazole 20 mg at home, continue with pantoprazole 40 mg    #Depression/Anxiety  -continue paroxetine 10 mg daily   -continue xanax PRN    #DVT prophylaxis   -SCDS    IMPROVE VTE Individual Risk Assessment          RISK                                                          Points  [  ] Previous VTE                                                3  [  ] Thrombophilia                                             2  [  ] Lower limb paralysis                                   2        (unable to hold up >15 seconds)    [  ] Current Cancer                                             2         (within 6 months)  [  ] Immobilization > 24 hrs                              1  [  ] ICU/CCU stay > 24 hours                             1  [  ] Age > 60                                                         1    IMPROVE VTE Score:         [     1    ]    Total Risk Factor Score:    0 - 1:   Consider IPC  >2 - 3:  Thromboprophylaxis required (enoxaparin or SQ heparin)        >4:   High Risk: Thromboprophylaxis required (enoxaparin or SQ heparin), optional add IPC  **If CONTRAINDICATION to enoxaparin or SQ heparin, USE IPCs**    #Advanced Directives  -full code    Case discussed with Dr. Story

## 2022-06-30 NOTE — H&P ADULT - NSHPPHYSICALEXAM_GEN_ALL_CORE
Vitals  T(F): 98.1 (06-30-22 @ 22:00), Max: 98.1 (06-30-22 @ 22:00)  HR: 65 (06-30-22 @ 22:00) (62 - 81)  BP: 165/76 (06-30-22 @ 22:00) (72/55 - 165/76)  RR: 16 (06-30-22 @ 22:00) (16 - 18)  SpO2: 98% (06-30-22 @ 22:00) (98% - 98%)    Physical Exam   Gen: NAD, comfortable  HENT: atraumatic head and ears, no gross abnormalities of ears, mucous membranes moist, no oral lesions, neck supple without masses/goiter/lymphadenopathy  CV: RRR, nl s1/s2, no M/R/G  Pulm: nl respiratory effort, CTAB, no wheezes/crackles/rhonchi  Back: no scoliosis, lordosis, or kyphosis, no tenderness  Abd: normoactive bowel sounds in all 4 quadrants, soft, +diffuse TTP, nondistended, no rebound, no guarding, no masses  Extremities: no pedal edema, pedal pulses palpable   Skin: nl warm and dry, no wounds   Neuro: A&Ox3, answering questions appropriately, PERRL, EOMI, 5/5 strength in upper and lower extremities bilaterally, sensation intact in upper and lower extremities bilaterally  Pysch: no depression, no SI, no HI Vitals  T(F): 98.1 (06-30-22 @ 22:00), Max: 98.1 (06-30-22 @ 22:00)  HR: 65 (06-30-22 @ 22:00) (62 - 81)  BP: 165/76 (06-30-22 @ 22:00) (72/55 - 165/76)  RR: 16 (06-30-22 @ 22:00) (16 - 18)  SpO2: 98% (06-30-22 @ 22:00) (98% - 98%)    Physical Exam   Gen: NAD, comfortable  HENT: atraumatic head and ears, no gross abnormalities of ears, mucous membranes moist, no oral lesions, neck supple without masses/goiter/lymphadenopathy  CV: RRR, nl s1/s2, no M/R/G  Pulm: nl respiratory effort, CTAB, no wheezes/crackles/rhonchi  Back: no scoliosis, lordosis, or kyphosis, no tenderness  Abd: normoactive bowel sounds in all 4 quadrants, soft, +diffuse TTP, nondistended, no rebound, no guarding, no masses  Extremities: no pedal edema, pedal pulses palpable   Skin: nl warm and dry, no wounds   Neuro: A&Ox3, answering questions appropriately, PERRL, EOMI, 5/5 strength in upper and lower extremities bilaterally, sensation intact in upper and lower extremities bilaterally  Psych: no depression, no SI, no HI

## 2022-06-30 NOTE — H&P ADULT - NSHPSOCIALHISTORY_GEN_ALL_CORE
last drink of alcohol in Novemeber 2021, no cigarette use, no recreational drug use Last drink of alcohol was in November 2021, no cigarette use, no recreational drug use  Lives at home

## 2022-06-30 NOTE — ED STATDOCS - PROGRESS NOTE DETAILS
called to patient bedside by RN - pt was found to be pale, hypotensive and having the urge to move her bowels. pt given bolus of IVFS and transferred to main ED for further eval, EKG being done now. -Jazlyn Branham PA-C

## 2022-06-30 NOTE — ED STATDOCS - NS ED ATTENDING STATEMENT MOD
This was a shared visit with the BRYN. I reviewed and verified the documentation and independently performed the documented:

## 2022-06-30 NOTE — PHARMACOTHERAPY INTERVENTION NOTE - COMMENTS
Medication reconciliation completed.  Reviewed Medication list and confirmed med allergies with patient; confirmed with Dr. First Medshila.

## 2022-06-30 NOTE — ED STATDOCS - OBJECTIVE STATEMENT
76 F hx arthritis, fatty liver, GERD, HTN, HLD, osteoarthritis, pancreatitis, h/o cholecystectomy here complaining of  sudden onset of severe abdominal pain that started today. reports associated symptoms of nausea. denies fever, chills. no CP, SOB.

## 2022-06-30 NOTE — ED ADULT NURSE REASSESSMENT NOTE - NS ED NURSE REASSESS COMMENT FT1
Patient hypotensive and diaphoretic feels like she was going to have a bowel movement. BRYANT oHbson aware, move to [facility] for further workup.

## 2022-06-30 NOTE — ED PROVIDER NOTE - OBJECTIVE STATEMENT
76 F hx arthritis, fatty liver, GERD, HTN, HLD, osteoarthritis, pancreatitis, h/o cholecystectomy here complaining of  sudden onset of severe abdominal pain that started today. reports associated symptoms of nausea. denies fever, chills. no CP, SOB, EtOH, drug use, diarrhea, and urination problems. Pt did vomit once today and abd pain for the last few hours.

## 2022-06-30 NOTE — H&P ADULT - NSHPREVIEWOFSYSTEMS_GEN_ALL_CORE
REVIEW OF SYSTEMS:    CONSTITUTIONAL: No weakness, fevers or chills  EYES/ENT: No visual changes;  No vertigo or throat pain   NECK: No pain or stiffness  RESPIRATORY: No cough, wheezing, hemoptysis; No shortness of breath  CARDIOVASCULAR: No chest pain or palpitations  GASTROINTESTINAL: +abdominal pain. +nausea, no vomiting, no hematemesis; No diarrhea or constipation. No melena or hematochezia.  GENITOURINARY: No dysuria, frequency or hematuria  NEUROLOGICAL: No numbness or weakness  SKIN: No itching, rashes

## 2022-06-30 NOTE — ED STATDOCS - CLINICAL SUMMARY MEDICAL DECISION MAKING FREE TEXT BOX
would evaluate for pancreatitis vs. atypical ACS vs. GERD / gastritis. dispo pending labs imaging reassessment

## 2022-07-01 LAB
ALBUMIN SERPL ELPH-MCNC: 2.9 G/DL — LOW (ref 3.3–5)
ALP SERPL-CCNC: 51 U/L — SIGNIFICANT CHANGE UP (ref 40–120)
ALT FLD-CCNC: 32 U/L — SIGNIFICANT CHANGE UP (ref 12–78)
ANION GAP SERPL CALC-SCNC: 5 MMOL/L — SIGNIFICANT CHANGE UP (ref 5–17)
AST SERPL-CCNC: 19 U/L — SIGNIFICANT CHANGE UP (ref 15–37)
BASOPHILS # BLD AUTO: 0.01 K/UL — SIGNIFICANT CHANGE UP (ref 0–0.2)
BASOPHILS NFR BLD AUTO: 0.1 % — SIGNIFICANT CHANGE UP (ref 0–2)
BILIRUB SERPL-MCNC: 0.3 MG/DL — SIGNIFICANT CHANGE UP (ref 0.2–1.2)
BUN SERPL-MCNC: 11 MG/DL — SIGNIFICANT CHANGE UP (ref 7–23)
CALCIUM SERPL-MCNC: 8.3 MG/DL — LOW (ref 8.5–10.1)
CHLORIDE SERPL-SCNC: 113 MMOL/L — HIGH (ref 96–108)
CHOLEST SERPL-MCNC: 107 MG/DL — SIGNIFICANT CHANGE UP
CO2 SERPL-SCNC: 27 MMOL/L — SIGNIFICANT CHANGE UP (ref 22–31)
CREAT SERPL-MCNC: 0.56 MG/DL — SIGNIFICANT CHANGE UP (ref 0.5–1.3)
CULTURE RESULTS: SIGNIFICANT CHANGE UP
EGFR: 95 ML/MIN/1.73M2 — SIGNIFICANT CHANGE UP
EOSINOPHIL # BLD AUTO: 0.04 K/UL — SIGNIFICANT CHANGE UP (ref 0–0.5)
EOSINOPHIL NFR BLD AUTO: 0.5 % — SIGNIFICANT CHANGE UP (ref 0–6)
GLUCOSE SERPL-MCNC: 96 MG/DL — SIGNIFICANT CHANGE UP (ref 70–99)
HCT VFR BLD CALC: 35.3 % — SIGNIFICANT CHANGE UP (ref 34.5–45)
HDLC SERPL-MCNC: 46 MG/DL — LOW
HGB BLD-MCNC: 11.4 G/DL — LOW (ref 11.5–15.5)
IMM GRANULOCYTES NFR BLD AUTO: 0.3 % — SIGNIFICANT CHANGE UP (ref 0–1.5)
LIDOCAIN IGE QN: HIGH U/L (ref 73–393)
LIPID PNL WITH DIRECT LDL SERPL: 49 MG/DL — SIGNIFICANT CHANGE UP
LYMPHOCYTES # BLD AUTO: 1.93 K/UL — SIGNIFICANT CHANGE UP (ref 1–3.3)
LYMPHOCYTES # BLD AUTO: 25.6 % — SIGNIFICANT CHANGE UP (ref 13–44)
MAGNESIUM SERPL-MCNC: 2 MG/DL — SIGNIFICANT CHANGE UP (ref 1.6–2.6)
MCHC RBC-ENTMCNC: 28.9 PG — SIGNIFICANT CHANGE UP (ref 27–34)
MCHC RBC-ENTMCNC: 32.3 GM/DL — SIGNIFICANT CHANGE UP (ref 32–36)
MCV RBC AUTO: 89.6 FL — SIGNIFICANT CHANGE UP (ref 80–100)
MONOCYTES # BLD AUTO: 0.53 K/UL — SIGNIFICANT CHANGE UP (ref 0–0.9)
MONOCYTES NFR BLD AUTO: 7 % — SIGNIFICANT CHANGE UP (ref 2–14)
NEUTROPHILS # BLD AUTO: 5.01 K/UL — SIGNIFICANT CHANGE UP (ref 1.8–7.4)
NEUTROPHILS NFR BLD AUTO: 66.5 % — SIGNIFICANT CHANGE UP (ref 43–77)
NON HDL CHOLESTEROL: 61 MG/DL — SIGNIFICANT CHANGE UP
PHOSPHATE SERPL-MCNC: 4 MG/DL — SIGNIFICANT CHANGE UP (ref 2.5–4.5)
PLATELET # BLD AUTO: 176 K/UL — SIGNIFICANT CHANGE UP (ref 150–400)
POTASSIUM SERPL-MCNC: 3.9 MMOL/L — SIGNIFICANT CHANGE UP (ref 3.5–5.3)
POTASSIUM SERPL-SCNC: 3.9 MMOL/L — SIGNIFICANT CHANGE UP (ref 3.5–5.3)
PROT SERPL-MCNC: 5.4 GM/DL — LOW (ref 6–8.3)
RBC # BLD: 3.94 M/UL — SIGNIFICANT CHANGE UP (ref 3.8–5.2)
RBC # FLD: 14 % — SIGNIFICANT CHANGE UP (ref 10.3–14.5)
SODIUM SERPL-SCNC: 145 MMOL/L — SIGNIFICANT CHANGE UP (ref 135–145)
SPECIMEN SOURCE: SIGNIFICANT CHANGE UP
TRIGL SERPL-MCNC: 59 MG/DL — SIGNIFICANT CHANGE UP
WBC # BLD: 7.54 K/UL — SIGNIFICANT CHANGE UP (ref 3.8–10.5)
WBC # FLD AUTO: 7.54 K/UL — SIGNIFICANT CHANGE UP (ref 3.8–10.5)

## 2022-07-01 PROCEDURE — 74183 MRI ABD W/O CNTR FLWD CNTR: CPT | Mod: 26

## 2022-07-01 PROCEDURE — 99233 SBSQ HOSP IP/OBS HIGH 50: CPT | Mod: GC

## 2022-07-01 PROCEDURE — 99222 1ST HOSP IP/OBS MODERATE 55: CPT

## 2022-07-01 RX ORDER — OMEPRAZOLE 10 MG/1
20 CAPSULE, DELAYED RELEASE ORAL DAILY
Refills: 0 | Status: DISCONTINUED | OUTPATIENT
Start: 2022-07-01 | End: 2022-07-02

## 2022-07-01 RX ORDER — METOPROLOL TARTRATE 50 MG
25 TABLET ORAL AT BEDTIME
Refills: 0 | Status: DISCONTINUED | OUTPATIENT
Start: 2022-07-01 | End: 2022-07-04

## 2022-07-01 RX ORDER — SODIUM CHLORIDE 9 MG/ML
1000 INJECTION, SOLUTION INTRAVENOUS
Refills: 0 | Status: DISCONTINUED | OUTPATIENT
Start: 2022-07-01 | End: 2022-07-01

## 2022-07-01 RX ORDER — HYDROMORPHONE HYDROCHLORIDE 2 MG/ML
1 INJECTION INTRAMUSCULAR; INTRAVENOUS; SUBCUTANEOUS EVERY 4 HOURS
Refills: 0 | Status: DISCONTINUED | OUTPATIENT
Start: 2022-07-01 | End: 2022-07-04

## 2022-07-01 RX ORDER — OLMESARTAN MEDOXOMIL 5 MG/1
40 TABLET, FILM COATED ORAL DAILY
Refills: 0 | Status: DISCONTINUED | OUTPATIENT
Start: 2022-07-01 | End: 2022-07-04

## 2022-07-01 RX ORDER — SODIUM CHLORIDE 9 MG/ML
1000 INJECTION, SOLUTION INTRAVENOUS
Refills: 0 | Status: DISCONTINUED | OUTPATIENT
Start: 2022-07-01 | End: 2022-07-03

## 2022-07-01 RX ADMIN — Medication 81 MILLIGRAM(S): at 10:56

## 2022-07-01 RX ADMIN — MORPHINE SULFATE 2 MILLIGRAM(S): 50 CAPSULE, EXTENDED RELEASE ORAL at 01:40

## 2022-07-01 RX ADMIN — HYDROMORPHONE HYDROCHLORIDE 0.5 MILLIGRAM(S): 2 INJECTION INTRAMUSCULAR; INTRAVENOUS; SUBCUTANEOUS at 02:10

## 2022-07-01 RX ADMIN — HYDROMORPHONE HYDROCHLORIDE 1 MILLIGRAM(S): 2 INJECTION INTRAMUSCULAR; INTRAVENOUS; SUBCUTANEOUS at 12:58

## 2022-07-01 RX ADMIN — SIMVASTATIN 20 MILLIGRAM(S): 20 TABLET, FILM COATED ORAL at 01:07

## 2022-07-01 RX ADMIN — SODIUM CHLORIDE 250 MILLILITER(S): 9 INJECTION, SOLUTION INTRAVENOUS at 18:38

## 2022-07-01 RX ADMIN — HYDROMORPHONE HYDROCHLORIDE 1 MILLIGRAM(S): 2 INJECTION INTRAMUSCULAR; INTRAVENOUS; SUBCUTANEOUS at 04:45

## 2022-07-01 RX ADMIN — SIMVASTATIN 20 MILLIGRAM(S): 20 TABLET, FILM COATED ORAL at 22:43

## 2022-07-01 RX ADMIN — HYDROMORPHONE HYDROCHLORIDE 1 MILLIGRAM(S): 2 INJECTION INTRAMUSCULAR; INTRAVENOUS; SUBCUTANEOUS at 17:01

## 2022-07-01 RX ADMIN — HYDROMORPHONE HYDROCHLORIDE 0.5 MILLIGRAM(S): 2 INJECTION INTRAMUSCULAR; INTRAVENOUS; SUBCUTANEOUS at 02:40

## 2022-07-01 RX ADMIN — Medication 25 MILLIGRAM(S): at 22:43

## 2022-07-01 RX ADMIN — HYDROMORPHONE HYDROCHLORIDE 1 MILLIGRAM(S): 2 INJECTION INTRAMUSCULAR; INTRAVENOUS; SUBCUTANEOUS at 04:27

## 2022-07-01 RX ADMIN — SODIUM CHLORIDE 150 MILLILITER(S): 9 INJECTION, SOLUTION INTRAVENOUS at 01:15

## 2022-07-01 RX ADMIN — ONDANSETRON 4 MILLIGRAM(S): 8 TABLET, FILM COATED ORAL at 04:28

## 2022-07-01 RX ADMIN — Medication 1 TABLET(S): at 10:55

## 2022-07-01 RX ADMIN — HYDROMORPHONE HYDROCHLORIDE 1 MILLIGRAM(S): 2 INJECTION INTRAMUSCULAR; INTRAVENOUS; SUBCUTANEOUS at 08:45

## 2022-07-01 RX ADMIN — Medication 10 MILLIGRAM(S): at 10:55

## 2022-07-01 RX ADMIN — SODIUM CHLORIDE 250 MILLILITER(S): 9 INJECTION, SOLUTION INTRAVENOUS at 23:42

## 2022-07-01 RX ADMIN — MORPHINE SULFATE 2 MILLIGRAM(S): 50 CAPSULE, EXTENDED RELEASE ORAL at 01:08

## 2022-07-01 RX ADMIN — HYDROMORPHONE HYDROCHLORIDE 1 MILLIGRAM(S): 2 INJECTION INTRAMUSCULAR; INTRAVENOUS; SUBCUTANEOUS at 21:25

## 2022-07-01 NOTE — CONSULT NOTE ADULT - ASSESSMENT
76 year old female presenting with abdominal pain, lipase 30k and pancreatitis on CT with history of pancreatitis.  Plan:  ::High rate IVF- LR @ 250ml/hr x 24 hours  ::Pain control prn  ::Adv diet as tolerated- low fat diet    Plan discussed with Dr. Mckay

## 2022-07-01 NOTE — DIETITIAN INITIAL EVALUATION ADULT - PERTINENT MEDS FT
MEDICATIONS  (STANDING):  aspirin enteric coated 81 milliGRAM(s) Oral daily  lactated ringers. 1000 milliLiter(s) (150 mL/Hr) IV Continuous <Continuous>  losartan 100 milliGRAM(s) Oral daily  metoprolol succinate ER 25 milliGRAM(s) Oral at bedtime  multivitamin 1 Tablet(s) Oral daily  pantoprazole    Tablet 40 milliGRAM(s) Oral before breakfast  PARoxetine 10 milliGRAM(s) Oral daily  simvastatin 20 milliGRAM(s) Oral at bedtime    MEDICATIONS  (PRN):  acetaminophen     Tablet .. 650 milliGRAM(s) Oral every 6 hours PRN Mild Pain (1 - 3)  ALPRAZolam 0.25 milliGRAM(s) Oral three times a day PRN anxiety  HYDROmorphone  Injectable 1 milliGRAM(s) IV Push every 4 hours PRN Severe Pain (7 - 10)  morphine  - Injectable 2 milliGRAM(s) IV Push every 4 hours PRN Moderate Pain (4 - 6)  ondansetron Injectable 4 milliGRAM(s) IV Push every 6 hours PRN Nausea and/or Vomiting

## 2022-07-01 NOTE — DIETITIAN INITIAL EVALUATION ADULT - OTHER INFO
75 y/o F with PMH fatty liver, GERD, HLD, HTN, hypothyroidism, osteoarthritis, h/o cholecystectomy, pancreatitis presents for abdominal pain. Pt states started to have severe abdominal pain around 1 pm today. Described as generalized pain, non radiating, rated 8/10, no aggravating or alleviating factors. Associated with nausea and one episode of vomiting. Pt continues w/ NPO status may have diet advanced to CL. NFPE- moderate muscle/fat wasting. Pt c/o abdominal discomfort. Will continue to monitor and follow up prn. See below for recommendations. Criteria met for severe malnutrition.

## 2022-07-01 NOTE — DIETITIAN INITIAL EVALUATION ADULT - PERTINENT LABORATORY DATA
07-01    145  |  113<H>  |  11  ----------------------------<  96  3.9   |  27  |  0.56    Ca    8.3<L>      01 Jul 2022 07:25  Phos  4.0     07-01  Mg     2.0     07-01    TPro  5.4<L>  /  Alb  2.9<L>  /  TBili  0.3  /  DBili  x   /  AST  19  /  ALT  32  /  AlkPhos  51  07-01

## 2022-07-01 NOTE — DIETITIAN INITIAL EVALUATION ADULT - OTHER CALCULATIONS
Headache HPI





- General


Chief Complaint: Headache


Stated Complaint: nausea/headache


Time Seen by Provider: 08/24/18 16:11


Source: RN notes reviewed, old records reviewed


Mode of arrival: ambulatory


Limitations: no limitations





- History of Present Illness


Initial Comments: 





Patient is a 14-year-old male presents emergency department today with chief 

complaint of headache for the past week.  Headaches been intermittent.  He did 

go to a water park 2 days ago and was feeling well that time.  He complained of 

a headache yesterday and parents gave her ibuprofen.  [facility] emergency department 

today complaining of a headache and tenderness over the frontal sinus.  He 

denies any visual changes.  He denies any significant cough. Patient denies any 

recent fever, chills, shortness of breath, chest pain, back pain, abdominal pain

, nausea vomiting, numbness or tingling, dysuria or hematuria, constipation or 

diarrhea, headaches or visual changes, or any other current symptoms





- Related Data


 Home Medications











 Medication  Instructions  Recorded  Confirmed


 


Ibuprofen [Advil] 200 - 400 mg PO Q8HR PRN 08/24/18 08/24/18








 Previous Rx's











 Medication  Instructions  Recorded


 


Acetaminophen [Tylenol] 500 mg PO Q4-6H PRN #20 tab 08/24/18


 


Amoxicillin/Potassium Clav 1 tab PO BID #20 tab 08/24/18





[Augmentin 875-125 Tablet]  


 


Ibuprofen [Motrin] 600 mg PO Q6HR PRN #20 tab 08/24/18


 


methylPREDNISolone Dose Pack 4 mg PO AS DIRECTED #21 package 08/24/18





[Medrol Dose Pack]  











 Allergies











Allergy/AdvReac Type Severity Reaction Status Date / Time


 


No Known Allergies Allergy   Verified 08/24/18 16:15














Review of Systems


ROS Statement: 


Those systems with pertinent positive or pertinent negative responses have been 

documented in the HPI.





ROS Other: All systems not noted in ROS Statement are negative.





Past Medical History


Past Medical History: No Reported History


History of Any Multi-Drug Resistant Organisms: None Reported


Past Surgical History: No Surgical Hx Reported


Past Psychological History: No Psychological Hx Reported


Smoking Status: Never smoker


Past Alcohol Use History: None Reported


Past Drug Use History: None Reported





General Exam





- General Exam Comments


Initial Comments: 





This is a 14-year-old male.  He is alert and oriented.  No significant distress.


Limitations: no limitations


General appearance: alert, in no apparent distress


Head exam: Present: atraumatic, normocephalic, normal inspection


Eye exam: Present: normal appearance, PERRL, EOMI.  Absent: scleral icterus, 

conjunctival injection, periorbital swelling


ENT exam: Present: normal exam, mucous membranes moist


Neck exam: Present: normal inspection.  Absent: tenderness, meningismus, 

lymphadenopathy


Respiratory exam: Present: normal lung sounds bilaterally.  Absent: respiratory 

distress, wheezes, rales, rhonchi, stridor


Cardiovascular Exam: Present: regular rate, normal rhythm, normal heart sounds.

  Absent: systolic murmur, diastolic murmur, rubs, gallop, clicks


GI/Abdominal exam: Present: soft, normal bowel sounds.  Absent: distended, 

tenderness, guarding, rebound, rigid


Extremities exam: Present: normal inspection, full ROM, normal capillary 

refill.  Absent: tenderness, pedal edema, joint swelling, calf tenderness


Back exam: Present: normal inspection


Neurological exam: Present: alert, oriented X3, CN II-XII intact


Psychiatric exam: Present: normal affect, normal mood


Skin exam: Present: warm, dry, intact, normal color.  Absent: rash





Course


 Vital Signs











  08/24/18 08/24/18





  15:56 17:30


 


Temperature 98.3 F 100.9 F H


 


Pulse Rate 113 H 


 


Respiratory 20 





Rate  


 


Blood Pressure 132/73 


 


O2 Sat by Pulse 99 





Oximetry  














- Reevaluation(s)


Reevaluation #1: 





08/24/18 17:57


Patient is reevaluated and crying at this time.  He reports his headache is not 

any better with the Tylenol and Toradol he had.  CAT scan was discussed with 

family will proceed with CAT scan.


Reevaluation #2: 





08/24/18 18:44


She was reevaluated and feels much better after receiving the Benadryl.  He 

states his headache is diminishing.  Patient has no meningeal signs.  Negative 

Kernig's and Brudzinski's.  No pain with extra ocular movements are noted.





Medical Decision Making





- Medical Decision Making





This is a 14-year-old male comes in today with headache, frontal headache.  He 

reports that he has some nausea.  He also has had a fever and chills.  This 

time he has no pain with extraocular eye movements.  There is no swelling to 

the eye or evidence of proptosis.  Lab work was obtained.  White blood cell 

counts within normal limits.  We did check blood culture.  Heterophile rapid 

strep and chest x-ray are normal.  Patient still continued to complain of a 

headache after Toradol and Tylenol.  CT was completed.  He has evidence of 

ethmoid sinusitis.  We'll treat the Patient with Augmentin.  Given a dose of 

Rocephin in the emergency department.  I did discuss all starting on steroids.  

Over-the-counter decongestant medication.  Patient will alternate Motrin and 

Tylenol.  Discussed close follow-up with primary care physician.  Discussed 

return parameters including eye swelling, visual changes proptosis or any other 

symptoms.  Family under since treatment plan will comply.  Return parameters 

were discussed.





- Lab Data


Result diagrams: 


 08/24/18 16:45





 08/24/18 16:45


 Lab Results











  08/24/18 08/24/18 08/24/18 Range/Units





  16:45 16:45 16:45 


 


WBC  5.6    (5.0-14.5)  k/uL


 


RBC  4.93    (4.50-5.30)  m/uL


 


Hgb  12.3 L    (13.0-16.0)  gm/dL


 


Hct  37.8    (37.0-49.0)  %


 


MCV  76.8 L    (78.0-98.0)  fL


 


MCH  25.0    (25.0-35.0)  pg


 


MCHC  32.5    (31.0-37.0)  g/dL


 


RDW  15.4    (11.5-15.5)  %


 


Plt Count  177    (150-450)  k/uL


 


Neutrophils %  84    %


 


Lymphocytes %  9    %


 


Monocytes %  5    %


 


Eosinophils %  0    %


 


Basophils %  0    %


 


Neutrophils #  4.7    (1.1-8.5)  k/uL


 


Lymphocytes #  0.5 L    (1.0-8.0)  k/uL


 


Monocytes #  0.3    (0-1.0)  k/uL


 


Eosinophils #  0.0    (0-0.7)  k/uL


 


Basophils #  0.0    (0-0.2)  k/uL


 


Microcytosis  Slight    


 


Sodium   139   (137-145)  mmol/L


 


Potassium   3.9   (3.5-5.1)  mmol/L


 


Chloride   104   ()  mmol/L


 


Carbon Dioxide   26   (22-30)  mmol/L


 


Anion Gap   9   mmol/L


 


BUN   8   (7-17)  mg/dL


 


Creatinine   0.60   (0.40-0.80)  mg/dL


 


Est GFR (CKD-EPI)AfAm      


 


Est GFR (CKD-EPI)NonAf      


 


Glucose   122   mg/dL


 


Calcium   9.2   (8.5-10.2)  mg/dL


 


Total Bilirubin   0.5   (0.2-1.3)  mg/dL


 


AST   26   (15-40)  U/L


 


ALT   26   (21-72)  U/L


 


Alkaline Phosphatase   207   (178-455)  U/L


 


Total Protein   7.5   (6.3-8.2)  g/dL


 


Albumin   4.2   (3.5-5.0)  g/dL


 


Urine Color     


 


Urine Appearance     (Clear)  


 


Urine pH     (5.0-8.0)  


 


Ur Specific Gravity     (1.001-1.035)  


 


Urine Protein     (Negative)  


 


Urine Glucose (UA)     (Negative)  


 


Urine Ketones     (Negative)  


 


Urine Blood     (Negative)  


 


Urine Nitrite     (Negative)  


 


Urine Bilirubin     (Negative)  


 


Urine Urobilinogen     (<2.0)  mg/dL


 


Ur Leukocyte Esterase     (Negative)  


 


Urine RBC     (0-5)  /hpf


 


Urine WBC     (0-5)  /hpf


 


Urine Mucus     (None)  /hpf


 


Heterophile Antibody    Negative  (Negative)  


 


Group A Strep Rapid     (Negative)  














  08/24/18 08/24/18 Range/Units





  16:45 17:22 


 


WBC    (5.0-14.5)  k/uL


 


RBC    (4.50-5.30)  m/uL


 


Hgb    (13.0-16.0)  gm/dL


 


Hct    (37.0-49.0)  %


 


MCV    (78.0-98.0)  fL


 


MCH    (25.0-35.0)  pg


 


MCHC    (31.0-37.0)  g/dL


 


RDW    (11.5-15.5)  %


 


Plt Count    (150-450)  k/uL


 


Neutrophils %    %


 


Lymphocytes %    %


 


Monocytes %    %


 


Eosinophils %    %


 


Basophils %    %


 


Neutrophils #    (1.1-8.5)  k/uL


 


Lymphocytes #    (1.0-8.0)  k/uL


 


Monocytes #    (0-1.0)  k/uL


 


Eosinophils #    (0-0.7)  k/uL


 


Basophils #    (0-0.2)  k/uL


 


Microcytosis    


 


Sodium    (137-145)  mmol/L


 


Potassium    (3.5-5.1)  mmol/L


 


Chloride    ()  mmol/L


 


Carbon Dioxide    (22-30)  mmol/L


 


Anion Gap    mmol/L


 


BUN    (7-17)  mg/dL


 


Creatinine    (0.40-0.80)  mg/dL


 


Est GFR (CKD-EPI)AfAm    


 


Est GFR (CKD-EPI)NonAf    


 


Glucose    mg/dL


 


Calcium    (8.5-10.2)  mg/dL


 


Total Bilirubin    (0.2-1.3)  mg/dL


 


AST    (15-40)  U/L


 


ALT    (21-72)  U/L


 


Alkaline Phosphatase    (178-455)  U/L


 


Total Protein    (6.3-8.2)  g/dL


 


Albumin    (3.5-5.0)  g/dL


 


Urine Color  Yellow   


 


Urine Appearance  Clear   (Clear)  


 


Urine pH  7.0   (5.0-8.0)  


 


Ur Specific Gravity  1.023   (1.001-1.035)  


 


Urine Protein  1+ H   (Negative)  


 


Urine Glucose (UA)  Negative   (Negative)  


 


Urine Ketones  Negative   (Negative)  


 


Urine Blood  Negative   (Negative)  


 


Urine Nitrite  Negative   (Negative)  


 


Urine Bilirubin  Negative   (Negative)  


 


Urine Urobilinogen  2.0   (<2.0)  mg/dL


 


Ur Leukocyte Esterase  Negative   (Negative)  


 


Urine RBC  2   (0-5)  /hpf


 


Urine WBC  3   (0-5)  /hpf


 


Urine Mucus  Many H   (None)  /hpf


 


Heterophile Antibody    (Negative)  


 


Group A Strep Rapid   Negative  (Negative)  














- Radiology Data


Radiology results: report reviewed


Evidence of ethmoid sinusitis.  Negative computed tomography scan of the brain.





Patient's x-ray is negative for any acute process.





Disposition


Clinical Impression: 


 Sinusitis





Disposition: HOME SELF-CARE


Condition: Good


Instructions:  Acute Headache (ED), Sinusitis (ED)


Additional Instructions: 


Patient advised to follow-up with primary care physician tomorrow.  Make sure 

you're taking Motrin Tylenol for pain.  Take the antibiotic as prescribed.  

Return to emergency department if any alarming signs or symptoms occur.


Prescriptions: 


Acetaminophen [Tylenol] 500 mg PO Q4-6H PRN #20 tab


 PRN Reason: Fever


Amoxicillin/Potassium Clav [Augmentin 875-125 Tablet] 1 tab PO BID #20 tab


Ibuprofen [Motrin] 600 mg PO Q6HR PRN #20 tab


 PRN Reason: Pain


methylPREDNISolone Dose Pack [Medrol Dose Pack] 4 mg PO AS DIRECTED #21 package


Is patient prescribed a controlled substance at d/c from ED?: No


Referrals: 


Sean Sosa MD [Primary Care Provider] - 1-2 days


Time of Disposition: 18:43
Current admission weight used for calculations (6/30)

## 2022-07-01 NOTE — CONSULT NOTE ADULT - NS ATTEND AMEND GEN_ALL_CORE FT
75 yo W with recurrent acute pancreatitis.     Lactated ringer's bolus then 250/hr x 24 hours.   Advance diet as tolerated  MRI/MRCP to evaluate duct and parenchyma. May need ERCP with minor papillotomy but this would be as outpatient in a few weeks.

## 2022-07-01 NOTE — PATIENT PROFILE ADULT - FALL HARM RISK - HARM RISK INTERVENTIONS

## 2022-07-01 NOTE — ASU PATIENT PROFILE, ADULT - BARIATRIC
no

## 2022-07-01 NOTE — PROGRESS NOTE ADULT - ATTENDING COMMENTS
Please see resident note for full details regarding the service.     General: Awake and alert, cooperative with exam. No acute distress.   Cardiology: Normal S1, S2. No murmurs. RRR.   Respiratory: Lungs CTABL. No wheezes, rales, or rhonchi.   Gastrointestinal: + BS. Soft. NT. ND. No guarding, rigidity, or rebound tenderness.  Extremities: No peripheral edema bilaterally.  Neurological: A+Ox3. CN 2-12 intact. No FND. Normal speech. No facial droop.     Assessment   - Acute pancreatitis     Plan   - Increase IVF to 250 ml/hr   - Advance to clear liquid diet this afternoon -> if tolerating will advance to full liquid diet this evening and regular diet tomorrow   - If doing well, can likely d/c tomorrow Please see resident note for full details regarding the service.     General: Awake and alert, cooperative with exam. No acute distress.   Cardiology: Normal S1, S2. No murmurs. RRR.   Respiratory: Lungs CTABL. No wheezes, rales, or rhonchi.   Gastrointestinal: + BS. Soft. NT. ND. No guarding, rigidity, or rebound tenderness.  Extremities: No peripheral edema bilaterally.  Neurological: A+Ox3. CN 2-12 intact. No FND. Normal speech. No facial droop.     Assessment   - Acute pancreatitis   - Pancreatic divisim     Plan   - Increase IVF to 250 ml/hr   - Advance to clear liquid diet this afternoon -> if tolerating will advance to full liquid diet this evening and regular diet tomorrow   - Will discuss pancreatic divism with Dr. Mckay, may need procedure to correct   - If doing well, can likely d/c tomorrow

## 2022-07-01 NOTE — DIETITIAN NUTRITION RISK NOTIFICATION - ADDITIONAL COMMENTS/DIETITIAN RECOMMENDATIONS
Additional Recommendations  1) advance po diet to clears>fulls>regular 2) monitor weights track trends 3) Monitor BM if none x > 3 days initiate bowel meds 4) Monitor lytes and hydration replete prn 5) MVI w/minerals to meet RDI's

## 2022-07-01 NOTE — CONSULT NOTE ADULT - SUBJECTIVE AND OBJECTIVE BOX
Patient is a 76y old  Female who presents with a chief complaint of Acute pancreatitis without infection or necrosis    HPI:  This is a 76 year old female with past medical history of fatty liver, GERD, HLD, HTN, hypothyroidism, osteoarthritis, h/o cholecystectomy, pancreatitis presents for abdominal pain. Seen at bedside. Reports abdominal discomfort starting yesterday while having coffee during zoom meeting. Attempted to relieve with pepto and lying down. Reports pain as being mid abdomen radiating across toward flanks. Aggravated by palpation and movement. Alleviated by Dilaudid. Does have history of pancreatitis with most recent episodes 2021 and 2021. 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 Denies etoh since 2021.   Denies any overt s/s of GI bleed including melena, hematochezia, or hematemesis.     PAST MEDICAL & SURGICAL HISTORY:  Hypertension      High cholesterol      GERD (gastroesophageal reflux disease)      Arthritis      Fatty liver      Pancreatitis  h/o      Osteoarthritis      Hammertoe of right foot      Anxiety      History of back surgery      History of       History of tonsillectomy      History of laminectomy  with resection      S/P left cataract extraction      S/P cholecystectomy      H/O parathyroidectomy      H/O hand surgery    MEDICATIONS  (STANDING):  aspirin enteric coated 81 milliGRAM(s) Oral daily  lactated ringers. 1000 milliLiter(s) (150 mL/Hr) IV Continuous <Continuous>  losartan 100 milliGRAM(s) Oral daily  metoprolol succinate ER 25 milliGRAM(s) Oral at bedtime  multivitamin 1 Tablet(s) Oral daily  pantoprazole    Tablet 40 milliGRAM(s) Oral before breakfast  PARoxetine 10 milliGRAM(s) Oral daily  simvastatin 20 milliGRAM(s) Oral at bedtime    MEDICATIONS  (PRN):  acetaminophen     Tablet .. 650 milliGRAM(s) Oral every 6 hours PRN Mild Pain (1 - 3)  ALPRAZolam 0.25 milliGRAM(s) Oral three times a day PRN anxiety  HYDROmorphone  Injectable 1 milliGRAM(s) IV Push every 4 hours PRN Severe Pain (7 - 10)  morphine  - Injectable 2 milliGRAM(s) IV Push every 4 hours PRN Moderate Pain (4 - 6)  ondansetron Injectable 4 milliGRAM(s) IV Push every 6 hours PRN Nausea and/or Vomiting    Allergies    adhesives (Rash)  Betadine (Unknown)  Sudafed (Other)  Triple Antibiotic (Unknown)    Intolerances    SOCIAL HISTORY:    FAMILY HISTORY:  Family history of breast cancer  Mother -  at age 99    Family history of diabetes mellitus  Both parents -     Family history of CHF (congestive heart failure)  Father     Family history of AIDS (Sibling)  brother     REVIEW OF SYSTEMS:    CONSTITUTIONAL: No weakness, fevers or chills  EYES/ENT: No visual changes;  No vertigo or throat pain   NECK: No pain or stiffness  RESPIRATORY: No cough, wheezing, hemoptysis; No shortness of breath  CARDIOVASCULAR: No chest pain or palpitations  GASTROINTESTINAL: No abdominal or epigastric pain. No nausea, vomiting, or hematemesis; No diarrhea or constipation. No melena or hematochezia.  GENITOURINARY: No dysuria, frequency or hematuria  NEUROLOGICAL: No numbness or weakness  SKIN: No itching, burning, rashes, or lesions   PSYCH: Normal mood and affect  All other review of systems is negative unless indicated above.    Vital Signs Last 24 Hrs  T(C): 36.7 (2022 08:01), Max: 37 (2022 00:56)  T(F): 98.1 (2022 08:01), Max: 98.6 (2022 00:56)  HR: 72 (2022 08:01) (65 - 82)  BP: 114/66 (2022 08:01) (72/55 - 174/72)  BP(mean): 91 (2022 22:00) (91 - 91)  RR: 16 (2022 08:01) (16 - 16)  SpO2: 93% (2022 08:01) (93% - 98%)    PHYSICAL EXAM:    Constitutional: No acute distress, well-developed, non-toxic appearing  HEENT: masked, good phonation, not icteric  Neck: supple, no lymphadenopathy  Respiratory: clear to ascultation bilaterally, no wheezing  Cardiovascular: S1 and S2, regular rate and rhythm, no murmurs rubs or gallops  Gastrointestinal: soft, non-tender, non-distended, +bowel sounds, no rebound or guarding, no surgical scars, no drains  Extremities: No peripheral edema, no cyanosis or clubbing  Vascular: 2+ peripheral pulses, no venous stasis  Neurological: A/O x 3, no focal deficits, no asterixis  Psychiatric: Normal mood, normal affect  Skin: No rashes, not jaundiced    LABS:                        11.4   7.54  )-----------( 176      ( 2022 07:25 )             35.3     07-    145  |  113<H>  |  11  ----------------------------<  96  3.9   |  27  |  0.56    Ca    8.3<L>      2022 07:25  Phos  4.0     07-  Mg     2.0     07-    TPro  5.4<L>  /  Alb  2.9<L>  /  TBili  0.3  /  DBili  x   /  AST  19  /  ALT  32  /  AlkPhos  51  07-      LIVER FUNCTIONS - ( 2022 07:25 )  Alb: 2.9 g/dL / Pro: 5.4 gm/dL / ALK PHOS: 51 U/L / ALT: 32 U/L / AST: 19 U/L / GGT: x             RADIOLOGY & ADDITIONAL STUDIES:  CT: acute pancreatitis Patient is a 76y old  Female who presents with a chief complaint of Acute pancreatitis without infection or necrosis    HPI:  This is a 76 year old female with past medical history of fatty liver, GERD, HLD, HTN, hypothyroidism, osteoarthritis, h/o cholecystectomy, prior ETOH induced pancreatitis presents for abdominal pain and admitted with pancreatitis.     Patient states she started to have abdominal pain radiating around to her flanks bilaterally yesterday while on a zoom meeting. Pain was sudden onset. Pain was 8/10, sharp, constant. Tried pepto without relief. Palpationand movement were exacerbating factors, no alleviating factors (other than hospital pain meds). No fevers or chills. No diarrhea. No eating raw or undercooked food. Denies any alcohol intake since .  Does have history of pancreatitis with most recent episodes 2021 and 2021. 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   Denies any overt s/s of GI bleed including melena, hematochezia, or hematemesis.     PAST MEDICAL & SURGICAL HISTORY:  Hypertension      High cholesterol      GERD (gastroesophageal reflux disease)      Arthritis      Fatty liver      Pancreatitis  h/o      Osteoarthritis      Hammertoe of right foot      Anxiety      History of back surgery      History of       History of tonsillectomy      History of laminectomy  with resection      S/P left cataract extraction      S/P cholecystectomy      H/O parathyroidectomy      H/O hand surgery    MEDICATIONS  (STANDING):  aspirin enteric coated 81 milliGRAM(s) Oral daily  lactated ringers. 1000 milliLiter(s) (150 mL/Hr) IV Continuous <Continuous>  losartan 100 milliGRAM(s) Oral daily  metoprolol succinate ER 25 milliGRAM(s) Oral at bedtime  multivitamin 1 Tablet(s) Oral daily  pantoprazole    Tablet 40 milliGRAM(s) Oral before breakfast  PARoxetine 10 milliGRAM(s) Oral daily  simvastatin 20 milliGRAM(s) Oral at bedtime    MEDICATIONS  (PRN):  acetaminophen     Tablet .. 650 milliGRAM(s) Oral every 6 hours PRN Mild Pain (1 - 3)  ALPRAZolam 0.25 milliGRAM(s) Oral three times a day PRN anxiety  HYDROmorphone  Injectable 1 milliGRAM(s) IV Push every 4 hours PRN Severe Pain (7 - 10)  morphine  - Injectable 2 milliGRAM(s) IV Push every 4 hours PRN Moderate Pain (4 - 6)  ondansetron Injectable 4 milliGRAM(s) IV Push every 6 hours PRN Nausea and/or Vomiting    Allergies    adhesives (Rash)  Betadine (Unknown)  Sudafed (Other)  Triple Antibiotic (Unknown)    Intolerances    SOCIAL HISTORY:  former ETOH in the setting of family member dying, no smoking, no drugs; no longer drinks    FAMILY HISTORY:  Family history of breast cancer  Mother -  at age 99    Family history of diabetes mellitus  Both parents -     Family history of CHF (congestive heart failure)  Father     Family history of AIDS (Sibling)  brother     REVIEW OF SYSTEMS:    CONSTITUTIONAL: No weakness, fevers or chills  EYES/ENT: No visual changes;  No vertigo or throat pain   NECK: No pain or stiffness  RESPIRATORY: No cough, wheezing, hemoptysis; No shortness of breath  CARDIOVASCULAR: No chest pain or palpitations  GASTROINTESTINAL: see HPI  GENITOURINARY: No dysuria, frequency or hematuria  NEUROLOGICAL: No numbness or weakness  SKIN: No itching, burning, rashes, or lesions   PSYCH: Normal mood and affect  All other review of systems is negative unless indicated above.    Vital Signs Last 24 Hrs  T(C): 36.7 (2022 08:01), Max: 37 (2022 00:56)  T(F): 98.1 (2022 08:01), Max: 98.6 (2022 00:56)  HR: 72 (2022 08:01) (65 - 82)  BP: 114/66 (2022 08:01) (72/55 - 174/72)  BP(mean): 91 (2022 22:00) (91 - 91)  RR: 16 (2022 08:01) (16 - 16)  SpO2: 93% (2022 08:01) (93% - 98%)    PHYSICAL EXAM:    Constitutional: No acute distress, well-developed, non-toxic appearing  HEENT: masked, good phonation, not icteric  Neck: supple, no lymphadenopathy  Respiratory: clear to ascultation bilaterally, no wheezing  Cardiovascular: S1 and S2, regular rate and rhythm, no murmurs rubs or gallops  Gastrointestinal: soft, tender to deep palpation diffusely, non-distended, +bowel sounds, no rebound or guarding, no surgical scars, no drains  Extremities: No peripheral edema, no cyanosis or clubbing  Vascular: 2+ peripheral pulses, no venous stasis  Neurological: A/O x 3, no focal deficits, no asterixis  Psychiatric: Normal mood, normal affect  Skin: No rashes, not jaundiced    LABS:                        11.4   7.54  )-----------( 176      ( 2022 07:25 )             35.3     07-01    145  |  113<H>  |  11  ----------------------------<  96  3.9   |  27  |  0.56    Ca    8.3<L>      2022 07:25  Phos  4.0     07-  Mg     2.0     07-    TPro  5.4<L>  /  Alb  2.9<L>  /  TBili  0.3  /  DBili  x   /  AST  19  /  ALT  32  /  AlkPhos  51  07-01      LIVER FUNCTIONS - ( 2022 07:25 )  Alb: 2.9 g/dL / Pro: 5.4 gm/dL / ALK PHOS: 51 U/L / ALT: 32 U/L / AST: 19 U/L / GGT: x             RADIOLOGY & ADDITIONAL STUDIES:  CT: acute pancreatitis

## 2022-07-02 LAB
ADD ON TEST-SPECIMEN IN LAB: SIGNIFICANT CHANGE UP
ALBUMIN SERPL ELPH-MCNC: 2.8 G/DL — LOW (ref 3.3–5)
ALP SERPL-CCNC: 51 U/L — SIGNIFICANT CHANGE UP (ref 40–120)
ALT FLD-CCNC: 31 U/L — SIGNIFICANT CHANGE UP (ref 12–78)
ANION GAP SERPL CALC-SCNC: 5 MMOL/L — SIGNIFICANT CHANGE UP (ref 5–17)
AST SERPL-CCNC: 21 U/L — SIGNIFICANT CHANGE UP (ref 15–37)
BILIRUB SERPL-MCNC: 0.5 MG/DL — SIGNIFICANT CHANGE UP (ref 0.2–1.2)
BUN SERPL-MCNC: 5 MG/DL — LOW (ref 7–23)
CALCIUM SERPL-MCNC: 8.3 MG/DL — LOW (ref 8.5–10.1)
CHLORIDE SERPL-SCNC: 106 MMOL/L — SIGNIFICANT CHANGE UP (ref 96–108)
CO2 SERPL-SCNC: 28 MMOL/L — SIGNIFICANT CHANGE UP (ref 22–31)
CREAT SERPL-MCNC: 0.42 MG/DL — LOW (ref 0.5–1.3)
EGFR: 102 ML/MIN/1.73M2 — SIGNIFICANT CHANGE UP
GLUCOSE SERPL-MCNC: 105 MG/DL — HIGH (ref 70–99)
HCT VFR BLD CALC: 31.9 % — LOW (ref 34.5–45)
HGB BLD-MCNC: 10.4 G/DL — LOW (ref 11.5–15.5)
MCHC RBC-ENTMCNC: 29.5 PG — SIGNIFICANT CHANGE UP (ref 27–34)
MCHC RBC-ENTMCNC: 32.6 GM/DL — SIGNIFICANT CHANGE UP (ref 32–36)
MCV RBC AUTO: 90.4 FL — SIGNIFICANT CHANGE UP (ref 80–100)
PLATELET # BLD AUTO: 169 K/UL — SIGNIFICANT CHANGE UP (ref 150–400)
POTASSIUM SERPL-MCNC: 3.5 MMOL/L — SIGNIFICANT CHANGE UP (ref 3.5–5.3)
POTASSIUM SERPL-SCNC: 3.5 MMOL/L — SIGNIFICANT CHANGE UP (ref 3.5–5.3)
PROT SERPL-MCNC: 5.4 GM/DL — LOW (ref 6–8.3)
RBC # BLD: 3.53 M/UL — LOW (ref 3.8–5.2)
RBC # FLD: 14.1 % — SIGNIFICANT CHANGE UP (ref 10.3–14.5)
SODIUM SERPL-SCNC: 139 MMOL/L — SIGNIFICANT CHANGE UP (ref 135–145)
WBC # BLD: 7.02 K/UL — SIGNIFICANT CHANGE UP (ref 3.8–10.5)
WBC # FLD AUTO: 7.02 K/UL — SIGNIFICANT CHANGE UP (ref 3.8–10.5)

## 2022-07-02 PROCEDURE — 99233 SBSQ HOSP IP/OBS HIGH 50: CPT

## 2022-07-02 PROCEDURE — 99232 SBSQ HOSP IP/OBS MODERATE 35: CPT

## 2022-07-02 RX ORDER — SENNA PLUS 8.6 MG/1
2 TABLET ORAL AT BEDTIME
Refills: 0 | Status: DISCONTINUED | OUTPATIENT
Start: 2022-07-02 | End: 2022-07-04

## 2022-07-02 RX ADMIN — PANTOPRAZOLE SODIUM 40 MILLIGRAM(S): 20 TABLET, DELAYED RELEASE ORAL at 06:04

## 2022-07-02 RX ADMIN — ONDANSETRON 4 MILLIGRAM(S): 8 TABLET, FILM COATED ORAL at 03:58

## 2022-07-02 RX ADMIN — Medication 25 MILLIGRAM(S): at 21:26

## 2022-07-02 RX ADMIN — SIMVASTATIN 20 MILLIGRAM(S): 20 TABLET, FILM COATED ORAL at 21:27

## 2022-07-02 RX ADMIN — SODIUM CHLORIDE 250 MILLILITER(S): 9 INJECTION, SOLUTION INTRAVENOUS at 03:28

## 2022-07-02 RX ADMIN — Medication 5 MILLIGRAM(S): at 06:04

## 2022-07-02 RX ADMIN — Medication 81 MILLIGRAM(S): at 10:41

## 2022-07-02 RX ADMIN — Medication 1 TABLET(S): at 10:41

## 2022-07-02 RX ADMIN — Medication 10 MILLIGRAM(S): at 10:42

## 2022-07-02 NOTE — PROGRESS NOTE ADULT - ATTENDING COMMENTS
Please see resident note for full details regarding the service.     General: Awake and alert, cooperative with exam. No acute distress.   Cardiology: Normal S1, S2. No murmurs. RRR.   Respiratory: Lungs CTABL. No wheezes, rales, or rhonchi.   Gastrointestinal: + BS. Soft. NT. ND. No guarding, rigidity, or rebound tenderness.  Extremities: No peripheral edema bilaterally.  Neurological: A+Ox3. CN 2-12 intact. No FND. Normal speech. No facial droop.     Assessment   - Acute pancreatitis   - Pancreatic divisim     Plan   - Increase IVF to 250 ml/hr   - Advance to clear liquid diet this afternoon -> if tolerating will advance to full liquid diet this evening and regular diet tomorrow   - Will discuss pancreatic divism with Dr. Mckay, may need procedure to correct   - If doing well, can likely d/c tomorrow

## 2022-07-03 LAB
ALBUMIN SERPL ELPH-MCNC: 3 G/DL — LOW (ref 3.3–5)
ALP SERPL-CCNC: 60 U/L — SIGNIFICANT CHANGE UP (ref 40–120)
ALT FLD-CCNC: 56 U/L — SIGNIFICANT CHANGE UP (ref 12–78)
ANION GAP SERPL CALC-SCNC: 5 MMOL/L — SIGNIFICANT CHANGE UP (ref 5–17)
AST SERPL-CCNC: 39 U/L — HIGH (ref 15–37)
BILIRUB SERPL-MCNC: 0.5 MG/DL — SIGNIFICANT CHANGE UP (ref 0.2–1.2)
BUN SERPL-MCNC: 7 MG/DL — SIGNIFICANT CHANGE UP (ref 7–23)
CALCIUM SERPL-MCNC: 8.7 MG/DL — SIGNIFICANT CHANGE UP (ref 8.5–10.1)
CHLORIDE SERPL-SCNC: 107 MMOL/L — SIGNIFICANT CHANGE UP (ref 96–108)
CO2 SERPL-SCNC: 32 MMOL/L — HIGH (ref 22–31)
CREAT SERPL-MCNC: 0.58 MG/DL — SIGNIFICANT CHANGE UP (ref 0.5–1.3)
EGFR: 94 ML/MIN/1.73M2 — SIGNIFICANT CHANGE UP
GLUCOSE SERPL-MCNC: 100 MG/DL — HIGH (ref 70–99)
HCT VFR BLD CALC: 32.3 % — LOW (ref 34.5–45)
HGB BLD-MCNC: 10.6 G/DL — LOW (ref 11.5–15.5)
LIDOCAIN IGE QN: 1066 U/L — HIGH (ref 73–393)
MCHC RBC-ENTMCNC: 29.4 PG — SIGNIFICANT CHANGE UP (ref 27–34)
MCHC RBC-ENTMCNC: 32.8 GM/DL — SIGNIFICANT CHANGE UP (ref 32–36)
MCV RBC AUTO: 89.5 FL — SIGNIFICANT CHANGE UP (ref 80–100)
PLATELET # BLD AUTO: 167 K/UL — SIGNIFICANT CHANGE UP (ref 150–400)
POTASSIUM SERPL-MCNC: 3.4 MMOL/L — LOW (ref 3.5–5.3)
POTASSIUM SERPL-SCNC: 3.4 MMOL/L — LOW (ref 3.5–5.3)
PROT SERPL-MCNC: 5.8 GM/DL — LOW (ref 6–8.3)
RBC # BLD: 3.61 M/UL — LOW (ref 3.8–5.2)
RBC # FLD: 13.7 % — SIGNIFICANT CHANGE UP (ref 10.3–14.5)
SODIUM SERPL-SCNC: 144 MMOL/L — SIGNIFICANT CHANGE UP (ref 135–145)
WBC # BLD: 6.78 K/UL — SIGNIFICANT CHANGE UP (ref 3.8–10.5)
WBC # FLD AUTO: 6.78 K/UL — SIGNIFICANT CHANGE UP (ref 3.8–10.5)

## 2022-07-03 PROCEDURE — 93010 ELECTROCARDIOGRAM REPORT: CPT

## 2022-07-03 PROCEDURE — 99233 SBSQ HOSP IP/OBS HIGH 50: CPT

## 2022-07-03 PROCEDURE — 71045 X-RAY EXAM CHEST 1 VIEW: CPT | Mod: 26

## 2022-07-03 RX ORDER — FUROSEMIDE 40 MG
40 TABLET ORAL DAILY
Refills: 0 | Status: DISCONTINUED | OUTPATIENT
Start: 2022-07-03 | End: 2022-07-03

## 2022-07-03 RX ORDER — POTASSIUM CHLORIDE 20 MEQ
40 PACKET (EA) ORAL ONCE
Refills: 0 | Status: COMPLETED | OUTPATIENT
Start: 2022-07-03 | End: 2022-07-03

## 2022-07-03 RX ORDER — FUROSEMIDE 40 MG
20 TABLET ORAL ONCE
Refills: 0 | Status: COMPLETED | OUTPATIENT
Start: 2022-07-03 | End: 2022-07-03

## 2022-07-03 RX ORDER — FUROSEMIDE 40 MG
40 TABLET ORAL ONCE
Refills: 0 | Status: COMPLETED | OUTPATIENT
Start: 2022-07-03 | End: 2022-07-03

## 2022-07-03 RX ADMIN — SIMVASTATIN 20 MILLIGRAM(S): 20 TABLET, FILM COATED ORAL at 22:17

## 2022-07-03 RX ADMIN — PANTOPRAZOLE SODIUM 40 MILLIGRAM(S): 20 TABLET, DELAYED RELEASE ORAL at 06:55

## 2022-07-03 RX ADMIN — Medication 40 MILLIGRAM(S): at 06:41

## 2022-07-03 RX ADMIN — OLMESARTAN MEDOXOMIL 40 MILLIGRAM(S): 5 TABLET, FILM COATED ORAL at 09:22

## 2022-07-03 RX ADMIN — Medication 650 MILLIGRAM(S): at 04:52

## 2022-07-03 RX ADMIN — Medication 81 MILLIGRAM(S): at 09:21

## 2022-07-03 RX ADMIN — Medication 25 MILLIGRAM(S): at 22:16

## 2022-07-03 RX ADMIN — Medication 40 MILLIEQUIVALENT(S): at 13:47

## 2022-07-03 RX ADMIN — Medication 1 TABLET(S): at 09:21

## 2022-07-03 RX ADMIN — Medication 650 MILLIGRAM(S): at 04:22

## 2022-07-03 RX ADMIN — Medication 20 MILLIGRAM(S): at 15:38

## 2022-07-03 RX ADMIN — Medication 10 MILLIGRAM(S): at 09:22

## 2022-07-03 NOTE — ED ADULT NURSE REASSESSMENT NOTE - NS ED NURSE REASSESS COMMENT FT1
Pt stable. No s/s distress noted. Pt able to ambulate self to restroom. Pt denies pain at this time. Safety and comfort measures applied. Will con't to monitor.

## 2022-07-03 NOTE — PROGRESS NOTE ADULT - NUTRITIONAL ASSESSMENT
This patient has been assessed with a concern for Malnutrition and has been determined to have a diagnosis/diagnoses of Severe protein-calorie malnutrition.    This patient is being managed with:   Diet Clear Liquid-  Entered: Jul 1 2022  3:51PM    
This patient has been assessed with a concern for Malnutrition and has been determined to have a diagnosis/diagnoses of Severe protein-calorie malnutrition.    This patient is being managed with:   Diet Low Fiber-  Entered: Jul 2 2022 10:15AM    
This patient has been assessed with a concern for Malnutrition and has been determined to have a diagnosis/diagnoses of Severe protein-calorie malnutrition.    This patient is being managed with:   Diet Low Fiber-  Entered: Jul 2 2022 10:15AM

## 2022-07-03 NOTE — PROVIDER CONTACT NOTE (OTHER) - ASSESSMENT
Pt c/o some difficulty breathing this AM, sPO2 low 88-89% on RA, all other VSS WDL. Pt placed on 2L NC.

## 2022-07-03 NOTE — CHART NOTE - NSCHARTNOTEFT_GEN_A_CORE
Rn informed that pt is requiring oxygen as her saturations are falling (87%). Pt was seen by her bedside, awake and oriented x 3. Presently, complaining of chest tightness, claims that she is having this tightness since yesterday. Denied, SOB, chest pain, headaches, nausea, vomiting.     -On examination   -General-NAD  -Resp- bilateral crackles heard   -CVS-No abnormal findings     A/P- ?Fluid overloaded 2/2 IV fluids.  -IV Lasix 40 mg stat  -F/U CXR, bnp,  EKG  -Close observation     -Case was discussed with

## 2022-07-04 ENCOUNTER — TRANSCRIPTION ENCOUNTER (OUTPATIENT)
Age: 77
End: 2022-07-04

## 2022-07-04 VITALS
OXYGEN SATURATION: 94 % | HEART RATE: 67 BPM | SYSTOLIC BLOOD PRESSURE: 169 MMHG | RESPIRATION RATE: 18 BRPM | TEMPERATURE: 98 F | DIASTOLIC BLOOD PRESSURE: 75 MMHG

## 2022-07-04 LAB
ANION GAP SERPL CALC-SCNC: 2 MMOL/L — LOW (ref 5–17)
BUN SERPL-MCNC: 8 MG/DL — SIGNIFICANT CHANGE UP (ref 7–23)
CALCIUM SERPL-MCNC: 9.1 MG/DL — SIGNIFICANT CHANGE UP (ref 8.5–10.1)
CHLORIDE SERPL-SCNC: 105 MMOL/L — SIGNIFICANT CHANGE UP (ref 96–108)
CO2 SERPL-SCNC: 32 MMOL/L — HIGH (ref 22–31)
CREAT SERPL-MCNC: 0.53 MG/DL — SIGNIFICANT CHANGE UP (ref 0.5–1.3)
EGFR: 96 ML/MIN/1.73M2 — SIGNIFICANT CHANGE UP
GLUCOSE SERPL-MCNC: 110 MG/DL — HIGH (ref 70–99)
LIDOCAIN IGE QN: 792 U/L — HIGH (ref 73–393)
POTASSIUM SERPL-MCNC: 3.8 MMOL/L — SIGNIFICANT CHANGE UP (ref 3.5–5.3)
POTASSIUM SERPL-SCNC: 3.8 MMOL/L — SIGNIFICANT CHANGE UP (ref 3.5–5.3)
SODIUM SERPL-SCNC: 139 MMOL/L — SIGNIFICANT CHANGE UP (ref 135–145)

## 2022-07-04 PROCEDURE — 99232 SBSQ HOSP IP/OBS MODERATE 35: CPT

## 2022-07-04 PROCEDURE — 99239 HOSP IP/OBS DSCHRG MGMT >30: CPT

## 2022-07-04 RX ORDER — FUROSEMIDE 40 MG
20 TABLET ORAL ONCE
Refills: 0 | Status: COMPLETED | OUTPATIENT
Start: 2022-07-04 | End: 2022-07-04

## 2022-07-04 RX ADMIN — Medication 30 MILLILITER(S): at 05:37

## 2022-07-04 RX ADMIN — Medication 81 MILLIGRAM(S): at 09:44

## 2022-07-04 RX ADMIN — SENNA PLUS 2 TABLET(S): 8.6 TABLET ORAL at 05:37

## 2022-07-04 RX ADMIN — Medication 20 MILLIGRAM(S): at 10:30

## 2022-07-04 RX ADMIN — OLMESARTAN MEDOXOMIL 40 MILLIGRAM(S): 5 TABLET, FILM COATED ORAL at 09:44

## 2022-07-04 RX ADMIN — Medication 1 TABLET(S): at 09:44

## 2022-07-04 RX ADMIN — Medication 10 MILLIGRAM(S): at 09:44

## 2022-07-04 RX ADMIN — PANTOPRAZOLE SODIUM 40 MILLIGRAM(S): 20 TABLET, DELAYED RELEASE ORAL at 05:16

## 2022-07-04 NOTE — PROGRESS NOTE ADULT - SUBJECTIVE AND OBJECTIVE BOX
75 y/o F with PMH fatty liver, GERD, HLD, HTN, hypothyroidism, osteoarthritis, h/o cholecystectomy, pancreatitis presents for abdominal pain. Pt states started to have severe abdominal pain around 1 pm today. Described as generalized pain, nonradiating, rated 8/10, no aggravating or alleviating factors. Associated wit nausea and one episode of vomiting. Denies fever, chills, chest pain, SOB, diarrhea, constipation, lightheadedness, dizziness. Pt has hx of recurrent pancreatitis, last hospitalized in December 2021. In the past thought due to be related gallstones, had cholecystectomy. Had period of few months in 2021 when was drinking alcohol everyday since brother passed away. Pt states has not drank alcohol since November 2021. 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    In the ED initial /84, afebrile. Had transient episode of hypotension 72/55 that improved to 156/73 s/p fluids. WBC 10.82, lipase >30,000. CT A/P showed acute interstitial pancreatitis.    Subjective- abd pain improved.  overnight events noted with increasing SOB but improved after recieving lasix and IVF stopped.      ROS:   All 10 systems reviewed and found to be negative with the exception of what has been described above.    Vital Signs Last 24 Hrs  T(C): 36.3 (03 Jul 2022 08:21), Max: 36.9 (02 Jul 2022 21:23)  T(F): 97.4 (03 Jul 2022 08:21), Max: 98.5 (02 Jul 2022 21:23)  HR: 63 (03 Jul 2022 08:21) (63 - 82)  BP: 156/64 (03 Jul 2022 08:21) (128/59 - 156/64)  BP(mean): --  RR: 18 (03 Jul 2022 08:21) (18 - 19)  SpO2: 97% (03 Jul 2022 08:21) (95% - 97%)      GEN: lying in bed, NAD  HEENT:   NC/AT, pupils equal and reactive, EOMI  CV:  +S1, +S2, RRR  RESP:   lungs clear to auscultation bilaterally, no wheeze, rales, rhonchi   BREAST:  not examined  GI:  abdomen soft, mild epigastric tenderness without guarding without rebound   MSK:   normal muscle tone  EXT:  no edema  NEURO:  AAOX3, no focal neurological deficits  SKIN:  no rashes      < from: CT Abdomen and Pelvis w/ IV Cont (06.30.22 @ 17:46) >  IMPRESSION:  Acute interstitial pancreatitis.    < end of copied text >            
Patient is a 76y old  Female who presents with a chief complaint of Abdominal pain (04 Jul 2022 12:40). Follow up for abdominal pain/pancreatitis.     Patient without any acute abdominal pain. Tolerating PO. In terms of GI was stable for discharge even yesterday but stayed for need for lasix for volume overload for fluids. This AM breathing much better.       MEDICATIONS  (STANDING):  aspirin enteric coated 81 milliGRAM(s) Oral daily  metoprolol succinate ER 25 milliGRAM(s) Oral at bedtime  multivitamin 1 Tablet(s) Oral daily  olmesartan 40 milliGRAM(s) Oral daily  pantoprazole    Tablet 40 milliGRAM(s) Oral before breakfast  PARoxetine 10 milliGRAM(s) Oral daily  senna 2 Tablet(s) Oral at bedtime  simvastatin 20 milliGRAM(s) Oral at bedtime    MEDICATIONS  (PRN):  acetaminophen     Tablet .. 650 milliGRAM(s) Oral every 6 hours PRN Mild Pain (1 - 3)  ALPRAZolam 0.25 milliGRAM(s) Oral three times a day PRN anxiety  HYDROmorphone  Injectable 1 milliGRAM(s) IV Push every 4 hours PRN Severe Pain (7 - 10)  morphine  - Injectable 2 milliGRAM(s) IV Push every 4 hours PRN Moderate Pain (4 - 6)  ondansetron Injectable 4 milliGRAM(s) IV Push every 6 hours PRN Nausea and/or Vomiting      Vital Signs Last 24 Hrs  T(C): 36.6 (04 Jul 2022 07:27), Max: 36.7 (03 Jul 2022 22:13)  T(F): 97.9 (04 Jul 2022 07:27), Max: 98 (03 Jul 2022 22:13)  HR: 67 (04 Jul 2022 07:27) (67 - 77)  BP: 169/75 (04 Jul 2022 07:27) (149/82 - 169/75)  BP(mean): --  RR: 18 (04 Jul 2022 07:27) (18 - 19)  SpO2: 94% (04 Jul 2022 07:27) (94% - 97%)    PHYSICAL EXAM:    Constitutional: No acute distress, well-developed, non-toxic appearing, walking around to evaluate for 02 sats, seen with Dr. Caden ZUÑIGA: un masked, good phonation, not icteric  Neck: supple, no lymphadenopathy  Respiratory: clear to ascultation bilaterally, no wheezing  Cardiovascular: S1 and S2, regular rate and rhythm, no murmurs rubs or gallops  Gastrointestinal: soft, non-tender, non-distended, +bowel sounds, no rebound or guarding,   Extremities: No peripheral edema, no cyanosis or clubbing  Vascular: 2+ peripheral pulses, no venous stasis  Neurological: A/O x 3, no focal deficits, no asterixis  Psychiatric: Normal mood, normal affect  Skin: No rashes, not jaundiced    LABS:                        10.6   6.78  )-----------( 167      ( 03 Jul 2022 07:29 )             32.3     07-04    139  |  105  |  8   ----------------------------<  110<H>  3.8   |  32<H>  |  0.53    Ca    9.1      04 Jul 2022 06:37    TPro  5.8<L>  /  Alb  3.0<L>  /  TBili  0.5  /  DBili  x   /  AST  39<H>  /  ALT  56  /  AlkPhos  60  07-03      LIVER FUNCTIONS - ( 03 Jul 2022 07:29 )  Alb: 3.0 g/dL / Pro: 5.8 gm/dL / ALK PHOS: 60 U/L / ALT: 56 U/L / AST: 39 U/L / GGT: x             RADIOLOGY & ADDITIONAL STUDIES: MRCP without ductal dilation or stricture, +divisum
75 y/o F with PMH fatty liver, GERD, HLD, HTN, hypothyroidism, osteoarthritis, h/o cholecystectomy, pancreatitis presents for abdominal pain. Pt states started to have severe abdominal pain around 1 pm today. Described as generalized pain, nonradiating, rated 8/10, no aggravating or alleviating factors. Associated wit nausea and one episode of vomiting. Denies fever, chills, chest pain, SOB, diarrhea, constipation, lightheadedness, dizziness. Pt has hx of recurrent pancreatitis, last hospitalized in December 2021. In the past thought due to be related gallstones, had cholecystectomy. Had period of few months in 2021 when was drinking alcohol everyday since brother passed away. Pt states has not drank alcohol since November 2021. 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    In the ED initial /84, afebrile. Had transient episode of hypotension 72/55 that improved to 156/73 s/p fluids. WBC 10.82, lipase >30,000. CT A/P showed acute interstitial pancreatitis.    Subjective- Pt is feeling much better and no nausea , vomiting and abdominal pain was reported.    REVIEW OF SYSTEMS:    CONSTITUTIONAL: No weakness, fevers or chills  EYES/ENT: No visual changes;  No vertigo or throat pain   NECK: No pain or stiffness  RESPIRATORY: No cough, wheezing, hemoptysis; No shortness of breath  CARDIOVASCULAR: No chest pain or palpitations  GASTROINTESTINAL: No abdominal or epigastric pain. No nausea, vomiting, or hematemesis; No diarrhea or constipation. No melena or hematochezia.  GENITOURINARY: No dysuria, frequency or hematuria  NEUROLOGICAL: No numbness or weakness  SKIN: No itching, rashes    Vital Signs Last 24 Hrs  T(C): 37 (01 Jul 2022 16:10), Max: 37 (01 Jul 2022 00:56)  T(F): 98.6 (01 Jul 2022 16:10), Max: 98.6 (01 Jul 2022 00:56)  HR: 85 (01 Jul 2022 16:10) (65 - 85)  BP: 145/67 (01 Jul 2022 16:10) (114/66 - 174/72)  BP(mean): 91 (30 Jun 2022 22:00) (91 - 91)  RR: 17 (01 Jul 2022 16:10) (16 - 17)  SpO2: 95% (01 Jul 2022 16:10) (93% - 98%)    Examination-  GENERAL: NAD, lying in bed comfortably  HEAD:  Atraumatic, Normocephalic  EYES: EOMI, PERRLA, conjunctiva and sclera clear  ENT: Moist mucous membranes  NECK: Supple, No JVD  CHEST/LUNG: Clear to auscultation bilaterally; No rales, rhonchi, wheezing, or rubs. Unlabored respirations  HEART: Regular rate and rhythm; No murmurs, rubs, or gallops  ABDOMEN: Bowel sounds present; Soft, Nontender, Nondistended. No hepatomegally  EXTREMITIES:  2+ Peripheral Pulses, brisk capillary refill. No clubbing, cyanosis, or edema  NERVOUS SYSTEM:  Alert & Oriented X3, speech clear. No deficits   MSK: FROM all 4 extremities, full and equal strength  SKIN: No rashes or lesions       Labs-                   11.4   7.54  )-----------( 176      ( 01 Jul 2022 07:25 )             35.3   07-01    145  |  113<H>  |  11  ----------------------------<  96  3.9   |  27  |  0.56    Ca    8.3<L>      01 Jul 2022 07:25  Phos  4.0     07-01  Mg     2.0     07-01    TPro  5.4<L>  /  Alb  2.9<L>  /  TBili  0.3  /  DBili  x   /  AST  19  /  ALT  32  /  AlkPhos  51  07-01    MEDICATIONS  (STANDING):  aspirin enteric coated 81 milliGRAM(s) Oral daily  lactated ringers. 1000 milliLiter(s) (150 mL/Hr) IV Continuous <Continuous>  losartan 100 milliGRAM(s) Oral daily  metoprolol succinate ER 25 milliGRAM(s) Oral at bedtime  multivitamin 1 Tablet(s) Oral daily  olmesartan 40 milliGRAM(s) Oral daily  omeprazole 20 milliGRAM(s) Oral daily  pantoprazole    Tablet 40 milliGRAM(s) Oral before breakfast  PARoxetine 10 milliGRAM(s) Oral daily  simvastatin 20 milliGRAM(s) Oral at bedtime    MEDICATIONS  (PRN):  acetaminophen     Tablet .. 650 milliGRAM(s) Oral every 6 hours PRN Mild Pain (1 - 3)  ALPRAZolam 0.25 milliGRAM(s) Oral three times a day PRN anxiety  HYDROmorphone  Injectable 1 milliGRAM(s) IV Push every 4 hours PRN Severe Pain (7 - 10)  morphine  - Injectable 2 milliGRAM(s) IV Push every 4 hours PRN Moderate Pain (4 - 6)  ondansetron Injectable 4 milliGRAM(s) IV Push every 6 hours PRN Nausea and/or Vomiting    < from: CT Abdomen and Pelvis w/ IV Cont (06.30.22 @ 17:46) >  IMPRESSION:  Acute interstitial pancreatitis.    < end of copied text >            
Patient is a 76y old  Female who presents with a chief complaint of Abdominal pain (2022 16:53)      HPI:  pt feeling better with decreased pain  she had some nausea and constipation earlier but resolved    PAST MEDICAL & SURGICAL HISTORY:  Hypertension      High cholesterol      GERD (gastroesophageal reflux disease)      Arthritis      Fatty liver      Pancreatitis  h/o      Osteoarthritis      Hammertoe of right foot      Anxiety      History of back surgery      History of       History of tonsillectomy      History of laminectomy  with resection      S/P left cataract extraction      S/P cholecystectomy      H/O parathyroidectomy      H/O hand surgery          MEDICATIONS  (STANDING):  aspirin enteric coated 81 milliGRAM(s) Oral daily  lactated ringers. 1000 milliLiter(s) (250 mL/Hr) IV Continuous <Continuous>  losartan 100 milliGRAM(s) Oral daily  metoprolol succinate ER 25 milliGRAM(s) Oral at bedtime  multivitamin 1 Tablet(s) Oral daily  olmesartan 40 milliGRAM(s) Oral daily  pantoprazole    Tablet 40 milliGRAM(s) Oral before breakfast  PARoxetine 10 milliGRAM(s) Oral daily  senna 2 Tablet(s) Oral at bedtime  simvastatin 20 milliGRAM(s) Oral at bedtime    MEDICATIONS  (PRN):  acetaminophen     Tablet .. 650 milliGRAM(s) Oral every 6 hours PRN Mild Pain (1 - 3)  ALPRAZolam 0.25 milliGRAM(s) Oral three times a day PRN anxiety  HYDROmorphone  Injectable 1 milliGRAM(s) IV Push every 4 hours PRN Severe Pain (7 - 10)  morphine  - Injectable 2 milliGRAM(s) IV Push every 4 hours PRN Moderate Pain (4 - 6)  ondansetron Injectable 4 milliGRAM(s) IV Push every 6 hours PRN Nausea and/or Vomiting      Allergies    adhesives (Rash)  Betadine (Unknown)  Sudafed (Other)  Triple Antibiotic (Unknown)    Intolerances        REVIEW OF SYSTEMS:    CONSTITUTIONAL: No weakness, fevers or chills  RESPIRATORY: No cough, wheezing, hemoptysis; No shortness of breath  CARDIOVASCULAR: No chest pain or palpitations  GASTROINTESTINAL: as above  All other review of systems is negative unless indicated above.    Vital Signs Last 24 Hrs  T(C): 37.4 (2022 09:39), Max: 37.4 (2022 09:39)  T(F): 99.3 (2022 09:39), Max: 99.3 (2022 09:39)  HR: 86 (2022 09:39) (84 - 86)  BP: 136/62 (2022 09:39) (136/62 - 145/67)  BP(mean): --  RR: 17 (2022 09:39) (16 - 17)  SpO2: 95% (2022 09:39) (92% - 95%)    PHYSICAL EXAM:    Constitutional: NAD  Gastrointestinal: BS+, soft, mildly distended      LABS:                        10.4   7.02  )-----------( 169      ( 2022 06:52 )             31.9     07-02    139  |  106  |  5<L>  ----------------------------<  105<H>  3.5   |  28  |  0.42<L>    Ca    8.3<L>      2022 06:52  Phos  4.0     07-01  Mg     2.0     07-01    TPro  5.4<L>  /  Alb  2.8<L>  /  TBili  0.5  /  DBili  x   /  AST  21  /  ALT  31  /  AlkPhos  51  07-02      LIVER FUNCTIONS - ( 2022 06:52 )  Alb: 2.8 g/dL / Pro: 5.4 gm/dL / ALK PHOS: 51 U/L / ALT: 31 U/L / AST: 21 U/L / GGT: x             RADIOLOGY & ADDITIONAL STUDIES:
77 y/o F with PMH fatty liver, GERD, HLD, HTN, hypothyroidism, osteoarthritis, h/o cholecystectomy, pancreatitis presents for abdominal pain. Pt states started to have severe abdominal pain around 1 pm today. Described as generalized pain, nonradiating, rated 8/10, no aggravating or alleviating factors. Associated wit nausea and one episode of vomiting. Denies fever, chills, chest pain, SOB, diarrhea, constipation, lightheadedness, dizziness. Pt has hx of recurrent pancreatitis, last hospitalized in December 2021. In the past thought due to be related gallstones, had cholecystectomy. Had period of few months in 2021 when was drinking alcohol everyday since brother passed away. Pt states has not drank alcohol since November 2021. 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    In the ED initial /84, afebrile. Had transient episode of hypotension 72/55 that improved to 156/73 s/p fluids. WBC 10.82, lipase >30,000. CT A/P showed acute interstitial pancreatitis.    Subjective- abd pain improved + bloating,  + nausea no vomiting    ROS:   All 10 systems reviewed and found to be negative with the exception of what has been described above.    Vital Signs Last 24 Hrs  T(C): 37.4 (02 Jul 2022 09:39), Max: 37.4 (02 Jul 2022 09:39)  T(F): 99.3 (02 Jul 2022 09:39), Max: 99.3 (02 Jul 2022 09:39)  HR: 86 (02 Jul 2022 09:39) (84 - 86)  BP: 136/62 (02 Jul 2022 09:39) (136/62 - 145/67)  BP(mean): --  RR: 17 (02 Jul 2022 09:39) (16 - 17)  SpO2: 95% (02 Jul 2022 09:39) (92% - 95%)      GEN: lying in bed, NAD  HEENT:   NC/AT, pupils equal and reactive, EOMI  CV:  +S1, +S2, RRR  RESP:   lungs clear to auscultation bilaterally, no wheeze, rales, rhonchi   BREAST:  not examined  GI:  abdomen soft, mild epigastric tenderness without guarding without rebound   MSK:   normal muscle tone  EXT:  no edema  NEURO:  AAOX3, no focal neurological deficits  SKIN:  no rashes      < from: CT Abdomen and Pelvis w/ IV Cont (06.30.22 @ 17:46) >  IMPRESSION:  Acute interstitial pancreatitis.    < end of copied text >

## 2022-07-04 NOTE — DISCHARGE NOTE PROVIDER - DETAILS OF MALNUTRITION DIAGNOSIS/DIAGNOSES
This patient has been assessed with a concern for Malnutrition and was treated during this hospitalization for the following Nutrition diagnosis/diagnoses:     -  07/01/2022: Severe protein-calorie malnutrition

## 2022-07-04 NOTE — DISCHARGE NOTE NURSING/CASE MANAGEMENT/SOCIAL WORK - NSDCVIVACCINE_GEN_ALL_CORE_FT
COVID-19, mRNA, LNP-S, PF, 100 mcg/ 0.5 mL dose (Moderna); 04-Nov-2021 17:08; Yanci Garcia (MAGALIS); [employer] [id number] (Exp. Date: 14-Nov-2021); IntraMuscular; Deltoid Left.; 0.25 milliLiter(s);

## 2022-07-04 NOTE — DISCHARGE NOTE NURSING/CASE MANAGEMENT/SOCIAL WORK - PATIENT PORTAL LINK FT
You can access the FollowMyHealth Patient Portal offered by [facility] by registering at the following website: [email or web address]. By joining [employer]’s FollowMyHealth portal, you will also be able to view your health information using other applications (apps) compatible with our system.

## 2022-07-04 NOTE — DISCHARGE NOTE PROVIDER - HOSPITAL COURSE
· Subjective and Objective:   77 y/o F with PMH fatty liver, GERD, HLD, HTN, hypothyroidism, osteoarthritis, h/o cholecystectomy, pancreatitis presents for abdominal pain. Pt states started to have severe abdominal pain around 1 pm today. Described as generalized pain, nonradiating, rated 8/10, no aggravating or alleviating factors. Associated wit nausea and one episode of vomiting. Denies fever, chills, chest pain, SOB, diarrhea, constipation, lightheadedness, dizziness. Pt has hx of recurrent pancreatitis, last hospitalized in December 2021. In the past thought due to be related gallstones, had cholecystectomy. Had period of few months in 2021 when was drinking alcohol everyday since brother passed away. Pt states has not drank alcohol since November 2021. 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    In the ED initial /84, afebrile. Had transient episode of hypotension 72/55 that improved to 156/73 s/p fluids. WBC 10.82, lipase >30,000. CT A/P showed acute interstitial pancreatitis. MRCP done which also showed interstitial pancreatitis and possible divisum.  pt knows to f/u with dr kim.    course complciated by acute on chronic diastolic CHF exacerbation requiring IV lasix. pt not on diruectics at home and ambulatory o2 sat is 96% on RA.      ROS:   All 10 systems reviewed and found to be negative with the exception of what has been described above.    Vital Signs Last 24 Hrs  T(C): 36.6 (04 Jul 2022 07:27), Max: 36.7 (03 Jul 2022 22:13)  T(F): 97.9 (04 Jul 2022 07:27), Max: 98 (03 Jul 2022 22:13)  HR: 67 (04 Jul 2022 07:27) (67 - 77)  BP: 169/75 (04 Jul 2022 07:27) (149/82 - 169/75)  BP(mean): --  RR: 18 (04 Jul 2022 07:27) (18 - 19)  SpO2: 94% (04 Jul 2022 07:27) (94% - 97%)  GEN: lying in bed, NAD  HEENT:   NC/AT, pupils equal and reactive, EOMI  CV:  +S1, +S2, RRR  RESP:   lungs clear to auscultation bilaterally, no wheeze, rales, rhonchi   GI:  abdomen soft, mild epigastric tenderness without guarding without rebound   NEURO:  AAOX3, no focal neurological deficits  SKIN:  no rashes      < from: CT Abdomen and Pelvis w/ IV Cont (06.30.22 @ 17:46) >  IMPRESSION:  Acute interstitial pancreatitis.    < from: MR MRCP w/wo IV Cont (07.01.22 @ 22:37) >    IMPRESSION:  Acute interstitial pancreatitis without drainable fluid collection or   other complication.  No biliary ductal dilatation or intraductal stone.        < end of copied text >

## 2022-07-04 NOTE — DISCHARGE NOTE PROVIDER - NSDCMRMEDTOKEN_GEN_ALL_CORE_FT
Aspirin Enteric Coated 81 mg oral delayed release tablet: 1 tab(s) orally once a day (at bedtime)  Co-Q10 200 mg oral capsule: 1 cap(s) orally once a day (at bedtime)  Fish Oil 1000 mg oral capsule: 1 cap(s) orally once a day  fluoride 1.1% oral paste: mucous membrane 2 times a day  magnesium oxide 500 mg oral tablet: 1 tab(s) orally once a day  metoprolol succinate 25 mg oral tablet, extended release: 1 tab(s) orally once a day (at bedtime)  Multiple Vitamins oral tablet: 1 tab(s) orally once a day  olmesartan 40 mg oral tablet: 1 tab(s) orally once a day  PARoxetine 10 mg oral tablet: 1 tab(s) orally once a day  PriLOSEC 20 mg oral delayed release capsule: 1 cap(s) orally once a day  simvastatin 20 mg oral tablet: 1 tab(s) orally once a day (at bedtime)  Xanax 0.25 mg oral tablet: 1 tab(s) orally 3 times a day, As Needed

## 2022-07-04 NOTE — DISCHARGE NOTE NURSING/CASE MANAGEMENT/SOCIAL WORK - NSDCPEFALRISK_GEN_ALL_CORE
For information on Fall & Injury Prevention, visit: [email or web address] OR  [email or web address] OR  https://www.cdc.gov/steadi/patient.html

## 2022-07-04 NOTE — PROGRESS NOTE ADULT - ASSESSMENT
77 y/o F PMHx arthritis, fatty liver, GERD, HTN, HLD, osteoarthritis, h/o cholecystectomy, pancreatitis presents for abdominal pain     #Acute pancreatitis   -Stable   -Probably due to pancreatic duct divisum  -hx of recurrent pancreatitis, last admitted in December 2021  -MRCP 11/2021 done on previous admission showed anatomic anomaly pancreatic ductal divisum  -CT abdomen/pelvis shows acute interstitial pancreatitis   -s/p 3 L IV fluid bolus in ED, continue IV fluids LR at 250 cc/hr  -lipase >30,000 on admission  -zofran 4mg IVP Q6H PRN nausea and/or vomiting  -pain management  -Advance diet as tolerated  - GI consult appreciated     #HTN  -had transient episode of hypotension 72/55 that improved to 156/73 s/p fluids  -takes olmesartan 40 mg at home. Will continue home meds.  -continue metoprolol succinate 25 mg    #HLD  -continue simvastatin 20 mg   -f/u lipid profile    #GERD  -take omeprazole 20 mg at home. Will continue home meds.    #Depression/Anxiety  -continue paroxetine 10 mg daily   -continue xanax PRN    #DVT prophylaxis   -SCDS    IMPROVE VTE Individual Risk Assessment          RISK                                                          Points  [  ] Previous VTE                                                3  [  ] Thrombophilia                                             2  [  ] Lower limb paralysis                                   2        (unable to hold up >15 seconds)    [  ] Current Cancer                                             2         (within 6 months)  [  ] Immobilization > 24 hrs                              1  [  ] ICU/CCU stay > 24 hours                             1  [  ] Age > 60                                                         1    IMPROVE VTE Score:         [     1    ]    Total Risk Factor Score:    0 - 1:   Consider IPC  >2 - 3:  Thromboprophylaxis required (enoxaparin or SQ heparin)        >4:   High Risk: Thromboprophylaxis required (enoxaparin or SQ heparin), optional add IPC  **If CONTRAINDICATION to enoxaparin or SQ heparin, USE IPCs**    #Advanced Directives  -full code    -Case was discussed with   
77yo female with pancreatitis    she has hx recurrent pancreatitis for years with denial of any recent etoh  clinically this episode she is feeling better but possible mild ileus  will try advancing diet to see if tolerated    await mrcp report- ?dilated PD
75 y/o F PMHx arthritis, fatty liver, GERD, HTN, HLD, osteoarthritis, h/o cholecystectomy, pancreatitis presents for abdominal pain     # acute hypoxic resp failure 2/2 acute on chronic diastolic CHF - resolving  - s/p IV lasix  - will give one more dose of lasix 20 mg po X 1  - lung exam improving may need one more dose of lasix in AM  - stop IVF  - check BMP in AM   - hypokalemia - 2/2 lasix . repleted     #Acute pancreatitis - resolving   -Probably due to pancreatic duct divisum  -hx of recurrent pancreatitis, last admitted in December 2021  -MRCP 11/2021 done on previous admission showed anatomic anomaly pancreatic ductal divisum  -CT abdomen/pelvis shows acute interstitial pancreatitis   -s/p 3 L IV fluid bolus in ED, continue IV fluids LR at 250 cc/hr  -lipase >30,000 on admission now ~1000  -zofran 4mg IVP Q6H PRN nausea and/or vomiting  -pain management  -Advance diet as tolerated  - GI consult appreciated outpt f/u with dr kim     #HTN  -had transient episode of hypotension 72/55 that improved to 156/73 s/p fluids  -takes olmesartan 40 mg at home. Will continue home meds.  -continue metoprolol succinate 25 mg    #HLD  -continue simvastatin 20 mg   -f/u lipid profile    #GERD  -take omeprazole 20 mg at home. Will continue home meds.    #Depression/Anxiety  -continue paroxetine 10 mg daily   -continue xanax PRN    #DVT prophylaxis   -SCDS    IMPROVE VTE Individual Risk Assessment          RISK                                                          Points  [  ] Previous VTE                                                3  [  ] Thrombophilia                                             2  [  ] Lower limb paralysis                                   2        (unable to hold up >15 seconds)    [  ] Current Cancer                                             2         (within 6 months)  [  ] Immobilization > 24 hrs                              1  [  ] ICU/CCU stay > 24 hours                             1  [  ] Age > 60                                                         1    IMPROVE VTE Score:         [     1    ]    Total Risk Factor Score:    0 - 1:   Consider IPC  >2 - 3:  Thromboprophylaxis required (enoxaparin or SQ heparin)        >4:   High Risk: Thromboprophylaxis required (enoxaparin or SQ heparin), optional add IPC  **If CONTRAINDICATION to enoxaparin or SQ heparin, USE IPCs**    #Advanced Directives  -full code    dispo - likely home in 1-2 days once cleared by GI
77 y/o F PMHx arthritis, fatty liver, GERD, HTN, HLD, osteoarthritis, h/o cholecystectomy, pancreatitis presents for abdominal pain     #Acute pancreatitis   -Probably due to pancreatic duct divisum  -hx of recurrent pancreatitis, last admitted in December 2021  -MRCP 11/2021 done on previous admission showed anatomic anomaly pancreatic ductal divisum  -CT abdomen/pelvis shows acute interstitial pancreatitis   -s/p 3 L IV fluid bolus in ED, continue IV fluids LR at 250 cc/hr  -lipase >30,000 on admission  -zofran 4mg IVP Q6H PRN nausea and/or vomiting  -pain management  -Advance diet as tolerated  - GI consult appreciated     #HTN  -had transient episode of hypotension 72/55 that improved to 156/73 s/p fluids  -takes olmesartan 40 mg at home. Will continue home meds.  -continue metoprolol succinate 25 mg    #HLD  -continue simvastatin 20 mg   -f/u lipid profile    #GERD  -take omeprazole 20 mg at home. Will continue home meds.    #Depression/Anxiety  -continue paroxetine 10 mg daily   -continue xanax PRN    #DVT prophylaxis   -SCDS    IMPROVE VTE Individual Risk Assessment          RISK                                                          Points  [  ] Previous VTE                                                3  [  ] Thrombophilia                                             2  [  ] Lower limb paralysis                                   2        (unable to hold up >15 seconds)    [  ] Current Cancer                                             2         (within 6 months)  [  ] Immobilization > 24 hrs                              1  [  ] ICU/CCU stay > 24 hours                             1  [  ] Age > 60                                                         1    IMPROVE VTE Score:         [     1    ]    Total Risk Factor Score:    0 - 1:   Consider IPC  >2 - 3:  Thromboprophylaxis required (enoxaparin or SQ heparin)        >4:   High Risk: Thromboprophylaxis required (enoxaparin or SQ heparin), optional add IPC  **If CONTRAINDICATION to enoxaparin or SQ heparin, USE IPCs**    #Advanced Directives  -full code    dispo - likely home in 1-2 days once cleared by GI
76 year old woman with recurrent acute pancreatitis with divisum and prior heavy alcohol use, now doing better.     Will f/u as outpatient. I will likely order an MRI/MRCP with secretin to see if her PD dilates (did not want to do this in the setting of acute pancreatitis).   Ok to discharge from a GI perspective but defer to Dr. Negrete based on sat's/lasix requirement

## 2022-07-04 NOTE — DISCHARGE NOTE PROVIDER - CARE PROVIDER_API CALL
Keshawn Neely)  Cardiovascular Disease; Internal Medicine  [address], [address]  [place] [address]  Phone: [phone]  Fax: [phone]  Follow Up Time:     Nick Mckay)  Gastroenterology; Internal Medicine  [address], [place] [address]  Phone: [phone]  Fax: [phone]  Follow Up Time:

## 2022-07-04 NOTE — DISCHARGE NOTE PROVIDER - NSDCCPCAREPLAN_GEN_ALL_CORE_FT
PRINCIPAL DISCHARGE DIAGNOSIS  Diagnosis: Acute pancreatitis  Assessment and Plan of Treatment: you had inflammation of your pancreas.  stay on low fat diet.   follow up with dr kim        SECONDARY DISCHARGE DIAGNOSES  Diagnosis: Transient hypotension  Assessment and Plan of Treatment:

## 2022-07-11 DIAGNOSIS — E89.2 POSTPROCEDURAL HYPOPARATHYROIDISM: ICD-10-CM

## 2022-07-11 DIAGNOSIS — Z91.048 OTHER NONMEDICINAL SUBSTANCE ALLERGY STATUS: ICD-10-CM

## 2022-07-11 DIAGNOSIS — K21.9 GASTRO-ESOPHAGEAL REFLUX DISEASE WITHOUT ESOPHAGITIS: ICD-10-CM

## 2022-07-11 DIAGNOSIS — Z90.49 ACQUIRED ABSENCE OF OTHER SPECIFIED PARTS OF DIGESTIVE TRACT: ICD-10-CM

## 2022-07-11 DIAGNOSIS — J96.01 ACUTE RESPIRATORY FAILURE WITH HYPOXIA: ICD-10-CM

## 2022-07-11 DIAGNOSIS — E87.70 FLUID OVERLOAD, UNSPECIFIED: ICD-10-CM

## 2022-07-11 DIAGNOSIS — E87.6 HYPOKALEMIA: ICD-10-CM

## 2022-07-11 DIAGNOSIS — E03.9 HYPOTHYROIDISM, UNSPECIFIED: ICD-10-CM

## 2022-07-11 DIAGNOSIS — I50.33 ACUTE ON CHRONIC DIASTOLIC (CONGESTIVE) HEART FAILURE: ICD-10-CM

## 2022-07-11 DIAGNOSIS — M19.90 UNSPECIFIED OSTEOARTHRITIS, UNSPECIFIED SITE: ICD-10-CM

## 2022-07-11 DIAGNOSIS — Z88.1 ALLERGY STATUS TO OTHER ANTIBIOTIC AGENTS STATUS: ICD-10-CM

## 2022-07-11 DIAGNOSIS — K76.0 FATTY (CHANGE OF) LIVER, NOT ELSEWHERE CLASSIFIED: ICD-10-CM

## 2022-07-11 DIAGNOSIS — F32.A DEPRESSION, UNSPECIFIED: ICD-10-CM

## 2022-07-11 DIAGNOSIS — E78.5 HYPERLIPIDEMIA, UNSPECIFIED: ICD-10-CM

## 2022-07-11 DIAGNOSIS — E43 UNSPECIFIED SEVERE PROTEIN-CALORIE MALNUTRITION: ICD-10-CM

## 2022-07-11 DIAGNOSIS — F41.9 ANXIETY DISORDER, UNSPECIFIED: ICD-10-CM

## 2022-07-11 DIAGNOSIS — K56.7 ILEUS, UNSPECIFIED: ICD-10-CM

## 2022-07-11 DIAGNOSIS — Z88.8 ALLERGY STATUS TO OTHER DRUGS, MEDICAMENTS AND BIOLOGICAL SUBSTANCES STATUS: ICD-10-CM

## 2022-07-11 DIAGNOSIS — Q45.3 OTHER CONGENITAL MALFORMATIONS OF PANCREAS AND PANCREATIC DUCT: ICD-10-CM

## 2022-07-11 DIAGNOSIS — Z79.82 LONG TERM (CURRENT) USE OF ASPIRIN: ICD-10-CM

## 2022-07-11 DIAGNOSIS — K85.80 OTHER ACUTE PANCREATITIS WITHOUT NECROSIS OR INFECTION: ICD-10-CM

## 2022-07-11 DIAGNOSIS — I95.9 HYPOTENSION, UNSPECIFIED: ICD-10-CM

## 2022-07-11 DIAGNOSIS — Z79.899 OTHER LONG TERM (CURRENT) DRUG THERAPY: ICD-10-CM

## 2022-07-11 DIAGNOSIS — Z20.822 CONTACT WITH AND (SUSPECTED) EXPOSURE TO COVID-19: ICD-10-CM

## 2022-07-18 ENCOUNTER — APPOINTMENT (OUTPATIENT)
Dept: GASTROENTEROLOGY | Facility: CLINIC | Age: 77
End: 2022-07-18

## 2022-07-18 VITALS
HEIGHT: 64 IN | RESPIRATION RATE: 12 BRPM | DIASTOLIC BLOOD PRESSURE: 78 MMHG | HEART RATE: 73 BPM | OXYGEN SATURATION: 98 % | WEIGHT: 149 LBS | BODY MASS INDEX: 25.44 KG/M2 | SYSTOLIC BLOOD PRESSURE: 138 MMHG

## 2022-07-18 LAB
BASOPHILS # BLD AUTO: 0.03 K/UL
BASOPHILS NFR BLD AUTO: 0.5 %
EOSINOPHIL # BLD AUTO: 0.15 K/UL
EOSINOPHIL NFR BLD AUTO: 2.7 %
HCT VFR BLD CALC: 39.1 %
HGB BLD-MCNC: 12.7 G/DL
IMM GRANULOCYTES NFR BLD AUTO: 0.2 %
LYMPHOCYTES # BLD AUTO: 1.88 K/UL
LYMPHOCYTES NFR BLD AUTO: 34 %
MAN DIFF?: NORMAL
MCHC RBC-ENTMCNC: 28.8 PG
MCHC RBC-ENTMCNC: 32.5 GM/DL
MCV RBC AUTO: 88.7 FL
MONOCYTES # BLD AUTO: 0.39 K/UL
MONOCYTES NFR BLD AUTO: 7.1 %
NEUTROPHILS # BLD AUTO: 3.07 K/UL
NEUTROPHILS NFR BLD AUTO: 55.5 %
PLATELET # BLD AUTO: 339 K/UL
RBC # BLD: 4.41 M/UL
RBC # FLD: 13.1 %
WBC # FLD AUTO: 5.53 K/UL

## 2022-07-18 PROCEDURE — 99214 OFFICE O/P EST MOD 30 MIN: CPT

## 2022-07-18 RX ORDER — METOPROLOL SUCCINATE 25 MG/1
25 TABLET, EXTENDED RELEASE ORAL
Qty: 90 | Refills: 0 | Status: ACTIVE | COMMUNITY
Start: 2021-04-12

## 2022-07-18 RX ORDER — METOPROLOL TARTRATE 25 MG/1
25 TABLET, FILM COATED ORAL
Refills: 0 | Status: DISCONTINUED | COMMUNITY
End: 2022-07-18

## 2022-07-18 RX ORDER — OLMESARTAN MEDOXOMIL 40 MG/1
40 TABLET, FILM COATED ORAL
Qty: 30 | Refills: 0 | Status: ACTIVE | COMMUNITY
Start: 2022-02-14

## 2022-07-18 NOTE — HISTORY OF PRESENT ILLNESS
[FreeTextEntry1] : 76 F presenting for follow up after hospitalization for pancreatitis. She notes since d/c she is feeling better, following a low fat diet, and adhering to ETOH cessation. Has significant anxiety over continued bouts of pancreatitis. Denies chest pain, shortness of breath, nausea, vomiting,  diarrhea, constipation, melena, hematochezia, unintentional weight changes, fevers, chills.

## 2022-07-18 NOTE — ASSESSMENT
[FreeTextEntry1] : 76 F presenting for follow up after hospitalization for pancreatitis. Is feeling better since d/c. She has significant anxiety over pancreatitis. Requesting repeat lab draw today to "check her pancreas". Reviewed chart, and discussed case with Dr. Mckay. Will have patient obtain MRI to further assess divisum of pancreas seen while admitted. Encourage to continue to follow low fat diet, and ETOH cessation. All questions answered. Discussed with Dr. Mckay \par

## 2022-07-19 LAB
ALBUMIN SERPL ELPH-MCNC: 4.5 G/DL
ALP BLD-CCNC: 74 U/L
ALT SERPL-CCNC: 21 U/L
AMYLASE/CREAT SERPL: 109 U/L
ANION GAP SERPL CALC-SCNC: 11 MMOL/L
AST SERPL-CCNC: 22 U/L
BILIRUB SERPL-MCNC: 0.2 MG/DL
BUN SERPL-MCNC: 17 MG/DL
CALCIUM SERPL-MCNC: 9.9 MG/DL
CHLORIDE SERPL-SCNC: 103 MMOL/L
CO2 SERPL-SCNC: 25 MMOL/L
CREAT SERPL-MCNC: 0.65 MG/DL
EGFR: 92 ML/MIN/1.73M2
GLUCOSE SERPL-MCNC: 80 MG/DL
LPL SERPL-CCNC: 94 U/L
POTASSIUM SERPL-SCNC: 4.5 MMOL/L
PROT SERPL-MCNC: 7.1 G/DL
SODIUM SERPL-SCNC: 139 MMOL/L

## 2022-08-03 ENCOUNTER — APPOINTMENT (OUTPATIENT)
Dept: MRI IMAGING | Facility: HOSPITAL | Age: 77
End: 2022-08-03

## 2022-08-03 ENCOUNTER — OUTPATIENT (OUTPATIENT)
Dept: OUTPATIENT SERVICES | Facility: HOSPITAL | Age: 77
LOS: 1 days | End: 2022-08-03
Payer: MEDICARE

## 2022-08-03 DIAGNOSIS — K85.90 ACUTE PANCREATITIS WITHOUT NECROSIS OR INFECTION, UNSPECIFIED: ICD-10-CM

## 2022-08-03 DIAGNOSIS — Z98.89 OTHER SPECIFIED POSTPROCEDURAL STATES: Chronic | ICD-10-CM

## 2022-08-03 DIAGNOSIS — Z98.890 OTHER SPECIFIED POSTPROCEDURAL STATES: Chronic | ICD-10-CM

## 2022-08-03 DIAGNOSIS — Z90.89 ACQUIRED ABSENCE OF OTHER ORGANS: Chronic | ICD-10-CM

## 2022-08-03 DIAGNOSIS — E89.2 POSTPROCEDURAL HYPOPARATHYROIDISM: Chronic | ICD-10-CM

## 2022-08-03 DIAGNOSIS — Z90.49 ACQUIRED ABSENCE OF OTHER SPECIFIED PARTS OF DIGESTIVE TRACT: Chronic | ICD-10-CM

## 2022-08-03 DIAGNOSIS — Z98.42 CATARACT EXTRACTION STATUS, LEFT EYE: Chronic | ICD-10-CM

## 2022-08-03 PROCEDURE — 74183 MRI ABD W/O CNTR FLWD CNTR: CPT | Mod: 26

## 2022-08-03 PROCEDURE — 74183 MRI ABD W/O CNTR FLWD CNTR: CPT

## 2022-08-11 ENCOUNTER — NON-APPOINTMENT (OUTPATIENT)
Age: 77
End: 2022-08-11

## 2022-11-23 ENCOUNTER — INPATIENT (INPATIENT)
Facility: HOSPITAL | Age: 77
LOS: 1 days | Discharge: HOME | DRG: 439 | End: 2022-11-25
Attending: HOSPITALIST | Admitting: INTERNAL MEDICINE
Payer: MEDICARE

## 2022-11-23 VITALS — HEIGHT: 64 IN | WEIGHT: 154.98 LBS

## 2022-11-23 DIAGNOSIS — Z98.890 OTHER SPECIFIED POSTPROCEDURAL STATES: Chronic | ICD-10-CM

## 2022-11-23 DIAGNOSIS — Z98.89 OTHER SPECIFIED POSTPROCEDURAL STATES: Chronic | ICD-10-CM

## 2022-11-23 DIAGNOSIS — Z98.42 CATARACT EXTRACTION STATUS, LEFT EYE: Chronic | ICD-10-CM

## 2022-11-23 DIAGNOSIS — K85.90 ACUTE PANCREATITIS WITHOUT NECROSIS OR INFECTION, UNSPECIFIED: ICD-10-CM

## 2022-11-23 DIAGNOSIS — Z90.89 ACQUIRED ABSENCE OF OTHER ORGANS: Chronic | ICD-10-CM

## 2022-11-23 DIAGNOSIS — E89.2 POSTPROCEDURAL HYPOPARATHYROIDISM: Chronic | ICD-10-CM

## 2022-11-23 DIAGNOSIS — Z90.49 ACQUIRED ABSENCE OF OTHER SPECIFIED PARTS OF DIGESTIVE TRACT: Chronic | ICD-10-CM

## 2022-11-23 LAB
ALBUMIN SERPL ELPH-MCNC: 3.5 G/DL — SIGNIFICANT CHANGE UP (ref 3.3–5)
ALP SERPL-CCNC: 56 U/L — SIGNIFICANT CHANGE UP (ref 40–120)
ALT FLD-CCNC: 37 U/L — SIGNIFICANT CHANGE UP (ref 12–78)
ANION GAP SERPL CALC-SCNC: 2 MMOL/L — LOW (ref 5–17)
AST SERPL-CCNC: 29 U/L — SIGNIFICANT CHANGE UP (ref 15–37)
BASOPHILS # BLD AUTO: 0.02 K/UL — SIGNIFICANT CHANGE UP (ref 0–0.2)
BASOPHILS NFR BLD AUTO: 0.2 % — SIGNIFICANT CHANGE UP (ref 0–2)
BILIRUB SERPL-MCNC: 0.3 MG/DL — SIGNIFICANT CHANGE UP (ref 0.2–1.2)
BUN SERPL-MCNC: 14 MG/DL — SIGNIFICANT CHANGE UP (ref 7–23)
CALCIUM SERPL-MCNC: 9 MG/DL — SIGNIFICANT CHANGE UP (ref 8.5–10.1)
CHLORIDE SERPL-SCNC: 106 MMOL/L — SIGNIFICANT CHANGE UP (ref 96–108)
CO2 SERPL-SCNC: 28 MMOL/L — SIGNIFICANT CHANGE UP (ref 22–31)
CREAT SERPL-MCNC: 0.57 MG/DL — SIGNIFICANT CHANGE UP (ref 0.5–1.3)
EGFR: 94 ML/MIN/1.73M2 — SIGNIFICANT CHANGE UP
EOSINOPHIL # BLD AUTO: 0.03 K/UL — SIGNIFICANT CHANGE UP (ref 0–0.5)
EOSINOPHIL NFR BLD AUTO: 0.4 % — SIGNIFICANT CHANGE UP (ref 0–6)
GLUCOSE SERPL-MCNC: 113 MG/DL — HIGH (ref 70–99)
HCT VFR BLD CALC: 39.8 % — SIGNIFICANT CHANGE UP (ref 34.5–45)
HGB BLD-MCNC: 13.3 G/DL — SIGNIFICANT CHANGE UP (ref 11.5–15.5)
IMM GRANULOCYTES NFR BLD AUTO: 0.2 % — SIGNIFICANT CHANGE UP (ref 0–0.9)
LIDOCAIN IGE QN: HIGH U/L (ref 73–393)
LYMPHOCYTES # BLD AUTO: 1.64 K/UL — SIGNIFICANT CHANGE UP (ref 1–3.3)
LYMPHOCYTES # BLD AUTO: 19.7 % — SIGNIFICANT CHANGE UP (ref 13–44)
MCHC RBC-ENTMCNC: 29.7 PG — SIGNIFICANT CHANGE UP (ref 27–34)
MCHC RBC-ENTMCNC: 33.4 GM/DL — SIGNIFICANT CHANGE UP (ref 32–36)
MCV RBC AUTO: 88.8 FL — SIGNIFICANT CHANGE UP (ref 80–100)
MONOCYTES # BLD AUTO: 0.49 K/UL — SIGNIFICANT CHANGE UP (ref 0–0.9)
MONOCYTES NFR BLD AUTO: 5.9 % — SIGNIFICANT CHANGE UP (ref 2–14)
NEUTROPHILS # BLD AUTO: 6.11 K/UL — SIGNIFICANT CHANGE UP (ref 1.8–7.4)
NEUTROPHILS NFR BLD AUTO: 73.6 % — SIGNIFICANT CHANGE UP (ref 43–77)
PLATELET # BLD AUTO: 235 K/UL — SIGNIFICANT CHANGE UP (ref 150–400)
POTASSIUM SERPL-MCNC: 4.5 MMOL/L — SIGNIFICANT CHANGE UP (ref 3.5–5.3)
POTASSIUM SERPL-SCNC: 4.5 MMOL/L — SIGNIFICANT CHANGE UP (ref 3.5–5.3)
PROT SERPL-MCNC: 6.9 GM/DL — SIGNIFICANT CHANGE UP (ref 6–8.3)
RBC # BLD: 4.48 M/UL — SIGNIFICANT CHANGE UP (ref 3.8–5.2)
RBC # FLD: 13.9 % — SIGNIFICANT CHANGE UP (ref 10.3–14.5)
SARS-COV-2 RNA SPEC QL NAA+PROBE: SIGNIFICANT CHANGE UP
SODIUM SERPL-SCNC: 136 MMOL/L — SIGNIFICANT CHANGE UP (ref 135–145)
TROPONIN I, HIGH SENSITIVITY RESULT: 5.43 NG/L — SIGNIFICANT CHANGE UP
WBC # BLD: 8.31 K/UL — SIGNIFICANT CHANGE UP (ref 3.8–10.5)
WBC # FLD AUTO: 8.31 K/UL — SIGNIFICANT CHANGE UP (ref 3.8–10.5)

## 2022-11-23 PROCEDURE — 99222 1ST HOSP IP/OBS MODERATE 55: CPT

## 2022-11-23 PROCEDURE — 93010 ELECTROCARDIOGRAM REPORT: CPT

## 2022-11-23 PROCEDURE — 80074 ACUTE HEPATITIS PANEL: CPT

## 2022-11-23 PROCEDURE — 85027 COMPLETE CBC AUTOMATED: CPT

## 2022-11-23 PROCEDURE — 36415 COLL VENOUS BLD VENIPUNCTURE: CPT

## 2022-11-23 PROCEDURE — 84478 ASSAY OF TRIGLYCERIDES: CPT

## 2022-11-23 PROCEDURE — 83735 ASSAY OF MAGNESIUM: CPT

## 2022-11-23 PROCEDURE — 80048 BASIC METABOLIC PNL TOTAL CA: CPT

## 2022-11-23 PROCEDURE — 84100 ASSAY OF PHOSPHORUS: CPT

## 2022-11-23 PROCEDURE — 74177 CT ABD & PELVIS W/CONTRAST: CPT | Mod: 26,MA

## 2022-11-23 PROCEDURE — 83690 ASSAY OF LIPASE: CPT

## 2022-11-23 PROCEDURE — 99285 EMERGENCY DEPT VISIT HI MDM: CPT

## 2022-11-23 RX ORDER — SIMVASTATIN 20 MG/1
20 TABLET, FILM COATED ORAL AT BEDTIME
Refills: 0 | Status: DISCONTINUED | OUTPATIENT
Start: 2022-11-23 | End: 2022-11-25

## 2022-11-23 RX ORDER — LANOLIN ALCOHOL/MO/W.PET/CERES
3 CREAM (GRAM) TOPICAL AT BEDTIME
Refills: 0 | Status: DISCONTINUED | OUTPATIENT
Start: 2022-11-23 | End: 2022-11-25

## 2022-11-23 RX ORDER — ASPIRIN/CALCIUM CARB/MAGNESIUM 324 MG
81 TABLET ORAL DAILY
Refills: 0 | Status: DISCONTINUED | OUTPATIENT
Start: 2022-11-23 | End: 2022-11-25

## 2022-11-23 RX ORDER — ACETAMINOPHEN 500 MG
650 TABLET ORAL EVERY 6 HOURS
Refills: 0 | Status: DISCONTINUED | OUTPATIENT
Start: 2022-11-23 | End: 2022-11-25

## 2022-11-23 RX ORDER — ONDANSETRON 8 MG/1
4 TABLET, FILM COATED ORAL ONCE
Refills: 0 | Status: COMPLETED | OUTPATIENT
Start: 2022-11-23 | End: 2022-11-23

## 2022-11-23 RX ORDER — SODIUM CHLORIDE 9 MG/ML
1000 INJECTION INTRAMUSCULAR; INTRAVENOUS; SUBCUTANEOUS ONCE
Refills: 0 | Status: COMPLETED | OUTPATIENT
Start: 2022-11-23 | End: 2022-11-23

## 2022-11-23 RX ORDER — METOPROLOL TARTRATE 50 MG
25 TABLET ORAL AT BEDTIME
Refills: 0 | Status: DISCONTINUED | OUTPATIENT
Start: 2022-11-23 | End: 2022-11-25

## 2022-11-23 RX ORDER — ALPRAZOLAM 0.25 MG
0.25 TABLET ORAL THREE TIMES A DAY
Refills: 0 | Status: DISCONTINUED | OUTPATIENT
Start: 2022-11-23 | End: 2022-11-25

## 2022-11-23 RX ORDER — LOSARTAN POTASSIUM 100 MG/1
100 TABLET, FILM COATED ORAL DAILY
Refills: 0 | Status: DISCONTINUED | OUTPATIENT
Start: 2022-11-23 | End: 2022-11-25

## 2022-11-23 RX ORDER — ASCORBIC ACID 60 MG
500 TABLET,CHEWABLE ORAL DAILY
Refills: 0 | Status: DISCONTINUED | OUTPATIENT
Start: 2022-11-23 | End: 2022-11-25

## 2022-11-23 RX ORDER — ONDANSETRON 8 MG/1
4 TABLET, FILM COATED ORAL EVERY 6 HOURS
Refills: 0 | Status: DISCONTINUED | OUTPATIENT
Start: 2022-11-23 | End: 2022-11-25

## 2022-11-23 RX ORDER — PANTOPRAZOLE SODIUM 20 MG/1
40 TABLET, DELAYED RELEASE ORAL
Refills: 0 | Status: DISCONTINUED | OUTPATIENT
Start: 2022-11-23 | End: 2022-11-25

## 2022-11-23 RX ORDER — MORPHINE SULFATE 50 MG/1
4 CAPSULE, EXTENDED RELEASE ORAL ONCE
Refills: 0 | Status: DISCONTINUED | OUTPATIENT
Start: 2022-11-23 | End: 2022-11-23

## 2022-11-23 RX ORDER — HYDROMORPHONE HYDROCHLORIDE 2 MG/ML
0.5 INJECTION INTRAMUSCULAR; INTRAVENOUS; SUBCUTANEOUS ONCE
Refills: 0 | Status: DISCONTINUED | OUTPATIENT
Start: 2022-11-23 | End: 2022-11-23

## 2022-11-23 RX ORDER — MAGNESIUM OXIDE 400 MG ORAL TABLET 241.3 MG
1 TABLET ORAL
Qty: 0 | Refills: 0 | DISCHARGE

## 2022-11-23 RX ORDER — MAGNESIUM OXIDE 400 MG ORAL TABLET 241.3 MG
400 TABLET ORAL DAILY
Refills: 0 | Status: DISCONTINUED | OUTPATIENT
Start: 2022-11-23 | End: 2022-11-25

## 2022-11-23 RX ORDER — HYDROMORPHONE HYDROCHLORIDE 2 MG/ML
0.5 INJECTION INTRAMUSCULAR; INTRAVENOUS; SUBCUTANEOUS EVERY 4 HOURS
Refills: 0 | Status: DISCONTINUED | OUTPATIENT
Start: 2022-11-23 | End: 2022-11-25

## 2022-11-23 RX ORDER — SODIUM CHLORIDE 9 MG/ML
1000 INJECTION INTRAMUSCULAR; INTRAVENOUS; SUBCUTANEOUS
Refills: 0 | Status: DISCONTINUED | OUTPATIENT
Start: 2022-11-23 | End: 2022-11-24

## 2022-11-23 RX ADMIN — MORPHINE SULFATE 4 MILLIGRAM(S): 50 CAPSULE, EXTENDED RELEASE ORAL at 16:40

## 2022-11-23 RX ADMIN — Medication 3 MILLIGRAM(S): at 22:47

## 2022-11-23 RX ADMIN — HYDROMORPHONE HYDROCHLORIDE 0.5 MILLIGRAM(S): 2 INJECTION INTRAMUSCULAR; INTRAVENOUS; SUBCUTANEOUS at 23:20

## 2022-11-23 RX ADMIN — SODIUM CHLORIDE 1000 MILLILITER(S): 9 INJECTION INTRAMUSCULAR; INTRAVENOUS; SUBCUTANEOUS at 16:42

## 2022-11-23 RX ADMIN — SODIUM CHLORIDE 2000 MILLILITER(S): 9 INJECTION INTRAMUSCULAR; INTRAVENOUS; SUBCUTANEOUS at 18:58

## 2022-11-23 RX ADMIN — SODIUM CHLORIDE 100 MILLILITER(S): 9 INJECTION INTRAMUSCULAR; INTRAVENOUS; SUBCUTANEOUS at 22:46

## 2022-11-23 RX ADMIN — Medication 25 MILLIGRAM(S): at 22:49

## 2022-11-23 RX ADMIN — HYDROMORPHONE HYDROCHLORIDE 0.5 MILLIGRAM(S): 2 INJECTION INTRAMUSCULAR; INTRAVENOUS; SUBCUTANEOUS at 22:54

## 2022-11-23 RX ADMIN — ONDANSETRON 4 MILLIGRAM(S): 8 TABLET, FILM COATED ORAL at 16:40

## 2022-11-23 RX ADMIN — HYDROMORPHONE HYDROCHLORIDE 0.5 MILLIGRAM(S): 2 INJECTION INTRAMUSCULAR; INTRAVENOUS; SUBCUTANEOUS at 18:58

## 2022-11-23 RX ADMIN — SIMVASTATIN 20 MILLIGRAM(S): 20 TABLET, FILM COATED ORAL at 22:50

## 2022-11-23 NOTE — PATIENT PROFILE ADULT - VISION (WITH CORRECTIVE LENSES IF THE PATIENT USUALLY WEARS THEM):
[facility]    PCP: Herbert Hyde MD    Chief Complaint   Patient presents with   • Follow-up     abnormal UFCT        HPI  Gretchen Escalera is a 73 year old White female here today.  Patient with history of abnormal ultrafast CAT scan of 591.  She has had dyslipidemia.  She has incredibly high HDL of 115.  Her sister  of heart disease.    Patient is also limited by chronic arthritis of back.  She has dystonia as one would she take Inderal.    Patient has struggled with her  having 2 brothers and a nephew  from heart disease.      Past Medical History  Past Medical History:   Diagnosis Date   • Depression    • Elevated coronary artery calcium score     10/28/2010 UFCT 591   • Elevated coronary artery calcium score       UFCT 296.3   • Elevated coronary artery calcium score     2001 score 296, 2010 score 591   • Fibromyalgia 2010   • Generalized anxiety disorder    • GERD (gastroesophageal reflux disease)    • Phlebitis        Past Surgical History  Past Medical History:   Diagnosis Date   • Depression    • Elevated coronary artery calcium score     10/28/2010 UFCT 591   • Elevated coronary artery calcium score       UFCT 296.3   • Elevated coronary artery calcium score     2001 score 296, 2010 score 591   • Fibromyalgia 2010   • Generalized anxiety disorder    • GERD (gastroesophageal reflux disease)    • Phlebitis        Family History  Family History   Problem Relation Age of Onset   • Coronary Artery Disease Other          Significant for premature CAD   • Aneurysm Neg Hx          Negative for AAA.       Social History  Social History     Tobacco Use   • Smoking status: Never Smoker   • Smokeless tobacco: Never Used   • Tobacco comment: Never used tobacco. denies smoking.   Substance Use Topics   • Alcohol use: Yes     Comment: 3 glasses wine day.   • Drug use: Never        Allergies  ALLERGIES:   Allergen Reactions   • Demerol Other (See  Comments)      Injection     • Duloxetine Other (See Comments)      Oral     • Epinephrine Other (See Comments)      Injection     • Lexapro Other (See Comments)     Oral     • Penicillins Other (See Comments)      CLASS         Presenting Medications  Current Medications    B COMPLEX-BIOTIN-FA (VITAMIN B50 COMPLEX) TAB CR    1 TABLET DAILY.    DIAZEPAM (VALIUM) 2 MG TABLET    2 tablets daily along with a 5mg. tablet    GABAPENTIN (NEURONTIN) 100 MG CAPSULE    1 capsule daily    METOPROLOL TARTRATE (LOPRESSOR) 50 MG TABLET    ONE TABLET THE NIGHT BEFORE CTA AND ONE TABLET THE MORNING OF CTA    PANTOPRAZOLE (PROTONIX) 40 MG TABLET    1 tablet daily    PROBIOTIC PRODUCT (FLORAJEN3 PO)    as directed daily    RANITIDINE (ZANTAC) 150 MG TABLET    1 TABLET DAILY AS DIRECTED    SUCRALFATE (CARAFATE) 1 G TABLET    1 TABLET EVERY 12 HOURS.    VITAMIN E 200 UNITS CAPSULE    1 capsule daily       Review of Systems   Constitution: Negative for chills, fever, weight gain and weight loss.   HENT: Negative for hearing loss.    Eyes:        Patient denies significant visual changes   Cardiovascular: Negative for chest pain and claudication.        Negative except for what's indicated in HPI   Respiratory: Negative for cough and hemoptysis.    Hematologic/Lymphatic: Does not bruise/bleed easily.   Skin: Negative for rash and suspicious lesions.   Musculoskeletal: Negative for arthritis.   Gastrointestinal: Negative for hematochezia and melena.   Genitourinary: Negative for hematuria.   Neurological:        No localized deficits   Allergic/Immunologic: Negative for environmental allergies.        No new food allergies     Review of Systems  12 point review of systems reviewed and negative or clinically irrelevant except where noted in HPI.     Physical Exam:  Vitals:   Visit Vitals  /76 (BP Location: [facility], Patient Position: Sitting, Cuff Size: Regular)   Pulse 68   Ht 5' 2\" (1.575 m)   Wt 53.1 kg (117 lb)   BMI 21.40 kg/m²      General:  No acute distress.  HEENT: Mucosa moist, no scleral icterus  Neck: JVP normal, no carotid bruit bilaterally  Lungs: Cear to auscultation  Cardiac: RRR   Abdomen: Soft, non tender, difficult to assess abd aorta  Musculoskeletal: ambulatory  Extremities: pulses intact  Skin: Warm  Neuro: dystonia  Psychiatric: Normal affect.        Assessment / Plan  1. Coronary artery disease involving native coronary artery of native heart without angina pectoris    2. Pure hypercholesterolemia    3. Palpitations    4. Elevated coronary artery calcium score    5. Cervical stenosis of spinal canal         This patient with atypical chest pain with significant abnormal ultrafast CAT scan and a very strong family history.  She is exceptionally worried about this.  She was to get the cat CTA year ago with FFR but she became anxious when getting the test.  She now has agreed to get it.    Patient has history of dyslipidemia.  She has a good HDL was 115.  This reviewed with her.  Her LDL is now down to 101.  She has been intolerant of every statin in the past.    Patient remains unbiased significant cervical stenosis as well as lumbar stenosis.  She saw an expert at [facility] for this.    Patient has dystonia and I advised her to go on Inderal 10 to 20 mg twice a day see if that improvement in symptoms.  She has had multiple drug interactions we will see whether she can take this or not.    Patient is struggled with incredible stress, anxiety and drinking the past.  An MRI should showed some microvascular changes.  We will have her see Dr. Marie.  Also recommended to see a psychologist or psychiatrist in the future.  Mainly as she is concerned about some question memory issues.    Haile Barnes MD   pt wears eyeglasses/Normal vision: sees adequately in most situations; can see medication labels, newsprint

## 2022-11-23 NOTE — ED STATDOCS - OBJECTIVE STATEMENT
76 yo F with PMHx of pancreatitis, last EtOH lat week (one drink), also with congenital abnl of pancreas, HLD, HTN, GERD, anxiety, s/p cholycystectomy with c/o of onset of upper abd pain at approx 1pm today . feels like prior pancreatitis flares. No n/v. Nl BMs. No fever. Was able to eat today.

## 2022-11-23 NOTE — H&P ADULT - ASSESSMENT
A/P:    1.  Pancreatitis  -keep on Clear liquid diet  -on IVF  -give pain meds as needed  -give medication for nausea  -follow GI consult     2.  HTN  HLD  -continue Home meds    3.  SCD for DVT ppx    4.  Code status: Full code.

## 2022-11-23 NOTE — PATIENT PROFILE ADULT - OVER THE PAST TWO WEEKS HAVE YOU FELT DOWN, DEPRESSED OR HOPELESS?
pt states she has been feeling down due to grieving/yes pt states she has been feeling down due to grieving/no

## 2022-11-23 NOTE — ED ADULT NURSE NOTE - NSFALLRSKASSESSDT_ED_ALL_ED
Please let patient know that I reviewed her case with Dr. Beasley.  She says there is a risk of recurrence after excision of preauricular pit.  Please schedule her to see Dr. Roque or Dr. Beasley to discuss surgery if she still wants to proceed.   23-Nov-2022 16:50

## 2022-11-23 NOTE — H&P ADULT - HISTORY OF PRESENT ILLNESS
78 yo Female presented with complain of upper abdominal pain since 1 pm today. No radiation of pain. Pain was 8-9/10. It feels like prior pancreatitis flares. She also has some nausea but no vomiting. She has normal BMs. No fever. Was able to eat today. No chest pain. No other complain today.

## 2022-11-23 NOTE — ED STATDOCS - PHYSICAL EXAMINATION
Constitutional: NAD AOx3  Eyes: PERRL EOMI  Head: Normocephalic atraumatic  Mouth: MMM  Cardiac: regular rate and rhythm  Resp: Lungs CTAB  GI: Abd s/nd/ttp epigastric with guarding  Neuro: CN2-12 grossly intact, ARANDA x 4  Skin: No visible rashes

## 2022-11-23 NOTE — PATIENT PROFILE ADULT - FALL HARM RISK - HARM RISK INTERVENTIONS

## 2022-11-23 NOTE — ED ADULT NURSE NOTE - OBJECTIVE STATEMENT
78 y/o female awake alert and oriented x4 presents to ED c/o upper abd pain started 1pm today. Hx pancreatitis. Last ETOH last week (one drink). Denies nausea, vomiting, fever/chills.

## 2022-11-23 NOTE — H&P ADULT - NSHPPHYSICALEXAM_GEN_ALL_CORE
T(C): 36.4 (11-23-22 @ 15:59), Max: 36.4 (11-23-22 @ 15:59)  HR: 72 (11-23-22 @ 17:04) (64 - 72)  BP: 112/70 (11-23-22 @ 17:04) (112/70 - 151/70)  RR: 18 (11-23-22 @ 17:04) (18 - 18)  SpO2: 97% (11-23-22 @ 17:04) (95% - 97%)    CONSTITUTIONAL: Well groomed, no apparent distress  EYES: PERRLA and symmetric, EOMI, No conjunctival or scleral injection, non-icteric  ENMT: Oral mucosa with moist membranes. Normal dentition; no pharyngeal injection or exudates             NECK: Supple, symmetric and without tracheal deviation   RESP: No respiratory distress, no use of accessory muscles; CTA b/l, no WRR  CV: RRR, +S1S2, no MRG; no JVD; no peripheral edema  GI: Soft, +=Epigastric tenderness, ND, no palpable masses; no hepatosplenomegaly; no hernia palpated  LYMPH: No cervical LAD or tenderness; no axillary LAD or tenderness; no inguinal LAD or tenderness  MSK: Normal ROM without pain, no spinal tenderness, normal muscle strength/tone  SKIN: No rashes or ulcers noted; no subcutaneous nodules or induration palpable  NEURO: CN II-XII intact; normal reflexes in upper and lower extremities, sensation intact in upper and lower extremities b/l to light touch   PSYCH: Appropriate insight/judgment; A+O x 3, mood and affect appropriate, recent/remote memory intact

## 2022-11-23 NOTE — ED STATDOCS - PROGRESS NOTE DETAILS
labs with lipase >30,000, other labs WNL, pending CT a/p, will admit for pancreatitis  Salome Vicente PA-C Patient seen and evaluated, ED attending note and orders reviewed, will continue with patient follow up and care -Salome Vicente PA-C CT a/p with +pancreatitis, will admit pt  Salome Vicente PA-C

## 2022-11-23 NOTE — H&P ADULT - NSHPREVIEWOFSYSTEMS_GEN_ALL_CORE
Gen: No fever, chills, weakness  ENT: No visual changes or throat pain  Neck: No pain or stiffness  Respiratory: No cough or wheezing  Cardiovascular: No chest pain or palpitations  Gastrointestinal: ++ abdominal pain, ++nausea, No vomiting, No constipation, or No diarrhea  Hematologic: No easy bleeding or bruising  Neurologic: No numbness or focal weakness  Psych: No depression or insomnia  Skin: No rash or itching

## 2022-11-23 NOTE — ED ADULT NURSE REASSESSMENT NOTE - NS ED NURSE REASSESS COMMENT FT1
Pt stated "I am not feeling well" Pt had syncopal episode sitting in chair. vitals stable. Dr. Alvarez made aware. Will cont to monitor for safety and comfort.

## 2022-11-24 LAB
ANION GAP SERPL CALC-SCNC: 2 MMOL/L — LOW (ref 5–17)
BUN SERPL-MCNC: 8 MG/DL — SIGNIFICANT CHANGE UP (ref 7–23)
CALCIUM SERPL-MCNC: 8.2 MG/DL — LOW (ref 8.5–10.1)
CHLORIDE SERPL-SCNC: 112 MMOL/L — HIGH (ref 96–108)
CO2 SERPL-SCNC: 28 MMOL/L — SIGNIFICANT CHANGE UP (ref 22–31)
CREAT SERPL-MCNC: 0.5 MG/DL — SIGNIFICANT CHANGE UP (ref 0.5–1.3)
EGFR: 97 ML/MIN/1.73M2 — SIGNIFICANT CHANGE UP
GLUCOSE SERPL-MCNC: 91 MG/DL — SIGNIFICANT CHANGE UP (ref 70–99)
POTASSIUM SERPL-MCNC: 4.1 MMOL/L — SIGNIFICANT CHANGE UP (ref 3.5–5.3)
POTASSIUM SERPL-SCNC: 4.1 MMOL/L — SIGNIFICANT CHANGE UP (ref 3.5–5.3)
SODIUM SERPL-SCNC: 142 MMOL/L — SIGNIFICANT CHANGE UP (ref 135–145)

## 2022-11-24 PROCEDURE — 99233 SBSQ HOSP IP/OBS HIGH 50: CPT

## 2022-11-24 RX ORDER — HEPARIN SODIUM 5000 [USP'U]/ML
5000 INJECTION INTRAVENOUS; SUBCUTANEOUS EVERY 8 HOURS
Refills: 0 | Status: DISCONTINUED | OUTPATIENT
Start: 2022-11-24 | End: 2022-11-25

## 2022-11-24 RX ORDER — SODIUM CHLORIDE 9 MG/ML
1000 INJECTION INTRAMUSCULAR; INTRAVENOUS; SUBCUTANEOUS
Refills: 0 | Status: DISCONTINUED | OUTPATIENT
Start: 2022-11-24 | End: 2022-11-25

## 2022-11-24 RX ORDER — SODIUM CHLORIDE 9 MG/ML
1000 INJECTION INTRAMUSCULAR; INTRAVENOUS; SUBCUTANEOUS
Refills: 0 | Status: DISCONTINUED | OUTPATIENT
Start: 2022-11-24 | End: 2022-11-24

## 2022-11-24 RX ADMIN — Medication 1 TABLET(S): at 09:47

## 2022-11-24 RX ADMIN — LOSARTAN POTASSIUM 100 MILLIGRAM(S): 100 TABLET, FILM COATED ORAL at 09:48

## 2022-11-24 RX ADMIN — PANTOPRAZOLE SODIUM 40 MILLIGRAM(S): 20 TABLET, DELAYED RELEASE ORAL at 13:20

## 2022-11-24 RX ADMIN — Medication 30 MILLILITER(S): at 03:01

## 2022-11-24 RX ADMIN — Medication 500 MILLIGRAM(S): at 09:47

## 2022-11-24 RX ADMIN — SIMVASTATIN 20 MILLIGRAM(S): 20 TABLET, FILM COATED ORAL at 21:32

## 2022-11-24 RX ADMIN — SODIUM CHLORIDE 100 MILLILITER(S): 9 INJECTION INTRAMUSCULAR; INTRAVENOUS; SUBCUTANEOUS at 18:38

## 2022-11-24 RX ADMIN — HEPARIN SODIUM 5000 UNIT(S): 5000 INJECTION INTRAVENOUS; SUBCUTANEOUS at 13:21

## 2022-11-24 RX ADMIN — SODIUM CHLORIDE 150 MILLILITER(S): 9 INJECTION INTRAMUSCULAR; INTRAVENOUS; SUBCUTANEOUS at 09:46

## 2022-11-24 RX ADMIN — HYDROMORPHONE HYDROCHLORIDE 0.5 MILLIGRAM(S): 2 INJECTION INTRAMUSCULAR; INTRAVENOUS; SUBCUTANEOUS at 03:15

## 2022-11-24 RX ADMIN — Medication 81 MILLIGRAM(S): at 09:48

## 2022-11-24 RX ADMIN — MAGNESIUM OXIDE 400 MG ORAL TABLET 400 MILLIGRAM(S): 241.3 TABLET ORAL at 09:47

## 2022-11-24 RX ADMIN — Medication 10 MILLIGRAM(S): at 09:49

## 2022-11-24 RX ADMIN — HEPARIN SODIUM 5000 UNIT(S): 5000 INJECTION INTRAVENOUS; SUBCUTANEOUS at 21:32

## 2022-11-24 RX ADMIN — HYDROMORPHONE HYDROCHLORIDE 0.5 MILLIGRAM(S): 2 INJECTION INTRAMUSCULAR; INTRAVENOUS; SUBCUTANEOUS at 03:01

## 2022-11-24 RX ADMIN — Medication 25 MILLIGRAM(S): at 21:32

## 2022-11-24 RX ADMIN — SODIUM CHLORIDE 100 MILLILITER(S): 9 INJECTION INTRAMUSCULAR; INTRAVENOUS; SUBCUTANEOUS at 13:13

## 2022-11-24 NOTE — PROGRESS NOTE ADULT - SUBJECTIVE AND OBJECTIVE BOX
Patient is a 77y old  Female who presents with a chief complaint of Pancreatitis (23 Nov 2022 22:04)      SUBJECTIVE:   HPI:  76 yo Female presented with complain of upper abdominal pain since 1 pm today. No radiation of pain. Pain was 8-9/10. It feels like prior pancreatitis flares. She also has some nausea but no vomiting. She has normal BMs. No fever. Was able to eat today. No chest pain. No other complain today.  (23 Nov 2022 22:04)      sub: feels less epigastric pain. Lipase trending down to 4900      REVIEW OF SYSTEMS:    CONSTITUTIONAL: No weakness, fevers or chills  EYES/ENT: No visual changes;  No vertigo or throat pain   NECK: No pain or stiffness  RESPIRATORY: No cough, wheezing, hemoptysis; No shortness of breath  CARDIOVASCULAR: No chest pain or palpitations  GASTROINTESTINAL: No abdominal or epigastric pain. No nausea, vomiting, or hematemesis; No diarrhea or constipation. No melena or hematochezia.  GENITOURINARY: No dysuria, frequency or hematuria  NEUROLOGICAL: No numbness or weakness  SKIN: No itching, burning, rashes, or lesions   All other review of systems is negative unless indicated above    Height (cm): 162.6 (11-23 @ 14:15)  Weight (kg): 71.7 (11-23 @ 22:32)  BMI (kg/m2): 27.1 (11-23 @ 22:32)  BSA (m2): 1.77 (11-23 @ 22:32)    Vital Signs Last 24 Hrs  T(C): 36.6 (24 Nov 2022 09:09), Max: 36.9 (24 Nov 2022 08:22)  T(F): 97.8 (24 Nov 2022 09:09), Max: 98.4 (24 Nov 2022 08:22)  HR: 65 (24 Nov 2022 09:09) (63 - 72)  BP: 156/85 (24 Nov 2022 09:09) (112/70 - 167/57)  BP(mean): 93 (23 Nov 2022 15:59) (93 - 93)  RR: 18 (24 Nov 2022 09:09) (17 - 18)  SpO2: 99% (24 Nov 2022 09:09) (95% - 99%)    Parameters below as of 24 Nov 2022 09:09  Patient On (Oxygen Delivery Method): room air        I&O's Summary    24 Nov 2022 07:01  -  24 Nov 2022 12:07  --------------------------------------------------------  IN: 1000 mL / OUT: 0 mL / NET: 1000 mL        CAPILLARY BLOOD GLUCOSE      POCT Blood Glucose.: 110 mg/dL (23 Nov 2022 16:58)      PHYSICAL EXAM:    Constitutional: NAD, awake and alert,   HEENT: PERR, EOMI, Normal Hearing, MMM  Neck: Soft and supple, No LAD, No JVD  Respiratory: Breath sounds are clear bilaterally, No wheezing, rales or rhonchi  Cardiovascular: S1 and S2, regular rate and rhythm, no Murmurs, gallops or rubs  Gastrointestinal: Bowel Sounds present, soft, nontender, nondistended, no guarding, no rebound  Extremities: No peripheral edema  Vascular: 2+ peripheral pulses  Neurological: A/O x 3, no focal deficits  Musculoskeletal: 5/5 strength b/l upper and lower extremities  Skin: No rashes    MEDICATIONS:  MEDICATIONS  (STANDING):  ascorbic acid 500 milliGRAM(s) Oral daily  aspirin enteric coated 81 milliGRAM(s) Oral daily  heparin   Injectable 5000 Unit(s) SubCutaneous every 8 hours  losartan 100 milliGRAM(s) Oral daily  magnesium oxide 400 milliGRAM(s) Oral daily  metoprolol succinate ER 25 milliGRAM(s) Oral at bedtime  multivitamin 1 Tablet(s) Oral daily  pantoprazole    Tablet 40 milliGRAM(s) Oral before breakfast  PARoxetine 10 milliGRAM(s) Oral daily  simvastatin 20 milliGRAM(s) Oral at bedtime  sodium chloride 0.9%. 1000 milliLiter(s) (100 mL/Hr) IV Continuous <Continuous>      LABS: All Labs Reviewed:                        13.3   8.31  )-----------( 235      ( 23 Nov 2022 16:36 )             39.8     11-24    142  |  112<H>  |  8   ----------------------------<  91  4.1   |  28  |  0.50    Ca    8.2<L>      24 Nov 2022 08:13  Phos  3.8     11-24  Mg     1.9     11-24    TPro  6.9  /  Alb  3.5  /  TBili  0.3  /  DBili  x   /  AST  29  /  ALT  37  /  AlkPhos  56  11-23              Blood Culture:     RADIOLOGY/EKG: reviewed

## 2022-11-24 NOTE — CONSULT NOTE ADULT - SUBJECTIVE AND OBJECTIVE BOX
GI consult  Pt of Dr. Mckay    HPI:  76 yo Female presented with complain of upper abdominal pain since 1 pm today. No radiation of pain. Pain was 8-9/10. It feels like prior pancreatitis flares. She also has some nausea but no vomiting. She has normal BMs. No fever. Was able to eat today. No chest pain. No other complain today.      Pt had 1 alcoholic drink a few days ago. Pain is better today. No n/v, vera clears x 2, +flatus, no stool.       PAST MEDICAL & SURGICAL HISTORY:  Hypertension      High cholesterol      GERD (gastroesophageal reflux disease)      Arthritis      Fatty liver      Pancreatitis  h/o      Osteoarthritis      Hammertoe of right foot      Anxiety      History of back surgery      History of       History of tonsillectomy      History of laminectomy  with resection      S/P left cataract extraction      S/P cholecystectomy      H/O parathyroidectomy      H/O hand surgery          Home Medications:  Aspirin Enteric Coated 81 mg oral delayed release tablet: 1 tab(s) orally once a day (at bedtime) (2022 19:54)  Co-Q10 200 mg oral capsule: 1 cap(s) orally once a day (at bedtime) (2022 19:54)  Fish Oil 1000 mg oral capsule: 1 cap(s) orally once a day (2022 19:54)  fluoride 0.2% oral solution: 15 milliliter(s) mucous membrane once a day (2022 19:54)  fluoride 1.1% oral paste: mucous membrane 2 times a day (2022 19:54)  magnesium oxide 500 mg oral tablet: 1 tab(s) orally once a day (at bedtime) (2022 19:54)  metoprolol succinate 25 mg oral tablet, extended release: 1 tab(s) orally once a day (at bedtime) (2022 19:54)  Multiple Vitamins oral tablet: 1 tab(s) orally once a day (2022 19:54)  olmesartan 40 mg oral tablet: 1 tab(s) orally once a day (2022 19:54)  PARoxetine 10 mg oral tablet: 1 tab(s) orally once a day (2022 19:54)  PriLOSEC 20 mg oral delayed release capsule: 1 cap(s) orally once a day (2022 19:54)  simvastatin 20 mg oral tablet: 1 tab(s) orally once a day (at bedtime) (2022 19:54)  Vitamin C 500 mg oral tablet: 1 tab(s) orally 2 times a day (2022 19:54)  Vitamin D3 25 mcg (1000 intl units) oral tablet: 1 tab(s) orally once a day (2022 19:54)  Xanax 0.25 mg oral tablet: 1 tab(s) orally 3 times a day, As Needed (2022 19:54)      MEDICATIONS  (STANDING):  ascorbic acid 500 milliGRAM(s) Oral daily  aspirin enteric coated 81 milliGRAM(s) Oral daily  heparin   Injectable 5000 Unit(s) SubCutaneous every 8 hours  losartan 100 milliGRAM(s) Oral daily  magnesium oxide 400 milliGRAM(s) Oral daily  metoprolol succinate ER 25 milliGRAM(s) Oral at bedtime  multivitamin 1 Tablet(s) Oral daily  pantoprazole    Tablet 40 milliGRAM(s) Oral before breakfast  PARoxetine 10 milliGRAM(s) Oral daily  simvastatin 20 milliGRAM(s) Oral at bedtime  sodium chloride 0.9%. 1000 milliLiter(s) (100 mL/Hr) IV Continuous <Continuous>    MEDICATIONS  (PRN):  acetaminophen     Tablet .. 650 milliGRAM(s) Oral every 6 hours PRN Temp greater or equal to 38C (100.4F), Mild Pain (1 - 3)  ALPRAZolam 0.25 milliGRAM(s) Oral three times a day PRN anxiety  aluminum hydroxide/magnesium hydroxide/simethicone Suspension 30 milliLiter(s) Oral every 4 hours PRN Dyspepsia  HYDROmorphone  Injectable 0.5 milliGRAM(s) IV Push every 4 hours PRN Moderate Pain (4 - 6)  melatonin 3 milliGRAM(s) Oral at bedtime PRN Insomnia  ondansetron Injectable 4 milliGRAM(s) IV Push every 6 hours PRN Nausea and/or Vomiting      Allergies    adhesives (Rash)  Betadine (Unknown)  Sudafed (Other)  Triple Antibiotic (Unknown)    Intolerances        SOCIAL HISTORY: +EtOH use    FAMILY HISTORY:  Family history of breast cancer  Mother -  at age 99    Family history of diabetes mellitus  Both parents -     Family history of CHF (congestive heart failure)  Father     Family history of AIDS (Sibling)  brother         ROS  As above  Otherwise unremarkable, all systems reviewed    PE:  Vital Signs Last 24 Hrs  T(C): 36.6 (2022 09:09), Max: 36.9 (2022 08:22)  T(F): 97.8 (2022 09:09), Max: 98.4 (2022 08:22)  HR: 65 (2022 09:09) (63 - 72)  BP: 156/85 (2022 09:09) (112/70 - 167/57)  BP(mean): 93 (2022 15:59) (93 - 93)  RR: 18 (2022 09:09) (17 - 18)  SpO2: 99% (2022 09:09) (95% - 99%)    Parameters below as of 2022 09:09  Patient On (Oxygen Delivery Method): room air        Constitutional: NAD, well-developed, A+Ox3  Anicteric   Respiratory: CTABL, breathing comfortably  Cardiovascular: S1 and S2, RRR  Gastrointestinal: +BS, soft, non tender, non distended, no mass  Extremities: warm, well perfused, no edema  Psychiatric: Normal mood, normal affect  Neuro: moves all extremities, grossly intact  Skin: No rashes or lesions    LABS:                        13.3   8.31  )-----------( 235      ( 2022 16:36 )             39.8     11-24    142  |  112<H>  |  8   ----------------------------<  91  4.1   |  28  |  0.50    Ca    8.2<L>      2022 08:13  Phos  3.8     11-  Mg     1.9         TPro  6.9  /  Alb  3.5  /  TBili  0.3  /  DBili  x   /  AST  29  /  ALT  37  /  AlkPhos  56  11-      LIVER FUNCTIONS - ( 2022 16:36 )  Alb: 3.5 g/dL / Pro: 6.9 gm/dL / ALK PHOS: 56 U/L / ALT: 37 U/L / AST: 29 U/L / GGT: x           lipase >30K->4913    RADIOLOGY & ADDITIONAL STUDIES:  CT with interstitial pancreatitis noted

## 2022-11-24 NOTE — CONSULT NOTE ADULT - ASSESSMENT
77F with recurrent pancreatitis, h/o EtOH, pancreas divisum, with episode of mild uncomplicated pancreatitis.    Rec:  -advance to low fat diet  -avoid EtOH  -if tolerating diet and feels well can d/c home tomorrow  -outpatient follow up with Dr. Mckay

## 2022-11-25 ENCOUNTER — TRANSCRIPTION ENCOUNTER (OUTPATIENT)
Age: 77
End: 2022-11-25

## 2022-11-25 VITALS
HEART RATE: 64 BPM | TEMPERATURE: 97 F | OXYGEN SATURATION: 97 % | RESPIRATION RATE: 18 BRPM | DIASTOLIC BLOOD PRESSURE: 73 MMHG | SYSTOLIC BLOOD PRESSURE: 171 MMHG

## 2022-11-25 LAB
ANION GAP SERPL CALC-SCNC: 1 MMOL/L — LOW (ref 5–17)
BUN SERPL-MCNC: 7 MG/DL — SIGNIFICANT CHANGE UP (ref 7–23)
CALCIUM SERPL-MCNC: 8.8 MG/DL — SIGNIFICANT CHANGE UP (ref 8.5–10.1)
CHLORIDE SERPL-SCNC: 110 MMOL/L — HIGH (ref 96–108)
CO2 SERPL-SCNC: 31 MMOL/L — SIGNIFICANT CHANGE UP (ref 22–31)
CREAT SERPL-MCNC: 0.52 MG/DL — SIGNIFICANT CHANGE UP (ref 0.5–1.3)
EGFR: 96 ML/MIN/1.73M2 — SIGNIFICANT CHANGE UP
GLUCOSE SERPL-MCNC: 102 MG/DL — HIGH (ref 70–99)
HAV IGM SER-ACNC: SIGNIFICANT CHANGE UP
HBV CORE IGM SER-ACNC: SIGNIFICANT CHANGE UP
HBV SURFACE AG SER-ACNC: SIGNIFICANT CHANGE UP
HCT VFR BLD CALC: 35.9 % — SIGNIFICANT CHANGE UP (ref 34.5–45)
HCV AB S/CO SERPL IA: 0.1 S/CO — SIGNIFICANT CHANGE UP (ref 0–0.99)
HCV AB SERPL-IMP: SIGNIFICANT CHANGE UP
HGB BLD-MCNC: 11.9 G/DL — SIGNIFICANT CHANGE UP (ref 11.5–15.5)
LIDOCAIN IGE QN: 889 U/L — HIGH (ref 73–393)
MCHC RBC-ENTMCNC: 29.2 PG — SIGNIFICANT CHANGE UP (ref 27–34)
MCHC RBC-ENTMCNC: 33.1 GM/DL — SIGNIFICANT CHANGE UP (ref 32–36)
MCV RBC AUTO: 88.2 FL — SIGNIFICANT CHANGE UP (ref 80–100)
PLATELET # BLD AUTO: 204 K/UL — SIGNIFICANT CHANGE UP (ref 150–400)
POTASSIUM SERPL-MCNC: 3.8 MMOL/L — SIGNIFICANT CHANGE UP (ref 3.5–5.3)
POTASSIUM SERPL-SCNC: 3.8 MMOL/L — SIGNIFICANT CHANGE UP (ref 3.5–5.3)
RBC # BLD: 4.07 M/UL — SIGNIFICANT CHANGE UP (ref 3.8–5.2)
RBC # FLD: 13.8 % — SIGNIFICANT CHANGE UP (ref 10.3–14.5)
SODIUM SERPL-SCNC: 142 MMOL/L — SIGNIFICANT CHANGE UP (ref 135–145)
WBC # BLD: 5.67 K/UL — SIGNIFICANT CHANGE UP (ref 3.8–10.5)
WBC # FLD AUTO: 5.67 K/UL — SIGNIFICANT CHANGE UP (ref 3.8–10.5)

## 2022-11-25 PROCEDURE — 99239 HOSP IP/OBS DSCHRG MGMT >30: CPT

## 2022-11-25 RX ADMIN — PANTOPRAZOLE SODIUM 40 MILLIGRAM(S): 20 TABLET, DELAYED RELEASE ORAL at 04:49

## 2022-11-25 RX ADMIN — Medication 1 TABLET(S): at 12:21

## 2022-11-25 RX ADMIN — Medication 500 MILLIGRAM(S): at 12:23

## 2022-11-25 RX ADMIN — Medication 10 MILLIGRAM(S): at 12:23

## 2022-11-25 RX ADMIN — SODIUM CHLORIDE 100 MILLILITER(S): 9 INJECTION INTRAMUSCULAR; INTRAVENOUS; SUBCUTANEOUS at 04:49

## 2022-11-25 RX ADMIN — LOSARTAN POTASSIUM 100 MILLIGRAM(S): 100 TABLET, FILM COATED ORAL at 12:22

## 2022-11-25 NOTE — DISCHARGE NOTE NURSING/CASE MANAGEMENT/SOCIAL WORK - NSDCPEFALRISK_GEN_ALL_CORE
For information on Fall & Injury Prevention, visit: [email or web address] OR  [email or web address] OR  https://www.cdc.gov/steadi/patient.html

## 2022-11-25 NOTE — DISCHARGE NOTE PROVIDER - NSDCCPCAREPLAN_GEN_ALL_CORE_FT
PRINCIPAL DISCHARGE DIAGNOSIS  Diagnosis: Pancreatitis  Assessment and Plan of Treatment: You had inflammation of your pancreas.  stay on low fat diet. Please follow up with dr kim soon after discharge

## 2022-11-25 NOTE — PROGRESS NOTE ADULT - SUBJECTIVE AND OBJECTIVE BOX
Patient is a 77y old  Female who presents with a chief complaint of Pancreatitis (2022 13:56)      Subective:  Feels good  No pain  [facility]    PAST MEDICAL & SURGICAL HISTORY:  Hypertension      High cholesterol      GERD (gastroesophageal reflux disease)      Arthritis      Fatty liver      Pancreatitis  h/o      Osteoarthritis      Hammertoe of right foot      Anxiety      History of back surgery      History of       History of tonsillectomy      History of laminectomy  with resection      S/P left cataract extraction      S/P cholecystectomy      H/O parathyroidectomy      H/O hand surgery          MEDICATIONS  (STANDING):  ascorbic acid 500 milliGRAM(s) Oral daily  aspirin enteric coated 81 milliGRAM(s) Oral daily  heparin   Injectable 5000 Unit(s) SubCutaneous every 8 hours  losartan 100 milliGRAM(s) Oral daily  magnesium oxide 400 milliGRAM(s) Oral daily  metoprolol succinate ER 25 milliGRAM(s) Oral at bedtime  multivitamin 1 Tablet(s) Oral daily  pantoprazole    Tablet 40 milliGRAM(s) Oral before breakfast  PARoxetine 10 milliGRAM(s) Oral daily  simvastatin 20 milliGRAM(s) Oral at bedtime  sodium chloride 0.9%. 1000 milliLiter(s) (100 mL/Hr) IV Continuous <Continuous>    MEDICATIONS  (PRN):  acetaminophen     Tablet .. 650 milliGRAM(s) Oral every 6 hours PRN Temp greater or equal to 38C (100.4F), Mild Pain (1 - 3)  ALPRAZolam 0.25 milliGRAM(s) Oral three times a day PRN anxiety  aluminum hydroxide/magnesium hydroxide/simethicone Suspension 30 milliLiter(s) Oral every 4 hours PRN Dyspepsia  HYDROmorphone  Injectable 0.5 milliGRAM(s) IV Push every 4 hours PRN Moderate Pain (4 - 6)  melatonin 3 milliGRAM(s) Oral at bedtime PRN Insomnia  ondansetron Injectable 4 milliGRAM(s) IV Push every 6 hours PRN Nausea and/or Vomiting      REVIEW OF SYSTEMS:    RESPIRATORY: No shortness of breath  CARDIOVASCULAR: No chest pain  All other review of systems is negative unless indicated above.    Vital Signs Last 24 Hrs  T(C): 36.3 (2022 09:09), Max: 36.9 (2022 17:08)  T(F): 97.4 (2022 09:09), Max: 98.4 (2022 17:08)  HR: 70 (2022 09:09) (64 - 70)  BP: 164/71 (2022 09:09) (149/59 - 164/71)  BP(mean): --  RR: 18 (2022 09:09) (18 - 18)  SpO2: 95% (2022 09:09) (95% - 95%)    Parameters below as of 2022 09:09  Patient On (Oxygen Delivery Method): room air        PHYSICAL EXAM:    Constitutional: NAD, well-developed  Respiratory: CTAB  Cardiovascular: S1 and S2, RRR  Gastrointestinal: BS+, soft, NT/ND  Extremities: No peripheral edema  Psychiatric: Normal mood, normal affect    LABS:                        11.9   5.67  )-----------( 204      ( 2022 07:39 )             35.9     11-25    142  |  110<H>  |  7   ----------------------------<  102<H>  3.8   |  31  |  0.52    Ca    8.8      2022 07:39  Phos  3.8     11-24  Mg     1.9     11-24    TPro  6.9  /  Alb  3.5  /  TBili  0.3  /  DBili  x   /  AST  29  /  ALT  37  /  AlkPhos  56  11-23      LIVER FUNCTIONS - ( 2022 16:36 )  Alb: 3.5 g/dL / Pro: 6.9 gm/dL / ALK PHOS: 56 U/L / ALT: 37 U/L / AST: 29 U/L / GGT: x             RADIOLOGY & ADDITIONAL STUDIES:

## 2022-11-25 NOTE — DISCHARGE NOTE PROVIDER - CARE PROVIDER_API CALL
Keshawn Neely)  Cardiovascular Disease; Internal Medicine  [address], [address]  [place] [address]  Phone: [phone]  Fax: [phone]  Established Patient  Follow Up Time: 1 week    Nick Mckay)  Gastroenterology; Internal Medicine  [address], [place] [address]  Phone: [phone]  Fax: [phone]  Established Patient  Follow Up Time: 1 week

## 2022-11-25 NOTE — DISCHARGE NOTE NURSING/CASE MANAGEMENT/SOCIAL WORK - PATIENT PORTAL LINK FT
You can access the FollowMyHealth Patient Portal offered by [facility] by registering at the following website: [email or web address]. By joining [employer]’s FollowMyHealth portal, you will also be able to view your health information using other applications (apps) compatible with our system.

## 2022-11-25 NOTE — PROGRESS NOTE ADULT - ASSESSMENT
A/P:    1.  Pancreatitis (known pancreatic divisum  -FLD  -on IVF  -give pain meds as needed  -give medication for nausea  -follow GI consult     2.  HTN  HLD  -continue Home meds    3.  SCD for DVT ppx    4.  Code status: Full code. 
Imp:  Recurrent acute pancreatitis, but clinically well    Rec:  D/C Plan and f/u with Dr. Woody barnes

## 2022-11-25 NOTE — DISCHARGE NOTE PROVIDER - HOSPITAL COURSE
78 yo Female presented with complain of upper abdominal pain since 1 pm today. No radiation of pain. Pain was 8-9/10. It feels like prior pancreatitis flares. She also has some nausea but no vomiting. She has normal BMs. No fever. Was able to eat today. No chest pain. No other complain today.     Hospital Course  -Pt admitted for acute pancreatitis. Started on IV fluids. Patient lipase trended down and is tolerating her diet. At the time of discharge, pt had minimal pain and lipase was 889. At this time, patient is medically optimized to be discharged home. Patient will follow up with GI and PCP soon after discharge     < from: CT Abdomen and Pelvis w/ IV Cont (11.23.22 @ 18:29) >    PROCEDURE:  CT of the Abdomen was performed.  Sagittal and coronal reformats were performed.    FINDINGS:  LOWER CHEST: Within normal limits.    LIVER: Within normal limits.  BILE DUCTS: Normal caliber.  GALLBLADDER: Cholecystectomy.  SPLEEN: Within normal limits.  PANCREAS: The pancreas demonstrates homogeneous parenchymal enhancement.   There is moderate peripancreatic inflammation. No peripancreatic fluid   collection.  ADRENALS: Within normal limits.  KIDNEYS/URETERS: Partially visualized ptotic low-lying left kidney. No   hydronephrosis..    BOWEL: No bowel obstruction.  PERITONEUM: No ascites.  VESSELS: Atherosclerotic changes.  RETROPERITONEUM/LYMPH NODES: No lymphadenopathy.  ABDOMINAL WALL: Within normal limits.  BONES: Posterior spinal fusion in the lower lumbar spine. Degenerative   changes.    IMPRESSION:  Acute interstitial pancreatitis. No peripancreatic fluid collections.    A/P:    1.  Pancreatitis (known pancreatic divisum  -FLD  -on IVF  -give pain meds as needed  -give medication for nausea  -follow GI consult     2.  HTN  HLD  -continue Home meds

## 2022-11-25 NOTE — DISCHARGE NOTE NURSING/CASE MANAGEMENT/SOCIAL WORK - NSDCVIVACCINE_GEN_ALL_CORE_FT
COVID-19, mRNA, LNP-S, PF, 100 mcg/ 0.5 mL dose (Moderna); 04-Nov-2021 17:08; Yanci Garcia (MAGALIS); [employer] [id number] (Exp. Date: 14-Nov-2021); IntraMuscular; Deltoid Left.; 0.25 milliLiter(s);

## 2022-11-25 NOTE — DISCHARGE NOTE PROVIDER - PROVIDER TOKENS
PROVIDER:[TOKEN:[[id number]],FOLLOWUP:[1 week],ESTABLISHEDPATIENT:[T]],PROVIDER:[TOKEN:[[id number]],FOLLOWUP:[1 week],ESTABLISHEDPATIENT:[T]]

## 2022-11-25 NOTE — DISCHARGE NOTE PROVIDER - NSDCMRMEDTOKEN_GEN_ALL_CORE_FT
Aspirin Enteric Coated 81 mg oral delayed release tablet: 1 tab(s) orally once a day (at bedtime)  Co-Q10 200 mg oral capsule: 1 cap(s) orally once a day (at bedtime)  Fish Oil 1000 mg oral capsule: 1 cap(s) orally once a day  fluoride 0.2% oral solution: 15 milliliter(s) mucous membrane once a day  fluoride 1.1% oral paste: mucous membrane 2 times a day  magnesium oxide 500 mg oral tablet: 1 tab(s) orally once a day (at bedtime)  metoprolol succinate 25 mg oral tablet, extended release: 1 tab(s) orally once a day (at bedtime)  Multiple Vitamins oral tablet: 1 tab(s) orally once a day  olmesartan 40 mg oral tablet: 1 tab(s) orally once a day  PARoxetine 10 mg oral tablet: 1 tab(s) orally once a day  PriLOSEC 20 mg oral delayed release capsule: 1 cap(s) orally once a day  simvastatin 20 mg oral tablet: 1 tab(s) orally once a day (at bedtime)  Vitamin C 500 mg oral tablet: 1 tab(s) orally 2 times a day  Vitamin D3 25 mcg (1000 intl units) oral tablet: 1 tab(s) orally once a day  Xanax 0.25 mg oral tablet: 1 tab(s) orally 3 times a day, As Needed

## 2022-11-30 DIAGNOSIS — Q45.3 OTHER CONGENITAL MALFORMATIONS OF PANCREAS AND PANCREATIC DUCT: ICD-10-CM

## 2022-11-30 DIAGNOSIS — Z90.49 ACQUIRED ABSENCE OF OTHER SPECIFIED PARTS OF DIGESTIVE TRACT: ICD-10-CM

## 2022-11-30 DIAGNOSIS — I10 ESSENTIAL (PRIMARY) HYPERTENSION: ICD-10-CM

## 2022-11-30 DIAGNOSIS — Z88.1 ALLERGY STATUS TO OTHER ANTIBIOTIC AGENTS STATUS: ICD-10-CM

## 2022-11-30 DIAGNOSIS — F41.9 ANXIETY DISORDER, UNSPECIFIED: ICD-10-CM

## 2022-11-30 DIAGNOSIS — K21.9 GASTRO-ESOPHAGEAL REFLUX DISEASE WITHOUT ESOPHAGITIS: ICD-10-CM

## 2022-11-30 DIAGNOSIS — Z79.82 LONG TERM (CURRENT) USE OF ASPIRIN: ICD-10-CM

## 2022-11-30 DIAGNOSIS — K85.80 OTHER ACUTE PANCREATITIS WITHOUT NECROSIS OR INFECTION: ICD-10-CM

## 2022-11-30 DIAGNOSIS — Z88.8 ALLERGY STATUS TO OTHER DRUGS, MEDICAMENTS AND BIOLOGICAL SUBSTANCES STATUS: ICD-10-CM

## 2022-11-30 DIAGNOSIS — E78.00 PURE HYPERCHOLESTEROLEMIA, UNSPECIFIED: ICD-10-CM

## 2022-12-14 NOTE — DIETITIAN INITIAL EVALUATION ADULT - NUTRITION DIAGNOSIS
How Severe Are Your Spot(S)?: mild What Type Of Note Output Would You Prefer (Optional)?: Bullet Format What Is The Reason For Today's Visit?: Full Body Skin Examination What Is The Reason For Today's Visit? (Being Monitored For X): concerning skin lesions on an annual basis yes...

## 2023-01-04 ENCOUNTER — APPOINTMENT (OUTPATIENT)
Dept: GASTROENTEROLOGY | Facility: CLINIC | Age: 78
End: 2023-01-04
Payer: MEDICARE

## 2023-01-04 VITALS
HEART RATE: 68 BPM | BODY MASS INDEX: 25.61 KG/M2 | DIASTOLIC BLOOD PRESSURE: 75 MMHG | WEIGHT: 150 LBS | SYSTOLIC BLOOD PRESSURE: 135 MMHG | HEIGHT: 64 IN

## 2023-01-04 DIAGNOSIS — K85.90 ACUTE PANCREATITIS WITHOUT NECROSIS OR INFECTION, UNSPECIFIED: ICD-10-CM

## 2023-01-04 DIAGNOSIS — Q45.3 OTHER CONGENITAL MALFORMATIONS OF PANCREAS AND PANCREATIC DUCT: ICD-10-CM

## 2023-01-04 PROCEDURE — 99214 OFFICE O/P EST MOD 30 MIN: CPT

## 2023-01-13 ENCOUNTER — OUTPATIENT (OUTPATIENT)
Dept: OUTPATIENT SERVICES | Facility: HOSPITAL | Age: 78
LOS: 1 days | End: 2023-01-13
Payer: MEDICARE

## 2023-01-13 ENCOUNTER — APPOINTMENT (OUTPATIENT)
Dept: CT IMAGING | Facility: CLINIC | Age: 78
End: 2023-01-13
Payer: MEDICARE

## 2023-01-13 DIAGNOSIS — Z98.89 OTHER SPECIFIED POSTPROCEDURAL STATES: Chronic | ICD-10-CM

## 2023-01-13 DIAGNOSIS — K85.90 ACUTE PANCREATITIS WITHOUT NECROSIS OR INFECTION, UNSPECIFIED: ICD-10-CM

## 2023-01-13 DIAGNOSIS — Z90.49 ACQUIRED ABSENCE OF OTHER SPECIFIED PARTS OF DIGESTIVE TRACT: Chronic | ICD-10-CM

## 2023-01-13 DIAGNOSIS — Z98.42 CATARACT EXTRACTION STATUS, LEFT EYE: Chronic | ICD-10-CM

## 2023-01-13 DIAGNOSIS — Z00.8 ENCOUNTER FOR OTHER GENERAL EXAMINATION: ICD-10-CM

## 2023-01-13 DIAGNOSIS — Z98.890 OTHER SPECIFIED POSTPROCEDURAL STATES: Chronic | ICD-10-CM

## 2023-01-13 DIAGNOSIS — Z90.89 ACQUIRED ABSENCE OF OTHER ORGANS: Chronic | ICD-10-CM

## 2023-01-13 DIAGNOSIS — E89.2 POSTPROCEDURAL HYPOPARATHYROIDISM: Chronic | ICD-10-CM

## 2023-01-13 PROCEDURE — 74177 CT ABD & PELVIS W/CONTRAST: CPT

## 2023-01-13 PROCEDURE — 74177 CT ABD & PELVIS W/CONTRAST: CPT | Mod: 26

## 2023-01-13 NOTE — ADDENDUM
[FreeTextEntry1] : I was present with the NP during the history and exam. I discussed the case and examined the patient with the NP and agree with the findings and plan as documented in the NP's note. Winnie, 77 year old woman with pancreatic divisum but recurrent acute pancreatitis; this most likely due to alcohol and she is still drinking socially. Needs to be completely abstinent. Secretin MRCP shows no abnormal dilation and contrast entered the duodenum, so don’t' think minor papillotomy will solve problem and its really her alcohol use. Repeat imaging ordered. Patient verbalized understanding.

## 2023-01-13 NOTE — PHYSICAL EXAM
[Alert] : alert [Healthy Appearing] : healthy appearing [Sclera] : the sclera and conjunctiva were normal [Bowel Sounds] : normal bowel sounds [Abdomen Tenderness] : non-tender [Abdomen Soft] : soft [Normal Color / Pigmentation] : normal skin color and pigmentation [Oriented To Time, Place, And Person] : oriented to person, place, and time [Hearing Threshold Finger Rub Not [place]] : hearing was normal [Normal Appearance] : the appearance of the neck was normal [No Neck Mass] : no neck mass was observed [No Focal Deficits] : no focal deficits [Motor Exam] : the motor exam was normal

## 2023-01-13 NOTE — ASSESSMENT
[FreeTextEntry1] : Plan:\par Discussed at length the importance of alcohol cessation with recurrent pancreatitis to prevent recurrent acute episode. Will order CT pancreas protocol to monitor now since last episode was about 8 weeks ago. Discussed possible treatment of pancreatic divisum if pt abstains from alcohol but still has an episode of acute pancreatitis. Will call pt when CT result is available. All questions answered, pt expressed understanding. Seen and discussed with Dr. Mckay.

## 2023-01-13 NOTE — HISTORY OF PRESENT ILLNESS
[FreeTextEntry1] : Kristin Greer is a 77 year old female PMH pancreas divisum presenting today post hospitalization for acute on chronic pancreatitis.\par \par  Pt admittedly thinks the most recent episode was triggered by alcohol. Since being discharged has been feeling well, denies abdominal pain, nausea, vomiting. Has been tolerating PO intake without difficulty. Does still drink socially, but has been trying to adhere to a low fat diet as instructed. No weight loss. No abdominal pain. No post prandial symptoms. No change in BM's. No overt signs of Gi bleeding like hematemesis, melena, hematochezia.

## 2023-01-24 ENCOUNTER — NON-APPOINTMENT (OUTPATIENT)
Age: 78
End: 2023-01-24

## 2023-01-31 NOTE — ED PROVIDER NOTE - IV ALTEPLASE ADMIN OUTSIDE HIDDEN
show Qbrexza Pregnancy And Lactation Text: There is no available data on Qbrexza use in pregnant women.  There is no available data on Qbrexza use in lactation.

## 2023-02-08 ENCOUNTER — OFFICE (OUTPATIENT)
Dept: URBAN - METROPOLITAN AREA CLINIC 102 | Facility: CLINIC | Age: 78
Setting detail: OPHTHALMOLOGY
End: 2023-02-08
Payer: MEDICARE

## 2023-02-08 DIAGNOSIS — Z96.1: ICD-10-CM

## 2023-02-08 DIAGNOSIS — H16.223: ICD-10-CM

## 2023-02-08 DIAGNOSIS — H26.493: ICD-10-CM

## 2023-02-08 DIAGNOSIS — H16.222: ICD-10-CM

## 2023-02-08 DIAGNOSIS — H43.813: ICD-10-CM

## 2023-02-08 DIAGNOSIS — H01.001: ICD-10-CM

## 2023-02-08 DIAGNOSIS — H35.3131: ICD-10-CM

## 2023-02-08 DIAGNOSIS — H01.002: ICD-10-CM

## 2023-02-08 DIAGNOSIS — H16.221: ICD-10-CM

## 2023-02-08 DIAGNOSIS — H01.004: ICD-10-CM

## 2023-02-08 DIAGNOSIS — H01.005: ICD-10-CM

## 2023-02-08 PROCEDURE — 92014 COMPRE OPH EXAM EST PT 1/>: CPT | Performed by: OPHTHALMOLOGY

## 2023-02-08 PROCEDURE — 92134 CPTRZ OPH DX IMG PST SGM RTA: CPT | Performed by: OPHTHALMOLOGY

## 2023-02-08 ASSESSMENT — REFRACTION_MANIFEST
OS_AXIS: 90
OD_CYLINDER: -0.75
OD_AXIS: 72
OD_ADD: +2.50
OD_AXIS: 72
OS_ADD: +2.50
OS_SPHERE: +0.25
OS_VA1: 20/20
OS_ADD: +2.50
OD_CYLINDER: -0.75
OS_CYLINDER: -1.25
OD_SPHERE: +0.75
OD_VA1: 20/20
OD_ADD: +2.50
OS_SPHERE: +0.25
OS_CYLINDER: -1.25
OD_VA1: 20/20
OS_AXIS: 90
OS_VA1: 20/20
OD_SPHERE: +0.75

## 2023-02-08 ASSESSMENT — KERATOMETRY
OS_K1POWER_DIOPTERS: 41.75
OD_K1POWER_DIOPTERS: 41.75
OS_K2POWER_DIOPTERS: 43.75
OS_AXISANGLE_DEGREES: 177
OD_K2POWER_DIOPTERS: 42.75
OD_AXISANGLE_DEGREES: 171

## 2023-02-08 ASSESSMENT — REFRACTION_CURRENTRX
OD_VPRISM_DIRECTION: PROGS
OS_ADD: +2.25
OD_VPRISM_DIRECTION: PROGS
OS_OVR_VA: 20/
OS_ADD: +2.50
OS_AXIS: 095
OD_AXIS: 070
OD_SPHERE: +0.75
OD_CYLINDER: -1.50
OS_OVR_VA: 20/
OS_VPRISM_DIRECTION: PROGS
OS_SPHERE: +0.25
OD_CYLINDER: -0.75
OS_AXIS: 103
OD_OVR_VA: 20/
OD_OVR_VA: 20/
OS_CYLINDER: -1.25
OD_OVR_VA: 20/
OD_ADD: +2.50
OS_SPHERE: +0.50
OS_AXIS: 088
OS_OVR_VA: 20/
OD_SPHERE: +1.25
OD_AXIS: 069
OD_ADD: +2.25
OD_SPHERE: +0.75
OD_VPRISM_DIRECTION: PROGS
OS_VPRISM_DIRECTION: PROGS
OD_AXIS: 085
OS_VPRISM_DIRECTION: PROGS
OS_SPHERE: +0.75
OS_CYLINDER: -1.25
OS_CYLINDER: -1.75
OD_CYLINDER: -0.50

## 2023-02-08 ASSESSMENT — CONFRONTATIONAL VISUAL FIELD TEST (CVF)
OD_FINDINGS: FULL
OS_FINDINGS: FULL

## 2023-02-08 ASSESSMENT — AXIALLENGTH_DERIVED
OS_AL: 24.0212
OD_AL: 23.9091
OS_AL: 24.0212
OS_AL: 24.0212
OD_AL: 24.0602
OD_AL: 23.9091

## 2023-02-08 ASSESSMENT — REFRACTION_AUTOREFRACTION
OS_SPHERE: +0.75
OS_CYLINDER: -2.25
OD_AXIS: 091
OD_CYLINDER: -1.50
OS_AXIS: 093
OD_SPHERE: +0.75

## 2023-02-08 ASSESSMENT — TONOMETRY
OD_IOP_MMHG: 17
OS_IOP_MMHG: 14

## 2023-02-08 ASSESSMENT — TEAR BREAK UP TIME (TBUT)
OD_TBUT: T
OS_TBUT: T

## 2023-02-08 ASSESSMENT — SPHEQUIV_DERIVED
OS_SPHEQUIV: -0.375
OS_SPHEQUIV: -0.375
OD_SPHEQUIV: 0.375
OD_SPHEQUIV: 0
OS_SPHEQUIV: -0.375
OD_SPHEQUIV: 0.375

## 2023-02-08 ASSESSMENT — VISUAL ACUITY
OD_BCVA: 20/20
OS_BCVA: 20/25+2

## 2023-02-08 ASSESSMENT — LID EXAM ASSESSMENTS
OD_BLEPHARITIS: RLL RUL T
OS_BLEPHARITIS: LLL LUL T

## 2023-05-11 ENCOUNTER — INPATIENT (INPATIENT)
Facility: HOSPITAL | Age: 78
LOS: 6 days | Discharge: HOME | DRG: 438 | End: 2023-05-18
Attending: HOSPITALIST | Admitting: INTERNAL MEDICINE
Payer: MEDICARE

## 2023-05-11 VITALS
SYSTOLIC BLOOD PRESSURE: 102 MMHG | WEIGHT: 160.06 LBS | HEIGHT: 63 IN | OXYGEN SATURATION: 96 % | DIASTOLIC BLOOD PRESSURE: 58 MMHG | TEMPERATURE: 98 F | RESPIRATION RATE: 16 BRPM | HEART RATE: 72 BPM

## 2023-05-11 DIAGNOSIS — Z98.890 OTHER SPECIFIED POSTPROCEDURAL STATES: Chronic | ICD-10-CM

## 2023-05-11 DIAGNOSIS — Z98.89 OTHER SPECIFIED POSTPROCEDURAL STATES: Chronic | ICD-10-CM

## 2023-05-11 DIAGNOSIS — Z90.89 ACQUIRED ABSENCE OF OTHER ORGANS: Chronic | ICD-10-CM

## 2023-05-11 DIAGNOSIS — Z90.49 ACQUIRED ABSENCE OF OTHER SPECIFIED PARTS OF DIGESTIVE TRACT: Chronic | ICD-10-CM

## 2023-05-11 DIAGNOSIS — Z98.42 CATARACT EXTRACTION STATUS, LEFT EYE: Chronic | ICD-10-CM

## 2023-05-11 DIAGNOSIS — E89.2 POSTPROCEDURAL HYPOPARATHYROIDISM: Chronic | ICD-10-CM

## 2023-05-11 LAB
ALBUMIN SERPL ELPH-MCNC: 3.8 G/DL — SIGNIFICANT CHANGE UP (ref 3.3–5)
ALP SERPL-CCNC: 65 U/L — SIGNIFICANT CHANGE UP (ref 40–120)
ALT FLD-CCNC: 57 U/L — SIGNIFICANT CHANGE UP (ref 12–78)
ANION GAP SERPL CALC-SCNC: 5 MMOL/L — SIGNIFICANT CHANGE UP (ref 5–17)
APTT BLD: 31.7 SEC — SIGNIFICANT CHANGE UP (ref 27.5–35.5)
AST SERPL-CCNC: 34 U/L — SIGNIFICANT CHANGE UP (ref 15–37)
BASOPHILS # BLD AUTO: 0.02 K/UL — SIGNIFICANT CHANGE UP (ref 0–0.2)
BASOPHILS NFR BLD AUTO: 0.2 % — SIGNIFICANT CHANGE UP (ref 0–2)
BILIRUB SERPL-MCNC: 0.2 MG/DL — SIGNIFICANT CHANGE UP (ref 0.2–1.2)
BLD GP AB SCN SERPL QL: SIGNIFICANT CHANGE UP
BUN SERPL-MCNC: 18 MG/DL — SIGNIFICANT CHANGE UP (ref 7–23)
CALCIUM SERPL-MCNC: 9.6 MG/DL — SIGNIFICANT CHANGE UP (ref 8.5–10.1)
CHLORIDE SERPL-SCNC: 112 MMOL/L — HIGH (ref 96–108)
CO2 SERPL-SCNC: 25 MMOL/L — SIGNIFICANT CHANGE UP (ref 22–31)
CREAT SERPL-MCNC: 0.87 MG/DL — SIGNIFICANT CHANGE UP (ref 0.5–1.3)
EGFR: 69 ML/MIN/1.73M2 — SIGNIFICANT CHANGE UP
EOSINOPHIL # BLD AUTO: 0.09 K/UL — SIGNIFICANT CHANGE UP (ref 0–0.5)
EOSINOPHIL NFR BLD AUTO: 1 % — SIGNIFICANT CHANGE UP (ref 0–6)
GLUCOSE SERPL-MCNC: 136 MG/DL — HIGH (ref 70–99)
HCT VFR BLD CALC: 39.2 % — SIGNIFICANT CHANGE UP (ref 34.5–45)
HGB BLD-MCNC: 13.3 G/DL — SIGNIFICANT CHANGE UP (ref 11.5–15.5)
IMM GRANULOCYTES NFR BLD AUTO: 0.1 % — SIGNIFICANT CHANGE UP (ref 0–0.9)
INR BLD: 0.94 RATIO — SIGNIFICANT CHANGE UP (ref 0.88–1.16)
LIDOCAIN IGE QN: HIGH U/L (ref 73–393)
LYMPHOCYTES # BLD AUTO: 2.82 K/UL — SIGNIFICANT CHANGE UP (ref 1–3.3)
LYMPHOCYTES # BLD AUTO: 30.8 % — SIGNIFICANT CHANGE UP (ref 13–44)
MCHC RBC-ENTMCNC: 29.4 PG — SIGNIFICANT CHANGE UP (ref 27–34)
MCHC RBC-ENTMCNC: 33.9 GM/DL — SIGNIFICANT CHANGE UP (ref 32–36)
MCV RBC AUTO: 86.5 FL — SIGNIFICANT CHANGE UP (ref 80–100)
MONOCYTES # BLD AUTO: 0.68 K/UL — SIGNIFICANT CHANGE UP (ref 0–0.9)
MONOCYTES NFR BLD AUTO: 7.4 % — SIGNIFICANT CHANGE UP (ref 2–14)
NEUTROPHILS # BLD AUTO: 5.55 K/UL — SIGNIFICANT CHANGE UP (ref 1.8–7.4)
NEUTROPHILS NFR BLD AUTO: 60.5 % — SIGNIFICANT CHANGE UP (ref 43–77)
PLATELET # BLD AUTO: 230 K/UL — SIGNIFICANT CHANGE UP (ref 150–400)
POTASSIUM SERPL-MCNC: 3.8 MMOL/L — SIGNIFICANT CHANGE UP (ref 3.5–5.3)
POTASSIUM SERPL-SCNC: 3.8 MMOL/L — SIGNIFICANT CHANGE UP (ref 3.5–5.3)
PROT SERPL-MCNC: 7.4 GM/DL — SIGNIFICANT CHANGE UP (ref 6–8.3)
PROTHROM AB SERPL-ACNC: 10.9 SEC — SIGNIFICANT CHANGE UP (ref 10.5–13.4)
RBC # BLD: 4.53 M/UL — SIGNIFICANT CHANGE UP (ref 3.8–5.2)
RBC # FLD: 13.4 % — SIGNIFICANT CHANGE UP (ref 10.3–14.5)
SODIUM SERPL-SCNC: 142 MMOL/L — SIGNIFICANT CHANGE UP (ref 135–145)
WBC # BLD: 9.17 K/UL — SIGNIFICANT CHANGE UP (ref 3.8–10.5)
WBC # FLD AUTO: 9.17 K/UL — SIGNIFICANT CHANGE UP (ref 3.8–10.5)

## 2023-05-11 PROCEDURE — 93010 ELECTROCARDIOGRAM REPORT: CPT | Mod: 76

## 2023-05-11 PROCEDURE — A9579: CPT

## 2023-05-11 PROCEDURE — G1004: CPT

## 2023-05-11 PROCEDURE — 80053 COMPREHEN METABOLIC PANEL: CPT

## 2023-05-11 PROCEDURE — 83735 ASSAY OF MAGNESIUM: CPT

## 2023-05-11 PROCEDURE — 76000 FLUOROSCOPY <1 HR PHYS/QHP: CPT

## 2023-05-11 PROCEDURE — 85027 COMPLETE CBC AUTOMATED: CPT

## 2023-05-11 PROCEDURE — 80048 BASIC METABOLIC PNL TOTAL CA: CPT

## 2023-05-11 PROCEDURE — 36415 COLL VENOUS BLD VENIPUNCTURE: CPT

## 2023-05-11 PROCEDURE — 87635 SARS-COV-2 COVID-19 AMP PRB: CPT

## 2023-05-11 PROCEDURE — 85025 COMPLETE CBC W/AUTO DIFF WBC: CPT

## 2023-05-11 PROCEDURE — 99285 EMERGENCY DEPT VISIT HI MDM: CPT

## 2023-05-11 PROCEDURE — C1889: CPT

## 2023-05-11 PROCEDURE — C1769: CPT

## 2023-05-11 PROCEDURE — 83690 ASSAY OF LIPASE: CPT

## 2023-05-11 PROCEDURE — 74183 MRI ABD W/O CNTR FLWD CNTR: CPT

## 2023-05-11 PROCEDURE — 74177 CT ABD & PELVIS W/CONTRAST: CPT | Mod: 26,MG

## 2023-05-11 PROCEDURE — 84100 ASSAY OF PHOSPHORUS: CPT

## 2023-05-11 PROCEDURE — 0225U NFCT DS DNA&RNA 21 SARSCOV2: CPT

## 2023-05-11 RX ORDER — SODIUM CHLORIDE 9 MG/ML
1000 INJECTION, SOLUTION INTRAVENOUS ONCE
Refills: 0 | Status: COMPLETED | OUTPATIENT
Start: 2023-05-11 | End: 2023-05-11

## 2023-05-11 RX ORDER — HYDROMORPHONE HYDROCHLORIDE 2 MG/ML
1 INJECTION INTRAMUSCULAR; INTRAVENOUS; SUBCUTANEOUS ONCE
Refills: 0 | Status: DISCONTINUED | OUTPATIENT
Start: 2023-05-11 | End: 2023-05-11

## 2023-05-11 RX ORDER — FLUORIDE (SODIUM) 1MG(2.2MG)
15 TABLET,CHEWABLE ORAL
Qty: 0 | Refills: 0 | DISCHARGE

## 2023-05-11 RX ORDER — ONDANSETRON 8 MG/1
8 TABLET, FILM COATED ORAL ONCE
Refills: 0 | Status: COMPLETED | OUTPATIENT
Start: 2023-05-11 | End: 2023-05-11

## 2023-05-11 RX ORDER — MAGNESIUM OXIDE 400 MG ORAL TABLET 241.3 MG
1 TABLET ORAL
Qty: 0 | Refills: 0 | DISCHARGE

## 2023-05-11 RX ORDER — DIPHENHYDRAMINE HCL 50 MG
25 CAPSULE ORAL ONCE
Refills: 0 | Status: COMPLETED | OUTPATIENT
Start: 2023-05-11 | End: 2023-05-11

## 2023-05-11 RX ADMIN — ONDANSETRON 8 MILLIGRAM(S): 8 TABLET, FILM COATED ORAL at 20:55

## 2023-05-11 RX ADMIN — SODIUM CHLORIDE 2000 MILLILITER(S): 9 INJECTION, SOLUTION INTRAVENOUS at 20:55

## 2023-05-11 RX ADMIN — HYDROMORPHONE HYDROCHLORIDE 1 MILLIGRAM(S): 2 INJECTION INTRAMUSCULAR; INTRAVENOUS; SUBCUTANEOUS at 21:25

## 2023-05-11 RX ADMIN — Medication 25 MILLIGRAM(S): at 22:34

## 2023-05-11 RX ADMIN — HYDROMORPHONE HYDROCHLORIDE 1 MILLIGRAM(S): 2 INJECTION INTRAMUSCULAR; INTRAVENOUS; SUBCUTANEOUS at 20:55

## 2023-05-11 RX ADMIN — HYDROMORPHONE HYDROCHLORIDE 1 MILLIGRAM(S): 2 INJECTION INTRAMUSCULAR; INTRAVENOUS; SUBCUTANEOUS at 23:04

## 2023-05-11 RX ADMIN — SODIUM CHLORIDE 2000 MILLILITER(S): 9 INJECTION, SOLUTION INTRAVENOUS at 21:57

## 2023-05-11 RX ADMIN — HYDROMORPHONE HYDROCHLORIDE 1 MILLIGRAM(S): 2 INJECTION INTRAMUSCULAR; INTRAVENOUS; SUBCUTANEOUS at 22:34

## 2023-05-11 NOTE — ED PROVIDER NOTE - OBJECTIVE STATEMENT
77 y/o Female presented for abdominal pain. Patient stated she started to have severe abdominal pain around 1 pm yesterday. Described as generalized pain, nonradiating, rated 9/10, no aggravating or alleviating factors. Associated wit nausea and one episode of vomiting. Denies fever, chills, chest pain, SOB, diarrhea, constipation, lightheadedness, dizziness. Patient has hx of recurrent pancreatitis, last hospitalized in December 2021. In the past thought due to be related gallstones, had cholecystectomy. Patient states has not drank alcohol since November 2021. MRI in previous admission showed anatomic anomaly pancreatic ductal divisum

## 2023-05-11 NOTE — ED PROVIDER NOTE - CLINICAL SUMMARY MEDICAL DECISION MAKING FREE TEXT BOX
pt with recurrent Pancreatitis need for immediate IV analgesia with patient reporting severe pain upon arrival to ED she did improve with multiple rounds of IV narcotics normotensive all patient's questions answered will require admission to the hospital for IV analgesia fluid resuscitation and management of her recurrent pancreatitis she is not tolerating p.o. fluids and still requires pain control

## 2023-05-11 NOTE — ED ADULT TRIAGE NOTE - IDEAL BODY WEIGHT(KG)
100% of the time/able to follow multistep instructions/able to follow single-step instructions 52 Unknown if ever smoked

## 2023-05-11 NOTE — ED ADULT TRIAGE NOTE - CHIEF COMPLAINT QUOTE
BIB EMS C/O SEVERE ABDOMINAL PAIN, PANCREATITIS. PAIN STARTED 1 HOUR AGO. PT NAUSEOUS AND VOMITING. PAIN 10/10, DIAPHORETIC. DENIES CP/SOB/D/FEVER/CHILLS. PMH PANCREATITIS, HBP

## 2023-05-12 DIAGNOSIS — K85.90 ACUTE PANCREATITIS WITHOUT NECROSIS OR INFECTION, UNSPECIFIED: ICD-10-CM

## 2023-05-12 LAB
ALBUMIN SERPL ELPH-MCNC: 3 G/DL — LOW (ref 3.3–5)
ALP SERPL-CCNC: 50 U/L — SIGNIFICANT CHANGE UP (ref 40–120)
ALT FLD-CCNC: 42 U/L — SIGNIFICANT CHANGE UP (ref 12–78)
ANION GAP SERPL CALC-SCNC: 5 MMOL/L — SIGNIFICANT CHANGE UP (ref 5–17)
AST SERPL-CCNC: 25 U/L — SIGNIFICANT CHANGE UP (ref 15–37)
BILIRUB SERPL-MCNC: 0.4 MG/DL — SIGNIFICANT CHANGE UP (ref 0.2–1.2)
BUN SERPL-MCNC: 14 MG/DL — SIGNIFICANT CHANGE UP (ref 7–23)
CALCIUM SERPL-MCNC: 8.4 MG/DL — LOW (ref 8.5–10.1)
CHLORIDE SERPL-SCNC: 111 MMOL/L — HIGH (ref 96–108)
CO2 SERPL-SCNC: 26 MMOL/L — SIGNIFICANT CHANGE UP (ref 22–31)
CREAT SERPL-MCNC: 0.68 MG/DL — SIGNIFICANT CHANGE UP (ref 0.5–1.3)
EGFR: 90 ML/MIN/1.73M2 — SIGNIFICANT CHANGE UP
GLUCOSE SERPL-MCNC: 118 MG/DL — HIGH (ref 70–99)
HCT VFR BLD CALC: 36.9 % — SIGNIFICANT CHANGE UP (ref 34.5–45)
HGB BLD-MCNC: 12.2 G/DL — SIGNIFICANT CHANGE UP (ref 11.5–15.5)
LIDOCAIN IGE QN: HIGH U/L (ref 73–393)
MCHC RBC-ENTMCNC: 29.6 PG — SIGNIFICANT CHANGE UP (ref 27–34)
MCHC RBC-ENTMCNC: 33.1 GM/DL — SIGNIFICANT CHANGE UP (ref 32–36)
MCV RBC AUTO: 89.6 FL — SIGNIFICANT CHANGE UP (ref 80–100)
PLATELET # BLD AUTO: 178 K/UL — SIGNIFICANT CHANGE UP (ref 150–400)
POTASSIUM SERPL-MCNC: 4.1 MMOL/L — SIGNIFICANT CHANGE UP (ref 3.5–5.3)
POTASSIUM SERPL-SCNC: 4.1 MMOL/L — SIGNIFICANT CHANGE UP (ref 3.5–5.3)
PROT SERPL-MCNC: 5.8 GM/DL — LOW (ref 6–8.3)
RBC # BLD: 4.12 M/UL — SIGNIFICANT CHANGE UP (ref 3.8–5.2)
RBC # FLD: 13.6 % — SIGNIFICANT CHANGE UP (ref 10.3–14.5)
SODIUM SERPL-SCNC: 142 MMOL/L — SIGNIFICANT CHANGE UP (ref 135–145)
WBC # BLD: 7.07 K/UL — SIGNIFICANT CHANGE UP (ref 3.8–10.5)
WBC # FLD AUTO: 7.07 K/UL — SIGNIFICANT CHANGE UP (ref 3.8–10.5)

## 2023-05-12 PROCEDURE — 99222 1ST HOSP IP/OBS MODERATE 55: CPT

## 2023-05-12 RX ORDER — ACETAMINOPHEN 500 MG
1000 TABLET ORAL ONCE
Refills: 0 | Status: COMPLETED | OUTPATIENT
Start: 2023-05-12 | End: 2023-05-12

## 2023-05-12 RX ORDER — ACETAMINOPHEN 500 MG
650 TABLET ORAL EVERY 6 HOURS
Refills: 0 | Status: DISCONTINUED | OUTPATIENT
Start: 2023-05-12 | End: 2023-05-14

## 2023-05-12 RX ORDER — HYDROMORPHONE HYDROCHLORIDE 2 MG/ML
1.5 INJECTION INTRAMUSCULAR; INTRAVENOUS; SUBCUTANEOUS EVERY 4 HOURS
Refills: 0 | Status: DISCONTINUED | OUTPATIENT
Start: 2023-05-12 | End: 2023-05-13

## 2023-05-12 RX ORDER — HYDROMORPHONE HYDROCHLORIDE 2 MG/ML
1 INJECTION INTRAMUSCULAR; INTRAVENOUS; SUBCUTANEOUS EVERY 4 HOURS
Refills: 0 | Status: DISCONTINUED | OUTPATIENT
Start: 2023-05-12 | End: 2023-05-12

## 2023-05-12 RX ORDER — ALPRAZOLAM 0.25 MG
0.25 TABLET ORAL THREE TIMES A DAY
Refills: 0 | Status: DISCONTINUED | OUTPATIENT
Start: 2023-05-12 | End: 2023-05-18

## 2023-05-12 RX ORDER — LANOLIN ALCOHOL/MO/W.PET/CERES
3 CREAM (GRAM) TOPICAL AT BEDTIME
Refills: 0 | Status: DISCONTINUED | OUTPATIENT
Start: 2023-05-12 | End: 2023-05-18

## 2023-05-12 RX ORDER — METOPROLOL TARTRATE 50 MG
25 TABLET ORAL AT BEDTIME
Refills: 0 | Status: DISCONTINUED | OUTPATIENT
Start: 2023-05-12 | End: 2023-05-18

## 2023-05-12 RX ORDER — PANTOPRAZOLE SODIUM 20 MG/1
40 TABLET, DELAYED RELEASE ORAL
Refills: 0 | Status: DISCONTINUED | OUTPATIENT
Start: 2023-05-12 | End: 2023-05-18

## 2023-05-12 RX ORDER — HYDROMORPHONE HYDROCHLORIDE 2 MG/ML
1 INJECTION INTRAMUSCULAR; INTRAVENOUS; SUBCUTANEOUS ONCE
Refills: 0 | Status: DISCONTINUED | OUTPATIENT
Start: 2023-05-12 | End: 2023-05-12

## 2023-05-12 RX ORDER — ASPIRIN/CALCIUM CARB/MAGNESIUM 324 MG
81 TABLET ORAL DAILY
Refills: 0 | Status: DISCONTINUED | OUTPATIENT
Start: 2023-05-12 | End: 2023-05-18

## 2023-05-12 RX ORDER — ONDANSETRON 8 MG/1
4 TABLET, FILM COATED ORAL EVERY 8 HOURS
Refills: 0 | Status: DISCONTINUED | OUTPATIENT
Start: 2023-05-12 | End: 2023-05-18

## 2023-05-12 RX ORDER — SIMVASTATIN 20 MG/1
20 TABLET, FILM COATED ORAL AT BEDTIME
Refills: 0 | Status: DISCONTINUED | OUTPATIENT
Start: 2023-05-12 | End: 2023-05-18

## 2023-05-12 RX ORDER — LOSARTAN POTASSIUM 100 MG/1
100 TABLET, FILM COATED ORAL DAILY
Refills: 0 | Status: DISCONTINUED | OUTPATIENT
Start: 2023-05-12 | End: 2023-05-18

## 2023-05-12 RX ORDER — SODIUM CHLORIDE 9 MG/ML
1000 INJECTION, SOLUTION INTRAVENOUS
Refills: 0 | Status: DISCONTINUED | OUTPATIENT
Start: 2023-05-12 | End: 2023-05-15

## 2023-05-12 RX ADMIN — HYDROMORPHONE HYDROCHLORIDE 1.5 MILLIGRAM(S): 2 INJECTION INTRAMUSCULAR; INTRAVENOUS; SUBCUTANEOUS at 00:00

## 2023-05-12 RX ADMIN — SODIUM CHLORIDE 100 MILLILITER(S): 9 INJECTION, SOLUTION INTRAVENOUS at 20:08

## 2023-05-12 RX ADMIN — HYDROMORPHONE HYDROCHLORIDE 1 MILLIGRAM(S): 2 INJECTION INTRAMUSCULAR; INTRAVENOUS; SUBCUTANEOUS at 03:12

## 2023-05-12 RX ADMIN — Medication 1000 MILLIGRAM(S): at 15:00

## 2023-05-12 RX ADMIN — SIMVASTATIN 20 MILLIGRAM(S): 20 TABLET, FILM COATED ORAL at 21:58

## 2023-05-12 RX ADMIN — Medication 1000 MILLIGRAM(S): at 04:48

## 2023-05-12 RX ADMIN — HYDROMORPHONE HYDROCHLORIDE 1.5 MILLIGRAM(S): 2 INJECTION INTRAMUSCULAR; INTRAVENOUS; SUBCUTANEOUS at 20:08

## 2023-05-12 RX ADMIN — PANTOPRAZOLE SODIUM 40 MILLIGRAM(S): 20 TABLET, DELAYED RELEASE ORAL at 05:44

## 2023-05-12 RX ADMIN — Medication 81 MILLIGRAM(S): at 10:57

## 2023-05-12 RX ADMIN — HYDROMORPHONE HYDROCHLORIDE 1.5 MILLIGRAM(S): 2 INJECTION INTRAMUSCULAR; INTRAVENOUS; SUBCUTANEOUS at 15:21

## 2023-05-12 RX ADMIN — Medication 0.25 MILLIGRAM(S): at 21:58

## 2023-05-12 RX ADMIN — Medication 400 MILLIGRAM(S): at 03:48

## 2023-05-12 RX ADMIN — HYDROMORPHONE HYDROCHLORIDE 1 MILLIGRAM(S): 2 INJECTION INTRAMUSCULAR; INTRAVENOUS; SUBCUTANEOUS at 10:59

## 2023-05-12 RX ADMIN — HYDROMORPHONE HYDROCHLORIDE 1.5 MILLIGRAM(S): 2 INJECTION INTRAMUSCULAR; INTRAVENOUS; SUBCUTANEOUS at 16:30

## 2023-05-12 RX ADMIN — HYDROMORPHONE HYDROCHLORIDE 1 MILLIGRAM(S): 2 INJECTION INTRAMUSCULAR; INTRAVENOUS; SUBCUTANEOUS at 06:42

## 2023-05-12 RX ADMIN — Medication 25 MILLIGRAM(S): at 21:58

## 2023-05-12 RX ADMIN — HYDROMORPHONE HYDROCHLORIDE 1 MILLIGRAM(S): 2 INJECTION INTRAMUSCULAR; INTRAVENOUS; SUBCUTANEOUS at 02:12

## 2023-05-12 RX ADMIN — HYDROMORPHONE HYDROCHLORIDE 1 MILLIGRAM(S): 2 INJECTION INTRAMUSCULAR; INTRAVENOUS; SUBCUTANEOUS at 11:00

## 2023-05-12 RX ADMIN — Medication 0.25 MILLIGRAM(S): at 02:44

## 2023-05-12 RX ADMIN — Medication 400 MILLIGRAM(S): at 14:38

## 2023-05-12 RX ADMIN — SODIUM CHLORIDE 100 MILLILITER(S): 9 INJECTION, SOLUTION INTRAVENOUS at 02:49

## 2023-05-12 RX ADMIN — HYDROMORPHONE HYDROCHLORIDE 1 MILLIGRAM(S): 2 INJECTION INTRAMUSCULAR; INTRAVENOUS; SUBCUTANEOUS at 07:12

## 2023-05-12 RX ADMIN — Medication 3 MILLIGRAM(S): at 21:58

## 2023-05-12 RX ADMIN — Medication 10 MILLIGRAM(S): at 11:00

## 2023-05-12 RX ADMIN — LOSARTAN POTASSIUM 100 MILLIGRAM(S): 100 TABLET, FILM COATED ORAL at 10:57

## 2023-05-12 NOTE — H&P ADULT - HISTORY OF PRESENT ILLNESS
77 y/o Female presented for abdominal pain. Patient stated she started to have severe abdominal pain around 1 pm yesterday. Described as generalized pain, nonradiating, rated 9/10, no aggravating or alleviating factors. Associated wit nausea and one episode of vomiting. Denies fever, chills, chest pain, SOB, diarrhea, constipation, lightheadedness, dizziness. Patient has hx of recurrent pancreatitis, last hospitalized in December 2021. In the past thought due to be related gallstones, had cholecystectomy. Patient states has not drank alcohol since November 2021. 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

## 2023-05-12 NOTE — CONSULT NOTE ADULT - SUBJECTIVE AND OBJECTIVE BOX
Patient is a 77y old  Female who presents with a chief complaint of Pancreatitis    HPI:  This is a 76 year old female with past medical history of GERD, HLD, HTN, pancreatitis with ductal divisum presenting to [facility] with acute abdominal pain. Endorses pain started yesterday at 1pm after lunch in bilateral upper quadrants, patient took pepto bismol with some relief and ate hummus and crackers and within short period of time pain became 8/10 and patient called for EMS. While in ambulance, endorses pain became severe 10/10 stabbing in epigastric region radiating to chest and she "thought I was having a heart attack." Reports one lone episode nonbloody emesis. Denies fever, chills, diarrhea, or constipation. Denies jaundice. Patient denies any ingestion of etoh, fried/fatty foods, or new medications. Last hospitalized 2022 with pancreatitis. Evaluated as outpatient and discussion of eventual plan for minor papillotomy should pancreatitis reoccur. Pateint denies any ingestion of etoh, fried/fatty foods, or new medications. Currently with nausea, just medicated and pain 3/10 with pain medication, aggravated with food- clear liquids for now. CT consistent with pancreatitis no necrosis, no obstruction     PAST MEDICAL & SURGICAL HISTORY:  Hypertension      High cholesterol      GERD (gastroesophageal reflux disease)      Arthritis      Fatty liver      Pancreatitis  h/o      Osteoarthritis      Hammertoe of right foot      Anxiety      History of back surgery      History of       History of tonsillectomy      History of laminectomy  with resection      S/P left cataract extraction      S/P cholecystectomy      H/O parathyroidectomy      H/O hand surgery    MEDICATIONS  (STANDING):  aspirin enteric coated 81 milliGRAM(s) Oral daily  lactated ringers. 1000 milliLiter(s) (100 mL/Hr) IV Continuous <Continuous>  losartan 100 milliGRAM(s) Oral daily  metoprolol succinate ER 25 milliGRAM(s) Oral at bedtime  pantoprazole    Tablet 40 milliGRAM(s) Oral before breakfast  PARoxetine 10 milliGRAM(s) Oral daily  simvastatin 20 milliGRAM(s) Oral at bedtime    MEDICATIONS  (PRN):  acetaminophen     Tablet .. 650 milliGRAM(s) Oral every 6 hours PRN Temp greater or equal to 38C (100.4F), Mild Pain (1 - 3)  ALPRAZolam 0.25 milliGRAM(s) Oral three times a day PRN Anxiety  aluminum hydroxide/magnesium hydroxide/simethicone Suspension 30 milliLiter(s) Oral every 4 hours PRN Dyspepsia  HYDROmorphone  Injectable 1 milliGRAM(s) IV Push every 4 hours PRN Moderate Pain (4 - 6)  melatonin 3 milliGRAM(s) Oral at bedtime PRN Insomnia  ondansetron Injectable 4 milliGRAM(s) IV Push every 8 hours PRN Nausea and/or Vomiting    Allergies    Sudafed (Other)  Betadine (Unknown)  adhesives (Rash)  Triple Antibiotic (Unknown)    Intolerances    SOCIAL HISTORY:    FAMILY HISTORY:  Family history of breast cancer  Mother -  at age 99    Family history of diabetes mellitus  Both parents -     Family history of CHF (congestive heart failure)  Father     Family history of AIDS (Sibling)  brother     REVIEW OF SYSTEMS:    CONSTITUTIONAL: No weakness, fevers or chills  EYES/ENT: No visual changes;  No vertigo or throat pain   NECK: No pain or stiffness  RESPIRATORY: No cough, wheezing, hemoptysis; No shortness of breath  CARDIOVASCULAR: No chest pain or palpitations  GASTROINTESTINAL: See HPI  GENITOURINARY: No dysuria, frequency or hematuria  NEUROLOGICAL: No numbness or weakness  SKIN: No itching, burning, rashes, or lesions   PSYCH: Normal mood and affect  All other review of systems is negative unless indicated above.    Vital Signs Last 24 Hrs  T(C): 36.5 (12 May 2023 07:35), Max: 36.7 (11 May 2023 20:16)  T(F): 97.7 (12 May 2023 07:35), Max: 98.1 (11 May 2023 20:16)  HR: 76 (12 May 2023 07:35) (71 - 78)  BP: 108/56 (12 May 2023 07:35) (102/58 - 144/75)  BP(mean): --  RR: 16 (12 May 2023 07:35) (16 - 18)  SpO2: 93% (12 May 2023 07:35) (93% - 100%)    Parameters below as of 12 May 2023 07:35  Patient On (Oxygen Delivery Method): room air        PHYSICAL EXAM:    Constitutional: No acute distress, well-developed, non-toxic appearing  HEENT: masked, good phonation, not icteric  Neck: supple, no lymphadenopathy  Respiratory: clear to ascultation bilaterally, no wheezing  Cardiovascular: S1 and S2, regular rate and rhythm, no murmurs rubs or gallops  Gastrointestinal: soft, tender to mod palp, non-distended, +bowel sounds, no rebound or guarding, no surgical scars, no drains  Extremities: No peripheral edema, no cyanosis or clubbing  Vascular: 2+ peripheral pulses, no venous stasis  Neurological: A/O x 3, no focal deficits, no asterixis  Psychiatric: Normal mood, normal affect  Skin: No rashes, not jaundiced    LABS:                        12.2   7.07  )-----------( 178      ( 12 May 2023 06:34 )             36.9     -12    142  |  111<H>  |  14  ----------------------------<  118<H>  4.1   |  26  |  0.68    Ca    8.4<L>      12 May 2023 06:34    TPro  5.8<L>  /  Alb  3.0<L>  /  TBili  0.4  /  DBili  x   /  AST  25  /  ALT  42  /  AlkPhos  50  -12    PT/INR - ( 11 May 2023 20:30 )   PT: 10.9 sec;   INR: 0.94 ratio         PTT - ( 11 May 2023 20:30 )  PTT:31.7 sec  LIVER FUNCTIONS - ( 12 May 2023 06:34 )  Alb: 3.0 g/dL / Pro: 5.8 gm/dL / ALK PHOS: 50 U/L / ALT: 42 U/L / AST: 25 U/L / GGT: x             RADIOLOGY & ADDITIONAL STUDIES:  IMPRESSION:  Acute interstitial pancreatitis. No necrosis, hemorrhage or pseudocyst   formation. Patient is a 77y old  Female who presents with a chief complaint of Pancreatitis    76 year old woman with HLD, GERD, HTN, recurrent acute diverticulitis former drinker with pancreatic divisum, admitted with pancreatitis.     Patient states that day prior to admission had sudden onset, 10/10 epigastric and chest pain. Pain sharp/stabbing, non radiating, sudden onset, constant, without known exacerbating or alleviating factors. No fevers or chills. Did have nausea or vomiting. Denies new meds, adamant no alcohol since November, Currtenly pain is much better controlled, down to 2/10. Is nauseated but no vomiting. Has no urge to eat. She was last hospitalized 2022 with pancreatitis. Evaluated as outpatient and discussion of eventual plan for minor papillotomy should pancreatitis reoccur. CT consistent with pancreatitis no necrosis, no obstruction     PAST MEDICAL & SURGICAL HISTORY:  Hypertension      High cholesterol      GERD (gastroesophageal reflux disease)      Arthritis      Fatty liver      Pancreatitis  h/o      Osteoarthritis      Hammertoe of right foot      Anxiety      History of back surgery      History of       History of tonsillectomy      History of laminectomy  with resection      S/P left cataract extraction      S/P cholecystectomy      H/O parathyroidectomy      H/O hand surgery    MEDICATIONS  (STANDING):  aspirin enteric coated 81 milliGRAM(s) Oral daily  lactated ringers. 1000 milliLiter(s) (100 mL/Hr) IV Continuous <Continuous>  losartan 100 milliGRAM(s) Oral daily  metoprolol succinate ER 25 milliGRAM(s) Oral at bedtime  pantoprazole    Tablet 40 milliGRAM(s) Oral before breakfast  PARoxetine 10 milliGRAM(s) Oral daily  simvastatin 20 milliGRAM(s) Oral at bedtime    MEDICATIONS  (PRN):  acetaminophen     Tablet .. 650 milliGRAM(s) Oral every 6 hours PRN Temp greater or equal to 38C (100.4F), Mild Pain (1 - 3)  ALPRAZolam 0.25 milliGRAM(s) Oral three times a day PRN Anxiety  aluminum hydroxide/magnesium hydroxide/simethicone Suspension 30 milliLiter(s) Oral every 4 hours PRN Dyspepsia  HYDROmorphone  Injectable 1 milliGRAM(s) IV Push every 4 hours PRN Moderate Pain (4 - 6)  melatonin 3 milliGRAM(s) Oral at bedtime PRN Insomnia  ondansetron Injectable 4 milliGRAM(s) IV Push every 8 hours PRN Nausea and/or Vomiting    Allergies    Sudafed (Other)  Betadine (Unknown)  adhesives (Rash)  Triple Antibiotic (Unknown)    Intolerances    SOCIAL HISTORY:    FAMILY HISTORY:  Family history of breast cancer  Mother -  at age 99    Family history of diabetes mellitus  Both parents -     Family history of CHF (congestive heart failure)  Father     Family history of AIDS (Sibling)  brother     REVIEW OF SYSTEMS:    CONSTITUTIONAL: No weakness, fevers or chills  EYES/ENT: No visual changes;  No vertigo or throat pain   NECK: No pain or stiffness  RESPIRATORY: No cough, wheezing, hemoptysis; No shortness of breath  CARDIOVASCULAR: No chest pain or palpitations  GASTROINTESTINAL: See HPI  GENITOURINARY: No dysuria, frequency or hematuria  NEUROLOGICAL: No numbness or weakness  SKIN: No itching, burning, rashes, or lesions   PSYCH: Normal mood and affect  All other review of systems is negative unless indicated above.    Vital Signs Last 24 Hrs  T(C): 36.5 (12 May 2023 07:35), Max: 36.7 (11 May 2023 20:16)  T(F): 97.7 (12 May 2023 07:35), Max: 98.1 (11 May 2023 20:16)  HR: 76 (12 May 2023 07:35) (71 - 78)  BP: 108/56 (12 May 2023 07:35) (102/58 - 144/75)  BP(mean): --  RR: 16 (12 May 2023 07:35) (16 - 18)  SpO2: 93% (12 May 2023 07:35) (93% - 100%)    Parameters below as of 12 May 2023 07:35  Patient On (Oxygen Delivery Method): room air        PHYSICAL EXAM:    Constitutional: No acute distress, well-developed, non-toxic appearing  HEENT: unmasked, good phonation, not icteric  Neck: supple, no lymphadenopathy  Respiratory: clear to ascultation bilaterally, no wheezing  Cardiovascular: S1 and S2, regular rate and rhythm, no murmurs rubs or gallops  Gastrointestinal: soft, tender to mod palp, non-distended, +bowel sounds, no rebound or guarding,  Extremities: No peripheral edema, no cyanosis or clubbing  Vascular: 2+ peripheral pulses, no venous stasis  Neurological: A/O x 3, no focal deficits, no asterixis  Psychiatric: Normal mood, normal affect  Skin: No rashes, not jaundiced    LABS:                        12.2   7.07  )-----------( 178      ( 12 May 2023 06:34 )             36.9     05-12    142  |  111<H>  |  14  ----------------------------<  118<H>  4.1   |  26  |  0.68    Ca    8.4<L>      12 May 2023 06:34    TPro  5.8<L>  /  Alb  3.0<L>  /  TBili  0.4  /  DBili  x   /  AST  25  /  ALT  42  /  AlkPhos  50  05-12    PT/INR - ( 11 May 2023 20:30 )   PT: 10.9 sec;   INR: 0.94 ratio         PTT - ( 11 May 2023 20:30 )  PTT:31.7 sec  LIVER FUNCTIONS - ( 12 May 2023 06:34 )  Alb: 3.0 g/dL / Pro: 5.8 gm/dL / ALK PHOS: 50 U/L / ALT: 42 U/L / AST: 25 U/L / GGT: x             RADIOLOGY & ADDITIONAL STUDIES:  IMPRESSION:  Acute interstitial pancreatitis. No necrosis, hemorrhage or pseudocyst   formation.

## 2023-05-12 NOTE — PROVIDER CONTACT NOTE (OTHER) - ASSESSMENT
Patient given Dilaudid 1 mg for pain as per orders. After 1 hour s/p Dilaudid patient states she is still in the same amount of pain as prior to administration of pain medication.

## 2023-05-12 NOTE — H&P ADULT - NSHPPHYSICALEXAM_GEN_ALL_CORE
T(C): 36.4 (05-12-23 @ 01:25), Max: 36.7 (05-11-23 @ 20:16)  HR: 71 (05-12-23 @ 02:11) (71 - 78)  BP: 139/68 (05-12-23 @ 02:11) (102/58 - 144/75)  RR: 18 (05-12-23 @ 02:11) (16 - 18)  SpO2: 93% (05-12-23 @ 02:11) (93% - 100%)    CONSTITUTIONAL: Well groomed, no apparent distress  EYES: PERRLA and symmetric, EOMI, No conjunctival or scleral injection, non-icteric  ENMT: Oral mucosa with moist membranes. Normal dentition; no pharyngeal injection or exudates             NECK: Supple, symmetric and without tracheal deviation   RESP: No respiratory distress, no use of accessory muscles; CTA b/l, no WRR  CV: RRR, +S1S2, no MRG; no JVD; no peripheral edema  GI: Soft, +very tender epigastric area,, Not distended  LYMPH: No cervical LAD or tenderness; no axillary LAD or tenderness; no inguinal LAD or tenderness  MSK: Normal ROM without pain, no spinal tenderness, normal muscle strength/tone  SKIN: No rashes or ulcers noted; no subcutaneous nodules or induration palpable  NEURO: CN II-XII intact; normal reflexes in upper and lower extremities, sensation intact in upper and lower extremities b/l to light touch   PSYCH: Appropriate insight/judgment; A+O x 3, mood and affect appropriate, recent/remote memory intact

## 2023-05-12 NOTE — H&P ADULT - NSHPREVIEWOFSYSTEMS_GEN_ALL_CORE
Gen: No fever, chills, weakness  ENT: No visual changes or throat pain  Neck: No pain or stiffness  Respiratory: No cough or wheezing  Cardiovascular: No chest pain or palpitations  Gastrointestinal: ++ abdominal pain, No nausea, vomiting, constipation, or diarrhea  Hematologic: No easy bleeding or bruising  Neurologic: No numbness or focal weakness  Psych: No depression or insomnia  Skin: No rash or itching

## 2023-05-12 NOTE — PATIENT PROFILE ADULT - FALL HARM RISK - HARM RISK INTERVENTIONS
Communicate Risk of Fall with Harm to all staff/Monitor for mental status changes/Monitor gait and stability/Reinforce activity limits and safety measures with patient and family/Reorient to person, place and time as needed/Review medications for side effects contributing to fall risk/Sit up slowly, dangle for a short time, stand at bedside before walking/Tailored Fall Risk Interventions/Use of alarms - bed, chair and/or voice tab/Visual Cue: Yellow wristband and red socks/Bed in lowest position, wheels locked, appropriate side rails in place/Call bell, personal items and telephone in reach/Instruct patient to call for assistance before getting out of bed or chair/Non-slip footwear when patient is out of bed/[place] to call system/Physically safe environment - no spills, clutter or unnecessary equipment/Purposeful Proactive Rounding/Room/bathroom lighting operational, light cord in reach

## 2023-05-12 NOTE — ED ADULT NURSE NOTE - NSFALLUNIVINTERV_ED_ALL_ED
Bed/Stretcher in lowest position, wheels locked, appropriate side rails in place/Call bell, personal items and telephone in reach/Instruct patient to call for assistance before getting out of bed/chair/stretcher/Non-slip footwear applied when patient is off stretcher/[place] to call system/Physically safe environment - no spills, clutter or unnecessary equipment/Purposeful proactive rounding/Room/bathroom lighting operational, light cord in reach

## 2023-05-12 NOTE — H&P ADULT - ASSESSMENT
A/P:    1.  Acute Pancreatitis  -patient has h/o recurrent pancreatitis  -keep on IVF  -keep NPO  -on IV Dilaudid as needed for pain control  -follow labs  -follow Gi     2.  HTN  HLD  Anxiety  -contunue Home meds    3.  SCD for DVT ppx    4.  Code status: Full code.

## 2023-05-12 NOTE — CONSULT NOTE ADULT - NS ATTEND AMEND GEN_ALL_CORE FT
77 year old woman with recurrent acute pancreatitis, now states not drinking, with pancreatic divisum.     Plan last time was that if this recurred we would perform an ERCP with minor papillotomy. My plan is to let her cool down and book for wednesday. She doesn't want the minor papillotomy, wants to think about it. I again at length discussed with her the anatomy and why the ERCP is indicated despite the risks of worsening or causing pancreatitis. If she is not drinking (the way she answered me pressing if she was still drinking though makes me suspect she might still be drinking, plus the fact that she is refusing the procedure that will help her if she is in fact not drinking), then ERCP with minor papillotomy is absolutely indicated to prevent recurrence.     For now, just advance diet as tolerated. I will do ERCP wednesday if she elects to stay for it, otherwise outpatient. 77 year old woman with recurrent acute pancreatitis, now states not drinking, with pancreatic divisum.     Plan last time was that if this recurred we would perform an ERCP with minor papillotomy. My plan is to let her cool down and book for wednesday. She doesn't want the minor papillotomy, wants to think about it. I again at length discussed with her the anatomy and why the ERCP is indicated despite the risks of worsening or causing pancreatitis. If she is not drinking (the way she answered me pressing if she was still drinking though makes me suspect she might still be drinking, plus the fact that she is refusing the procedure that will help her if she is in fact not drinking), then ERCP with minor papillotomy is absolutely indicated to prevent recurrence.     For now, just advance diet as tolerated. I will do ERCP wednesday if she elects to stay for it, otherwise outpatient.    LR at high rate (ie. 250cc/hr) only indicated the first 12-24 hours, otherwise no further benefit.   Can change to maintenance after 24 hours at 100cc/hr if still only on clear liquids.   Discussed with nurse can be on clear liquid diet.   F/u MRCP

## 2023-05-12 NOTE — PROGRESS NOTE ADULT - SUBJECTIVE AND OBJECTIVE BOX
pt seen and examined.  pain not controlled with dilaudid.    Vital Signs Last 24 Hrs  T(C): 36.5 (12 May 2023 07:35), Max: 36.7 (11 May 2023 20:16)  T(F): 97.7 (12 May 2023 07:35), Max: 98.1 (11 May 2023 20:16)  HR: 76 (12 May 2023 07:35) (71 - 78)  BP: 108/56 (12 May 2023 07:35) (102/58 - 144/75)  BP(mean): --  RR: 16 (12 May 2023 07:35) (16 - 18)  SpO2: 93% (12 May 2023 07:35) (93% - 100%)    Parameters below as of 12 May 2023 07:35  Patient On (Oxygen Delivery Method): room air    abd soft + BS mild tenderness without guarding                              12.2   7.07  )-----------( 178      ( 12 May 2023 06:34 )             36.9     05-12    142  |  111<H>  |  14  ----------------------------<  118<H>  4.1   |  26  |  0.68    Ca    8.4<L>      12 May 2023 06:34    TPro  5.8<L>  /  Alb  3.0<L>  /  TBili  0.4  /  DBili  x   /  AST  25  /  ALT  42  /  AlkPhos  50  05-12        LIVER FUNCTIONS - ( 12 May 2023 06:34 )  Alb: 3.0 g/dL / Pro: 5.8 gm/dL / ALK PHOS: 50 U/L / ALT: 42 U/L / AST: 25 U/L / GGT: x           PT/INR - ( 11 May 2023 20:30 )   PT: 10.9 sec;   INR: 0.94 ratio         PTT - ( 11 May 2023 20:30 )  PTT:31.7 sec      acute pancreatitis - likely pancreatic divisum  - case d/w GI   - continue IVF  - continue dilaudid but will increase to 1.5 mg q4h prn  - IV tylenol X 1 now

## 2023-05-12 NOTE — H&P ADULT - MLM HIDDEN
Discharge Summary - [facility]   Baby Boy [facility]) Leni Long 2 days male MRN: [id number]  Unit/Bed#: (N) Encounter: [id number]    Admission Date and Time: 2022  8:59 PM   Discharge Date: 2022  Admitting Diagnosis: Single liveborn infant, delivered vaginally [Z38 00]  Discharge Diagnosis: Term     HPI: [facility] ([de-identified]) Leni Long is a 3840 g (8 lb 7 5 oz) AGA male born to a 28 y o   [id number]  mother at Gestational Age: 36w0d  Discharge Weight:  Weight: 3670 g (8 lb 1 5 oz)   Pct Wt Change: -4 43 %  Route of delivery: Vaginal, Spontaneous  Procedures Performed: No orders of the defined types were placed in this encounter  Hospital Course: 44 week boy    No issues during admission  Bilirubin 5 5 at 24 hours of life which is low intermediate risk      Highlights of Hospital Stay:   Hearing screen:  Hearing Screen  Risk factors: No risk factors present  Parents informed: Yes  Initial ARMANDO screening results  Initial Hearing Screen Results Left Ear: Pass  Initial Hearing Screen Results Right Ear: Pass  Hearing Screen Date: 22  Car Seat Pneumogram:    Hepatitis B vaccination:   Immunization History   Administered Date(s) Administered    Hep B, Adolescent or Pediatric 2022     Feedings (last 2 days)     Date/Time Feeding Type Feeding Route    22 0900 Breast milk Breast    22 0800 Breast milk Breast    22 0735 Breast milk Breast    22 0500 Breast milk --    22 0300 Breast milk --    22 0100 Breast milk --    22 2345 Breast milk --    22 2300 Breast milk --    22 2100 Breast milk --    22 2000 Breast milk Breast    22 1715 Breast milk Breast    22 1700 Breast milk Breast    22 1015 -- Breast    22 0800 -- Breast    22 0420 Breast milk Breast    22 0330 Breast milk Breast    22 0030 Breast milk Breast        SAT after 24 hours: Pulse Ox Screen: Initial  Preductal Sensor %: 98 %  Preductal Sensor Site: R Upper Extremity  Postductal Sensor % : 99 %  Postductal Sensor Site: R Lower Extremity  CCHD Negative Screen: Pass - No Further Intervention Needed    Mother's blood type:   Information for the patient's mother:  Claudetta Allen [[id number]]     Lab Results   Component Value Date/Time    ABO Grouping O 2022 08:45 PM    Rh Factor Positive 2022 08:45 PM    Rh Type RH(D) POSITIVE 2022 11:24 AM      Baby's blood type:   ABO Grouping   Date Value Ref Range Status   2022 O  Final     Rh Factor   Date Value Ref Range Status   2022 Positive  Final     Naveed:   Results from last 7 days   Lab Units 22   LESLIE IGG  Negative       Bilirubin:   Results from last 7 days   Lab Units 22   TOTAL BILIRUBIN mg/dL 5 53     [place] Metabolic Screen Date:  (22 : Prachi Arreola RN)    Delivery Information:    YOB: 2021   Time of birth: 8:59 PM   Sex: male   Gestational Age: 39w0d     ROM Date: 2022  ROM Time: 8:46 PM  Length of ROM: 0h 13m                Fluid Color:            APGARS  One minute Five minutes   Totals: 8  9      Prenatal History:   Maternal Labs  Lab Results   Component Value Date/Time    ABO Grouping O 2022 08:45 PM    Rh Factor Positive 2022 08:45 PM    Rh Type RH(D) POSITIVE 2022 11:24 AM    Hepatitis B Surface Ag non-reactive 2022 12:00 AM    HEP C AB NON-REACTIVE 2022 11:24 AM    RPR Non-Reactive 2022 08:45 PM    Glucose 112 2022 11:24 AM        Vitals:   Temperature: 99 °F (37 2 °C)  Pulse: (!) 106 (baby sleeping)  Respirations: 42  Length: 20" (50 8 cm) (Filed from Delivery Summary)  Weight: 3670 g (8 lb 1 5 oz)  Pct Wt Change: -4 43 %    Physical Exam:General Appearance:  Alert, active, no distress  Head:  Normocephalic, AFOF                             Eyes:  Conjunctiva clear, +RR  Ears:  Normally placed, no anomalies  Nose: nares patent Mouth:  Palate intact  Respiratory:  No grunting, flaring, retractions, breath sounds clear and equal  Cardiovascular:  Regular rate and rhythm  No murmur  Adequate perfusion/capillary refill  Femoral pulses present   Abdomen:   Soft, non-distended, no masses, bowel sounds present, no HSM  Genitourinary:  Normal genitalia  Spine:  No hair ada, dimples  Musculoskeletal:  Normal hips  Skin/Hair/Nails:   Skin warm, dry, and intact, no rashes               Neurologic:   Normal tone and reflexes    Discharge instructions/Information to patient and family:   See after visit summary for information provided to patient and family  Provisions for Follow-Up Care:  See after visit summary for information related to follow-up care and any pertinent home health orders  Disposition: Home    Discharge Medications:  See after visit summary for reconciled discharge medications provided to patient and family  yes

## 2023-05-13 LAB
ALBUMIN SERPL ELPH-MCNC: 2.7 G/DL — LOW (ref 3.3–5)
ALP SERPL-CCNC: 50 U/L — SIGNIFICANT CHANGE UP (ref 40–120)
ALT FLD-CCNC: 54 U/L — SIGNIFICANT CHANGE UP (ref 12–78)
ANION GAP SERPL CALC-SCNC: 4 MMOL/L — LOW (ref 5–17)
AST SERPL-CCNC: 38 U/L — HIGH (ref 15–37)
BILIRUB SERPL-MCNC: 0.5 MG/DL — SIGNIFICANT CHANGE UP (ref 0.2–1.2)
BUN SERPL-MCNC: 9 MG/DL — SIGNIFICANT CHANGE UP (ref 7–23)
CALCIUM SERPL-MCNC: 8.1 MG/DL — LOW (ref 8.5–10.1)
CHLORIDE SERPL-SCNC: 107 MMOL/L — SIGNIFICANT CHANGE UP (ref 96–108)
CO2 SERPL-SCNC: 29 MMOL/L — SIGNIFICANT CHANGE UP (ref 22–31)
CREAT SERPL-MCNC: 0.56 MG/DL — SIGNIFICANT CHANGE UP (ref 0.5–1.3)
EGFR: 95 ML/MIN/1.73M2 — SIGNIFICANT CHANGE UP
GLUCOSE SERPL-MCNC: 97 MG/DL — SIGNIFICANT CHANGE UP (ref 70–99)
LIDOCAIN IGE QN: 2327 U/L — HIGH (ref 73–393)
POTASSIUM SERPL-MCNC: 4 MMOL/L — SIGNIFICANT CHANGE UP (ref 3.5–5.3)
POTASSIUM SERPL-SCNC: 4 MMOL/L — SIGNIFICANT CHANGE UP (ref 3.5–5.3)
PROT SERPL-MCNC: 5.5 GM/DL — LOW (ref 6–8.3)
SODIUM SERPL-SCNC: 140 MMOL/L — SIGNIFICANT CHANGE UP (ref 135–145)

## 2023-05-13 PROCEDURE — 99231 SBSQ HOSP IP/OBS SF/LOW 25: CPT

## 2023-05-13 PROCEDURE — 99232 SBSQ HOSP IP/OBS MODERATE 35: CPT

## 2023-05-13 PROCEDURE — 74183 MRI ABD W/O CNTR FLWD CNTR: CPT | Mod: 26

## 2023-05-13 RX ORDER — HYDROMORPHONE HYDROCHLORIDE 2 MG/ML
1 INJECTION INTRAMUSCULAR; INTRAVENOUS; SUBCUTANEOUS EVERY 6 HOURS
Refills: 0 | Status: DISCONTINUED | OUTPATIENT
Start: 2023-05-13 | End: 2023-05-16

## 2023-05-13 RX ADMIN — Medication 3 MILLIGRAM(S): at 21:36

## 2023-05-13 RX ADMIN — HYDROMORPHONE HYDROCHLORIDE 1.5 MILLIGRAM(S): 2 INJECTION INTRAMUSCULAR; INTRAVENOUS; SUBCUTANEOUS at 13:09

## 2023-05-13 RX ADMIN — Medication 650 MILLIGRAM(S): at 22:30

## 2023-05-13 RX ADMIN — HYDROMORPHONE HYDROCHLORIDE 1.5 MILLIGRAM(S): 2 INJECTION INTRAMUSCULAR; INTRAVENOUS; SUBCUTANEOUS at 12:15

## 2023-05-13 RX ADMIN — LOSARTAN POTASSIUM 100 MILLIGRAM(S): 100 TABLET, FILM COATED ORAL at 09:20

## 2023-05-13 RX ADMIN — Medication 10 MILLIGRAM(S): at 09:20

## 2023-05-13 RX ADMIN — Medication 81 MILLIGRAM(S): at 09:20

## 2023-05-13 RX ADMIN — HYDROMORPHONE HYDROCHLORIDE 1.5 MILLIGRAM(S): 2 INJECTION INTRAMUSCULAR; INTRAVENOUS; SUBCUTANEOUS at 02:29

## 2023-05-13 RX ADMIN — SIMVASTATIN 20 MILLIGRAM(S): 20 TABLET, FILM COATED ORAL at 21:35

## 2023-05-13 RX ADMIN — Medication 0.25 MILLIGRAM(S): at 22:50

## 2023-05-13 RX ADMIN — Medication 25 MILLIGRAM(S): at 21:36

## 2023-05-13 RX ADMIN — HYDROMORPHONE HYDROCHLORIDE 1 MILLIGRAM(S): 2 INJECTION INTRAMUSCULAR; INTRAVENOUS; SUBCUTANEOUS at 22:00

## 2023-05-13 RX ADMIN — HYDROMORPHONE HYDROCHLORIDE 1 MILLIGRAM(S): 2 INJECTION INTRAMUSCULAR; INTRAVENOUS; SUBCUTANEOUS at 21:36

## 2023-05-13 RX ADMIN — PANTOPRAZOLE SODIUM 40 MILLIGRAM(S): 20 TABLET, DELAYED RELEASE ORAL at 06:31

## 2023-05-13 RX ADMIN — Medication 650 MILLIGRAM(S): at 21:35

## 2023-05-13 RX ADMIN — HYDROMORPHONE HYDROCHLORIDE 1.5 MILLIGRAM(S): 2 INJECTION INTRAMUSCULAR; INTRAVENOUS; SUBCUTANEOUS at 01:59

## 2023-05-13 RX ADMIN — SODIUM CHLORIDE 100 MILLILITER(S): 9 INJECTION, SOLUTION INTRAVENOUS at 12:07

## 2023-05-13 NOTE — PROGRESS NOTE ADULT - SUBJECTIVE AND OBJECTIVE BOX
Reason for Admission: Pancreatitis  History of Present Illness:   77 y/o Female presented for abdominal pain. Patient stated she started to have severe abdominal pain around 1 pm yesterday. Described as generalized pain, nonradiating, rated 9/10, no aggravating or alleviating factors. Associated wit nausea and one episode of vomiting. Denies fever, chills, chest pain, SOB, diarrhea, constipation, lightheadedness, dizziness. Patient has hx of recurrent pancreatitis, last hospitalized in December 2021. In the past thought due to be related gallstones, had cholecystectomy. Patient states has not drank alcohol since November 2021. 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    5/13 - pain improving. tolerating clears. pt has not decided regarding ERCP yet.  agreeable to MRCP    ROS:   All 10 systems reviewed and found to be negative with the exception of what has been described above.    Vital Signs Last 24 Hrs  T(C): 37.4 (13 May 2023 12:10), Max: 37.8 (13 May 2023 07:53)  T(F): 99.3 (13 May 2023 12:10), Max: 100 (13 May 2023 07:53)  HR: 77 (13 May 2023 12:10) (73 - 89)  BP: 127/57 (13 May 2023 12:10) (115/48 - 132/57)  BP(mean): --  RR: 18 (13 May 2023 12:10) (18 - 19)  SpO2: 96% (13 May 2023 12:10) (93% - 96%)    Parameters below as of 13 May 2023 12:10  Patient On (Oxygen Delivery Method): room air    GEN: lying in bed, NAD  HEENT:   NC/AT, pupils equal and reactive, EOMI  CV:  +S1, +S2, RRR  RESP:   lungs clear to auscultation bilaterally, no wheeze, rales, rhonchi   BREAST:  not examined  GI:  abdomen soft, non-tender, non-distended, normoactive BS  RECTAL:  not examined  :  not examined  MSK:   normal muscle tone  EXT:  no edema  NEURO:  AAOX3, no focal neurological deficits  SKIN:  no rashes                                12.2   7.07  )-----------( 178      ( 12 May 2023 06:34 )             36.9     05-13    140  |  107  |  9   ----------------------------<  97  4.0   |  29  |  0.56    Ca    8.1<L>      13 May 2023 06:15    TPro  5.5<L>  /  Alb  2.7<L>  /  TBili  0.5  /  DBili  x   /  AST  38<H>  /  ALT  54  /  AlkPhos  50  05-13        LIVER FUNCTIONS - ( 13 May 2023 06:15 )  Alb: 2.7 g/dL / Pro: 5.5 gm/dL / ALK PHOS: 50 U/L / ALT: 54 U/L / AST: 38 U/L / GGT: x           PT/INR - ( 11 May 2023 20:30 )   PT: 10.9 sec;   INR: 0.94 ratio         PTT - ( 11 May 2023 20:30 )  PTT:31.7 sec      < from: CT Abdomen and Pelvis w/ IV Cont (05.11.23 @ 21:52) >  Acute interstitial pancreatitis. No necrosis, hemorrhage or pseudocyst   formation.    < end of copied text >      A/P:    1.  Acute Pancreatitis likely 2/2 pancreatic divisum  -patient has h/o recurrent pancreatitis  - continue IVF   - continue clear liquid diet  - MRCP pending  - GI input noted. possible ERCP     2.  HTN  HLD  Anxiety  continue Home meds    3.  SCD for DVT ppx    4.  Code status: Full code.

## 2023-05-14 LAB — LIDOCAIN IGE QN: 510 U/L — HIGH (ref 73–393)

## 2023-05-14 PROCEDURE — 99232 SBSQ HOSP IP/OBS MODERATE 35: CPT

## 2023-05-14 RX ORDER — FUROSEMIDE 40 MG
20 TABLET ORAL ONCE
Refills: 0 | Status: COMPLETED | OUTPATIENT
Start: 2023-05-14 | End: 2023-05-14

## 2023-05-14 RX ORDER — ACETAMINOPHEN 500 MG
650 TABLET ORAL EVERY 6 HOURS
Refills: 0 | Status: DISCONTINUED | OUTPATIENT
Start: 2023-05-14 | End: 2023-05-18

## 2023-05-14 RX ADMIN — Medication 650 MILLIGRAM(S): at 23:30

## 2023-05-14 RX ADMIN — Medication 81 MILLIGRAM(S): at 09:47

## 2023-05-14 RX ADMIN — Medication 650 MILLIGRAM(S): at 06:00

## 2023-05-14 RX ADMIN — LOSARTAN POTASSIUM 100 MILLIGRAM(S): 100 TABLET, FILM COATED ORAL at 09:46

## 2023-05-14 RX ADMIN — Medication 650 MILLIGRAM(S): at 12:35

## 2023-05-14 RX ADMIN — HYDROMORPHONE HYDROCHLORIDE 1 MILLIGRAM(S): 2 INJECTION INTRAMUSCULAR; INTRAVENOUS; SUBCUTANEOUS at 05:15

## 2023-05-14 RX ADMIN — HYDROMORPHONE HYDROCHLORIDE 1 MILLIGRAM(S): 2 INJECTION INTRAMUSCULAR; INTRAVENOUS; SUBCUTANEOUS at 05:01

## 2023-05-14 RX ADMIN — Medication 650 MILLIGRAM(S): at 22:32

## 2023-05-14 RX ADMIN — Medication 10 MILLIGRAM(S): at 09:47

## 2023-05-14 RX ADMIN — Medication 20 MILLIGRAM(S): at 01:22

## 2023-05-14 RX ADMIN — SIMVASTATIN 20 MILLIGRAM(S): 20 TABLET, FILM COATED ORAL at 22:22

## 2023-05-14 RX ADMIN — Medication 650 MILLIGRAM(S): at 05:01

## 2023-05-14 RX ADMIN — Medication 650 MILLIGRAM(S): at 13:32

## 2023-05-14 RX ADMIN — Medication 25 MILLIGRAM(S): at 22:22

## 2023-05-14 RX ADMIN — Medication 3 MILLIGRAM(S): at 22:22

## 2023-05-14 NOTE — PROGRESS NOTE ADULT - SUBJECTIVE AND OBJECTIVE BOX
Reason for Admission: Pancreatitis  History of Present Illness:   75 y/o Female presented for abdominal pain. Patient stated she started to have severe abdominal pain around 1 pm yesterday. Described as generalized pain, nonradiating, rated 9/10, no aggravating or alleviating factors. Associated wit nausea and one episode of vomiting. Denies fever, chills, chest pain, SOB, diarrhea, constipation, lightheadedness, dizziness. Patient has hx of recurrent pancreatitis, last hospitalized in December 2021. In the past thought due to be related gallstones, had cholecystectomy. Patient states has not drank alcohol since November 2021. 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    5/13 - pain improving. tolerating clears. pt has not decided regarding ERCP yet.  agreeable to MRCP  5/14 - pain much improved. + HA    ROS:   All 10 systems reviewed and found to be negative with the exception of what has been described above.    Vital Signs Last 24 Hrs  T(C): 36.6 (14 May 2023 07:22), Max: 38.1 (13 May 2023 20:34)  T(F): 97.9 (14 May 2023 07:22), Max: 100.6 (13 May 2023 20:34)  HR: 70 (14 May 2023 07:22) (70 - 82)  BP: 102/52 (14 May 2023 07:22) (102/52 - 161/54)  BP(mean): --  RR: 17 (14 May 2023 07:22) (17 - 18)  SpO2: 96% (14 May 2023 07:22) (93% - 96%)    Parameters below as of 14 May 2023 07:22  Patient On (Oxygen Delivery Method): room air    GEN: lying in bed, NAD  HEENT:   NC/AT, pupils equal and reactive, EOMI  CV:  +S1, +S2, RRR  RESP:   lungs clear to auscultation bilaterally, no wheeze, rales, rhonchi   BREAST:  not examined  GI:  abdomen soft, non-tender, non-distended, normoactive BS  RECTAL:  not examined  :  not examined  MSK:   normal muscle tone  EXT:  no edema  NEURO:  AAOX3, no focal neurological deficits  SKIN:  no rashes            05-13    140  |  107  |  9   ----------------------------<  97  4.0   |  29  |  0.56    Ca    8.1<L>      13 May 2023 06:15    TPro  5.5<L>  /  Alb  2.7<L>  /  TBili  0.5  /  DBili  x   /  AST  38<H>  /  ALT  54  /  AlkPhos  50  05-13        LIVER FUNCTIONS - ( 13 May 2023 06:15 )  Alb: 2.7 g/dL / Pro: 5.5 gm/dL / ALK PHOS: 50 U/L / ALT: 54 U/L / AST: 38 U/L / GGT: x               < from: CT Abdomen and Pelvis w/ IV Cont (05.11.23 @ 21:52) >  Acute interstitial pancreatitis. No necrosis, hemorrhage or pseudocyst   formation.    < end of copied text >      A/P:    1.  Acute Pancreatitis likely 2/2 pancreatic divisum  -patient has h/o recurrent pancreatitis  - continue IVF   - continue clear liquid diet  - MRCP - results pending   - GI input noted. possible ERCP on wednesday. pt now agreeable.     2.  HTN  HLD  Anxiety  continue Home meds    3.  SCD for DVT ppx    4.  Code status: Full code.

## 2023-05-15 LAB
ALBUMIN SERPL ELPH-MCNC: 2.8 G/DL — LOW (ref 3.3–5)
ALP SERPL-CCNC: 56 U/L — SIGNIFICANT CHANGE UP (ref 40–120)
ALT FLD-CCNC: 43 U/L — SIGNIFICANT CHANGE UP (ref 12–78)
ANION GAP SERPL CALC-SCNC: 4 MMOL/L — LOW (ref 5–17)
AST SERPL-CCNC: 28 U/L — SIGNIFICANT CHANGE UP (ref 15–37)
BILIRUB SERPL-MCNC: 0.3 MG/DL — SIGNIFICANT CHANGE UP (ref 0.2–1.2)
BUN SERPL-MCNC: 9 MG/DL — SIGNIFICANT CHANGE UP (ref 7–23)
CALCIUM SERPL-MCNC: 8.6 MG/DL — SIGNIFICANT CHANGE UP (ref 8.5–10.1)
CHLORIDE SERPL-SCNC: 109 MMOL/L — HIGH (ref 96–108)
CO2 SERPL-SCNC: 30 MMOL/L — SIGNIFICANT CHANGE UP (ref 22–31)
CREAT SERPL-MCNC: 0.47 MG/DL — LOW (ref 0.5–1.3)
EGFR: 99 ML/MIN/1.73M2 — SIGNIFICANT CHANGE UP
GLUCOSE SERPL-MCNC: 107 MG/DL — HIGH (ref 70–99)
LIDOCAIN IGE QN: 500 U/L — HIGH (ref 73–393)
POTASSIUM SERPL-MCNC: 3.9 MMOL/L — SIGNIFICANT CHANGE UP (ref 3.5–5.3)
POTASSIUM SERPL-SCNC: 3.9 MMOL/L — SIGNIFICANT CHANGE UP (ref 3.5–5.3)
PROT SERPL-MCNC: 5.8 GM/DL — LOW (ref 6–8.3)
RAPID RVP RESULT: SIGNIFICANT CHANGE UP
SARS-COV-2 RNA SPEC QL NAA+PROBE: SIGNIFICANT CHANGE UP
SODIUM SERPL-SCNC: 143 MMOL/L — SIGNIFICANT CHANGE UP (ref 135–145)

## 2023-05-15 PROCEDURE — 99232 SBSQ HOSP IP/OBS MODERATE 35: CPT

## 2023-05-15 RX ORDER — FUROSEMIDE 40 MG
40 TABLET ORAL ONCE
Refills: 0 | Status: COMPLETED | OUTPATIENT
Start: 2023-05-15 | End: 2023-05-15

## 2023-05-15 RX ADMIN — PANTOPRAZOLE SODIUM 40 MILLIGRAM(S): 20 TABLET, DELAYED RELEASE ORAL at 08:18

## 2023-05-15 RX ADMIN — LOSARTAN POTASSIUM 100 MILLIGRAM(S): 100 TABLET, FILM COATED ORAL at 09:18

## 2023-05-15 RX ADMIN — Medication 650 MILLIGRAM(S): at 09:18

## 2023-05-15 RX ADMIN — HYDROMORPHONE HYDROCHLORIDE 1 MILLIGRAM(S): 2 INJECTION INTRAMUSCULAR; INTRAVENOUS; SUBCUTANEOUS at 22:06

## 2023-05-15 RX ADMIN — Medication 0.25 MILLIGRAM(S): at 00:05

## 2023-05-15 RX ADMIN — Medication 650 MILLIGRAM(S): at 10:16

## 2023-05-15 RX ADMIN — SIMVASTATIN 20 MILLIGRAM(S): 20 TABLET, FILM COATED ORAL at 21:49

## 2023-05-15 RX ADMIN — Medication 10 MILLIGRAM(S): at 09:18

## 2023-05-15 RX ADMIN — Medication 40 MILLIGRAM(S): at 10:07

## 2023-05-15 RX ADMIN — HYDROMORPHONE HYDROCHLORIDE 1 MILLIGRAM(S): 2 INJECTION INTRAMUSCULAR; INTRAVENOUS; SUBCUTANEOUS at 21:51

## 2023-05-15 RX ADMIN — Medication 0.25 MILLIGRAM(S): at 23:23

## 2023-05-15 RX ADMIN — Medication 25 MILLIGRAM(S): at 21:49

## 2023-05-15 RX ADMIN — Medication 81 MILLIGRAM(S): at 09:18

## 2023-05-15 RX ADMIN — Medication 3 MILLIGRAM(S): at 21:49

## 2023-05-15 NOTE — PROGRESS NOTE ADULT - SUBJECTIVE AND OBJECTIVE BOX
Reason for Admission: Pancreatitis  History of Present Illness:   77 y/o Female presented for abdominal pain. Patient stated she started to have severe abdominal pain around 1 pm yesterday. Described as generalized pain, nonradiating, rated 9/10, no aggravating or alleviating factors. Associated wit nausea and one episode of vomiting. Denies fever, chills, chest pain, SOB, diarrhea, constipation, lightheadedness, dizziness. Patient has hx of recurrent pancreatitis, last hospitalized in December 2021. In the past thought due to be related gallstones, had cholecystectomy. Patient states has not drank alcohol since November 2021. 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    5/13 - pain improving. tolerating clears. pt has not decided regarding ERCP yet.  agreeable to MRCP  5/14 - pain much improved. + HA  5/15 - toelrating diet.  wants to speak with dr kim before deciding on ERCP + fever     ROS:   All 10 systems reviewed and found to be negative with the exception of what has been described above.    Vital Signs Last 24 Hrs  T(C): 36.5 (15 May 2023 15:44), Max: 37.2 (14 May 2023 22:25)  T(F): 97.7 (15 May 2023 15:44), Max: 99 (14 May 2023 22:25)  HR: 67 (15 May 2023 15:44) (67 - 75)  BP: 159/74 (15 May 2023 15:44) (136/58 - 159/74)  BP(mean): --  RR: 18 (15 May 2023 15:44) (18 - 18)  SpO2: 93% (15 May 2023 15:44) (91% - 93%)    Parameters below as of 15 May 2023 15:44  Patient On (Oxygen Delivery Method): room air      GEN: lying in bed, NAD  HEENT:   NC/AT, pupils equal and reactive, EOMI  CV:  +S1, +S2, RRR  RESP:   lungs clear to auscultation bilaterally, no wheeze, rales, rhonchi   BREAST:  not examined  GI:  abdomen soft, non-tender, non-distended, normoactive BS  RECTAL:  not examined  :  not examined  MSK:   normal muscle tone  EXT:  no edema  NEURO:  AAOX3, no focal neurological deficits  SKIN:  no rashes      05-15    143  |  109<H>  |  9   ----------------------------<  107<H>  3.9   |  30  |  0.47<L>    Ca    8.6      15 May 2023 06:43    TPro  5.8<L>  /  Alb  2.8<L>  /  TBili  0.3  /  DBili  x   /  AST  28  /  ALT  43  /  AlkPhos  56  05-15        LIVER FUNCTIONS - ( 15 May 2023 06:43 )  Alb: 2.8 g/dL / Pro: 5.8 gm/dL / ALK PHOS: 56 U/L / ALT: 43 U/L / AST: 28 U/L / GGT: x             < from: CT Abdomen and Pelvis w/ IV Cont (05.11.23 @ 21:52) >  Acute interstitial pancreatitis. No necrosis, hemorrhage or pseudocyst   formation.    < end of copied text >    < from: MR MRCP w/wo IV Cont (05.13.23 @ 15:13) >  IMPRESSION:  Interstitial edematous pancreatitis.    Pancreas divisum.    Fusiform dilatation of the pancreatic duct again seen in the region of   the minor papilla compatible with a santorinicele.    Partially imaged bilateral pleural effusions with compressive atelectasis   of the lower lobes, new since 5/11/2023.    < end of copied text >        A/P:  # Acute Pancreatitis likely 2/2 pancreatic divisum  -patient has h/o recurrent pancreatitis  - stop IVF and given lasix given SOB and atelectasis as this is cause of low grade temp.  start incentive spirometer   - plan for dr kim to speak with patient regarding possible ERCP wednesday  - continue low fat diet   - MRCP - results noted above   - GI input noted. possible ERCP on wednesday. pt now agreeable.     # HTN  HLD  Anxiety  continue Home meds    # SCD for DVT ppx    # Code status: Full code.

## 2023-05-16 DIAGNOSIS — E78.5 HYPERLIPIDEMIA, UNSPECIFIED: ICD-10-CM

## 2023-05-16 DIAGNOSIS — K85.90 ACUTE PANCREATITIS WITHOUT NECROSIS OR INFECTION, UNSPECIFIED: ICD-10-CM

## 2023-05-16 DIAGNOSIS — I10 ESSENTIAL (PRIMARY) HYPERTENSION: ICD-10-CM

## 2023-05-16 DIAGNOSIS — I35.1 NONRHEUMATIC AORTIC (VALVE) INSUFFICIENCY: ICD-10-CM

## 2023-05-16 DIAGNOSIS — I36.1 NONRHEUMATIC TRICUSPID (VALVE) INSUFFICIENCY: ICD-10-CM

## 2023-05-16 DIAGNOSIS — I34.0 NONRHEUMATIC MITRAL (VALVE) INSUFFICIENCY: ICD-10-CM

## 2023-05-16 LAB
HCT VFR BLD CALC: 35.1 % — SIGNIFICANT CHANGE UP (ref 34.5–45)
HGB BLD-MCNC: 11.7 G/DL — SIGNIFICANT CHANGE UP (ref 11.5–15.5)
MCHC RBC-ENTMCNC: 29.6 PG — SIGNIFICANT CHANGE UP (ref 27–34)
MCHC RBC-ENTMCNC: 33.3 GM/DL — SIGNIFICANT CHANGE UP (ref 32–36)
MCV RBC AUTO: 88.9 FL — SIGNIFICANT CHANGE UP (ref 80–100)
PLATELET # BLD AUTO: 184 K/UL — SIGNIFICANT CHANGE UP (ref 150–400)
RBC # BLD: 3.95 M/UL — SIGNIFICANT CHANGE UP (ref 3.8–5.2)
RBC # FLD: 13.5 % — SIGNIFICANT CHANGE UP (ref 10.3–14.5)
WBC # BLD: 4.91 K/UL — SIGNIFICANT CHANGE UP (ref 3.8–10.5)
WBC # FLD AUTO: 4.91 K/UL — SIGNIFICANT CHANGE UP (ref 3.8–10.5)

## 2023-05-16 PROCEDURE — 99233 SBSQ HOSP IP/OBS HIGH 50: CPT

## 2023-05-16 RX ORDER — HYDROMORPHONE HYDROCHLORIDE 2 MG/ML
0.5 INJECTION INTRAMUSCULAR; INTRAVENOUS; SUBCUTANEOUS EVERY 6 HOURS
Refills: 0 | Status: DISCONTINUED | OUTPATIENT
Start: 2023-05-16 | End: 2023-05-18

## 2023-05-16 RX ORDER — HYDROMORPHONE HYDROCHLORIDE 2 MG/ML
0.2 INJECTION INTRAMUSCULAR; INTRAVENOUS; SUBCUTANEOUS EVERY 6 HOURS
Refills: 0 | Status: DISCONTINUED | OUTPATIENT
Start: 2023-05-16 | End: 2023-05-16

## 2023-05-16 RX ORDER — HYDROMORPHONE HYDROCHLORIDE 2 MG/ML
0.2 INJECTION INTRAMUSCULAR; INTRAVENOUS; SUBCUTANEOUS EVERY 6 HOURS
Refills: 0 | Status: DISCONTINUED | OUTPATIENT
Start: 2023-05-16 | End: 2023-05-18

## 2023-05-16 RX ORDER — HYDRALAZINE HCL 50 MG
5 TABLET ORAL EVERY 6 HOURS
Refills: 0 | Status: DISCONTINUED | OUTPATIENT
Start: 2023-05-16 | End: 2023-05-18

## 2023-05-16 RX ADMIN — PANTOPRAZOLE SODIUM 40 MILLIGRAM(S): 20 TABLET, DELAYED RELEASE ORAL at 06:32

## 2023-05-16 RX ADMIN — Medication 25 MILLIGRAM(S): at 22:13

## 2023-05-16 RX ADMIN — Medication 81 MILLIGRAM(S): at 09:24

## 2023-05-16 RX ADMIN — Medication 0.25 MILLIGRAM(S): at 23:52

## 2023-05-16 RX ADMIN — Medication 5 MILLIGRAM(S): at 18:27

## 2023-05-16 RX ADMIN — Medication 650 MILLIGRAM(S): at 14:10

## 2023-05-16 RX ADMIN — Medication 650 MILLIGRAM(S): at 13:31

## 2023-05-16 RX ADMIN — LOSARTAN POTASSIUM 100 MILLIGRAM(S): 100 TABLET, FILM COATED ORAL at 09:25

## 2023-05-16 RX ADMIN — Medication 10 MILLIGRAM(S): at 09:25

## 2023-05-16 RX ADMIN — Medication 3 MILLIGRAM(S): at 22:13

## 2023-05-16 RX ADMIN — SIMVASTATIN 20 MILLIGRAM(S): 20 TABLET, FILM COATED ORAL at 22:13

## 2023-05-16 NOTE — PROGRESS NOTE ADULT - SUBJECTIVE AND OBJECTIVE BOX
HPI: 77 y/o Female presented for abdominal pain. Patient stated she started to have severe abdominal pain around 1 pm yesterday. Described as generalized pain, non-radiating, rated 9/10, no aggravating or alleviating factors. Associated wit nausea and one episode of vomiting. Denies fever, chills, chest pain, SOB, diarrhea, constipation, lightheadedness, dizziness. Patient has hx of recurrent pancreatitis, last hospitalized in December 2021. In the past thought due to be related gallstones, had cholecystectomy. Patient states has not drank alcohol since November 2022. 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 (12 May 2023 02:42)      Subjective: Patient seen this morning, continued pain to epigastric region.    REVIEW OF SYSTEMS:    CONSTITUTIONAL: No weakness, fevers or chills  EYES/ENT: No visual changes;  No vertigo or throat pain   NECK: No pain or stiffness  RESPIRATORY: No cough, wheezing, hemoptysis; No shortness of breath  CARDIOVASCULAR: No chest pain or palpitations  GASTROINTESTINAL: +abdominal and epigastric pain. No nausea, vomiting, or hematemesis; No diarrhea or constipation. No melena or hematochezia.  GENITOURINARY: No dysuria, frequency or hematuria  NEUROLOGICAL: No numbness or weakness  SKIN: No itching, rashes      MEDICATIONS  (STANDING):  aspirin enteric coated 81 milliGRAM(s) Oral daily  losartan 100 milliGRAM(s) Oral daily  metoprolol succinate ER 25 milliGRAM(s) Oral at bedtime  pantoprazole    Tablet 40 milliGRAM(s) Oral before breakfast  PARoxetine 10 milliGRAM(s) Oral daily  simvastatin 20 milliGRAM(s) Oral at bedtime    MEDICATIONS  (PRN):  acetaminophen     Tablet .. 650 milliGRAM(s) Oral every 6 hours PRN Mild Pain (1 - 3)  ALPRAZolam 0.25 milliGRAM(s) Oral three times a day PRN Anxiety  aluminum hydroxide/magnesium hydroxide/simethicone Suspension 30 milliLiter(s) Oral every 4 hours PRN Dyspepsia  hydrALAZINE Injectable 5 milliGRAM(s) IV Push every 6 hours PRN SBP > 160  HYDROmorphone  Injectable 0.5 milliGRAM(s) IV Push every 6 hours PRN Severe Pain (7 - 10)  HYDROmorphone  Injectable 0.2 milliGRAM(s) IV Push every 6 hours PRN Moderate Pain (4 - 6)  melatonin 3 milliGRAM(s) Oral at bedtime PRN Insomnia  ondansetron Injectable 4 milliGRAM(s) IV Push every 8 hours PRN Nausea and/or Vomiting      Vital Signs Last 24 Hrs  T(C): 36.7 (16 May 2023 17:20), Max: 36.9 (15 May 2023 21:51)  T(F): 98.1 (16 May 2023 17:20), Max: 98.4 (15 May 2023 21:51)  HR: 76 (16 May 2023 17:20) (76 - 99)  BP: 178/98 (16 May 2023 17:20) (152/76 - 178/98)  RR: 18 (16 May 2023 17:20) (18 - 18)  SpO2: 95% (16 May 2023 17:20) (95% - 96%)    Parameters below as of 16 May 2023 17:20  Patient On (Oxygen Delivery Method): room air      PHYSICAL EXAM:  GENERAL: NAD, lying in bed comfortably  HEAD:  Atraumatic, Normocephalic  EYES: conjunctiva and sclera clear  ENT: Moist mucous membranes  NECK: Supple, No JVD  CHEST/LUNG: Clear to auscultation bilaterally; No rales, rhonchi, wheezing. Unlabored respirations  HEART: Regular rate and rhythm; No murmurs  ABDOMEN: Bowel sounds present; Soft, Nondistended. tender to epigastric area   EXTREMITIES:  2+ Peripheral Pulses, brisk capillary refill. No clubbing, cyanosis, or edema  NERVOUS SYSTEM:  Alert & Oriented X3, speech clear. No deficits   MSK: FROM all 4 extremities, full and equal strength          LABS:                          11.7   4.91  )-----------( 184      ( 16 May 2023 06:50 )             35.1     15 May 2023 06:43    143    |  109    |  9      ----------------------------<  107    3.9     |  30     |  0.47     Ca    8.6        15 May 2023 06:43    TPro  5.8    /  Alb  2.8    /  TBili  0.3    /  DBili  x      /  AST  28     /  ALT  43     /  AlkPhos  56     15 May 2023 06:43    LIVER FUNCTIONS - ( 15 May 2023 06:43 )  Alb: 2.8 g/dL / Pro: 5.8 gm/dL / ALK PHOS: 56 U/L / ALT: 43 U/L / AST: 28 U/L / GGT: x             CAPILLARY BLOOD GLUCOSE                RADIOLOGY:    < from: CT Abdomen and Pelvis w/ IV Cont (05.11.23 @ 21:52) >  Acute interstitial pancreatitis. No necrosis, hemorrhage or pseudocyst   formation.        < from: MR MRCP w/wo IV Cont (05.13.23 @ 15:13) >  IMPRESSION:  Interstitial edematous pancreatitis.    Pancreas divisum.    Fusiform dilatation of the pancreatic duct again seen in the region of   the minor papilla compatible with a santorinicele.    Partially imaged bilateral pleural effusions with compressive atelectasis   of the lower lobes, new since 5/11/2023.

## 2023-05-16 NOTE — CONSULT NOTE ADULT - PROVIDER SPECIALTY LIST ADULT
Gastroenterology
Cardiology

## 2023-05-16 NOTE — CONSULT NOTE ADULT - ASSESSMENT
77 year old female with history pancreatitis with pancreatic ductal divisum presenting with acute abdominal pain, lipase >30k, and + pancreatitis on CT    Imp: Recurrent pancreatitis with pancreatic ductal divisum    Rec:  ::MRCP  ::Plan for eventual endoscopic minor papillotomy once inflammation resolves  ::IVF, high rate until tolerating PO fluids then may dc  ::Antiemetics  ::Pain control  ::Clear liquids and adv as vera  
5/16/23:  Pt with above history and recurrent pancreatitis and pancreas divisim and plans for ERCP, possibly tomorrow  She is stable from a cardiac standpoint for needed GI procedures as indicated.  No indication for further cardiac testing at this time.  Continue as outlined by medicine and Dr Espinoza etal.  Will follow as work-up and treatment progresses.

## 2023-05-16 NOTE — CONSULT NOTE ADULT - SUBJECTIVE AND OBJECTIVE BOX
CHIEF COMPLAINT:  Patient is a 77y old  Female who presents with a chief complaint of Pancreatitis (15 May 2023 12:30)      HPI: 23:  77 y/o Female, well known to me, presented for abdominal pain. Patient stated she started to have severe abdominal pain around 1 pm yesterday. Described as generalized pain, nonradiating, rated 9/10, no aggravating or alleviating factors. Associated with nausea and one episode of vomiting.  Initially she had pain radiating into her lower chest with mild SOB.  CT scan did show bilateral pleural effusions but better after 1 dose od Lasix.  She denies fever, chills, recurrent chest pains or SOB, diarrhea, constipation, lightheadedness, dizziness. Patient has hx of recurrent pancreatitis, last hospitalized in 2021. In the past thought due to be related gallstones, had cholecystectomy. Patient states has not drank alcohol since 2021. 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 Her initial Lipase was >30,000 and has been coming down but still with mild abd pains.  CT of Abd and MRCP report as below.  For possible ERCP and stent placement tomorrow.  No new cardiac symptoms.  She had a stress echo in my office last year showing normal LV size and systolic function with LVEF=65-70% with mild MR and TR and trace AI but otherwise a normal study and no stress echo evidence for ischemia.        PMHx:  PAST MEDICAL & SURGICAL HISTORY:  High cholesterol  GERD (gastroesophageal reflux disease)  Arthritis  Fatty liver  Osteoarthritis  Pancreatitis-h/o  Hypertension  Hammertoe of right foot  Anxiety  History of back surgery  History of   History of tonsillectomy  History of laminectomy-with resection  S/P left cataract extraction  S/P cholecystectomy  H/O parathyroidectomy  H/O hand surgery      FAMILY HISTORY:   FAMILY HISTORY:  Family history of breast cancer  Mother -  at age 99  Family history of diabetes mellitus  Both parents -   Family history of CHF (congestive heart failure)  Father   Family history of AIDS (Sibling)-brother       ALLERGIES:  Allergies  Sudafed (Other)  Betadine (Unknown)  adhesives (Rash)  Triple Antibiotic (Unknown)      REVIEW OF SYSTEMS:  10 point ROS was obtained  Pertinent positives and negatives are as above  All other review of systems is negative unless indicated above      Vital Signs Last 24 Hrs  T(C): 36.9 (15 May 2023 21:51), Max: 36.9 (15 May 2023 21:51)  T(F): 98.4 (15 May 2023 21:51), Max: 98.4 (15 May 2023 21:51)  HR: 77 (15 May 2023 21:51) (67 - 77)  BP: 152/78 (15 May 2023 21:51) (136/58 - 159/74)  RR: 18 (15 May 2023 21:51) (18 - 18)  SpO2: 96% (15 May 2023 21:51) (91% - 96%): room air      PHYSICAL EXAM:   Constitutional: NAD, awake and alert, well-developed  HEENT: PERR, EOMI, Normal Hearing, MMM  Neck: Soft and supple, No LAD, No JVD  Respiratory: Breath sounds are clear bilaterally, No wheezing, rales or rhonchi  Cardiovascular: S1 and S2, regular rate and rhythm, soft UZAIR at LLSB and base as before, no gallops or rubs  Gastrointestinal: Bowel Sounds present, soft, mildly tender, nondistended, no guarding, no rebound  Extremities: No peripheral edema  Vascular: 2+ peripheral pulses  Neurological: A/O x 3, no focal deficits  Musculoskeletal: 5/5 strength b/l upper and lower extremities  Skin: No rashes      MEDICATIONS  (STANDING):  aspirin enteric coated 81 milliGRAM(s) Oral daily  losartan 100 milliGRAM(s) Oral daily  metoprolol succinate ER 25 milliGRAM(s) Oral at bedtime  pantoprazole    Tablet 40 milliGRAM(s) Oral before breakfast  PARoxetine 10 milliGRAM(s) Oral daily  simvastatin 20 milliGRAM(s) Oral at bedtime    MEDICATIONS  (PRN):  acetaminophen     Tablet .. 650 milliGRAM(s) Oral every 6 hours PRN Mild Pain (1 - 3)  ALPRAZolam 0.25 milliGRAM(s) Oral three times a day PRN Anxiety  aluminum hydroxide/magnesium hydroxide/simethicone Suspension 30 milliLiter(s) Oral every 4 hours PRN Dyspepsia  HYDROmorphone  Injectable 1 milliGRAM(s) IV Push every 6 hours PRN Severe Pain (7 - 10)  melatonin 3 milliGRAM(s) Oral at bedtime PRN Insomnia  ondansetron Injectable 4 milliGRAM(s) IV Push every 8 hours PRN Nausea and/or Vomiting      LABS: All Labs Reviewed:    05-15    143  |  109<H>  |  9   ----------------------------<  107<H>  3.9   |  30  |  0.47<L>    Ca    8.6      15 May 2023 06:43    TPro  5.8<L>  /  Alb  2.8<L>  /  TBili  0.3  /  DBili  x   /  AST  28  /  ALT  43  /  AlkPhos  56  05-15      Lipase, Serum in AM (05.15.23 @ 06:43): 500 U/L  Lipase, Serum in AM (23 @ 06:11): 510 U/L  Lipase, Serum in AM (23 @ 06:15): 2327 U/L  Lipase, Serum in AM (23 @ 06:34): 32135 U/L  Lipase, Serum (23 @ 20:30): >45223 U/L      BLOOD CULTURES:   LIPID PROFILE     RADIOLOGY:   MRCP: 23:  FINDINGS:  LOWER CHEST: Partially imaged bilateral pleural effusions with compressive atelectasis in the lower lobes, greater on the right.  LIVER: Within normal limits.  BILE DUCTS: Mild intrahepatic biliary ductal dilatation. Common bile duct within normal limits for caliber measuring 6 mm. No common bile duct stone.  GALLBLADDER: Cholecystectomy.  SPLEEN: Within normal limits for size. Subcentimeter high T2 signal lesion in the anterior aspect of the spleen, less conspicuous on the delayed postcontrast imaging, possibly a hemangioma.  PANCREAS: Peripancreatic fat stranding and fluid consistent with pancreatitis. Fluid extends into the perisplenic region and adjacent to the proximal and transverse duodenum; no focal collection. Pancreas divisum. Normal pancreatic enhancement without evidence of necrosis. 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 Remainder of the main pancreatic duct normal in caliber. 3 mm cyst in the pancreatic head (series 5 image 8), more conspicuous on the current study,however not significantly changed since 11/3/2021.  ADRENALS: Within normal limits.  KIDNEYS/URETERS: No hydronephrosis. Ptotic left kidney. Right renal cysts.  VISUALIZED PORTIONS:  BOWEL: Normal caliber.  PERITONEUM: Small amount of ascites.  VESSELS: Abdominal aorta normal in caliber. IVC and hepatic veins are patent. Portal, splenic, and superior mesenteric veins are patent.  RETROPERITONEUM/LYMPH NODES: No lymphadenopathy.  ABDOMINAL WALL: Unremarkable.  BONES: Fusion hardware in the spine.  IMPRESSION:  Interstitial edematous pancreatitis.  Pancreas divisum.  Fusiform dilatation of the pancreatic duct again seen in the region of the minor papilla compatible with a santorinicele.  Partially imaged bilateral pleural effusions with compressive atelectasis of the lower lobes, new since 2023.      CT of Abd/Pelvis: 23:  FINDINGS:  LOWER CHEST: Mild bibasilar atelectasis.  LIVER: Within normal limits.  BILE DUCTS: Normal caliber.  GALLBLADDER: Within cholecystectomy.  SPLEEN: Within normal limits.  PANCREAS: Diffusely edematous. Homogeneously enhancing without necrosis.  Moderate peripancreatic fluid and stranding. No main pancreatic duct dilatation. No organized fluid collection. No hemorrhage.  ADRENALS: Within normal limits.  KIDNEYS/URETERS: No hydronephrosis or perinephric stranding. 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  BLADDER: Underdistended which limits evaluation of the wall thickness.  REPRODUCTIVE ORGANS: Fibroid uterus. No adnexal soft tissue mass.  BOWEL: No bowel obstruction. Appendix is normal. No abnormal bowel wall thickening or inflammatory changes.  PERITONEUM: No no abdominal ascites. No free air or intraperitoneal collection.  VESSELS: Within normal limits. Splenic vein is patent. No evidence of pseudoaneurysm.  RETROPERITONEUM/LYMPH NODES: No lymphadenopathy.  ABDOMINAL WALL: Within normal limits.  BONES: Stable postsurgical changes posterior spinal decompressive surgery and fusion L4 and L5. No acute osseous finding. Degenerative changes.  IMPRESSION:  Acute interstitial pancreatitis. No necrosis, hemorrhage or pseudocyst formation.       EK23:  Sinus rhythm with Premature atrial complexes  Poor R-wave progression V1-4 as before    TELEMETRY:        STRESS ECH0:  Stress echo in my office last year showing normal LV size and systolic function with LVEF=65-70% with mild MR and TR with trace AI but otherwise a normal study and no stress echo evidence for ischemia    ECHO: 19:  M-Mode Measurements (cm)   LVEDd: 5.21 cm            LVESd: 2.43 cm   IVSEd: 0.96 cm   LVPWd: 1.1 cm             AO Root Dimension: 2.4 cm                        ACS: 2 cm  Doppler Measurements:                                  MV Peak E-Wave: 98.2 cm/s   TR Velocity:324 cm/s           MV Peak A-Wave: 96.3 cm/s   TR Gradient:41.9904 mmHg       MV E/A Ratio: 1.02 %   Estimated RAP:10 mmHg          MV Peak Gradient: 3.86 mmHg   RVSP:41 mmHg    Findings  Mitral Valve   Normal appearing mitral valve structure and function.   Trace mitral regurgitation is present.   EA reversal of the mitral inflow consistent with reduced compliance of the left ventricle    Aortic Valve   Mild aortic sclerosis is present with normal valvular opening.   Trace aortic regurgitation is present.    Tricuspid Valve   Mild (1+) tricuspid valve regurgitation is present.   Moderate pulmonary hypertension.    Pulmonic Valve   Normal appearing pulmonic valve structure and function.    Left Atrium   The left atrium is mildly dilated.    Left Ventricle   The left ventricle is normal in size, wall thickness, wall motion and contractility.   Estimated left ventricular ejection fraction is 65-70 %.    Right Atrium   Normal appearing right atrium.    Right Ventricle   Normal appearing right ventricle structure and function.    Pericardial Effusion   No evidence of pericardial effusion.    Pleural Effusion   No evidence of pleural effusion.    Miscellaneous   All visualized extra cardiac structures appears to be normal.    Summary   Mild aortic sclerosis is present with normal valvular opening.   Trace aortic regurgitation is present.   The left atrium is mildly dilated.   The left ventricle is normal in size, wall thickness, wall motion and contractility.   Estimated left ventricular ejection fraction is 65-70 %.   All visualized extra cardiac structures appears to be normal.   Normal appearing mitral valve structure and function.   Trace mitral regurgitation is present.   EA reversal of the mitral inflow consistent with reduced compliance of the left ventricle   No evidence of pericardial effusion.   No evidence of pleural effusion.   Normal appearing pulmonic valve structure and function.   Normal appearing right atrium.   Normal appearing right ventricle structure and function.   Mild (1+) tricuspid valve regurgitation is present.   Moderate pulmonary hypertension.    Signature   ----------------------------------------------------------------   Electronically signed by Salvador Mattson MD(Interpreting   physician) on 2019 07:59 PM   ----------------------------------------------------------------

## 2023-05-16 NOTE — CHART NOTE - NSCHARTNOTEFT_GEN_A_CORE
Spoke with patient at length today. Able to tolerate low fat diet. Breakthrough pain overnight requiring dilaudid. Plan for ercp with possible minor papillotomy tomorrow. Answered and addressed questions and concerns.   Repeat Covid swab, NPO p MN
RN called to report that Pt is c/o SOB. Pt was seen and examined at bedside. Pt is c/o feeling little short of breath. on exam, crackles + on auscultation of lungs. Pt is on IVF , will hold on IVF for now. IV Lasix 20 mg x 1 stat given. Pt is feeling better s/p Lasix.       D/w RN.     Hospitalist to f/u in AM.

## 2023-05-17 LAB
ANION GAP SERPL CALC-SCNC: 6 MMOL/L — SIGNIFICANT CHANGE UP (ref 5–17)
BASOPHILS # BLD AUTO: 0.02 K/UL — SIGNIFICANT CHANGE UP (ref 0–0.2)
BASOPHILS NFR BLD AUTO: 0.4 % — SIGNIFICANT CHANGE UP (ref 0–2)
BUN SERPL-MCNC: 13 MG/DL — SIGNIFICANT CHANGE UP (ref 7–23)
CALCIUM SERPL-MCNC: 9.2 MG/DL — SIGNIFICANT CHANGE UP (ref 8.5–10.1)
CHLORIDE SERPL-SCNC: 110 MMOL/L — HIGH (ref 96–108)
CO2 SERPL-SCNC: 26 MMOL/L — SIGNIFICANT CHANGE UP (ref 22–31)
CREAT SERPL-MCNC: 0.56 MG/DL — SIGNIFICANT CHANGE UP (ref 0.5–1.3)
EGFR: 95 ML/MIN/1.73M2 — SIGNIFICANT CHANGE UP
EOSINOPHIL # BLD AUTO: 0.25 K/UL — SIGNIFICANT CHANGE UP (ref 0–0.5)
EOSINOPHIL NFR BLD AUTO: 5.1 % — SIGNIFICANT CHANGE UP (ref 0–6)
GLUCOSE SERPL-MCNC: 108 MG/DL — HIGH (ref 70–99)
HCT VFR BLD CALC: 34.9 % — SIGNIFICANT CHANGE UP (ref 34.5–45)
HGB BLD-MCNC: 11.5 G/DL — SIGNIFICANT CHANGE UP (ref 11.5–15.5)
IMM GRANULOCYTES NFR BLD AUTO: 0.2 % — SIGNIFICANT CHANGE UP (ref 0–0.9)
LYMPHOCYTES # BLD AUTO: 1.5 K/UL — SIGNIFICANT CHANGE UP (ref 1–3.3)
LYMPHOCYTES # BLD AUTO: 30.7 % — SIGNIFICANT CHANGE UP (ref 13–44)
MAGNESIUM SERPL-MCNC: 2 MG/DL — SIGNIFICANT CHANGE UP (ref 1.6–2.6)
MCHC RBC-ENTMCNC: 29.3 PG — SIGNIFICANT CHANGE UP (ref 27–34)
MCHC RBC-ENTMCNC: 33 GM/DL — SIGNIFICANT CHANGE UP (ref 32–36)
MCV RBC AUTO: 89 FL — SIGNIFICANT CHANGE UP (ref 80–100)
MONOCYTES # BLD AUTO: 0.53 K/UL — SIGNIFICANT CHANGE UP (ref 0–0.9)
MONOCYTES NFR BLD AUTO: 10.8 % — SIGNIFICANT CHANGE UP (ref 2–14)
NEUTROPHILS # BLD AUTO: 2.58 K/UL — SIGNIFICANT CHANGE UP (ref 1.8–7.4)
NEUTROPHILS NFR BLD AUTO: 52.8 % — SIGNIFICANT CHANGE UP (ref 43–77)
PLATELET # BLD AUTO: 211 K/UL — SIGNIFICANT CHANGE UP (ref 150–400)
POTASSIUM SERPL-MCNC: 3.8 MMOL/L — SIGNIFICANT CHANGE UP (ref 3.5–5.3)
POTASSIUM SERPL-SCNC: 3.8 MMOL/L — SIGNIFICANT CHANGE UP (ref 3.5–5.3)
RBC # BLD: 3.92 M/UL — SIGNIFICANT CHANGE UP (ref 3.8–5.2)
RBC # FLD: 13.5 % — SIGNIFICANT CHANGE UP (ref 10.3–14.5)
SARS-COV-2 RNA SPEC QL NAA+PROBE: SIGNIFICANT CHANGE UP
SODIUM SERPL-SCNC: 142 MMOL/L — SIGNIFICANT CHANGE UP (ref 135–145)
WBC # BLD: 4.89 K/UL — SIGNIFICANT CHANGE UP (ref 3.8–10.5)
WBC # FLD AUTO: 4.89 K/UL — SIGNIFICANT CHANGE UP (ref 3.8–10.5)

## 2023-05-17 PROCEDURE — 99232 SBSQ HOSP IP/OBS MODERATE 35: CPT

## 2023-05-17 PROCEDURE — 43262 ENDO CHOLANGIOPANCREATOGRAPH: CPT | Mod: GC

## 2023-05-17 RX ORDER — ONDANSETRON 8 MG/1
4 TABLET, FILM COATED ORAL ONCE
Refills: 0 | Status: DISCONTINUED | OUTPATIENT
Start: 2023-05-17 | End: 2023-05-17

## 2023-05-17 RX ORDER — SODIUM CHLORIDE 9 MG/ML
1000 INJECTION, SOLUTION INTRAVENOUS
Refills: 0 | Status: DISCONTINUED | OUTPATIENT
Start: 2023-05-17 | End: 2023-05-17

## 2023-05-17 RX ORDER — FENTANYL CITRATE 50 UG/ML
50 INJECTION INTRAVENOUS
Refills: 0 | Status: DISCONTINUED | OUTPATIENT
Start: 2023-05-17 | End: 2023-05-17

## 2023-05-17 RX ORDER — FENTANYL CITRATE 50 UG/ML
25 INJECTION INTRAVENOUS
Refills: 0 | Status: DISCONTINUED | OUTPATIENT
Start: 2023-05-17 | End: 2023-05-17

## 2023-05-17 RX ORDER — SODIUM CHLORIDE 9 MG/ML
1000 INJECTION, SOLUTION INTRAVENOUS ONCE
Refills: 0 | Status: COMPLETED | OUTPATIENT
Start: 2023-05-17 | End: 2023-05-17

## 2023-05-17 RX ADMIN — Medication 0.25 MILLIGRAM(S): at 23:30

## 2023-05-17 RX ADMIN — SODIUM CHLORIDE 1000 MILLILITER(S): 9 INJECTION, SOLUTION INTRAVENOUS at 18:40

## 2023-05-17 RX ADMIN — Medication 5 MILLIGRAM(S): at 08:03

## 2023-05-17 RX ADMIN — Medication 25 MILLIGRAM(S): at 22:15

## 2023-05-17 RX ADMIN — HYDROMORPHONE HYDROCHLORIDE 0.2 MILLIGRAM(S): 2 INJECTION INTRAMUSCULAR; INTRAVENOUS; SUBCUTANEOUS at 10:04

## 2023-05-17 RX ADMIN — LOSARTAN POTASSIUM 100 MILLIGRAM(S): 100 TABLET, FILM COATED ORAL at 09:34

## 2023-05-17 RX ADMIN — SIMVASTATIN 20 MILLIGRAM(S): 20 TABLET, FILM COATED ORAL at 22:15

## 2023-05-17 RX ADMIN — HYDROMORPHONE HYDROCHLORIDE 0.5 MILLIGRAM(S): 2 INJECTION INTRAMUSCULAR; INTRAVENOUS; SUBCUTANEOUS at 22:16

## 2023-05-17 RX ADMIN — PANTOPRAZOLE SODIUM 40 MILLIGRAM(S): 20 TABLET, DELAYED RELEASE ORAL at 05:04

## 2023-05-17 RX ADMIN — Medication 650 MILLIGRAM(S): at 23:30

## 2023-05-17 RX ADMIN — HYDROMORPHONE HYDROCHLORIDE 0.2 MILLIGRAM(S): 2 INJECTION INTRAMUSCULAR; INTRAVENOUS; SUBCUTANEOUS at 09:34

## 2023-05-17 RX ADMIN — Medication 10 MILLIGRAM(S): at 09:34

## 2023-05-17 RX ADMIN — HYDROMORPHONE HYDROCHLORIDE 0.5 MILLIGRAM(S): 2 INJECTION INTRAMUSCULAR; INTRAVENOUS; SUBCUTANEOUS at 22:46

## 2023-05-17 RX ADMIN — Medication 650 MILLIGRAM(S): at 00:00

## 2023-05-17 RX ADMIN — Medication 3 MILLIGRAM(S): at 22:16

## 2023-05-17 NOTE — PROGRESS NOTE ADULT - SUBJECTIVE AND OBJECTIVE BOX
CHIEF COMPLAINT:  Patient is a 77y old  Female who presents with a chief complaint of Pancreatitis (15 May 2023 12:30)      HPI: 23:  75 y/o Female, well known to me, presented for abdominal pain. Patient stated she started to have severe abdominal pain around 1 pm yesterday. Described as generalized pain, nonradiating, rated 9/10, no aggravating or alleviating factors. Associated with nausea and one episode of vomiting.  Initially she had pain radiating into her lower chest with mild SOB.  CT scan did show bilateral pleural effusions but better after 1 dose od Lasix.  She denies fever, chills, recurrent chest pains or SOB, diarrhea, constipation, lightheadedness, dizziness. Patient has hx of recurrent pancreatitis, last hospitalized in 2021. In the past thought due to be related gallstones, had cholecystectomy. Patient states has not drank alcohol since 2021. 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 Her initial Lipase was >30,000 and has been coming down but still with mild abd pains.  CT of Abd and MRCP report as below.  For possible ERCP and stent placement tomorrow.  No new cardiac symptoms.  She had a stress echo in my office last year showing normal LV size and systolic function with LVEF=65-70% with mild MR and TR and trace AI but otherwise a normal study and no stress echo evidence for ischemia.    23:  Anxious about her ERCP but ok with it.  Her BP has been a bit high.  Was doing well on her outpt Amlodipine and would suggest restarting it after the procedure.  No other new cardiac issues or symptoms.  Still has abd pains but improving.  Lipase coming down.  Awaiting AM labs.        PMHx:  PAST MEDICAL & SURGICAL HISTORY:  High cholesterol  GERD (gastroesophageal reflux disease)  Arthritis  Fatty liver  Osteoarthritis  Pancreatitis-h/o  Hypertension  Hammertoe of right foot  Anxiety  History of back surgery  History of   History of tonsillectomy  History of laminectomy-with resection  S/P left cataract extraction  S/P cholecystectomy  H/O parathyroidectomy  H/O hand surgery      FAMILY HISTORY:   FAMILY HISTORY:  Family history of breast cancer  Mother -  at age 99  Family history of diabetes mellitus  Both parents -   Family history of CHF (congestive heart failure)  Father   Family history of AIDS (Sibling)-brother       ALLERGIES:  Allergies  Sudafed (Other)  Betadine (Unknown)  adhesives (Rash)  Triple Antibiotic (Unknown)      REVIEW OF SYSTEMS:  10 point ROS was obtained  Pertinent positives and negatives are as above  All other review of systems is negative unless indicated above      Vital Signs Last 24 Hrs  T(C): 36.9 (16 May 2023 21:46), Max: 36.9 (16 May 2023 08:35)  T(F): 98.4 (16 May 2023 21:46), Max: 98.4 (16 May 2023 08:35)  HR: 77 (16 May 2023 21:46) (76 - 99)  BP: 164/74 (16 May 2023 21:46) (152/76 - 178/98)  RR: 18 (16 May 2023 21:46) (18 - 18)  SpO2: 94% (16 May 2023 21:46) (94% - 96%): room air      PHYSICAL EXAM:   Constitutional: NAD, awake and alert, well-developed  HEENT: PERR, EOMI, Normal Hearing, MMM  Neck: Soft and supple, No LAD, No JVD  Respiratory: Breath sounds are clear bilaterally, No wheezing, rales or rhonchi  Cardiovascular: S1 and S2, regular rate and rhythm, soft UZAIR at LLSB and base as before, no gallops or rubs  Gastrointestinal: Bowel Sounds present, soft, mildly tender, nondistended, no guarding, no rebound  Extremities: No peripheral edema  Vascular: 2+ peripheral pulses  Neurological: A/O x 3, no focal deficits  Musculoskeletal: 5/5 strength b/l upper and lower extremities  Skin: No rashes      MEDICATIONS  (STANDING):  aspirin enteric coated 81 milliGRAM(s) Oral daily  losartan 100 milliGRAM(s) Oral daily  metoprolol succinate ER 25 milliGRAM(s) Oral at bedtime  pantoprazole    Tablet 40 milliGRAM(s) Oral before breakfast  PARoxetine 10 milliGRAM(s) Oral daily  simvastatin 20 milliGRAM(s) Oral at bedtime    MEDICATIONS  (PRN):  acetaminophen     Tablet .. 650 milliGRAM(s) Oral every 6 hours PRN Mild Pain (1 - 3)  ALPRAZolam 0.25 milliGRAM(s) Oral three times a day PRN Anxiety  aluminum hydroxide/magnesium hydroxide/simethicone Suspension 30 milliLiter(s) Oral every 4 hours PRN Dyspepsia  hydrALAZINE Injectable 5 milliGRAM(s) IV Push every 6 hours PRN SBP > 160  HYDROmorphone  Injectable 0.5 milliGRAM(s) IV Push every 6 hours PRN Severe Pain (7 - 10)  HYDROmorphone  Injectable 0.2 milliGRAM(s) IV Push every 6 hours PRN Moderate Pain (4 - 6)  melatonin 3 milliGRAM(s) Oral at bedtime PRN Insomnia  ondansetron Injectable 4 milliGRAM(s) IV Push every 8 hours PRN Nausea and/or Vomiting      LABS: All Labs Reviewed:                        11.7   4.91  )-----------( 184      ( 16 May 2023 06:50 )             35.1       -15    143  |  109<H>  |  9   ----------------------------<  107<H>  3.9   |  30  |  0.47<L>    Ca    8.6      15 May 2023 06:43    TPro  5.8<L>  /  Alb  2.8<L>  /  TBili  0.3  /  DBili  x   /  AST  28  /  ALT  43  /  AlkPhos  56  05-15      Lipase, Serum in AM (05.15.23 @ 06:43): 500 U/L  Lipase, Serum in AM (23 @ 06:11): 510 U/L  Lipase, Serum in AM (23 @ 06:15): 2327 U/L  Lipase, Serum in AM (23 @ 06:34): 15683 U/L  Lipase, Serum (23 @ 20:30): >18024 U/L      BLOOD CULTURES:   LIPID PROFILE     RADIOLOGY:   MRCP: 23:  FINDINGS:  LOWER CHEST: Partially imaged bilateral pleural effusions with compressive atelectasis in the lower lobes, greater on the right.  LIVER: Within normal limits.  BILE DUCTS: Mild intrahepatic biliary ductal dilatation. Common bile duct within normal limits for caliber measuring 6 mm. No common bile duct stone.  GALLBLADDER: Cholecystectomy.  SPLEEN: Within normal limits for size. Subcentimeter high T2 signal lesion in the anterior aspect of the spleen, less conspicuous on the delayed postcontrast imaging, possibly a hemangioma.  PANCREAS: Peripancreatic fat stranding and fluid consistent with pancreatitis. Fluid extends into the perisplenic region and adjacent to the proximal and transverse duodenum; no focal collection. Pancreas divisum. Normal pancreatic enhancement without evidence of necrosis. 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 Remainder of the main pancreatic duct normal in caliber. 3 mm cyst in the pancreatic head (series 5 image 8), more conspicuous on the current study,however not significantly changed since 11/3/2021.  ADRENALS: Within normal limits.  KIDNEYS/URETERS: No hydronephrosis. Ptotic left kidney. Right renal cysts.  VISUALIZED PORTIONS:  BOWEL: Normal caliber.  PERITONEUM: Small amount of ascites.  VESSELS: Abdominal aorta normal in caliber. IVC and hepatic veins are patent. Portal, splenic, and superior mesenteric veins are patent.  RETROPERITONEUM/LYMPH NODES: No lymphadenopathy.  ABDOMINAL WALL: Unremarkable.  BONES: Fusion hardware in the spine.  IMPRESSION:  Interstitial edematous pancreatitis.  Pancreas divisum.  Fusiform dilatation of the pancreatic duct again seen in the region of the minor papilla compatible with a santorinicele.  Partially imaged bilateral pleural effusions with compressive atelectasis of the lower lobes, new since 2023.      CT of Abd/Pelvis: 23:  FINDINGS:  LOWER CHEST: Mild bibasilar atelectasis.  LIVER: Within normal limits.  BILE DUCTS: Normal caliber.  GALLBLADDER: Within cholecystectomy.  SPLEEN: Within normal limits.  PANCREAS: Diffusely edematous. Homogeneously enhancing without necrosis.  Moderate peripancreatic fluid and stranding. No main pancreatic duct dilatation. No organized fluid collection. No hemorrhage.  ADRENALS: Within normal limits.  KIDNEYS/URETERS: No hydronephrosis or perinephric stranding. 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  BLADDER: Underdistended which limits evaluation of the wall thickness.  REPRODUCTIVE ORGANS: Fibroid uterus. No adnexal soft tissue mass.  BOWEL: No bowel obstruction. Appendix is normal. No abnormal bowel wall thickening or inflammatory changes.  PERITONEUM: No no abdominal ascites. No free air or intraperitoneal collection.  VESSELS: Within normal limits. Splenic vein is patent. No evidence of pseudoaneurysm.  RETROPERITONEUM/LYMPH NODES: No lymphadenopathy.  ABDOMINAL WALL: Within normal limits.  BONES: Stable postsurgical changes posterior spinal decompressive surgery and fusion L4 and L5. No acute osseous finding. Degenerative changes.  IMPRESSION:  Acute interstitial pancreatitis. No necrosis, hemorrhage or pseudocyst formation.       EK23:  Sinus rhythm with Premature atrial complexes  Poor R-wave progression V1-4 as before    TELEMETRY:        STRESS ECH0:  Stress echo in my office last year showing normal LV size and systolic function with LVEF=65-70% with mild MR and TR with trace AI but otherwise a normal study and no stress echo evidence for ischemia    ECHO: 19:  M-Mode Measurements (cm)   LVEDd: 5.21 cm            LVESd: 2.43 cm   IVSEd: 0.96 cm   LVPWd: 1.1 cm             AO Root Dimension: 2.4 cm                        ACS: 2 cm  Doppler Measurements:                                  MV Peak E-Wave: 98.2 cm/s   TR Velocity:324 cm/s           MV Peak A-Wave: 96.3 cm/s   TR Gradient:41.9904 mmHg       MV E/A Ratio: 1.02 %   Estimated RAP:10 mmHg          MV Peak Gradient: 3.86 mmHg   RVSP:41 mmHg    Findings  Mitral Valve   Normal appearing mitral valve structure and function.   Trace mitral regurgitation is present.   EA reversal of the mitral inflow consistent with reduced compliance of the left ventricle    Aortic Valve   Mild aortic sclerosis is present with normal valvular opening.   Trace aortic regurgitation is present.    Tricuspid Valve   Mild (1+) tricuspid valve regurgitation is present.   Moderate pulmonary hypertension.    Pulmonic Valve   Normal appearing pulmonic valve structure and function.    Left Atrium   The left atrium is mildly dilated.    Left Ventricle   The left ventricle is normal in size, wall thickness, wall motion and contractility.   Estimated left ventricular ejection fraction is 65-70 %.    Right Atrium   Normal appearing right atrium.    Right Ventricle   Normal appearing right ventricle structure and function.    Pericardial Effusion   No evidence of pericardial effusion.    Pleural Effusion   No evidence of pleural effusion.    Miscellaneous   All visualized extra cardiac structures appears to be normal.    Summary   Mild aortic sclerosis is present with normal valvular opening.   Trace aortic regurgitation is present.   The left atrium is mildly dilated.   The left ventricle is normal in size, wall thickness, wall motion and contractility.   Estimated left ventricular ejection fraction is 65-70 %.   All visualized extra cardiac structures appears to be normal.   Normal appearing mitral valve structure and function.   Trace mitral regurgitation is present.   EA reversal of the mitral inflow consistent with reduced compliance of the left ventricle   No evidence of pericardial effusion.   No evidence of pleural effusion.   Normal appearing pulmonic valve structure and function.   Normal appearing right atrium.   Normal appearing right ventricle structure and function.   Mild (1+) tricuspid valve regurgitation is present.   Moderate pulmonary hypertension.    Signature   ----------------------------------------------------------------   Electronically signed by Salvador Mattson MD(Interpreting   physician) on 2019 07:59 PM   ----------------------------------------------------------------

## 2023-05-17 NOTE — BRIEF OPERATIVE NOTE - OPERATION/FINDINGS
Unable to located minor papilla.   Biliary sphincterotomy to allow main PD injection and retrograde injection into the minor duct. Pancreatic Sphincter so tight could not even inject contrast into PD.

## 2023-05-17 NOTE — PROGRESS NOTE ADULT - SUBJECTIVE AND OBJECTIVE BOX
HPI: 77 y/o Female presented for abdominal pain. Patient stated she started to have severe abdominal pain around 1 pm yesterday. Described as generalized pain, non-radiating, rated 9/10, no aggravating or alleviating factors. Associated wit nausea and one episode of vomiting. Denies fever, chills, chest pain, SOB, diarrhea, constipation, lightheadedness, dizziness. Patient has hx of recurrent pancreatitis, last hospitalized in December 2021. In the past thought due to be related gallstones, had cholecystectomy. Patient states has not drank alcohol since November 2022. 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 (12 May 2023 02:42)      Subjective: Patient seen this morning, continued pain to epigastric region. Planned for ERCP today     REVIEW OF SYSTEMS:    CONSTITUTIONAL: No weakness, fevers or chills  EYES/ENT: No visual changes;  No vertigo or throat pain   NECK: No pain or stiffness  RESPIRATORY: No cough, wheezing, hemoptysis; No shortness of breath  CARDIOVASCULAR: No chest pain or palpitations  GASTROINTESTINAL: +abdominal and epigastric pain. No nausea, vomiting, or hematemesis; No diarrhea or constipation. No melena or hematochezia.  GENITOURINARY: No dysuria, frequency or hematuria  NEUROLOGICAL: No numbness or weakness  SKIN: No itching, rashes      MEDICATIONS  (STANDING):  aspirin enteric coated 81 milliGRAM(s) Oral daily  losartan 100 milliGRAM(s) Oral daily  metoprolol succinate ER 25 milliGRAM(s) Oral at bedtime  pantoprazole    Tablet 40 milliGRAM(s) Oral before breakfast  PARoxetine 10 milliGRAM(s) Oral daily  simvastatin 20 milliGRAM(s) Oral at bedtime    MEDICATIONS  (PRN):  acetaminophen     Tablet .. 650 milliGRAM(s) Oral every 6 hours PRN Mild Pain (1 - 3)  ALPRAZolam 0.25 milliGRAM(s) Oral three times a day PRN Anxiety  aluminum hydroxide/magnesium hydroxide/simethicone Suspension 30 milliLiter(s) Oral every 4 hours PRN Dyspepsia  hydrALAZINE Injectable 5 milliGRAM(s) IV Push every 6 hours PRN SBP > 160  HYDROmorphone  Injectable 0.5 milliGRAM(s) IV Push every 6 hours PRN Severe Pain (7 - 10)  HYDROmorphone  Injectable 0.2 milliGRAM(s) IV Push every 6 hours PRN Moderate Pain (4 - 6)  melatonin 3 milliGRAM(s) Oral at bedtime PRN Insomnia  ondansetron Injectable 4 milliGRAM(s) IV Push every 8 hours PRN Nausea and/or Vomiting      Vital Signs Last 24 Hrs  T(C): 36.6 (17 May 2023 07:53), Max: 36.9 (16 May 2023 21:46)  T(F): 97.9 (17 May 2023 07:53), Max: 98.4 (16 May 2023 21:46)  HR: 73 (17 May 2023 09:29) (64 - 77)  BP: 141/86 (17 May 2023 09:29) (141/86 - 178/98)  BP(mean): 100 (17 May 2023 06:51) (100 - 100)  RR: 18 (17 May 2023 09:29) (18 - 18)  SpO2: 95% (17 May 2023 09:29) (94% - 96%)    Parameters below as of 17 May 2023 09:29  Patient On (Oxygen Delivery Method): room air        PHYSICAL EXAM:  GENERAL: NAD, lying in bed comfortably  HEAD:  Atraumatic, Normocephalic  EYES: conjunctiva and sclera clear  ENT: Moist mucous membranes  NECK: Supple, No JVD  CHEST/LUNG: Clear to auscultation bilaterally; No rales, rhonchi, wheezing. Unlabored respirations  HEART: Regular rate and rhythm; No murmurs  ABDOMEN: Bowel sounds present; Soft, Nondistended. tender to epigastric area   EXTREMITIES:  2+ Peripheral Pulses, brisk capillary refill. No clubbing, cyanosis, or edema  NERVOUS SYSTEM:  Alert & Oriented X3, speech clear. No deficits   MSK: FROM all 4 extremities, full and equal strength          LABS:                        11.5   4.89  )-----------( 211      ( 17 May 2023 06:19 )             34.9   05-17    142  |  110<H>  |  13  ----------------------------<  108<H>  3.8   |  26  |  0.56    Ca    9.2      17 May 2023 06:19  Mg     2.0     05-17              CAPILLARY BLOOD GLUCOSE                RADIOLOGY:    < from: CT Abdomen and Pelvis w/ IV Cont (05.11.23 @ 21:52) >  Acute interstitial pancreatitis. No necrosis, hemorrhage or pseudocyst   formation.        < from: MR MRCP w/wo IV Cont (05.13.23 @ 15:13) >  IMPRESSION:  Interstitial edematous pancreatitis.    Pancreas divisum.    Fusiform dilatation of the pancreatic duct again seen in the region of   the minor papilla compatible with a santorinicele.    Partially imaged bilateral pleural effusions with compressive atelectasis   of the lower lobes, new since 5/11/2023.

## 2023-05-18 ENCOUNTER — TRANSCRIPTION ENCOUNTER (OUTPATIENT)
Age: 78
End: 2023-05-18

## 2023-05-18 VITALS
DIASTOLIC BLOOD PRESSURE: 72 MMHG | OXYGEN SATURATION: 99 % | TEMPERATURE: 98 F | RESPIRATION RATE: 18 BRPM | SYSTOLIC BLOOD PRESSURE: 149 MMHG | HEART RATE: 69 BPM

## 2023-05-18 LAB
ALBUMIN SERPL ELPH-MCNC: 3.1 G/DL — LOW (ref 3.3–5)
ALP SERPL-CCNC: 216 U/L — HIGH (ref 40–120)
ALT FLD-CCNC: 221 U/L — HIGH (ref 12–78)
ANION GAP SERPL CALC-SCNC: 6 MMOL/L — SIGNIFICANT CHANGE UP (ref 5–17)
AST SERPL-CCNC: 246 U/L — HIGH (ref 15–37)
BILIRUB SERPL-MCNC: 0.4 MG/DL — SIGNIFICANT CHANGE UP (ref 0.2–1.2)
BUN SERPL-MCNC: 15 MG/DL — SIGNIFICANT CHANGE UP (ref 7–23)
CALCIUM SERPL-MCNC: 8.6 MG/DL — SIGNIFICANT CHANGE UP (ref 8.5–10.1)
CHLORIDE SERPL-SCNC: 108 MMOL/L — SIGNIFICANT CHANGE UP (ref 96–108)
CO2 SERPL-SCNC: 25 MMOL/L — SIGNIFICANT CHANGE UP (ref 22–31)
CREAT SERPL-MCNC: 0.67 MG/DL — SIGNIFICANT CHANGE UP (ref 0.5–1.3)
EGFR: 91 ML/MIN/1.73M2 — SIGNIFICANT CHANGE UP
GLUCOSE SERPL-MCNC: 115 MG/DL — HIGH (ref 70–99)
MAGNESIUM SERPL-MCNC: 1.9 MG/DL — SIGNIFICANT CHANGE UP (ref 1.6–2.6)
PHOSPHATE SERPL-MCNC: 4.7 MG/DL — HIGH (ref 2.5–4.5)
POTASSIUM SERPL-MCNC: 4 MMOL/L — SIGNIFICANT CHANGE UP (ref 3.5–5.3)
POTASSIUM SERPL-SCNC: 4 MMOL/L — SIGNIFICANT CHANGE UP (ref 3.5–5.3)
PROT SERPL-MCNC: 6.7 GM/DL — SIGNIFICANT CHANGE UP (ref 6–8.3)
SODIUM SERPL-SCNC: 139 MMOL/L — SIGNIFICANT CHANGE UP (ref 135–145)

## 2023-05-18 PROCEDURE — 99232 SBSQ HOSP IP/OBS MODERATE 35: CPT | Mod: GC

## 2023-05-18 PROCEDURE — 99239 HOSP IP/OBS DSCHRG MGMT >30: CPT

## 2023-05-18 RX ORDER — FLUORIDE (SODIUM) 1MG(2.2MG)
0 TABLET,CHEWABLE ORAL
Qty: 0 | Refills: 0 | DISCHARGE

## 2023-05-18 RX ORDER — POLYETHYLENE GLYCOL 3350 17 G/17G
17 POWDER, FOR SOLUTION ORAL ONCE
Refills: 0 | Status: COMPLETED | OUTPATIENT
Start: 2023-05-18 | End: 2023-05-18

## 2023-05-18 RX ORDER — TRAMADOL HYDROCHLORIDE 50 MG/1
0.5 TABLET ORAL
Qty: 5 | Refills: 0
Start: 2023-05-18 | End: 2023-05-20

## 2023-05-18 RX ADMIN — Medication 650 MILLIGRAM(S): at 06:55

## 2023-05-18 RX ADMIN — Medication 650 MILLIGRAM(S): at 05:18

## 2023-05-18 RX ADMIN — PANTOPRAZOLE SODIUM 40 MILLIGRAM(S): 20 TABLET, DELAYED RELEASE ORAL at 05:20

## 2023-05-18 RX ADMIN — Medication 10 MILLIGRAM(S): at 09:25

## 2023-05-18 RX ADMIN — Medication 81 MILLIGRAM(S): at 09:25

## 2023-05-18 RX ADMIN — POLYETHYLENE GLYCOL 3350 17 GRAM(S): 17 POWDER, FOR SOLUTION ORAL at 19:03

## 2023-05-18 RX ADMIN — LOSARTAN POTASSIUM 100 MILLIGRAM(S): 100 TABLET, FILM COATED ORAL at 09:25

## 2023-05-18 NOTE — PROGRESS NOTE ADULT - PROVIDER SPECIALTY LIST ADULT
Hospitalist
Cardiology
Cardiology
Hospitalist
Hospitalist
Gastroenterology
Gastroenterology
Hospitalist

## 2023-05-18 NOTE — PROGRESS NOTE ADULT - ASSESSMENT
# Acute Pancreatitis likely 2/2 pancreatic divisum  - patient has h/o recurrent pancreatitis  - stop IVF and given lasix given SOB and atelectasis as this is cause of low grade temp.  start incentive spirometer   - continue low fat diet   - MRCP - results noted above   - GI input noted. possible ERCP on wednesday. pt now agreeable  - NPO after midnight   - Covid-19 PCR    # HTN  - Added hydralazine PRN  - Continue losartan and metoprolol     HLD  Anxiety  continue Home meds    # SCD for DVT ppx    # Code status: Full code    DISPO: for ERCP tomorrow 
76 yo female with history of pancreas divisum, presenting with acute abdominal pain, pancreatitis    s/p ERCP attempt papillotomy, unable to locate minor papilla and severely tight pancreatic sphincter   Will need to reattempt in 6-8 weeks outpatient    Rec:  ::Adv diet as tolerated  ::Pain control as needed  ::Ok to go home and follow up outpatient  
78 yo female with history of pancreas divisium. Awaiting MRCP. Patient to consider staying for ERCP and PD sphincterotomy or going home for outpatient procedure. 
# Acute Pancreatitis likely 2/2 pancreatic divisum  - patient has h/o recurrent pancreatitis  - S/p IVF and given lasix given SOB and atelectasis as this is cause of low grade temp.  Start incentive spirometer   - continue low fat diet   - MRCP - results noted above   - GI consult appreciated  - For ERCP today     # HTN - uncontrolled   - Added hydralazine PRN  - Continue losartan and metoprolol     HLD, Anxiety  continue Home meds    # SCD for DVT ppx    # Code status: Full code    DISPO: for ERCP today 
5/16/23:  Pt with above history and recurrent pancreatitis and pancreas divisim and plans for ERCP, possibly tomorrow  She is stable from a cardiac standpoint for needed GI procedures as indicated.  No indication for further cardiac testing at this time.  Continue as outlined by medicine and Dr Espinoza etal.  Will follow as work-up and treatment progresses.    5/17/23:  Anxious about her ERCP but ok with it.  Her BP has been a bit high.  Was doing well on her outpt Amlodipine and would suggest restarting it after the procedure.  No other new cardiac issues or symptoms.  Still has abd pains but improving.  Lipase coming down.  Awaiting AM labs.  Continue as outlined by medicine and Dr Espinoza etal.  Will continue to follow as work-up and treatment progresses and as an outpt as needed.
5/16/23:  Pt with above history and recurrent pancreatitis and pancreas divisim and plans for ERCP, possibly tomorrow  She is stable from a cardiac standpoint for needed GI procedures as indicated.  No indication for further cardiac testing at this time.  Continue as outlined by medicine and Dr Mckay etal.  Will follow as work-up and treatment progresses.    5/17/23:  Anxious about her ERCP but ok with it.  Her BP has been a bit high.  Was doing well on her outpt Amlodipine and would suggest restarting it after the procedure.  No other new cardiac issues or symptoms.  Still has abd pains but improving.  Lipase coming down.  Awaiting AM labs.  Continue as outlined by medicine and Dr Mckay etal.  Will continue to follow as work-up and treatment progresses and as an outpt as needed.    5/18/23:  Had ERCP yesterday but the sphincter was reportedly too tight to enter.  After the procedure she had another severe episode of abd. pains similar ot her pancreatitis pains, eased with Dilaudid.  She is somewhat better this AM with much less pain.  Awaiting AM labs.  No new cardiac symptoms.  Continue as outlined by medicine and Dr Mckay etal.  Will continue to follow as treatment progresses and as an outpt as needed.

## 2023-05-18 NOTE — DISCHARGE NOTE PROVIDER - NSDCMRMEDTOKEN_GEN_ALL_CORE_FT
amLODIPine 5 mg oral tablet: 1 tab(s) orally once a day  Aspirin Enteric Coated 81 mg oral delayed release tablet: 1 tab(s) orally once a day (at bedtime)  Co-Q10 200 mg oral capsule: 1 cap(s) orally once a day (at bedtime)  Fish Oil 1000 mg oral capsule: 1 cap(s) orally once a day  metoprolol succinate 25 mg oral tablet, extended release: 1 tab(s) orally once a day (at bedtime)  Multiple Vitamins oral tablet: 1 tab(s) orally once a day  olmesartan 40 mg oral tablet: 1 tab(s) orally once a day  PARoxetine 10 mg oral tablet: 1 tab(s) orally once a day  PriLOSEC 20 mg oral delayed release capsule: 1 cap(s) orally once a day  simvastatin 20 mg oral tablet: 1 tab(s) orally once a day (at bedtime)  traMADol 50 mg oral tablet: 0.5 tab(s) orally 3 times a day as needed for severe pain MDD: 1.5 tab daily  Vitamin C 500 mg oral tablet: 1 tab(s) orally 2 times a day  Vitamin D3 25 mcg (1000 intl units) oral tablet: 1 tab(s) orally once a day  Xanax 0.25 mg oral tablet: 1 tab(s) orally 3 times a day, As Needed

## 2023-05-18 NOTE — DISCHARGE NOTE NURSING/CASE MANAGEMENT/SOCIAL WORK - NSDCPEFALRISK_GEN_ALL_CORE
For information on Fall & Injury Prevention, visit: [email or web address] OR  [email or web address] OR  https://www.cdc.gov/steadi/patient.html

## 2023-05-18 NOTE — PROGRESS NOTE ADULT - SUBJECTIVE AND OBJECTIVE BOX
Patient is a 77y old  Female who presents with a chief complaint of Pancreatitis (18 May 2023 07:25)      MEDICATIONS  (STANDING):  aspirin enteric coated 81 milliGRAM(s) Oral daily  losartan 100 milliGRAM(s) Oral daily  metoprolol succinate ER 25 milliGRAM(s) Oral at bedtime  pantoprazole    Tablet 40 milliGRAM(s) Oral before breakfast  PARoxetine 10 milliGRAM(s) Oral daily  simvastatin 20 milliGRAM(s) Oral at bedtime    MEDICATIONS  (PRN):  acetaminophen     Tablet .. 650 milliGRAM(s) Oral every 6 hours PRN Mild Pain (1 - 3)  ALPRAZolam 0.25 milliGRAM(s) Oral three times a day PRN Anxiety  aluminum hydroxide/magnesium hydroxide/simethicone Suspension 30 milliLiter(s) Oral every 4 hours PRN Dyspepsia  hydrALAZINE Injectable 5 milliGRAM(s) IV Push every 6 hours PRN SBP > 160  HYDROmorphone  Injectable 0.5 milliGRAM(s) IV Push every 6 hours PRN Severe Pain (7 - 10)  HYDROmorphone  Injectable 0.2 milliGRAM(s) IV Push every 6 hours PRN Moderate Pain (4 - 6)  melatonin 3 milliGRAM(s) Oral at bedtime PRN Insomnia  ondansetron Injectable 4 milliGRAM(s) IV Push every 8 hours PRN Nausea and/or Vomiting      Vital Signs Last 24 Hrs  T(C): 36.7 (18 May 2023 07:41), Max: 36.7 (17 May 2023 17:38)  T(F): 98.1 (18 May 2023 07:41), Max: 98.1 (17 May 2023 17:38)  HR: 67 (18 May 2023 07:41) (66 - 74)  BP: 120/56 (18 May 2023 07:41) (120/56 - 174/79)  BP(mean): --  RR: 19 (18 May 2023 07:41) (12 - 20)  SpO2: 95% (18 May 2023 07:41) (93% - 98%)    Parameters below as of 18 May 2023 07:41  Patient On (Oxygen Delivery Method): room air        PHYSICAL EXAM:    Constitutional: No acute distress, well-developed, non-toxic appearing  HEENT: masked, good phonation, not icteric  Neck: supple, no lymphadenopathy  Respiratory: clear to ascultation bilaterally, no wheezing  Cardiovascular: S1 and S2, regular rate and rhythm, no murmurs rubs or gallops  Gastrointestinal: soft, non-tender, non-distended, +bowel sounds, no rebound or guarding, no surgical scars, no drains  Extremities: No peripheral edema, no cyanosis or clubbing  Vascular: 2+ peripheral pulses, no venous stasis  Neurological: A/O x 3, no focal deficits, no asterixis  Psychiatric: Normal mood, normal affect  Skin: No rashes, not jaundiced    LABS:                        11.5   4.89  )-----------( 211      ( 17 May 2023 06:19 )             34.9     05-18    139  |  108  |  15  ----------------------------<  115<H>  4.0   |  25  |  0.67    Ca    8.6      18 May 2023 06:22  Phos  4.7     05-18  Mg     1.9     05-18    TPro  6.7  /  Alb  3.1<L>  /  TBili  0.4  /  DBili  x   /  AST  246<H>  /  ALT  221<H>  /  AlkPhos  216<H>  05-18      LIVER FUNCTIONS - ( 18 May 2023 06:22 )  Alb: 3.1 g/dL / Pro: 6.7 gm/dL / ALK PHOS: 216 U/L / ALT: 221 U/L / AST: 246 U/L / GGT: x             RADIOLOGY & ADDITIONAL STUDIES: Patient is a 77y old  Female who presents with a chief complaint of Pancreatitis (18 May 2023 07:25)    Follow up for: pancreatitis    Patient with improved pain. Dissapointed couldn't get minor papillotomy, but understands her anatomy is difficult    MEDICATIONS  (STANDING):  aspirin enteric coated 81 milliGRAM(s) Oral daily  losartan 100 milliGRAM(s) Oral daily  metoprolol succinate ER 25 milliGRAM(s) Oral at bedtime  pantoprazole    Tablet 40 milliGRAM(s) Oral before breakfast  PARoxetine 10 milliGRAM(s) Oral daily  simvastatin 20 milliGRAM(s) Oral at bedtime    MEDICATIONS  (PRN):  acetaminophen     Tablet .. 650 milliGRAM(s) Oral every 6 hours PRN Mild Pain (1 - 3)  ALPRAZolam 0.25 milliGRAM(s) Oral three times a day PRN Anxiety  aluminum hydroxide/magnesium hydroxide/simethicone Suspension 30 milliLiter(s) Oral every 4 hours PRN Dyspepsia  hydrALAZINE Injectable 5 milliGRAM(s) IV Push every 6 hours PRN SBP > 160  HYDROmorphone  Injectable 0.5 milliGRAM(s) IV Push every 6 hours PRN Severe Pain (7 - 10)  HYDROmorphone  Injectable 0.2 milliGRAM(s) IV Push every 6 hours PRN Moderate Pain (4 - 6)  melatonin 3 milliGRAM(s) Oral at bedtime PRN Insomnia  ondansetron Injectable 4 milliGRAM(s) IV Push every 8 hours PRN Nausea and/or Vomiting      Vital Signs Last 24 Hrs  T(C): 36.7 (18 May 2023 07:41), Max: 36.7 (17 May 2023 17:38)  T(F): 98.1 (18 May 2023 07:41), Max: 98.1 (17 May 2023 17:38)  HR: 67 (18 May 2023 07:41) (66 - 74)  BP: 120/56 (18 May 2023 07:41) (120/56 - 174/79)  BP(mean): --  RR: 19 (18 May 2023 07:41) (12 - 20)  SpO2: 95% (18 May 2023 07:41) (93% - 98%)    Parameters below as of 18 May 2023 07:41  Patient On (Oxygen Delivery Method): room air        PHYSICAL EXAM:    Constitutional: No acute distress, well-developed, non-toxic appearing  HEENT: unmasked, good phonation, not icteric  Neck: supple, no lymphadenopathy  Respiratory: clear to ascultation bilaterally, no wheezing  Cardiovascular: S1 and S2, regular rate and rhythm, no murmurs rubs or gallops  Gastrointestinal: soft, non-tender, non-distended, +bowel sounds, no rebound or guarding,   Extremities: No peripheral edema, no cyanosis or clubbing  Vascular: 2+ peripheral pulses, no venous stasis  Neurological: A/O x 3, no focal deficits, no asterixis  Psychiatric: Normal mood, normal affect  Skin: No rashes, not jaundiced    LABS:                        11.5   4.89  )-----------( 211      ( 17 May 2023 06:19 )             34.9     05-18    139  |  108  |  15  ----------------------------<  115<H>  4.0   |  25  |  0.67    Ca    8.6      18 May 2023 06:22  Phos  4.7     05-18  Mg     1.9     05-18    TPro  6.7  /  Alb  3.1<L>  /  TBili  0.4  /  DBili  x   /  AST  246<H>  /  ALT  221<H>  /  AlkPhos  216<H>  05-18      LIVER FUNCTIONS - ( 18 May 2023 06:22 )  Alb: 3.1 g/dL / Pro: 6.7 gm/dL / ALK PHOS: 216 U/L / ALT: 221 U/L / AST: 246 U/L / GGT: x

## 2023-05-18 NOTE — PROGRESS NOTE ADULT - NS ATTEND AMEND GEN_ALL_CORE FT
77 year old with reucurrent pancreaitis, no longer drinking, with panc divisum, s/p ERCp with inability to locate minor papilla and extremly tight pancreatic sphincter.     Will repeat as outpatient in 6-8 weeks.

## 2023-05-18 NOTE — DISCHARGE NOTE NURSING/CASE MANAGEMENT/SOCIAL WORK - PATIENT PORTAL LINK FT
You can access the FollowMyHealth Patient Portal offered by [facility] by registering at the following website: [email or web address]. By joining [facility]’s FollowMyHealth portal, you will also be able to view your health information using other applications (apps) compatible with our system.

## 2023-05-18 NOTE — PROGRESS NOTE ADULT - SUBJECTIVE AND OBJECTIVE BOX
CHIEF COMPLAINT:  Patient is a 77y old  Female who presents with a chief complaint of Pancreatitis (15 May 2023 12:30)      HPI: 23:  75 y/o Female, well known to me, presented for abdominal pain. Patient stated she started to have severe abdominal pain around 1 pm yesterday. Described as generalized pain, nonradiating, rated 9/10, no aggravating or alleviating factors. Associated with nausea and one episode of vomiting.  Initially she had pain radiating into her lower chest with mild SOB.  CT scan did show bilateral pleural effusions but better after 1 dose od Lasix.  She denies fever, chills, recurrent chest pains or SOB, diarrhea, constipation, lightheadedness, dizziness. Patient has hx of recurrent pancreatitis, last hospitalized in 2021. In the past thought due to be related gallstones, had cholecystectomy. Patient states has not drank alcohol since 2021. 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 Her initial Lipase was >30,000 and has been coming down but still with mild abd pains.  CT of Abd and MRCP report as below.  For possible ERCP and stent placement tomorrow.  No new cardiac symptoms.  She had a stress echo in my office last year showing normal LV size and systolic function with LVEF=65-70% with mild MR and TR and trace AI but otherwise a normal study and no stress echo evidence for ischemia.    23:  Anxious about her ERCP but ok with it.  Her BP has been a bit high.  Was doing well on her outpt Amlodipine and would suggest restarting it after the procedure.  No other new cardiac issues or symptoms.  Still has abd pains but improving.  Lipase coming down.  Awaiting AM labs.    23:  Had ERCP yesterday but the sphincter was reportedly too tight to enter.  After the procedure she had another severe episode of abd. pains similar ot her pancreatitis pains, eased with Dilaudid.  She is somewhat better this AM with much less pain.  Awaiting AM labs.  No new cardiac symptoms.        PMHx:  PAST MEDICAL & SURGICAL HISTORY:  High cholesterol  GERD (gastroesophageal reflux disease)  Arthritis  Fatty liver  Osteoarthritis  Pancreatitis-h/o  Hypertension  Hammertoe of right foot  Anxiety  History of back surgery  History of   History of tonsillectomy  History of laminectomy-with resection  S/P left cataract extraction  S/P cholecystectomy  H/O parathyroidectomy  H/O hand surgery      FAMILY HISTORY:   FAMILY HISTORY:  Family history of breast cancer  Mother -  at age 99  Family history of diabetes mellitus  Both parents -   Family history of CHF (congestive heart failure)  Father   Family history of AIDS (Sibling)-brother       ALLERGIES:  Allergies  Sudafed (Other)  Betadine (Unknown)  adhesives (Rash)  Triple Antibiotic (Unknown)      REVIEW OF SYSTEMS:  10 point ROS was obtained  Pertinent positives and negatives are as above  All other review of systems is negative unless indicated above      Vital Signs Last 24 Hrs  T(C): 36.5 (18 May 2023 04:34), Max: 36.7 (17 May 2023 17:38)  T(F): 97.7 (18 May 2023 04:34), Max: 98.1 (17 May 2023 17:38)  HR: 69 (18 May 2023 04:34) (66 - 74)  BP: 136/66 (18 May 2023 04:34) (130/70 - 174/79)  RR: 17 (18 May 2023 04:34) (12 - 20)  SpO2: 95% (18 May 2023 04:34) (93% - 98%): room air      PHYSICAL EXAM:   Constitutional: NAD, awake and alert, well-developed  HEENT: PERR, EOMI, Normal Hearing, MMM  Neck: Soft and supple, No LAD, No JVD  Respiratory: Breath sounds are clear bilaterally, No wheezing, rales or rhonchi  Cardiovascular: S1 and S2, regular rate and rhythm, soft UZAIR at LLSB and base as before, no gallops or rubs  Gastrointestinal: Bowel Sounds present, soft, mildly tender, nondistended, no guarding, no rebound  Extremities: No peripheral edema  Vascular: 2+ peripheral pulses  Neurological: A/O x 3, no focal deficits  Musculoskeletal: 5/5 strength b/l upper and lower extremities  Skin: No rashes      MEDICATIONS  (STANDING):  aspirin enteric coated 81 milliGRAM(s) Oral daily  losartan 100 milliGRAM(s) Oral daily  metoprolol succinate ER 25 milliGRAM(s) Oral at bedtime  pantoprazole    Tablet 40 milliGRAM(s) Oral before breakfast  PARoxetine 10 milliGRAM(s) Oral daily  simvastatin 20 milliGRAM(s) Oral at bedtime    MEDICATIONS  (PRN):  acetaminophen     Tablet .. 650 milliGRAM(s) Oral every 6 hours PRN Mild Pain (1 - 3)  ALPRAZolam 0.25 milliGRAM(s) Oral three times a day PRN Anxiety  aluminum hydroxide/magnesium hydroxide/simethicone Suspension 30 milliLiter(s) Oral every 4 hours PRN Dyspepsia  hydrALAZINE Injectable 5 milliGRAM(s) IV Push every 6 hours PRN SBP > 160  HYDROmorphone  Injectable 0.5 milliGRAM(s) IV Push every 6 hours PRN Severe Pain (7 - 10)  HYDROmorphone  Injectable 0.2 milliGRAM(s) IV Push every 6 hours PRN Moderate Pain (4 - 6)  melatonin 3 milliGRAM(s) Oral at bedtime PRN Insomnia  ondansetron Injectable 4 milliGRAM(s) IV Push every 8 hours PRN Nausea and/or Vomiting      LABS: All Labs Reviewed:                        11.5   4.89  )-----------( 211      ( 17 May 2023 06:19 )             34.9                           11.7   4.91  )-----------( 184      ( 16 May 2023 06:50 )             35.1           139  |  108  |  15  ----------------------------<  115<H>  4.0   |  25  |  0.67    Ca    8.6      18 May 2023 06:22  Phos  4.7       Mg     1.9         TPro  6.7  /  Alb  3.1<L>  /  TBili  0.4  /  DBili  x   /  AST  246<H>  /  ALT  221<H>  /  AlkPhos  216<H>  05-18      05-15    143  |  109<H>  |  9   ----------------------------<  107<H>  3.9   |  30  |  0.47<L>    Ca    8.6      15 May 2023 06:43    TPro  5.8<L>  /  Alb  2.8<L>  /  TBili  0.3  /  DBili  x   /  AST  28  /  ALT  43  /  AlkPhos  56  05-15      Lipase, Serum in AM (05.15.23 @ 06:43): 500 U/L  Lipase, Serum in AM (23 @ 06:11): 510 U/L  Lipase, Serum in AM (23 @ 06:15): 2327 U/L  Lipase, Serum in AM (23 @ 06:34): 65750 U/L  Lipase, Serum (23 @ 20:30): >04395 U/L      BLOOD CULTURES:   LIPID PROFILE     RADIOLOGY:   MRCP: 23:  FINDINGS:  LOWER CHEST: Partially imaged bilateral pleural effusions with compressive atelectasis in the lower lobes, greater on the right.  LIVER: Within normal limits.  BILE DUCTS: Mild intrahepatic biliary ductal dilatation. Common bile duct within normal limits for caliber measuring 6 mm. No common bile duct stone.  GALLBLADDER: Cholecystectomy.  SPLEEN: Within normal limits for size. Subcentimeter high T2 signal lesion in the anterior aspect of the spleen, less conspicuous on the delayed postcontrast imaging, possibly a hemangioma.  PANCREAS: Peripancreatic fat stranding and fluid consistent with pancreatitis. Fluid extends into the perisplenic region and adjacent to the proximal and transverse duodenum; no focal collection. Pancreas divisum. Normal pancreatic enhancement without evidence of necrosis. 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 Remainder of the main pancreatic duct normal in caliber. 3 mm cyst in the pancreatic head (series 5 image 8), more conspicuous on the current study,however not significantly changed since 11/3/2021.  ADRENALS: Within normal limits.  KIDNEYS/URETERS: No hydronephrosis. Ptotic left kidney. Right renal cysts.  VISUALIZED PORTIONS:  BOWEL: Normal caliber.  PERITONEUM: Small amount of ascites.  VESSELS: Abdominal aorta normal in caliber. IVC and hepatic veins are patent. Portal, splenic, and superior mesenteric veins are patent.  RETROPERITONEUM/LYMPH NODES: No lymphadenopathy.  ABDOMINAL WALL: Unremarkable.  BONES: Fusion hardware in the spine.  IMPRESSION:  Interstitial edematous pancreatitis.  Pancreas divisum.  Fusiform dilatation of the pancreatic duct again seen in the region of the minor papilla compatible with a santorinicele.  Partially imaged bilateral pleural effusions with compressive atelectasis of the lower lobes, new since 2023.      CT of Abd/Pelvis: 23:  FINDINGS:  LOWER CHEST: Mild bibasilar atelectasis.  LIVER: Within normal limits.  BILE DUCTS: Normal caliber.  GALLBLADDER: Within cholecystectomy.  SPLEEN: Within normal limits.  PANCREAS: Diffusely edematous. Homogeneously enhancing without necrosis.  Moderate peripancreatic fluid and stranding. No main pancreatic duct dilatation. No organized fluid collection. No hemorrhage.  ADRENALS: Within normal limits.  KIDNEYS/URETERS: No hydronephrosis or perinephric stranding. 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  BLADDER: Underdistended which limits evaluation of the wall thickness.  REPRODUCTIVE ORGANS: Fibroid uterus. No adnexal soft tissue mass.  BOWEL: No bowel obstruction. Appendix is normal. No abnormal bowel wall thickening or inflammatory changes.  PERITONEUM: No no abdominal ascites. No free air or intraperitoneal collection.  VESSELS: Within normal limits. Splenic vein is patent. No evidence of pseudoaneurysm.  RETROPERITONEUM/LYMPH NODES: No lymphadenopathy.  ABDOMINAL WALL: Within normal limits.  BONES: Stable postsurgical changes posterior spinal decompressive surgery and fusion L4 and L5. No acute osseous finding. Degenerative changes.  IMPRESSION:  Acute interstitial pancreatitis. No necrosis, hemorrhage or pseudocyst formation.       EK23:  Sinus rhythm with Premature atrial complexes  Poor R-wave progression V1-4 as before    TELEMETRY:        STRESS ECH0:  Stress echo in my office last year showing normal LV size and systolic function with LVEF=65-70% with mild MR and TR with trace AI but otherwise a normal study and no stress echo evidence for ischemia    ECHO: 19:  M-Mode Measurements (cm)   LVEDd: 5.21 cm            LVESd: 2.43 cm   IVSEd: 0.96 cm   LVPWd: 1.1 cm             AO Root Dimension: 2.4 cm                        ACS: 2 cm  Doppler Measurements:                                  MV Peak E-Wave: 98.2 cm/s   TR Velocity:324 cm/s           MV Peak A-Wave: 96.3 cm/s   TR Gradient:41.9904 mmHg       MV E/A Ratio: 1.02 %   Estimated RAP:10 mmHg          MV Peak Gradient: 3.86 mmHg   RVSP:41 mmHg    Findings  Mitral Valve   Normal appearing mitral valve structure and function.   Trace mitral regurgitation is present.   EA reversal of the mitral inflow consistent with reduced compliance of the left ventricle    Aortic Valve   Mild aortic sclerosis is present with normal valvular opening.   Trace aortic regurgitation is present.    Tricuspid Valve   Mild (1+) tricuspid valve regurgitation is present.   Moderate pulmonary hypertension.    Pulmonic Valve   Normal appearing pulmonic valve structure and function.    Left Atrium   The left atrium is mildly dilated.    Left Ventricle   The left ventricle is normal in size, wall thickness, wall motion and contractility.   Estimated left ventricular ejection fraction is 65-70 %.    Right Atrium   Normal appearing right atrium.    Right Ventricle   Normal appearing right ventricle structure and function.    Pericardial Effusion   No evidence of pericardial effusion.    Pleural Effusion   No evidence of pleural effusion.    Miscellaneous   All visualized extra cardiac structures appears to be normal.    Summary   Mild aortic sclerosis is present with normal valvular opening.   Trace aortic regurgitation is present.   The left atrium is mildly dilated.   The left ventricle is normal in size, wall thickness, wall motion and contractility.   Estimated left ventricular ejection fraction is 65-70 %.   All visualized extra cardiac structures appears to be normal.   Normal appearing mitral valve structure and function.   Trace mitral regurgitation is present.   EA reversal of the mitral inflow consistent with reduced compliance of the left ventricle   No evidence of pericardial effusion.   No evidence of pleural effusion.   Normal appearing pulmonic valve structure and function.   Normal appearing right atrium.   Normal appearing right ventricle structure and function.   Mild (1+) tricuspid valve regurgitation is present.   Moderate pulmonary hypertension.    Signature   ----------------------------------------------------------------   Electronically signed by Salvador Mattson MD(Interpreting   physician) on 2019 07:59 PM   ----------------------------------------------------------------

## 2023-05-18 NOTE — DISCHARGE NOTE PROVIDER - CARE PROVIDER_API CALL
Nick Mckay)  Gastroenterology  [address] [place] [address]  Phone: [phone]  Fax: [phone]  Follow Up Time:

## 2023-05-18 NOTE — DISCHARGE NOTE PROVIDER - ATTENDING DISCHARGE PHYSICAL EXAMINATION:
Patient seen today, feels well, no complaints, abdominal pain much improved, she did take Dilaudid overnight. Tolerating orally.     Vital Signs Last 24 Hrs  T(C): 36.6 (18 May 2023 15:07), Max: 36.7 (17 May 2023 17:38)  T(F): 97.9 (18 May 2023 15:07), Max: 98.1 (17 May 2023 17:38)  HR: 69 (18 May 2023 15:07) (66 - 74)  BP: 149/72 (18 May 2023 15:07) (120/56 - 174/79)  BP(mean): --  RR: 18 (18 May 2023 15:07) (12 - 20)  SpO2: 99% (18 May 2023 15:07) (93% - 99%)    Parameters below as of 18 May 2023 15:07  Patient On (Oxygen Delivery Method): room air    PHYSICAL EXAM:  GENERAL: NAD, lying in bed comfortably  HEAD:  Atraumatic, Normocephalic  EYES: conjunctiva and sclera clear  ENT: Moist mucous membranes  NECK: Supple, No JVD  CHEST/LUNG: Clear to auscultation bilaterally; No rales, rhonchi, wheezing. Unlabored respirations  HEART: Regular rate and rhythm; No murmurs  ABDOMEN: Bowel sounds present; Soft, Nondistended. tender to epigastric area   EXTREMITIES:  2+ Peripheral Pulses, brisk capillary refill. No clubbing, cyanosis, or edema  NERVOUS SYSTEM:  Alert & Oriented X3, speech clear. No deficits   MSK: FROM all 4 extremities, full and equal strength

## 2023-05-18 NOTE — PROGRESS NOTE ADULT - REASON FOR ADMISSION
Pancreatitis

## 2023-05-18 NOTE — DISCHARGE NOTE PROVIDER - HOSPITAL COURSE
77 y/o Female presented for abdominal pain. Patient stated she started to have severe abdominal pain around 1 pm yesterday. Described as generalized pain, non-radiating, rated 9/10, no aggravating or alleviating factors. Associated wit nausea and one episode of vomiting. Denies fever, chills, chest pain, SOB, diarrhea, constipation, lightheadedness, dizziness. Patient has hx of recurrent pancreatitis, last hospitalized in December 2021. In the past thought due to be related gallstones, had cholecystectomy. Patient states has not drank alcohol since November 2022. 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    # Acute Pancreatitis likely 2/2 pancreatic divisum  - patient has h/o recurrent pancreatitis  - S/p IVF and given Lasix given SOB and atelectasis as this is cause of low grade temp.  Start incentive spirometer   - continue low fat diet   - MRCP - results noted above   - GI consult appreciated: attempt papillotomy, unable to locate minor papilla and severely tight pancreatic sphincter. Will need to reattempt in 6-8 weeks outpatient  - S/p  ERCP   - tolerating orally     # HTN - uncontrolled   - Added hydralazine PRN  - Continue losartan and metoprolol     HLD, Anxiety  continue Home meds    # SCD for DVT ppx

## 2023-05-24 DIAGNOSIS — Z91.09 OTHER ALLERGY STATUS, OTHER THAN TO DRUGS AND BIOLOGICAL SUBSTANCES: ICD-10-CM

## 2023-05-24 DIAGNOSIS — I10 ESSENTIAL (PRIMARY) HYPERTENSION: ICD-10-CM

## 2023-05-24 DIAGNOSIS — M19.90 UNSPECIFIED OSTEOARTHRITIS, UNSPECIFIED SITE: ICD-10-CM

## 2023-05-24 DIAGNOSIS — Z80.3 FAMILY HISTORY OF MALIGNANT NEOPLASM OF BREAST: ICD-10-CM

## 2023-05-24 DIAGNOSIS — K85.90 ACUTE PANCREATITIS WITHOUT NECROSIS OR INFECTION, UNSPECIFIED: ICD-10-CM

## 2023-05-24 DIAGNOSIS — F41.9 ANXIETY DISORDER, UNSPECIFIED: ICD-10-CM

## 2023-05-24 DIAGNOSIS — Z88.8 ALLERGY STATUS TO OTHER DRUGS, MEDICAMENTS AND BIOLOGICAL SUBSTANCES: ICD-10-CM

## 2023-05-24 DIAGNOSIS — Z83.3 FAMILY HISTORY OF DIABETES MELLITUS: ICD-10-CM

## 2023-05-24 DIAGNOSIS — I08.3 COMBINED RHEUMATIC DISORDERS OF MITRAL, AORTIC AND TRICUSPID VALVES: ICD-10-CM

## 2023-05-24 DIAGNOSIS — J98.11 ATELECTASIS: ICD-10-CM

## 2023-05-24 DIAGNOSIS — Z88.1 ALLERGY STATUS TO OTHER ANTIBIOTIC AGENTS: ICD-10-CM

## 2023-05-24 DIAGNOSIS — Z98.42 CATARACT EXTRACTION STATUS, LEFT EYE: ICD-10-CM

## 2023-05-24 DIAGNOSIS — E78.5 HYPERLIPIDEMIA, UNSPECIFIED: ICD-10-CM

## 2023-05-24 DIAGNOSIS — K21.9 GASTRO-ESOPHAGEAL REFLUX DISEASE WITHOUT ESOPHAGITIS: ICD-10-CM

## 2023-05-24 DIAGNOSIS — Q45.3 OTHER CONGENITAL MALFORMATIONS OF PANCREAS AND PANCREATIC DUCT: ICD-10-CM

## 2023-05-24 DIAGNOSIS — K76.0 FATTY (CHANGE OF) LIVER, NOT ELSEWHERE CLASSIFIED: ICD-10-CM

## 2023-05-24 DIAGNOSIS — Z90.49 ACQUIRED ABSENCE OF OTHER SPECIFIED PARTS OF DIGESTIVE TRACT: ICD-10-CM

## 2023-05-24 DIAGNOSIS — Z79.82 LONG TERM (CURRENT) USE OF ASPIRIN: ICD-10-CM

## 2023-05-24 DIAGNOSIS — M20.41 OTHER HAMMER TOE(S) (ACQUIRED), RIGHT FOOT: ICD-10-CM

## 2023-06-09 NOTE — ED ADULT NURSE NOTE - EXTENSIONS OF SELF_ADULT
-- DO NOT REPLY / DO NOT REPLY ALL --  -- Message is from Engagement Center Operations (ECO) --    General Patient Message:     Checo with Health system called stating she is looking to get patient a quantity increase for her medications from 90 days to 100 days as she is eligible for the increase. Please send new to the [facility] on [facility] in [facility] for patients maintenance Medications. Checo said the four medicaitons are Atenolol 50mg, Atorvastatin 10mg, Jardiance 25mg, and the Metformin ER 500mg. Please send or call Checo back to discuss and advise.     Caller Information       Type Contact Phone/Fax    06/09/2023 10:09 AM CDT Phone (Incoming) Health system [phone]     Checo        Alternative phone number: NA    Can a detailed message be left? No    Message Turnaround: [place]:    Refer to site's KB page for routing instructions    Please give this turnaround time to the caller:   \"You can expect to receive a response 2-3 business days after your provider's clinical team reviews the message\"               None

## 2023-07-05 ENCOUNTER — OUTPATIENT (OUTPATIENT)
Dept: OUTPATIENT SERVICES | Facility: HOSPITAL | Age: 78
LOS: 1 days | End: 2023-07-05
Payer: MEDICARE

## 2023-07-05 VITALS
DIASTOLIC BLOOD PRESSURE: 60 MMHG | HEIGHT: 64 IN | WEIGHT: 152.12 LBS | SYSTOLIC BLOOD PRESSURE: 151 MMHG | OXYGEN SATURATION: 100 % | TEMPERATURE: 97 F | RESPIRATION RATE: 16 BRPM | HEART RATE: 63 BPM

## 2023-07-05 DIAGNOSIS — Z98.890 OTHER SPECIFIED POSTPROCEDURAL STATES: Chronic | ICD-10-CM

## 2023-07-05 DIAGNOSIS — K85.90 ACUTE PANCREATITIS WITHOUT NECROSIS OR INFECTION, UNSPECIFIED: ICD-10-CM

## 2023-07-05 DIAGNOSIS — E89.2 POSTPROCEDURAL HYPOPARATHYROIDISM: Chronic | ICD-10-CM

## 2023-07-05 DIAGNOSIS — Z98.89 OTHER SPECIFIED POSTPROCEDURAL STATES: Chronic | ICD-10-CM

## 2023-07-05 DIAGNOSIS — Z90.89 ACQUIRED ABSENCE OF OTHER ORGANS: Chronic | ICD-10-CM

## 2023-07-05 DIAGNOSIS — Z98.42 CATARACT EXTRACTION STATUS, LEFT EYE: Chronic | ICD-10-CM

## 2023-07-05 DIAGNOSIS — Z90.49 ACQUIRED ABSENCE OF OTHER SPECIFIED PARTS OF DIGESTIVE TRACT: Chronic | ICD-10-CM

## 2023-07-05 LAB
ANION GAP SERPL CALC-SCNC: 1 MMOL/L — LOW (ref 5–17)
APTT BLD: 38.2 SEC — HIGH (ref 27.5–35.5)
BASOPHILS # BLD AUTO: 0.02 K/UL — SIGNIFICANT CHANGE UP (ref 0–0.2)
BASOPHILS NFR BLD AUTO: 0.4 % — SIGNIFICANT CHANGE UP (ref 0–2)
BUN SERPL-MCNC: 19 MG/DL — SIGNIFICANT CHANGE UP (ref 7–23)
CALCIUM SERPL-MCNC: 9.3 MG/DL — SIGNIFICANT CHANGE UP (ref 8.5–10.1)
CHLORIDE SERPL-SCNC: 109 MMOL/L — HIGH (ref 96–108)
CO2 SERPL-SCNC: 29 MMOL/L — SIGNIFICANT CHANGE UP (ref 22–31)
CREAT SERPL-MCNC: 0.7 MG/DL — SIGNIFICANT CHANGE UP (ref 0.5–1.3)
EGFR: 90 ML/MIN/1.73M2 — SIGNIFICANT CHANGE UP
EOSINOPHIL # BLD AUTO: 0.15 K/UL — SIGNIFICANT CHANGE UP (ref 0–0.5)
EOSINOPHIL NFR BLD AUTO: 3.1 % — SIGNIFICANT CHANGE UP (ref 0–6)
GLUCOSE SERPL-MCNC: 114 MG/DL — HIGH (ref 70–99)
HCT VFR BLD CALC: 39 % — SIGNIFICANT CHANGE UP (ref 34.5–45)
HGB BLD-MCNC: 12.7 G/DL — SIGNIFICANT CHANGE UP (ref 11.5–15.5)
IMM GRANULOCYTES NFR BLD AUTO: 0.2 % — SIGNIFICANT CHANGE UP (ref 0–0.9)
INR BLD: 1.02 RATIO — SIGNIFICANT CHANGE UP (ref 0.88–1.16)
LYMPHOCYTES # BLD AUTO: 2.06 K/UL — SIGNIFICANT CHANGE UP (ref 1–3.3)
LYMPHOCYTES # BLD AUTO: 42.5 % — SIGNIFICANT CHANGE UP (ref 13–44)
MCHC RBC-ENTMCNC: 29.3 PG — SIGNIFICANT CHANGE UP (ref 27–34)
MCHC RBC-ENTMCNC: 32.6 GM/DL — SIGNIFICANT CHANGE UP (ref 32–36)
MCV RBC AUTO: 89.9 FL — SIGNIFICANT CHANGE UP (ref 80–100)
MONOCYTES # BLD AUTO: 0.44 K/UL — SIGNIFICANT CHANGE UP (ref 0–0.9)
MONOCYTES NFR BLD AUTO: 9.1 % — SIGNIFICANT CHANGE UP (ref 2–14)
NEUTROPHILS # BLD AUTO: 2.17 K/UL — SIGNIFICANT CHANGE UP (ref 1.8–7.4)
NEUTROPHILS NFR BLD AUTO: 44.7 % — SIGNIFICANT CHANGE UP (ref 43–77)
PLATELET # BLD AUTO: 215 K/UL — SIGNIFICANT CHANGE UP (ref 150–400)
POTASSIUM SERPL-MCNC: 4.7 MMOL/L — SIGNIFICANT CHANGE UP (ref 3.5–5.3)
POTASSIUM SERPL-SCNC: 4.7 MMOL/L — SIGNIFICANT CHANGE UP (ref 3.5–5.3)
PROTHROM AB SERPL-ACNC: 11.8 SEC — SIGNIFICANT CHANGE UP (ref 10.5–13.4)
RBC # BLD: 4.34 M/UL — SIGNIFICANT CHANGE UP (ref 3.8–5.2)
RBC # FLD: 13.7 % — SIGNIFICANT CHANGE UP (ref 10.3–14.5)
SARS-COV-2 RNA SPEC QL NAA+PROBE: SIGNIFICANT CHANGE UP
SODIUM SERPL-SCNC: 139 MMOL/L — SIGNIFICANT CHANGE UP (ref 135–145)
WBC # BLD: 4.85 K/UL — SIGNIFICANT CHANGE UP (ref 3.8–10.5)
WBC # FLD AUTO: 4.85 K/UL — SIGNIFICANT CHANGE UP (ref 3.8–10.5)

## 2023-07-05 PROCEDURE — 87635 SARS-COV-2 COVID-19 AMP PRB: CPT

## 2023-07-05 PROCEDURE — 85730 THROMBOPLASTIN TIME PARTIAL: CPT

## 2023-07-05 PROCEDURE — 36415 COLL VENOUS BLD VENIPUNCTURE: CPT

## 2023-07-05 PROCEDURE — 85610 PROTHROMBIN TIME: CPT

## 2023-07-05 PROCEDURE — 99214 OFFICE O/P EST MOD 30 MIN: CPT | Mod: 25

## 2023-07-05 PROCEDURE — 85025 COMPLETE CBC W/AUTO DIFF WBC: CPT

## 2023-07-05 PROCEDURE — 93010 ELECTROCARDIOGRAM REPORT: CPT

## 2023-07-05 PROCEDURE — 93005 ELECTROCARDIOGRAM TRACING: CPT

## 2023-07-05 PROCEDURE — 80048 BASIC METABOLIC PNL TOTAL CA: CPT

## 2023-07-05 RX ORDER — ACETAMINOPHEN 500 MG
1000 TABLET ORAL ONCE
Refills: 0 | Status: DISCONTINUED | OUTPATIENT
Start: 2023-07-06 | End: 2023-07-06

## 2023-07-05 RX ORDER — FENTANYL CITRATE 50 UG/ML
50 INJECTION INTRAVENOUS
Refills: 0 | Status: DISCONTINUED | OUTPATIENT
Start: 2023-07-06 | End: 2023-07-06

## 2023-07-05 RX ORDER — SODIUM CHLORIDE 9 MG/ML
1000 INJECTION, SOLUTION INTRAVENOUS
Refills: 0 | Status: DISCONTINUED | OUTPATIENT
Start: 2023-07-06 | End: 2023-07-06

## 2023-07-05 RX ORDER — OXYCODONE HYDROCHLORIDE 5 MG/1
5 TABLET ORAL ONCE
Refills: 0 | Status: DISCONTINUED | OUTPATIENT
Start: 2023-07-06 | End: 2023-07-06

## 2023-07-05 NOTE — H&P PST ADULT - HISTORY OF PRESENT ILLNESS
Patient is a 77 year old female who presents to [facility] in no acute distress with chief complaint of chronic pancreatitis. She explains how she had a recent "flare up" requiring hospitalization. A stent was attempted to be placed without success and it was determined to reattempt once inflammation has subsided. Denies pain or discomfort at this time, N/V/D, fever, chills. She is scheduled for endoscopic retrograde cholangiopancreatography.

## 2023-07-05 NOTE — H&P PST ADULT - NSICDXFAMILYHX_GEN_ALL_CORE_FT
FAMILY HISTORY:  Father  Still living? No  FH: congestive heart failure, Age at diagnosis: Age Unknown  FHx: type 2 diabetes mellitus, Age at diagnosis: Age Unknown    Mother  Still living? No  FH: breast cancer, Age at diagnosis: Age Unknown  FHx: type 2 diabetes mellitus, Age at diagnosis: Age Unknown    Sibling  Still living? No  Family history of AIDS, Age at diagnosis: Age Unknown

## 2023-07-05 NOTE — ASU PATIENT PROFILE, ADULT - NSICDXPASTSURGICALHX_GEN_ALL_CORE_FT
PAST SURGICAL HISTORY:  H/O hand surgery     H/O parathyroidectomy     History of back surgery     History of      History of laminectomy with resection    History of tonsillectomy     S/P cataract surgery B/L    S/P cholecystectomy

## 2023-07-05 NOTE — H&P PST ADULT - ASSESSMENT
Patient is a 77 year old female who presents to [facility] in no acute distress with chief complaint of chronic pancreatitis. She explains how she had a recent "flare up" requiring hospitalization. A stent was attempted to be placed without success and it was determined to reattempt once inflammation has subsided. Denies pain or discomfort at this time, N/V/D, fever, chills. She is scheduled for endoscopic retrograde cholangiopancreatography.     1.  Dr Mckay office will instruct patient regarding bowel prep and medication regimen on day of procedure.  2. Endoscopy booking will call patient the day before surgery for arrival instructions   3. NPO post midnight  4. CBC, BMP, coags, EKG, done   5. Patient instructed to have responsible adult accompany/ drive pt home after procedure  6. Covid test performed today by patient and instructed to quarantine after covid test

## 2023-07-05 NOTE — ASU PATIENT PROFILE, ADULT - FALL HARM RISK - HARM RISK INTERVENTIONS

## 2023-07-05 NOTE — H&P PST ADULT - NSICDXPASTMEDICALHX_GEN_ALL_CORE_FT
PAST MEDICAL HISTORY:  Anxiety     Arthritis     Fatty liver     GERD (gastroesophageal reflux disease)     Hammertoe of right foot     High cholesterol     Hypertension     Osteoarthritis     Pancreatitis h/o    Scalp psoriasis

## 2023-07-06 ENCOUNTER — TRANSCRIPTION ENCOUNTER (OUTPATIENT)
Age: 78
End: 2023-07-06

## 2023-07-06 ENCOUNTER — APPOINTMENT (OUTPATIENT)
Dept: GASTROENTEROLOGY | Facility: HOSPITAL | Age: 78
End: 2023-07-06
Payer: MEDICARE

## 2023-07-06 ENCOUNTER — OUTPATIENT (OUTPATIENT)
Dept: INPATIENT UNIT | Facility: HOSPITAL | Age: 78
LOS: 1 days | Discharge: HOME | End: 2023-07-06
Payer: MEDICARE

## 2023-07-06 VITALS
SYSTOLIC BLOOD PRESSURE: 128 MMHG | RESPIRATION RATE: 13 BRPM | OXYGEN SATURATION: 97 % | HEART RATE: 72 BPM | DIASTOLIC BLOOD PRESSURE: 58 MMHG | TEMPERATURE: 98 F

## 2023-07-06 VITALS
WEIGHT: 152.12 LBS | TEMPERATURE: 98 F | DIASTOLIC BLOOD PRESSURE: 73 MMHG | RESPIRATION RATE: 16 BRPM | HEART RATE: 68 BPM | OXYGEN SATURATION: 97 % | SYSTOLIC BLOOD PRESSURE: 132 MMHG | HEIGHT: 64 IN

## 2023-07-06 DIAGNOSIS — Z98.89 OTHER SPECIFIED POSTPROCEDURAL STATES: Chronic | ICD-10-CM

## 2023-07-06 DIAGNOSIS — Z98.890 OTHER SPECIFIED POSTPROCEDURAL STATES: Chronic | ICD-10-CM

## 2023-07-06 DIAGNOSIS — Z90.49 ACQUIRED ABSENCE OF OTHER SPECIFIED PARTS OF DIGESTIVE TRACT: Chronic | ICD-10-CM

## 2023-07-06 DIAGNOSIS — E89.2 POSTPROCEDURAL HYPOPARATHYROIDISM: Chronic | ICD-10-CM

## 2023-07-06 DIAGNOSIS — Z90.89 ACQUIRED ABSENCE OF OTHER ORGANS: Chronic | ICD-10-CM

## 2023-07-06 DIAGNOSIS — K85.90 ACUTE PANCREATITIS WITHOUT NECROSIS OR INFECTION, UNSPECIFIED: ICD-10-CM

## 2023-07-06 DIAGNOSIS — Z98.42 CATARACT EXTRACTION STATUS, LEFT EYE: Chronic | ICD-10-CM

## 2023-07-06 DIAGNOSIS — Z98.49 CATARACT EXTRACTION STATUS, UNSPECIFIED EYE: Chronic | ICD-10-CM

## 2023-07-06 PROCEDURE — 76000 FLUOROSCOPY <1 HR PHYS/QHP: CPT

## 2023-07-06 PROCEDURE — C1769: CPT

## 2023-07-06 PROCEDURE — C2617: CPT

## 2023-07-06 PROCEDURE — 43262 ENDO CHOLANGIOPANCREATOGRAPH: CPT | Mod: GC

## 2023-07-06 PROCEDURE — C1889: CPT

## 2023-07-06 RX ORDER — AMLODIPINE BESYLATE 2.5 MG/1
1 TABLET ORAL
Refills: 0 | DISCHARGE

## 2023-07-06 RX ORDER — SIMVASTATIN 20 MG/1
1 TABLET, FILM COATED ORAL
Qty: 0 | Refills: 0 | DISCHARGE

## 2023-07-06 RX ORDER — METOPROLOL TARTRATE 50 MG
1 TABLET ORAL
Qty: 0 | Refills: 0 | DISCHARGE

## 2023-07-06 RX ORDER — ACETAMINOPHEN 500 MG
2 TABLET ORAL
Refills: 0 | DISCHARGE

## 2023-07-06 RX ORDER — OLMESARTAN MEDOXOMIL 5 MG/1
1 TABLET, FILM COATED ORAL
Qty: 0 | Refills: 0 | DISCHARGE

## 2023-07-06 RX ORDER — SODIUM CHLORIDE 9 MG/ML
1000 INJECTION, SOLUTION INTRAVENOUS ONCE
Refills: 0 | Status: COMPLETED | OUTPATIENT
Start: 2023-07-06 | End: 2023-07-06

## 2023-07-06 RX ORDER — ASCORBIC ACID 60 MG
2 TABLET,CHEWABLE ORAL
Refills: 0 | DISCHARGE

## 2023-07-06 RX ORDER — METOPROLOL TARTRATE 50 MG
1 TABLET ORAL
Refills: 0 | DISCHARGE

## 2023-07-06 RX ORDER — OMEPRAZOLE 10 MG/1
1 CAPSULE, DELAYED RELEASE ORAL
Qty: 0 | Refills: 0 | DISCHARGE

## 2023-07-06 RX ORDER — ALPRAZOLAM 0.25 MG
1 TABLET ORAL
Qty: 0 | Refills: 0 | DISCHARGE

## 2023-07-06 RX ORDER — ONDANSETRON 8 MG/1
4 TABLET, FILM COATED ORAL ONCE
Refills: 0 | Status: COMPLETED | OUTPATIENT
Start: 2023-07-06 | End: 2023-07-06

## 2023-07-06 RX ORDER — CHOLECALCIFEROL (VITAMIN D3) 125 MCG
1 CAPSULE ORAL
Qty: 0 | Refills: 0 | DISCHARGE

## 2023-07-06 RX ORDER — ACETAMINOPHEN 500 MG
100 TABLET ORAL
Qty: 0 | Refills: 0 | DISCHARGE
Start: 2023-07-06

## 2023-07-06 RX ORDER — ASPIRIN/CALCIUM CARB/MAGNESIUM 324 MG
1 TABLET ORAL
Qty: 0 | Refills: 0 | DISCHARGE

## 2023-07-06 RX ORDER — UBIDECARENONE 100 MG
1 CAPSULE ORAL
Qty: 0 | Refills: 0 | DISCHARGE

## 2023-07-06 RX ORDER — OMEGA-3 ACID ETHYL ESTERS 1 G
1 CAPSULE ORAL
Qty: 0 | Refills: 0 | DISCHARGE

## 2023-07-06 RX ORDER — ASCORBIC ACID 60 MG
1 TABLET,CHEWABLE ORAL
Qty: 0 | Refills: 0 | DISCHARGE

## 2023-07-06 RX ADMIN — SODIUM CHLORIDE 1000 MILLILITER(S): 9 INJECTION, SOLUTION INTRAVENOUS at 13:27

## 2023-07-06 RX ADMIN — SODIUM CHLORIDE 1000 MILLILITER(S): 9 INJECTION, SOLUTION INTRAVENOUS at 14:26

## 2023-07-06 RX ADMIN — ONDANSETRON 4 MILLIGRAM(S): 8 TABLET, FILM COATED ORAL at 13:36

## 2023-07-10 DIAGNOSIS — Z79.82 LONG TERM (CURRENT) USE OF ASPIRIN: ICD-10-CM

## 2023-07-10 DIAGNOSIS — K86.1 OTHER CHRONIC PANCREATITIS: ICD-10-CM

## 2023-07-10 DIAGNOSIS — E78.5 HYPERLIPIDEMIA, UNSPECIFIED: ICD-10-CM

## 2023-07-10 DIAGNOSIS — K85.10 BILIARY ACUTE PANCREATITIS WITHOUT NECROSIS OR INFECTION: ICD-10-CM

## 2023-07-10 DIAGNOSIS — Z87.891 PERSONAL HISTORY OF NICOTINE DEPENDENCE: ICD-10-CM

## 2023-07-10 DIAGNOSIS — I10 ESSENTIAL (PRIMARY) HYPERTENSION: ICD-10-CM

## 2023-07-10 DIAGNOSIS — K85.90 ACUTE PANCREATITIS WITHOUT NECROSIS OR INFECTION, UNSPECIFIED: ICD-10-CM

## 2023-07-26 NOTE — ASU PREOP CHECKLIST - TEMPERATURE IN FAHRENHEIT (DEGREES F)
97.1 Tranexamic Acid Pregnancy And Lactation Text: It is unknown if this medication is safe during pregnancy or breast feeding.

## 2023-08-09 ENCOUNTER — OFFICE (OUTPATIENT)
Dept: URBAN - METROPOLITAN AREA CLINIC 102 | Facility: CLINIC | Age: 78
Setting detail: OPHTHALMOLOGY
End: 2023-08-09
Payer: MEDICARE

## 2023-08-09 DIAGNOSIS — H01.001: ICD-10-CM

## 2023-08-09 DIAGNOSIS — H16.221: ICD-10-CM

## 2023-08-09 DIAGNOSIS — Z96.1: ICD-10-CM

## 2023-08-09 DIAGNOSIS — H01.002: ICD-10-CM

## 2023-08-09 DIAGNOSIS — H01.005: ICD-10-CM

## 2023-08-09 DIAGNOSIS — H16.222: ICD-10-CM

## 2023-08-09 DIAGNOSIS — H43.813: ICD-10-CM

## 2023-08-09 DIAGNOSIS — H26.493: ICD-10-CM

## 2023-08-09 DIAGNOSIS — H01.004: ICD-10-CM

## 2023-08-09 DIAGNOSIS — H16.223: ICD-10-CM

## 2023-08-09 DIAGNOSIS — H35.3131: ICD-10-CM

## 2023-08-09 PROCEDURE — 92014 COMPRE OPH EXAM EST PT 1/>: CPT | Performed by: OPHTHALMOLOGY

## 2023-08-09 PROCEDURE — 92250 FUNDUS PHOTOGRAPHY W/I&R: CPT | Performed by: OPHTHALMOLOGY

## 2023-08-09 ASSESSMENT — REFRACTION_CURRENTRX
OD_VPRISM_DIRECTION: PROGS
OS_ADD: +2.25
OD_OVR_VA: 20/
OD_VPRISM_DIRECTION: PROGS
OD_AXIS: 073
OD_SPHERE: +1.25
OD_AXIS: 085
OD_CYLINDER: -0.75
OS_CYLINDER: -1.25
OD_VPRISM_DIRECTION: PROGS
OD_OVR_VA: 20/
OS_CYLINDER: -1.75
OS_ADD: +2.50
OS_AXIS: 088
OS_SPHERE: +0.50
OD_AXIS: 070
OS_SPHERE: +0.25
OS_OVR_VA: 20/
OD_ADD: +2.25
OD_ADD: +2.50
OS_OVR_VA: 20/
OS_VPRISM_DIRECTION: PROGS
OD_SPHERE: +1.00
OD_OVR_VA: 20/
OS_AXIS: 095
OD_CYLINDER: -0.75
OD_CYLINDER: -1.50
OD_SPHERE: +0.75
OS_SPHERE: +0.50
OS_VPRISM_DIRECTION: PROGS
OS_OVR_VA: 20/
OS_VPRISM_DIRECTION: PROGS
OS_CYLINDER: -1.25
OS_AXIS: 087

## 2023-08-09 ASSESSMENT — KERATOMETRY
METHOD_AUTO_MANUAL: AUTO
OS_K2POWER_DIOPTERS: 43.25
OD_K2POWER_DIOPTERS: 42.75
OS_K1POWER_DIOPTERS: 42.00
OS_AXISANGLE_DEGREES: 169
OD_K1POWER_DIOPTERS: 41.75
OD_AXISANGLE_DEGREES: 157

## 2023-08-09 ASSESSMENT — REFRACTION_MANIFEST
OD_SPHERE: +0.75
OS_ADD: +2.50
OD_AXIS: 72
OD_ADD: +2.50
OS_VA1: 20/20
OD_CYLINDER: -0.75
OD_CYLINDER: -0.75
OS_VA1: 20/20
OS_CYLINDER: -1.25
OS_AXIS: 90
OS_SPHERE: +0.25
OD_VA1: 20/20
OS_AXIS: 90
OS_SPHERE: +0.25
OD_ADD: +2.50
OS_CYLINDER: -1.25
OD_SPHERE: +0.75
OD_VA1: 20/20
OD_AXIS: 72
OS_ADD: +2.50

## 2023-08-09 ASSESSMENT — TONOMETRY
OS_IOP_MMHG: 16
OD_IOP_MMHG: 18

## 2023-08-09 ASSESSMENT — TEAR BREAK UP TIME (TBUT)
OS_TBUT: T
OD_TBUT: T

## 2023-08-09 ASSESSMENT — CONFRONTATIONAL VISUAL FIELD TEST (CVF)
OD_FINDINGS: FULL
OS_FINDINGS: FULL

## 2023-08-09 ASSESSMENT — REFRACTION_AUTOREFRACTION
OS_AXIS: 090
OS_SPHERE: +1.00
OD_CYLINDER: -1.50
OS_CYLINDER: -2.00
OD_AXIS: 082
OD_SPHERE: +1.00

## 2023-08-09 ASSESSMENT — LID EXAM ASSESSMENTS
OS_BLEPHARITIS: LLL LUL T
OD_BLEPHARITIS: RLL RUL T

## 2023-08-09 ASSESSMENT — SPHEQUIV_DERIVED
OD_SPHEQUIV: 0.375
OS_SPHEQUIV: -0.375
OD_SPHEQUIV: 0.25
OD_SPHEQUIV: 0.375
OS_SPHEQUIV: 0
OS_SPHEQUIV: -0.375

## 2023-08-09 ASSESSMENT — VISUAL ACUITY
OS_BCVA: 20/20
OD_BCVA: 20/20

## 2023-08-09 ASSESSMENT — AXIALLENGTH_DERIVED
OD_AL: 23.9091
OS_AL: 24.0689
OD_AL: 23.9593
OS_AL: 23.9177
OS_AL: 24.0689
OD_AL: 23.9091

## 2023-08-25 NOTE — HISTORY OF PRESENT ILLNESS
Writer called patient to follow up on attached Gone! message. Patient states she noticed the mass on the inner part of her left bicep recently. Patient does report some intermittent throbbing from the left elbow upper her arm and intermittent tingling in the arm. Patient has had ongoing issue with the Left side of her body.     Writer discussed the appointment on Tuesday and the likely recommendation of an MRI to further characterize the mass in question. Pending this their may play a role for a biopsy but that will also be discussed on Tuesday in person. Patient will reach out to [facility] to see if she obtain a disc of her US prior to her appointment on 8/29/2023. All other questions will be discussed in detail face to face with natalia on Tuesday at her appointment.     Patient was appreciative of the follow up call.    [FreeTextEntry1] : 75 year old woman  with complaint of microscopic hematuria. This began on 9/15/20 when it was seen on routine screening UA. Patient reports she was seen at  for a viral infection and then was found to have trace blood in her urine. She is asymptomatic and has never seen blood in her urine.  \par It is mild in severity as the patient denies any gross hematuria. Nothing makes symptoms occur. \par It is associated with nothing.\par No gross hematuria, no dysuria, no frequency, no urgency, no hesitancy, no straining. No incontinence. \par No fevers, no chills, no nausea, no vomiting, no flank pain. No family history contributory to microscopic hematuria.\par \par 01/12/2021: Patient presents for follow up. UA 10/2020 showed 0 RBCs/hpf. She denies any new  symptoms and other medical  issues remain unchanged from above. No hematuria, no dysuria, mild frequency, no urgency, no hesitancy, no straining. No incontinence. No fevers, no chills, no nausea, no vomiting, no flank pain.

## 2023-09-22 NOTE — ED ADULT NURSE NOTE - PAIN: BODY LOCATION
chest, substernal [place] Counseling: I discussed with the patient the risks of tildrakizumab including but not limited to immunosuppression, malignancy, posterior leukoencephalopathy syndrome, and serious infections.  The patient understands that monitoring is required including a PPD at baseline and must alert us or the primary physician if symptoms of infection or other concerning signs are noted.

## 2023-11-02 ENCOUNTER — OFFICE (OUTPATIENT)
Dept: URBAN - METROPOLITAN AREA CLINIC 102 | Facility: CLINIC | Age: 78
Setting detail: OPHTHALMOLOGY
End: 2023-11-02
Payer: MEDICARE

## 2023-11-02 DIAGNOSIS — H10.45: ICD-10-CM

## 2023-11-02 PROCEDURE — 92012 INTRM OPH EXAM EST PATIENT: CPT | Performed by: OPHTHALMOLOGY

## 2023-11-02 ASSESSMENT — REFRACTION_MANIFEST
OS_SPHERE: +0.25
OD_AXIS: 72
OS_SPHERE: +0.25
OD_VA1: 20/20
OD_SPHERE: +0.75
OS_ADD: +2.50
OD_ADD: +2.50
OS_AXIS: 90
OS_VA1: 20/20
OS_CYLINDER: -1.25
OD_CYLINDER: -0.75
OD_CYLINDER: -0.75
OD_ADD: +2.50
OD_SPHERE: +0.75
OD_AXIS: 72
OS_ADD: +2.50
OS_VA1: 20/20
OD_VA1: 20/20
OS_AXIS: 90
OS_CYLINDER: -1.25

## 2023-11-02 ASSESSMENT — LID EXAM ASSESSMENTS
OD_BLEPHARITIS: RLL RUL T
OS_BLEPHARITIS: LLL LUL T

## 2023-11-02 ASSESSMENT — REFRACTION_CURRENTRX
OD_AXIS: 085
OD_AXIS: 073
OS_ADD: +2.25
OS_VPRISM_DIRECTION: PROGS
OS_SPHERE: +0.50
OS_SPHERE: +0.25
OD_VPRISM_DIRECTION: PROGS
OD_SPHERE: +1.25
OD_VPRISM_DIRECTION: PROGS
OD_OVR_VA: 20/
OS_CYLINDER: -1.25
OS_AXIS: 088
OS_ADD: +2.50
OD_CYLINDER: -0.75
OS_OVR_VA: 20/
OS_VPRISM_DIRECTION: PROGS
OS_OVR_VA: 20/
OS_OVR_VA: 20/
OD_VPRISM_DIRECTION: PROGS
OD_CYLINDER: -0.75
OD_CYLINDER: -1.50
OS_VPRISM_DIRECTION: PROGS
OD_SPHERE: +1.00
OS_CYLINDER: -1.25
OD_SPHERE: +0.75
OD_OVR_VA: 20/
OD_AXIS: 070
OD_ADD: +2.25
OS_SPHERE: +0.50
OS_AXIS: 087
OS_AXIS: 095
OD_ADD: +2.50
OS_CYLINDER: -1.75
OD_OVR_VA: 20/

## 2023-11-02 ASSESSMENT — SPHEQUIV_DERIVED
OS_SPHEQUIV: -0.375
OD_SPHEQUIV: 0.375
OD_SPHEQUIV: 0
OD_SPHEQUIV: 0.375
OS_SPHEQUIV: -0.375
OS_SPHEQUIV: -0.375

## 2023-11-02 ASSESSMENT — REFRACTION_AUTOREFRACTION
OS_CYLINDER: -1.75
OD_AXIS: 076
OD_SPHERE: +0.75
OS_AXIS: 093
OD_CYLINDER: -1.50
OS_SPHERE: +0.50

## 2023-11-02 ASSESSMENT — TEAR BREAK UP TIME (TBUT)
OS_TBUT: T
OD_TBUT: T

## 2023-11-02 ASSESSMENT — CONFRONTATIONAL VISUAL FIELD TEST (CVF)
OD_FINDINGS: FULL
OS_FINDINGS: FULL

## 2023-11-20 RX ORDER — OMEPRAZOLE 20 MG/1
20 CAPSULE, DELAYED RELEASE ORAL
Qty: 90 | Refills: 3 | Status: ACTIVE | COMMUNITY
Start: 2020-03-14 | End: 1900-01-01

## 2023-11-29 NOTE — ED STATDOCS - PATIENT PORTAL LINK FT
[address]                                OPERATIVE REPORT    PATIENT NAME: Kathleen Mattson                        :        1948  MED REC NO:   [id number]                          ROOM:       3048  ACCOUNT NO:   [de-identified]                           ADMIT DATE: 2023  PROVIDER:     Rupert Gaitan MD    DATE OF PROCEDURE:  2023    PREOPERATIVE DIAGNOSIS:  Benign prostatic hypertrophy with lower urinary  tract symptoms. POSTOPERATIVE DIAGNOSIS:  Benign prostatic hypertrophy with lower  urinary tract symptoms. PROCEDURE PERFORMED:  Cystoscopy, bipolar transurethral resection of  prostate. SURGEON:  Rupert Gaitan MD    INDICATIONS FOR PROCEDURE:  The patient is a very pleasant 72-year-old  gentleman with progressive obstructive voiding symptoms. He has  undergone a BPH workup revealing a median lobe and an 82 cubic  centimeter gland. He has been extensively counseled on the risks and  benefits of different prostate modalities including TURP. He presents  today for the aforementioned procedure. DESCRIPTION OF PROCEDURE:  After informed consent, the patient was taken  to the operating room. He was prepped and draped in sterile fashion. He was given a dose of preoperative antibiotics. Under general  anesthesia, I performed another cystoscopy. He does have trilobar  hypertrophy. The gland looked 50 to 60 gm cystoscopically, but the  median lobe was the likely issue. The bladder looked very diseased with  trabeculation, but no diverticula. It appeared thin walled. We now  switch out to my continuous flow resectoscope. To allow better entry  into the bladder, I took the median lobe down first using bipolar  energy. I now started at 12 o'clock position and worked my way on the  left lateral side. Intermittently, I fulgurated to stop any bleeding.    We do feel like we get the capsule
You can access the FollowMyHealth Patient Portal offered by [facility] by registering at the following website: [email or web address]. By joining [facility]’s FollowMyHealth portal, you will also be able to view your health information using other applications (apps) compatible with our system.

## 2023-12-16 NOTE — PATIENT PROFILE ADULT - FUNCTIONAL ASSESSMENT - DAILY ACTIVITY 5.
Pediatric Hospital Medicine Progress Note     Date: 12/16/2023 / Time: 6:55 AM     Patient:  Billy Taylor  PCP: Pratima Qiu D.O.  Hospital Day # 2      SUBJECTIVE   Brief HPI - 3 y.o. M with hx of DM-1 admitted for RSV bronchiolitis with hypoxemia & AOM.  - Transferred from [facility] 2/2 increasing ketones  - Lantus 6u qAM + 1u qHS  CC: 0.5u per 10g (breakfast) + 0.5u per 15g (all others)    Parents at bedside, pleased with how he's doing. No specific concerns or questions.       OBJECTIVE   Vital Signs  Date/Time Temp Pulse Resp BP SpO2 O2 (LPM)   12/16/23 0933 -- -- -- -- 97 % 1.5   12/16/23 0748 36.8 °C (98.3 °F) 114 25 100/50 94 % 1.5   12/16/23 0410 36.4 °C (97.5 °F) 101 28 -- 92 % 1.5   12/16/23 0339 -- -- -- -- -- 1.5   12/15/23 2352 36.4 °C (97.6 °F) 129 28 -- 91 % 1   12/15/23 2030 37.2 °C (98.9 °F) 123 28 107/74 ! 93 % 2     Physical Exam  General: Generally well-appearing toddler in no acute distress.   HEENT: Normocephalic, atraumatic. Extraocular movements intact. Mucus membranes moist.  CV: Regular rate & rhythm, no abnormal heart sounds.   Resp: Somewhat coarse bilaterally with no focal lung findings. Not in respiratory distress.  Abdomen: Normal bowel sounds present. Abdomen soft & non-tender with no masses or organomegaly noted.   MSK: Moves all extremities normally with full ROM.   Neuro: Alert & appropriate for age. No focal deficit noted.    Skin: Warm & well-perfused, no rashes.    Labs & Imaging    POC Blood Glucose   Reference Range & Units 40 - 99 mg/dL   12/16/2023 1002 241   12/16/2023 0346 268   12/15/2023 2340 246       ASSESSMENT & PLAN   Billy is an 3 y.o. male with history of type 1 diabetes admitted for bronchiolitis complicated by hypoxemia and hyperglycemia.     # Bronchiolitis  # Hypoxemia  Titrate supplemental O2 to maintain SpO2 >90% (awake) or >88% (asleep)  Has required 1-2 L O2 since admission  Supportive cares   Nasal hygiene & suctioning PRN  Tylenol & ibuprofen  PRN  RT consult per pathway  No indication for steroids/albuterol at this time      # Hyperglycemia  # Ketosis  # Type 1 DM  Transferred from [facility] due to hyperglycemia & +ketones  Ketone check not indicated based on blood sugars since arrival here  Continuing home insulin  Lantus 6u qAM + 1u qHS while sick  CC: 0.5u per 10g (breakfast) + 0.5u per 15g (all others)      # FEN / GI  Regular diet as tolerated, emphasizing fluids  IVF (75 mL/hr) with NS + 20 mEq KCl as needed  Monitor I/O      Disposition: Inpatient for supplemental O2 & blood sugar monitoring      Erin Whepley, MD  Pediatric Resident -- PGY-1    As this patient's attending physician, I provided on-site coordination of the healthcare team inclusive of the resident physician which included patient assessment, directing the patient's plan of care, and making decisions regarding the patient's management on this visit's date of service as reflected in the documentation above.  Parents were at bedside and agreeable with the current plan of care. All questions were answered.    Carolin Nguyễn MD, FAAP       4 = No assist / stand by assistance

## 2024-01-03 NOTE — PATIENT PROFILE ADULT - FUNCTIONAL ASSESSMENT - BASIC MOBILITY 3.
[id number] [place] [address]                                OPERATIVE REPORT    PATIENT NAME: ALFREDA MCKEON                      :        1963  MED REC NO:   [id number]                              ROOM:  ACCOUNT NO:   [id number]                           ADMIT DATE: 2024  PROVIDER:     Giancarlo Gates MD    DATE OF PROCEDURE:  2024    TITLE OF OPERATION:  1.  Cystoscopy, left ureteral stent removal.  2.  Left ureteroscopy, laser lithotripsy and stone basket extraction,  distinct separate stones.    PREOPERATIVE DIAGNOSES:  1.  Retained left ureteral stent.  2.  Left renal calculi, distinct multiple separate stones.    POSTOPERATIVE DIAGNOSES:  1.  Retained left ureteral stent.  2.  Left renal calculi, distinct multiple separate stones.    ANESTHETIC:  General anesthetic.    ATTENDING SURGEON:  Giancarlo Gates MD    ESTIMATED BLOOD LOSS:  0 mL.    HISTORY:  The patient presented with a 12 mm left UPJ stone and  underwent retrograde stone manipulation and stent placement on  2023.  He subsequently was found on KUB for the stone to have  settled in the lower pole and underwent shock wave lithotripsy on  2023.  The stone did fragment well at that time; however, followup  KUB showed some residual stone fragments in the lower pole of the left  kidney.  He now presents to undergo endoscopic treatment of these  stones.  There looked to be about two stones, maybe three or a cluster  of stones in the lower pole, with the largest one measuring about maybe  3 to 4 mm.  Therefore, we will take him to the operating room and remove  the stent and try to extract these stones or laser them or both.  He  does understand there is a possibility that I may have to place the  stent back.  He does understand that he may need to pass the stone  fragments on his own and that we may not be able to completely clear him  of stones.  He  Call received from pt's mother. Mother updated on plan & spoke with Discharge Planner. 4 = No assist / stand by assistance

## 2024-03-06 ENCOUNTER — OFFICE (OUTPATIENT)
Dept: URBAN - METROPOLITAN AREA CLINIC 102 | Facility: CLINIC | Age: 79
Setting detail: OPHTHALMOLOGY
End: 2024-03-06
Payer: MEDICARE

## 2024-03-06 DIAGNOSIS — H01.001: ICD-10-CM

## 2024-03-06 DIAGNOSIS — H00.12: ICD-10-CM

## 2024-03-06 DIAGNOSIS — H01.002: ICD-10-CM

## 2024-03-06 DIAGNOSIS — H01.004: ICD-10-CM

## 2024-03-06 DIAGNOSIS — H16.223: ICD-10-CM

## 2024-03-06 DIAGNOSIS — H01.005: ICD-10-CM

## 2024-03-06 PROCEDURE — 92012 INTRM OPH EXAM EST PATIENT: CPT | Performed by: OPHTHALMOLOGY

## 2024-03-06 PROCEDURE — 92285 EXTERNAL OCULAR PHOTOGRAPHY: CPT | Performed by: OPHTHALMOLOGY

## 2024-03-06 ASSESSMENT — REFRACTION_CURRENTRX
OS_CYLINDER: -1.25
OS_VPRISM_DIRECTION: PROGS
OS_AXIS: 095
OD_SPHERE: +0.75
OD_AXIS: 085
OD_VPRISM_DIRECTION: PROGS
OD_OVR_VA: 20/
OD_VPRISM_DIRECTION: PROGS
OS_VPRISM_DIRECTION: PROGS
OS_ADD: +2.25
OS_CYLINDER: -1.25
OS_VPRISM_DIRECTION: PROGS
OS_ADD: +2.50
OD_OVR_VA: 20/
OD_OVR_VA: 20/
OD_AXIS: 073
OD_ADD: +2.25
OD_SPHERE: +1.00
OS_OVR_VA: 20/
OS_SPHERE: +0.50
OS_SPHERE: +0.25
OS_OVR_VA: 20/
OS_AXIS: 088
OS_SPHERE: +0.50
OD_CYLINDER: -1.50
OD_AXIS: 070
OD_SPHERE: +1.25
OS_AXIS: 087
OS_OVR_VA: 20/
OD_ADD: +2.50
OD_VPRISM_DIRECTION: PROGS
OS_CYLINDER: -1.75
OD_CYLINDER: -0.75
OD_CYLINDER: -0.75

## 2024-03-06 ASSESSMENT — LID EXAM ASSESSMENTS
OS_BLEPHARITIS: LLL LUL T
OD_BLEPHARITIS: RLL RUL T

## 2024-03-14 ENCOUNTER — OFFICE (OUTPATIENT)
Dept: URBAN - METROPOLITAN AREA CLINIC 100 | Facility: CLINIC | Age: 79
Setting detail: OPHTHALMOLOGY
End: 2024-03-14
Payer: MEDICARE

## 2024-03-14 DIAGNOSIS — H16.223: ICD-10-CM

## 2024-03-14 DIAGNOSIS — H01.004: ICD-10-CM

## 2024-03-14 DIAGNOSIS — H01.005: ICD-10-CM

## 2024-03-14 DIAGNOSIS — H01.002: ICD-10-CM

## 2024-03-14 DIAGNOSIS — H00.14: ICD-10-CM

## 2024-03-14 DIAGNOSIS — H01.001: ICD-10-CM

## 2024-03-14 DIAGNOSIS — H00.12: ICD-10-CM

## 2024-03-14 PROCEDURE — 92012 INTRM OPH EXAM EST PATIENT: CPT | Performed by: OPHTHALMOLOGY

## 2024-03-14 PROCEDURE — 92285 EXTERNAL OCULAR PHOTOGRAPHY: CPT | Performed by: OPHTHALMOLOGY

## 2024-03-14 ASSESSMENT — LID EXAM ASSESSMENTS
OS_BLEPHARITIS: LLL LUL T
OD_BLEPHARITIS: RLL RUL T

## 2024-03-28 ENCOUNTER — RX ONLY (RX ONLY)
Age: 79
End: 2024-03-28

## 2024-03-28 ENCOUNTER — OFFICE (OUTPATIENT)
Dept: URBAN - METROPOLITAN AREA CLINIC 100 | Facility: CLINIC | Age: 79
Setting detail: OPHTHALMOLOGY
End: 2024-03-28
Payer: MEDICARE

## 2024-03-28 DIAGNOSIS — H01.00B: ICD-10-CM

## 2024-03-28 DIAGNOSIS — H00.14: ICD-10-CM

## 2024-03-28 DIAGNOSIS — H01.00A: ICD-10-CM

## 2024-03-28 DIAGNOSIS — H00.12: ICD-10-CM

## 2024-03-28 PROCEDURE — 92012 INTRM OPH EXAM EST PATIENT: CPT | Mod: 25 | Performed by: OPHTHALMOLOGY

## 2024-03-28 PROCEDURE — 67800 REMOVE EYELID LESION: CPT | Mod: E1 | Performed by: OPHTHALMOLOGY

## 2024-03-28 ASSESSMENT — LID EXAM ASSESSMENTS
OD_BLEPHARITIS: RLL RUL T
OS_BLEPHARITIS: LLL LUL T

## 2024-04-03 NOTE — DISCHARGE NOTE PROVIDER - NSDCCPCAREPLAN_GEN_ALL_CORE_FT
shanda PRINCIPAL DISCHARGE DIAGNOSIS  Diagnosis: Acute pancreatitis  Assessment and Plan of Treatment: Admitted for acute pancreatitis, pain now improved. Found to have pancreas divisum, attempted ERCP in hospital with difficulty. Plan to have ERCP as outpatient with Dr. Mckay. Tramadol ordered as needed for severe pain. Continue to follow up with your gastroenterologist.

## 2024-04-04 ENCOUNTER — OFFICE (OUTPATIENT)
Dept: URBAN - METROPOLITAN AREA CLINIC 102 | Facility: CLINIC | Age: 79
Setting detail: OPHTHALMOLOGY
End: 2024-04-04
Payer: MEDICARE

## 2024-04-04 DIAGNOSIS — H43.813: ICD-10-CM

## 2024-04-04 DIAGNOSIS — H01.002: ICD-10-CM

## 2024-04-04 DIAGNOSIS — H35.3131: ICD-10-CM

## 2024-04-04 DIAGNOSIS — Z96.1: ICD-10-CM

## 2024-04-04 DIAGNOSIS — H26.493: ICD-10-CM

## 2024-04-04 DIAGNOSIS — H01.004: ICD-10-CM

## 2024-04-04 DIAGNOSIS — H16.223: ICD-10-CM

## 2024-04-04 DIAGNOSIS — H01.005: ICD-10-CM

## 2024-04-04 DIAGNOSIS — H01.001: ICD-10-CM

## 2024-04-04 PROCEDURE — 92134 CPTRZ OPH DX IMG PST SGM RTA: CPT | Performed by: OPHTHALMOLOGY

## 2024-04-04 PROCEDURE — 92014 COMPRE OPH EXAM EST PT 1/>: CPT | Mod: 24 | Performed by: OPHTHALMOLOGY

## 2024-04-04 ASSESSMENT — LID EXAM ASSESSMENTS
OD_BLEPHARITIS: RLL RUL T
OS_BLEPHARITIS: LLL LUL T

## 2024-04-17 ENCOUNTER — OFFICE (OUTPATIENT)
Dept: URBAN - METROPOLITAN AREA CLINIC 102 | Facility: CLINIC | Age: 79
Setting detail: OPHTHALMOLOGY
End: 2024-04-17
Payer: MEDICARE

## 2024-04-17 DIAGNOSIS — H00.14: ICD-10-CM

## 2024-04-17 DIAGNOSIS — H00.11: ICD-10-CM

## 2024-04-17 DIAGNOSIS — H00.12: ICD-10-CM

## 2024-04-17 DIAGNOSIS — H16.223: ICD-10-CM

## 2024-04-17 DIAGNOSIS — H01.005: ICD-10-CM

## 2024-04-17 DIAGNOSIS — H01.002: ICD-10-CM

## 2024-04-17 DIAGNOSIS — H01.004: ICD-10-CM

## 2024-04-17 DIAGNOSIS — L71.0: ICD-10-CM

## 2024-04-17 PROCEDURE — 92012 INTRM OPH EXAM EST PATIENT: CPT | Performed by: OPHTHALMOLOGY

## 2024-04-17 ASSESSMENT — LID EXAM ASSESSMENTS
OD_BLEPHARITIS: RLL RUL T
OS_BLEPHARITIS: LLL LUL T

## 2024-04-22 ENCOUNTER — OFFICE (OUTPATIENT)
Dept: URBAN - METROPOLITAN AREA CLINIC 100 | Facility: CLINIC | Age: 79
Setting detail: OPHTHALMOLOGY
End: 2024-04-22
Payer: MEDICARE

## 2024-04-22 ENCOUNTER — RX ONLY (RX ONLY)
Age: 79
End: 2024-04-22

## 2024-04-22 DIAGNOSIS — H01.00B: ICD-10-CM

## 2024-04-22 DIAGNOSIS — H00.11: ICD-10-CM

## 2024-04-22 DIAGNOSIS — H01.00A: ICD-10-CM

## 2024-04-22 DIAGNOSIS — H16.223: ICD-10-CM

## 2024-04-22 PROCEDURE — 67800 REMOVE EYELID LESION: CPT | Mod: E3 | Performed by: OPHTHALMOLOGY

## 2024-04-22 PROCEDURE — 92012 INTRM OPH EXAM EST PATIENT: CPT | Mod: 25 | Performed by: OPHTHALMOLOGY

## 2024-04-22 ASSESSMENT — LID EXAM ASSESSMENTS
OD_BLEPHARITIS: RLL RUL T
OS_BLEPHARITIS: LLL LUL T

## 2024-05-01 ENCOUNTER — OFFICE (OUTPATIENT)
Dept: URBAN - METROPOLITAN AREA CLINIC 102 | Facility: CLINIC | Age: 79
Setting detail: OPHTHALMOLOGY
End: 2024-05-01
Payer: MEDICARE

## 2024-05-01 DIAGNOSIS — H01.004: ICD-10-CM

## 2024-05-01 DIAGNOSIS — H00.11: ICD-10-CM

## 2024-05-01 DIAGNOSIS — H01.001: ICD-10-CM

## 2024-05-01 DIAGNOSIS — L71.0: ICD-10-CM

## 2024-05-01 DIAGNOSIS — H01.002: ICD-10-CM

## 2024-05-01 DIAGNOSIS — H16.223: ICD-10-CM

## 2024-05-01 DIAGNOSIS — H01.005: ICD-10-CM

## 2024-05-01 PROBLEM — H00.14 REFRACTIVE ERROR: Status: ACTIVE | Noted: 2024-04-17

## 2024-05-01 PROBLEM — H00.12 REFRACTIVE ERROR: Status: ACTIVE | Noted: 2024-04-17

## 2024-05-01 PROCEDURE — 99024 POSTOP FOLLOW-UP VISIT: CPT | Performed by: OPHTHALMOLOGY

## 2024-05-01 ASSESSMENT — CONFRONTATIONAL VISUAL FIELD TEST (CVF)
OS_FINDINGS: FULL
OD_FINDINGS: FULL

## 2024-05-01 ASSESSMENT — LID EXAM ASSESSMENTS
OD_BLEPHARITIS: RLL RUL T
OS_BLEPHARITIS: LLL LUL T

## 2024-05-14 ENCOUNTER — EMERGENCY (EMERGENCY)
Facility: HOSPITAL | Age: 79
LOS: 0 days | Discharge: HOME | End: 2024-05-14
Attending: EMERGENCY MEDICINE
Payer: MEDICARE

## 2024-05-14 VITALS
RESPIRATION RATE: 18 BRPM | HEART RATE: 60 BPM | TEMPERATURE: 98 F | SYSTOLIC BLOOD PRESSURE: 149 MMHG | OXYGEN SATURATION: 98 % | DIASTOLIC BLOOD PRESSURE: 60 MMHG

## 2024-05-14 VITALS — WEIGHT: 157.85 LBS

## 2024-05-14 DIAGNOSIS — J40 BRONCHITIS, NOT SPECIFIED AS ACUTE OR CHRONIC: ICD-10-CM

## 2024-05-14 DIAGNOSIS — Z90.49 ACQUIRED ABSENCE OF OTHER SPECIFIED PARTS OF DIGESTIVE TRACT: Chronic | ICD-10-CM

## 2024-05-14 DIAGNOSIS — Z91.048 OTHER NONMEDICINAL SUBSTANCE ALLERGY STATUS: ICD-10-CM

## 2024-05-14 DIAGNOSIS — R04.2 HEMOPTYSIS: ICD-10-CM

## 2024-05-14 DIAGNOSIS — Z88.1 ALLERGY STATUS TO OTHER ANTIBIOTIC AGENTS STATUS: ICD-10-CM

## 2024-05-14 DIAGNOSIS — Z90.89 ACQUIRED ABSENCE OF OTHER ORGANS: Chronic | ICD-10-CM

## 2024-05-14 DIAGNOSIS — Z98.49 CATARACT EXTRACTION STATUS, UNSPECIFIED EYE: Chronic | ICD-10-CM

## 2024-05-14 DIAGNOSIS — Z20.822 CONTACT WITH AND (SUSPECTED) EXPOSURE TO COVID-19: ICD-10-CM

## 2024-05-14 DIAGNOSIS — Z98.890 OTHER SPECIFIED POSTPROCEDURAL STATES: Chronic | ICD-10-CM

## 2024-05-14 DIAGNOSIS — Z88.8 ALLERGY STATUS TO OTHER DRUGS, MEDICAMENTS AND BIOLOGICAL SUBSTANCES: ICD-10-CM

## 2024-05-14 DIAGNOSIS — E78.00 PURE HYPERCHOLESTEROLEMIA, UNSPECIFIED: ICD-10-CM

## 2024-05-14 DIAGNOSIS — E89.2 POSTPROCEDURAL HYPOPARATHYROIDISM: Chronic | ICD-10-CM

## 2024-05-14 DIAGNOSIS — Z98.89 OTHER SPECIFIED POSTPROCEDURAL STATES: Chronic | ICD-10-CM

## 2024-05-14 DIAGNOSIS — I10 ESSENTIAL (PRIMARY) HYPERTENSION: ICD-10-CM

## 2024-05-14 PROBLEM — L40.9 PSORIASIS, UNSPECIFIED: Chronic | Status: ACTIVE | Noted: 2023-07-05

## 2024-05-14 LAB
ALBUMIN SERPL ELPH-MCNC: 4 G/DL — SIGNIFICANT CHANGE UP (ref 3.3–5)
ALP SERPL-CCNC: 70 U/L — SIGNIFICANT CHANGE UP (ref 40–120)
ALT FLD-CCNC: 31 U/L — SIGNIFICANT CHANGE UP (ref 12–78)
ANION GAP SERPL CALC-SCNC: 2 MMOL/L — LOW (ref 5–17)
AST SERPL-CCNC: 24 U/L — SIGNIFICANT CHANGE UP (ref 15–37)
BASE EXCESS BLDV CALC-SCNC: 2.4 MMOL/L — SIGNIFICANT CHANGE UP (ref -2–3)
BASOPHILS # BLD AUTO: 0 K/UL — SIGNIFICANT CHANGE UP (ref 0–0.2)
BASOPHILS NFR BLD AUTO: 0 % — SIGNIFICANT CHANGE UP (ref 0–2)
BILIRUB SERPL-MCNC: 0.4 MG/DL — SIGNIFICANT CHANGE UP (ref 0.2–1.2)
BUN SERPL-MCNC: 23 MG/DL — SIGNIFICANT CHANGE UP (ref 7–23)
CALCIUM SERPL-MCNC: 10 MG/DL — SIGNIFICANT CHANGE UP (ref 8.5–10.1)
CHLORIDE SERPL-SCNC: 107 MMOL/L — SIGNIFICANT CHANGE UP (ref 96–108)
CO2 SERPL-SCNC: 30 MMOL/L — SIGNIFICANT CHANGE UP (ref 22–31)
CREAT SERPL-MCNC: 0.68 MG/DL — SIGNIFICANT CHANGE UP (ref 0.5–1.3)
D DIMER BLD IA.RAPID-MCNC: 282 NG/ML DDU — HIGH
EGFR: 90 ML/MIN/1.73M2 — SIGNIFICANT CHANGE UP
EOSINOPHIL # BLD AUTO: 0.05 K/UL — SIGNIFICANT CHANGE UP (ref 0–0.5)
EOSINOPHIL NFR BLD AUTO: 1 % — SIGNIFICANT CHANGE UP (ref 0–6)
FLUAV AG NPH QL: SIGNIFICANT CHANGE UP
FLUBV AG NPH QL: SIGNIFICANT CHANGE UP
GLUCOSE SERPL-MCNC: 111 MG/DL — HIGH (ref 70–99)
HCO3 BLDV-SCNC: 30 MMOL/L — HIGH (ref 22–29)
HCT VFR BLD CALC: 41.2 % — SIGNIFICANT CHANGE UP (ref 34.5–45)
HGB BLD-MCNC: 13.3 G/DL — SIGNIFICANT CHANGE UP (ref 11.5–15.5)
HIV 1 & 2 AB SERPL IA.RAPID: SIGNIFICANT CHANGE UP
LYMPHOCYTES # BLD AUTO: 1.49 K/UL — SIGNIFICANT CHANGE UP (ref 1–3.3)
LYMPHOCYTES # BLD AUTO: 28 % — SIGNIFICANT CHANGE UP (ref 13–44)
MAGNESIUM SERPL-MCNC: 2.3 MG/DL — SIGNIFICANT CHANGE UP (ref 1.6–2.6)
MANUAL SMEAR VERIFICATION: SIGNIFICANT CHANGE UP
MCHC RBC-ENTMCNC: 29.2 PG — SIGNIFICANT CHANGE UP (ref 27–34)
MCHC RBC-ENTMCNC: 32.3 GM/DL — SIGNIFICANT CHANGE UP (ref 32–36)
MCV RBC AUTO: 90.5 FL — SIGNIFICANT CHANGE UP (ref 80–100)
MONOCYTES # BLD AUTO: 0.21 K/UL — SIGNIFICANT CHANGE UP (ref 0–0.9)
MONOCYTES NFR BLD AUTO: 4 % — SIGNIFICANT CHANGE UP (ref 2–14)
NEUTROPHILS # BLD AUTO: 3.57 K/UL — SIGNIFICANT CHANGE UP (ref 1.8–7.4)
NEUTROPHILS NFR BLD AUTO: 65 % — SIGNIFICANT CHANGE UP (ref 43–77)
NEUTS BAND # BLD: 2 % — SIGNIFICANT CHANGE UP (ref 0–8)
NRBC # BLD: 0 /100 WBCS — SIGNIFICANT CHANGE UP (ref 0–0)
NRBC # BLD: SIGNIFICANT CHANGE UP /100 WBCS (ref 0–0)
NT-PROBNP SERPL-SCNC: 154 PG/ML — SIGNIFICANT CHANGE UP (ref 0–450)
PCO2 BLDV: 62 MMHG — HIGH (ref 39–42)
PH BLDV: 7.3 — LOW (ref 7.32–7.43)
PLAT MORPH BLD: NORMAL — SIGNIFICANT CHANGE UP
PLATELET # BLD AUTO: 209 K/UL — SIGNIFICANT CHANGE UP (ref 150–400)
PO2 BLDV: 32 MMHG — SIGNIFICANT CHANGE UP (ref 25–45)
POTASSIUM SERPL-MCNC: 5.1 MMOL/L — SIGNIFICANT CHANGE UP (ref 3.5–5.3)
POTASSIUM SERPL-SCNC: 5.1 MMOL/L — SIGNIFICANT CHANGE UP (ref 3.5–5.3)
PROT SERPL-MCNC: 7.6 GM/DL — SIGNIFICANT CHANGE UP (ref 6–8.3)
RBC # BLD: 4.55 M/UL — SIGNIFICANT CHANGE UP (ref 3.8–5.2)
RBC # FLD: 13.6 % — SIGNIFICANT CHANGE UP (ref 10.3–14.5)
RBC BLD AUTO: NORMAL — SIGNIFICANT CHANGE UP
RSV RNA NPH QL NAA+NON-PROBE: SIGNIFICANT CHANGE UP
SAO2 % BLDV: 46 % — LOW (ref 67–88)
SARS-COV-2 RNA SPEC QL NAA+PROBE: SIGNIFICANT CHANGE UP
SODIUM SERPL-SCNC: 139 MMOL/L — SIGNIFICANT CHANGE UP (ref 135–145)
TROPONIN I, HIGH SENSITIVITY RESULT: 5.95 NG/L — SIGNIFICANT CHANGE UP
WBC # BLD: 5.33 K/UL — SIGNIFICANT CHANGE UP (ref 3.8–10.5)
WBC # FLD AUTO: 5.33 K/UL — SIGNIFICANT CHANGE UP (ref 3.8–10.5)

## 2024-05-14 PROCEDURE — 0241U: CPT

## 2024-05-14 PROCEDURE — 71275 CT ANGIOGRAPHY CHEST: CPT | Mod: 26,MC

## 2024-05-14 PROCEDURE — 93005 ELECTROCARDIOGRAM TRACING: CPT

## 2024-05-14 PROCEDURE — 83880 ASSAY OF NATRIURETIC PEPTIDE: CPT

## 2024-05-14 PROCEDURE — 36415 COLL VENOUS BLD VENIPUNCTURE: CPT

## 2024-05-14 PROCEDURE — 80053 COMPREHEN METABOLIC PANEL: CPT

## 2024-05-14 PROCEDURE — 36000 PLACE NEEDLE IN VEIN: CPT | Mod: XU

## 2024-05-14 PROCEDURE — 71275 CT ANGIOGRAPHY CHEST: CPT | Mod: MC

## 2024-05-14 PROCEDURE — 84484 ASSAY OF TROPONIN QUANT: CPT

## 2024-05-14 PROCEDURE — 93010 ELECTROCARDIOGRAM REPORT: CPT

## 2024-05-14 PROCEDURE — 85379 FIBRIN DEGRADATION QUANT: CPT

## 2024-05-14 PROCEDURE — 71045 X-RAY EXAM CHEST 1 VIEW: CPT | Mod: 26

## 2024-05-14 PROCEDURE — 83735 ASSAY OF MAGNESIUM: CPT

## 2024-05-14 PROCEDURE — 86703 HIV-1/HIV-2 1 RESULT ANTBDY: CPT

## 2024-05-14 PROCEDURE — 99285 EMERGENCY DEPT VISIT HI MDM: CPT | Mod: 25

## 2024-05-14 PROCEDURE — 71045 X-RAY EXAM CHEST 1 VIEW: CPT

## 2024-05-14 PROCEDURE — 85025 COMPLETE CBC W/AUTO DIFF WBC: CPT

## 2024-05-14 PROCEDURE — 82803 BLOOD GASES ANY COMBINATION: CPT

## 2024-05-14 PROCEDURE — 99285 EMERGENCY DEPT VISIT HI MDM: CPT

## 2024-05-14 NOTE — ED ADULT NURSE NOTE - MODE OF DISCHARGE
Ambulatory

## 2024-05-14 NOTE — ED STATDOCS - PHYSICAL EXAMINATION
Constitutional: NAD AAOx3  Eyes: PERRLA EOMI  Head: Normocephalic atraumatic  Mouth: MMM  Cardiac: regular rate   Resp: unlabored breathing  GI: Abd s/nt/nd  Neuro: grossly normal and intact  Skin: No visible rashes Constitutional: NAD AAOx3  Eyes: PERRLA EOMI  Head: Normocephalic atraumatic  Mouth: MMM  Cardiac: regular rate   Resp: unlabored breathing clear b/l   GI: Abd s/nt/nd  Neuro: grossly normal and intact  Skin: No visible rashes

## 2024-05-14 NOTE — ED ADULT TRIAGE NOTE - CHIEF COMPLAINT QUOTE
pt presents to ED due to 1 episode of coughing up blood pt states she had a post nasal drip and was coughing a lot last night advised by MD to come to ED

## 2024-05-14 NOTE — ED ADULT NURSE NOTE - OBJECTIVE STATEMENT
hx of cough and cold.  Patient states last night she coughed up blood.  Patient states she has a sore throat and still tastes blood today. Patient referred to ED for evaluation by her MD

## 2024-05-14 NOTE — ED STATDOCS - ATTENDING APP SHARED VISIT CONTRIBUTION OF CARE
I, Dillon Au MD, personally saw the patient with ACP.  I have personally performed a face to face diagnostic evaluation on this patient.  I have reviewed the ACP note and agree with the history, exam, and plan of care, except as noted. I personally made/approved the management plan and take responsibility for the patient management   The initial assessment was performed by myself and then the patient was handed off to the ACP. The patient was followed and re-evaluated by the ACP. All labs, imaging and procedures were evaluated and performed by the ACP and I was available for consultation if any questions in the patients care came up.   I personally made/approved the management plan and take responsibility for the patient management.

## 2024-05-14 NOTE — ED STATDOCS - NS_ ATTENDINGSCRIBEDETAILS _ED_A_ED_FT
I, Dillon Au MD,  performed the initial face to face bedside interview with this patient regarding history of present illness, review of symptoms and relevant past medical, social and family history.  I completed an independent physical examination.  I was the initial provider who evaluated this patient.   I personally saw the patient and performed a substantive portion of the visit including all aspects of the medical decision making.  The history, relevant review of systems, past medical and surgical history, medical decision making, and physical examination was documented by the scribe in my presence and I attest to the accuracy of the documentation.

## 2024-05-14 NOTE — ED STATDOCS - NSFOLLOWUPINSTRUCTIONS_ED_ALL_ED_FT
Hemoptysis  Outline of the upper body, showing parts of the respiratory system, highlighting the nose, throat, and lungs.  Hemoptysis is coughing up blood. With mild hemoptysis, you may cough up small amounts of blood-streaked saliva and mucus from your lungs (sputum). With severe hemoptysis, you may cough up a lot of blood. Coughing up 1–2 cups (240–480 mL) of blood within 24 hours is a medical emergency.  Common causes of mild (nonmassive) hemoptysis include:  An infection in your nose, throat, or lungs, such as bronchitis, pneumonia, bronchiectasis, asthma, or chronic obstructive pulmonary disease (COPD).  Nosebleeds.  Breathing in a foreign object.  Common causes of severe (massive) hemoptysis include:  Tuberculosis (TB).  A tumor in the lungs or upper airway.  A blood clot in the lungs (pulmonary embolism).  Stomach bleeding or ulcer.  Taking blood thinner (anticoagulant) medicine.  Having a medical condition that keeps your blood from normal clotting.  Sometimes, the cause is not known.    Follow these instructions at home:  Medicines    If you were prescribed an antibiotic medicine, take it as told by your health care provider. Do not stop using the antibiotic even if you start to feel better.  Take over-the-counter and prescription medicines only as told by your health care provider.  General instructions    A sign showing that a person should not smoke.  Do not use any products that contain nicotine or tobacco. These products include cigarettes, chewing tobacco, and vaping devices, such as e-cigarettes. If you need help quitting, ask your health care provider.  Return to your normal activities as told by your health care provider. Ask your health care provider what activities are safe for you. This includes any exercise or air travel.  Keep all follow-up visits. This is important.  Contact a health care provider if:  You have a fever over 100.4°F (38°C).  The amount of bleeding increases.  The blood looks brighter or darker red.  Get help right away if you:  Cough up fresh blood or blood clots.  Cough up 1–2 cups (240–480 mL) in 24 hours.  Have trouble breathing.  Feel like you are choking.  Have chest pain, light-headedness, or dizziness.  These symptoms may be an emergency. Get help right away. Call 911.  Do not wait to see if the symptoms will go away.  Do not drive yourself to the hospital.  Summary  Hemoptysis is coughing up blood.  Coughing up 1–2 cups (240–480 mL) of blood within 24 hours is a medical emergency.  Do not use any products that contain nicotine or tobacco. These products include cigarettes, chewing tobacco, and vaping devices, such as e-cigarettes. If you need help quitting, ask your health care provider.  This information is not intended to replace advice given to you by your health care provider. Make sure you discuss any questions you have with your health care provider.

## 2024-05-14 NOTE — ED STATDOCS - PATIENT PORTAL LINK FT
You can access the FollowMyHealth Patient Portal offered by [facility] by registering at the following website: [email or web address]. By joining [employer]’s FollowMyHealth portal, you will also be able to view your health information using other applications (apps) compatible with our system.

## 2024-05-14 NOTE — ED ADULT NURSE NOTE - NSFALLRISKINTERV_ED_ALL_ED

## 2024-05-14 NOTE — ED STATDOCS - OBJECTIVE STATEMENT
78-year-old female history of hypertension high cholesterol presents the emergency department for 1 episode of bloody sputum.  Patient states that she started coughing pretty heavily last night and noticed some blood in the sputum this was 1 episode called her primary care doctor today and told the doctor about the episode and was told to come to the hospital for evaluation. Patient states she just has a mild cough now no chest pain shortness of breath leg swelling recent travel or recent surgeries does not feel lightheaded or dizzy no nose bleeding.  Exam here is nonfocal likely bronchial irritation doubt PE will check labs x-ray rule out pneumonia dimer rule out PE symptom control reassess.

## 2024-05-14 NOTE — ED STATDOCS - PROGRESS NOTE DETAILS
Dimer elevated 282, patient made aware, CTA ordered to r/o PE. - Regina Ray PA-C 78 year old female presents to the ED complaining of 1 episode of bloody sputum last night. Patient spoke to her PCP whom advised she come to the ED for evaluation. Patient endorsing cough, denies fevers, chills, cp, sob, n/v/d, abd pain, leg pain/swelling, recent travel, recent surgeries. Vitals are stable, physical examination is unremarkable. Plan for labs, EKG, CXR, reassess. - Regina Ray PA-C Rest of labs and imaging reviewed. No actionable findings. CTA negative for PE or other acute abnormalities. Discussed results with patient. No episodes of bloody sputum since ED arrival. Sxs likely secondary to bronchial irritation. Stable for dc home. Strict return precautions were given. All questions and concerns were addressed. - Regina Ray PA-C

## 2024-05-23 ENCOUNTER — APPOINTMENT (OUTPATIENT)
Dept: OTOLARYNGOLOGY | Facility: CLINIC | Age: 79
End: 2024-05-23

## 2024-06-05 ENCOUNTER — OFFICE (OUTPATIENT)
Dept: URBAN - METROPOLITAN AREA CLINIC 102 | Facility: CLINIC | Age: 79
Setting detail: OPHTHALMOLOGY
End: 2024-06-05
Payer: MEDICARE

## 2024-06-05 DIAGNOSIS — H16.223: ICD-10-CM

## 2024-06-05 DIAGNOSIS — H01.004: ICD-10-CM

## 2024-06-05 DIAGNOSIS — H00.11: ICD-10-CM

## 2024-06-05 DIAGNOSIS — L71.0: ICD-10-CM

## 2024-06-05 DIAGNOSIS — H01.002: ICD-10-CM

## 2024-06-05 DIAGNOSIS — H01.005: ICD-10-CM

## 2024-06-05 DIAGNOSIS — H01.001: ICD-10-CM

## 2024-06-05 PROCEDURE — 92012 INTRM OPH EXAM EST PATIENT: CPT | Performed by: OPHTHALMOLOGY

## 2024-06-05 ASSESSMENT — LID EXAM ASSESSMENTS
OD_BLEPHARITIS: RLL RUL T
OS_BLEPHARITIS: LLL LUL T

## 2024-06-05 ASSESSMENT — CONFRONTATIONAL VISUAL FIELD TEST (CVF)
OS_FINDINGS: FULL
OD_FINDINGS: FULL

## 2024-06-18 NOTE — H&P PST ADULT - NEGATIVE RESPIRATORY AND THORAX SYMPTOMS
Patient's HM shows they are overdue for Mammogram Screening.  Care Everywhere and  files searched.  No results to attach to order nor HM updated.        no wheezing/no dyspnea/no cough

## 2024-07-02 ENCOUNTER — APPOINTMENT (OUTPATIENT)
Dept: OPHTHALMOLOGY | Facility: CLINIC | Age: 79
End: 2024-07-02

## 2024-08-22 ENCOUNTER — RX ONLY (RX ONLY)
Age: 79
End: 2024-08-22

## 2024-08-22 ENCOUNTER — OFFICE (OUTPATIENT)
Dept: URBAN - METROPOLITAN AREA CLINIC 100 | Facility: CLINIC | Age: 79
Setting detail: OPHTHALMOLOGY
End: 2024-08-22
Payer: MEDICARE

## 2024-08-22 DIAGNOSIS — H01.001: ICD-10-CM

## 2024-08-22 DIAGNOSIS — H01.004: ICD-10-CM

## 2024-08-22 DIAGNOSIS — H01.002: ICD-10-CM

## 2024-08-22 DIAGNOSIS — H11.32: ICD-10-CM

## 2024-08-22 PROCEDURE — 92012 INTRM OPH EXAM EST PATIENT: CPT | Performed by: OPHTHALMOLOGY

## 2024-08-22 ASSESSMENT — LID EXAM ASSESSMENTS
OS_BLEPHARITIS: LLL LUL T
OD_BLEPHARITIS: RLL RUL T

## 2024-08-22 ASSESSMENT — CONFRONTATIONAL VISUAL FIELD TEST (CVF)
OD_FINDINGS: FULL
OS_FINDINGS: FULL

## 2024-08-24 ENCOUNTER — INPATIENT (INPATIENT)
Facility: HOSPITAL | Age: 79
LOS: 2 days | Discharge: HOME | DRG: 440 | End: 2024-08-27
Attending: INTERNAL MEDICINE | Admitting: STUDENT IN AN ORGANIZED HEALTH CARE EDUCATION/TRAINING PROGRAM
Payer: MEDICARE

## 2024-08-24 VITALS
OXYGEN SATURATION: 98 % | HEART RATE: 61 BPM | TEMPERATURE: 98 F | WEIGHT: 164.91 LBS | SYSTOLIC BLOOD PRESSURE: 172 MMHG | DIASTOLIC BLOOD PRESSURE: 85 MMHG | RESPIRATION RATE: 17 BRPM | HEIGHT: 62 IN

## 2024-08-24 DIAGNOSIS — Z98.49 CATARACT EXTRACTION STATUS, UNSPECIFIED EYE: Chronic | ICD-10-CM

## 2024-08-24 DIAGNOSIS — Z90.49 ACQUIRED ABSENCE OF OTHER SPECIFIED PARTS OF DIGESTIVE TRACT: Chronic | ICD-10-CM

## 2024-08-24 DIAGNOSIS — Z98.89 OTHER SPECIFIED POSTPROCEDURAL STATES: Chronic | ICD-10-CM

## 2024-08-24 DIAGNOSIS — E89.2 POSTPROCEDURAL HYPOPARATHYROIDISM: Chronic | ICD-10-CM

## 2024-08-24 DIAGNOSIS — Z90.89 ACQUIRED ABSENCE OF OTHER ORGANS: Chronic | ICD-10-CM

## 2024-08-24 DIAGNOSIS — Z98.890 OTHER SPECIFIED POSTPROCEDURAL STATES: Chronic | ICD-10-CM

## 2024-08-24 LAB
ALBUMIN SERPL ELPH-MCNC: 3.7 G/DL — SIGNIFICANT CHANGE UP (ref 3.3–5)
ALP SERPL-CCNC: 67 U/L — SIGNIFICANT CHANGE UP (ref 40–120)
ALT FLD-CCNC: 37 U/L — SIGNIFICANT CHANGE UP (ref 12–78)
ANION GAP SERPL CALC-SCNC: 3 MMOL/L — LOW (ref 5–17)
AST SERPL-CCNC: 30 U/L — SIGNIFICANT CHANGE UP (ref 15–37)
BASOPHILS # BLD AUTO: 0.02 K/UL — SIGNIFICANT CHANGE UP (ref 0–0.2)
BASOPHILS NFR BLD AUTO: 0.2 % — SIGNIFICANT CHANGE UP (ref 0–2)
BILIRUB SERPL-MCNC: 0.4 MG/DL — SIGNIFICANT CHANGE UP (ref 0.2–1.2)
BUN SERPL-MCNC: 18 MG/DL — SIGNIFICANT CHANGE UP (ref 7–23)
CALCIUM SERPL-MCNC: 9.4 MG/DL — SIGNIFICANT CHANGE UP (ref 8.5–10.1)
CHLORIDE SERPL-SCNC: 110 MMOL/L — HIGH (ref 96–108)
CO2 SERPL-SCNC: 27 MMOL/L — SIGNIFICANT CHANGE UP (ref 22–31)
CREAT SERPL-MCNC: 0.86 MG/DL — SIGNIFICANT CHANGE UP (ref 0.5–1.3)
EGFR: 70 ML/MIN/1.73M2 — SIGNIFICANT CHANGE UP
EOSINOPHIL # BLD AUTO: 0.1 K/UL — SIGNIFICANT CHANGE UP (ref 0–0.5)
EOSINOPHIL NFR BLD AUTO: 1 % — SIGNIFICANT CHANGE UP (ref 0–6)
GLUCOSE SERPL-MCNC: 132 MG/DL — HIGH (ref 70–99)
HCT VFR BLD CALC: 37.2 % — SIGNIFICANT CHANGE UP (ref 34.5–45)
HGB BLD-MCNC: 12.4 G/DL — SIGNIFICANT CHANGE UP (ref 11.5–15.5)
IMM GRANULOCYTES NFR BLD AUTO: 0.4 % — SIGNIFICANT CHANGE UP (ref 0–0.9)
LACTATE SERPL-SCNC: 1.5 MMOL/L — SIGNIFICANT CHANGE UP (ref 0.7–2)
LIDOCAIN IGE QN: >5000 U/L — HIGH (ref 13–75)
LYMPHOCYTES # BLD AUTO: 1.96 K/UL — SIGNIFICANT CHANGE UP (ref 1–3.3)
LYMPHOCYTES # BLD AUTO: 18.6 % — SIGNIFICANT CHANGE UP (ref 13–44)
MCHC RBC-ENTMCNC: 29.7 PG — SIGNIFICANT CHANGE UP (ref 27–34)
MCHC RBC-ENTMCNC: 33.3 GM/DL — SIGNIFICANT CHANGE UP (ref 32–36)
MCV RBC AUTO: 89 FL — SIGNIFICANT CHANGE UP (ref 80–100)
MONOCYTES # BLD AUTO: 0.74 K/UL — SIGNIFICANT CHANGE UP (ref 0–0.9)
MONOCYTES NFR BLD AUTO: 7 % — SIGNIFICANT CHANGE UP (ref 2–14)
NEUTROPHILS # BLD AUTO: 7.66 K/UL — HIGH (ref 1.8–7.4)
NEUTROPHILS NFR BLD AUTO: 72.8 % — SIGNIFICANT CHANGE UP (ref 43–77)
PLATELET # BLD AUTO: 224 K/UL — SIGNIFICANT CHANGE UP (ref 150–400)
POTASSIUM SERPL-MCNC: 5.1 MMOL/L — SIGNIFICANT CHANGE UP (ref 3.5–5.3)
POTASSIUM SERPL-SCNC: 5.1 MMOL/L — SIGNIFICANT CHANGE UP (ref 3.5–5.3)
PROT SERPL-MCNC: 7 GM/DL — SIGNIFICANT CHANGE UP (ref 6–8.3)
RBC # BLD: 4.18 M/UL — SIGNIFICANT CHANGE UP (ref 3.8–5.2)
RBC # FLD: 13.9 % — SIGNIFICANT CHANGE UP (ref 10.3–14.5)
SODIUM SERPL-SCNC: 140 MMOL/L — SIGNIFICANT CHANGE UP (ref 135–145)
WBC # BLD: 10.52 K/UL — HIGH (ref 3.8–10.5)
WBC # FLD AUTO: 10.52 K/UL — HIGH (ref 3.8–10.5)

## 2024-08-24 PROCEDURE — 74177 CT ABD & PELVIS W/CONTRAST: CPT | Mod: 26,MC

## 2024-08-24 PROCEDURE — 99285 EMERGENCY DEPT VISIT HI MDM: CPT

## 2024-08-24 RX ORDER — SODIUM CHLORIDE 9 MG/ML
1000 INJECTION INTRAMUSCULAR; INTRAVENOUS; SUBCUTANEOUS ONCE
Refills: 0 | Status: COMPLETED | OUTPATIENT
Start: 2024-08-24 | End: 2024-08-24

## 2024-08-24 RX ORDER — HYDROMORPHONE HYDROCHLORIDE 2 MG/1
1 TABLET ORAL ONCE
Refills: 0 | Status: DISCONTINUED | OUTPATIENT
Start: 2024-08-24 | End: 2024-08-24

## 2024-08-24 RX ORDER — ONDANSETRON 2 MG/ML
4 INJECTION, SOLUTION INTRAMUSCULAR; INTRAVENOUS ONCE
Refills: 0 | Status: COMPLETED | OUTPATIENT
Start: 2024-08-24 | End: 2024-08-24

## 2024-08-24 RX ADMIN — SODIUM CHLORIDE 1000 MILLILITER(S): 9 INJECTION INTRAMUSCULAR; INTRAVENOUS; SUBCUTANEOUS at 23:26

## 2024-08-24 RX ADMIN — ONDANSETRON 4 MILLIGRAM(S): 2 INJECTION, SOLUTION INTRAMUSCULAR; INTRAVENOUS at 23:25

## 2024-08-24 RX ADMIN — HYDROMORPHONE HYDROCHLORIDE 1 MILLIGRAM(S): 2 TABLET ORAL at 23:25

## 2024-08-25 DIAGNOSIS — K85.90 ACUTE PANCREATITIS WITHOUT NECROSIS OR INFECTION, UNSPECIFIED: ICD-10-CM

## 2024-08-25 DIAGNOSIS — I10 ESSENTIAL (PRIMARY) HYPERTENSION: ICD-10-CM

## 2024-08-25 LAB
A1C WITH ESTIMATED AVERAGE GLUCOSE RESULT: 6.2 % — HIGH (ref 4–5.6)
ALBUMIN SERPL ELPH-MCNC: 3.2 G/DL — LOW (ref 3.3–5)
ALP SERPL-CCNC: 63 U/L — SIGNIFICANT CHANGE UP (ref 40–120)
ALT FLD-CCNC: 31 U/L — SIGNIFICANT CHANGE UP (ref 12–78)
ANION GAP SERPL CALC-SCNC: 5 MMOL/L — SIGNIFICANT CHANGE UP (ref 5–17)
AST SERPL-CCNC: 23 U/L — SIGNIFICANT CHANGE UP (ref 15–37)
BILIRUB SERPL-MCNC: 0.4 MG/DL — SIGNIFICANT CHANGE UP (ref 0.2–1.2)
BUN SERPL-MCNC: 15 MG/DL — SIGNIFICANT CHANGE UP (ref 7–23)
CALCIUM SERPL-MCNC: 8.7 MG/DL — SIGNIFICANT CHANGE UP (ref 8.5–10.1)
CHLORIDE SERPL-SCNC: 108 MMOL/L — SIGNIFICANT CHANGE UP (ref 96–108)
CHOLEST SERPL-MCNC: 125 MG/DL — SIGNIFICANT CHANGE UP
CO2 SERPL-SCNC: 26 MMOL/L — SIGNIFICANT CHANGE UP (ref 22–31)
CREAT SERPL-MCNC: 0.67 MG/DL — SIGNIFICANT CHANGE UP (ref 0.5–1.3)
EGFR: 89 ML/MIN/1.73M2 — SIGNIFICANT CHANGE UP
ESTIMATED AVERAGE GLUCOSE: 131 MG/DL — HIGH (ref 68–114)
GLUCOSE SERPL-MCNC: 135 MG/DL — HIGH (ref 70–99)
HCT VFR BLD CALC: 35.3 % — SIGNIFICANT CHANGE UP (ref 34.5–45)
HDLC SERPL-MCNC: 59 MG/DL — SIGNIFICANT CHANGE UP
HGB BLD-MCNC: 11.7 G/DL — SIGNIFICANT CHANGE UP (ref 11.5–15.5)
LIPID PNL WITH DIRECT LDL SERPL: 57 MG/DL — SIGNIFICANT CHANGE UP
MCHC RBC-ENTMCNC: 29.4 PG — SIGNIFICANT CHANGE UP (ref 27–34)
MCHC RBC-ENTMCNC: 33.1 GM/DL — SIGNIFICANT CHANGE UP (ref 32–36)
MCV RBC AUTO: 88.7 FL — SIGNIFICANT CHANGE UP (ref 80–100)
NON HDL CHOLESTEROL: 66 MG/DL — SIGNIFICANT CHANGE UP
PLATELET # BLD AUTO: 196 K/UL — SIGNIFICANT CHANGE UP (ref 150–400)
POTASSIUM SERPL-MCNC: 4.1 MMOL/L — SIGNIFICANT CHANGE UP (ref 3.5–5.3)
POTASSIUM SERPL-SCNC: 4.1 MMOL/L — SIGNIFICANT CHANGE UP (ref 3.5–5.3)
PROT SERPL-MCNC: 6.4 GM/DL — SIGNIFICANT CHANGE UP (ref 6–8.3)
RBC # BLD: 3.98 M/UL — SIGNIFICANT CHANGE UP (ref 3.8–5.2)
RBC # FLD: 14.2 % — SIGNIFICANT CHANGE UP (ref 10.3–14.5)
SODIUM SERPL-SCNC: 139 MMOL/L — SIGNIFICANT CHANGE UP (ref 135–145)
TRIGL SERPL-MCNC: 35 MG/DL — SIGNIFICANT CHANGE UP
WBC # BLD: 8.49 K/UL — SIGNIFICANT CHANGE UP (ref 3.8–10.5)
WBC # FLD AUTO: 8.49 K/UL — SIGNIFICANT CHANGE UP (ref 3.8–10.5)

## 2024-08-25 PROCEDURE — 83690 ASSAY OF LIPASE: CPT

## 2024-08-25 PROCEDURE — 83036 HEMOGLOBIN GLYCOSYLATED A1C: CPT

## 2024-08-25 PROCEDURE — 80061 LIPID PANEL: CPT

## 2024-08-25 PROCEDURE — 36415 COLL VENOUS BLD VENIPUNCTURE: CPT

## 2024-08-25 PROCEDURE — 85025 COMPLETE CBC W/AUTO DIFF WBC: CPT

## 2024-08-25 PROCEDURE — 84100 ASSAY OF PHOSPHORUS: CPT

## 2024-08-25 PROCEDURE — 80053 COMPREHEN METABOLIC PANEL: CPT

## 2024-08-25 PROCEDURE — 85027 COMPLETE CBC AUTOMATED: CPT

## 2024-08-25 PROCEDURE — 83735 ASSAY OF MAGNESIUM: CPT

## 2024-08-25 PROCEDURE — 99223 1ST HOSP IP/OBS HIGH 75: CPT

## 2024-08-25 PROCEDURE — 80048 BASIC METABOLIC PNL TOTAL CA: CPT

## 2024-08-25 RX ORDER — HYDROMORPHONE HYDROCHLORIDE 2 MG/1
1 TABLET ORAL EVERY 4 HOURS
Refills: 0 | Status: DISCONTINUED | OUTPATIENT
Start: 2024-08-25 | End: 2024-08-25

## 2024-08-25 RX ORDER — METOPROLOL TARTRATE 100 MG/1
25 TABLET ORAL DAILY
Refills: 0 | Status: DISCONTINUED | OUTPATIENT
Start: 2024-08-25 | End: 2024-08-27

## 2024-08-25 RX ORDER — HYDROMORPHONE HYDROCHLORIDE 2 MG/1
1 TABLET ORAL EVERY 4 HOURS
Refills: 0 | Status: DISCONTINUED | OUTPATIENT
Start: 2024-08-25 | End: 2024-08-27

## 2024-08-25 RX ORDER — KETOROLAC TROMETHAMINE 30 MG/ML
15 INJECTION, SOLUTION INTRAMUSCULAR ONCE
Refills: 0 | Status: DISCONTINUED | OUTPATIENT
Start: 2024-08-25 | End: 2024-08-25

## 2024-08-25 RX ORDER — SODIUM CHLORIDE 9 MG/ML
1000 INJECTION INTRAMUSCULAR; INTRAVENOUS; SUBCUTANEOUS
Refills: 0 | Status: DISCONTINUED | OUTPATIENT
Start: 2024-08-25 | End: 2024-08-27

## 2024-08-25 RX ORDER — MAGNESIUM, ALUMINUM HYDROXIDE 200-225/5
30 SUSPENSION, ORAL (FINAL DOSE FORM) ORAL EVERY 4 HOURS
Refills: 0 | Status: DISCONTINUED | OUTPATIENT
Start: 2024-08-25 | End: 2024-08-27

## 2024-08-25 RX ORDER — PAROXETINE 10 MG/1
10 TABLET, FILM COATED ORAL DAILY
Refills: 0 | Status: DISCONTINUED | OUTPATIENT
Start: 2024-08-25 | End: 2024-08-27

## 2024-08-25 RX ORDER — ENOXAPARIN SODIUM 100 MG/ML
40 INJECTION SUBCUTANEOUS EVERY 24 HOURS
Refills: 0 | Status: DISCONTINUED | OUTPATIENT
Start: 2024-08-25 | End: 2024-08-27

## 2024-08-25 RX ORDER — ACETAMINOPHEN 325 MG/1
650 TABLET ORAL EVERY 6 HOURS
Refills: 0 | Status: DISCONTINUED | OUTPATIENT
Start: 2024-08-25 | End: 2024-08-27

## 2024-08-25 RX ORDER — LOSARTAN POTASSIUM 50 MG/1
100 TABLET ORAL DAILY
Refills: 0 | Status: DISCONTINUED | OUTPATIENT
Start: 2024-08-25 | End: 2024-08-27

## 2024-08-25 RX ORDER — OLMESARTAN MEDOXOMIL 40 MG/1
1 TABLET ORAL
Refills: 0 | DISCHARGE

## 2024-08-25 RX ORDER — HYDROMORPHONE HYDROCHLORIDE 2 MG/1
1.2 TABLET ORAL ONCE
Refills: 0 | Status: DISCONTINUED | OUTPATIENT
Start: 2024-08-25 | End: 2024-08-25

## 2024-08-25 RX ORDER — HYDROMORPHONE HYDROCHLORIDE 2 MG/1
0.5 TABLET ORAL EVERY 6 HOURS
Refills: 0 | Status: DISCONTINUED | OUTPATIENT
Start: 2024-08-25 | End: 2024-08-27

## 2024-08-25 RX ORDER — ALPRAZOLAM 0.25 MG
0.25 TABLET ORAL THREE TIMES A DAY
Refills: 0 | Status: DISCONTINUED | OUTPATIENT
Start: 2024-08-25 | End: 2024-08-27

## 2024-08-25 RX ORDER — AMLODIPINE BESYLATE 10 MG/1
5 TABLET ORAL DAILY
Refills: 0 | Status: DISCONTINUED | OUTPATIENT
Start: 2024-08-25 | End: 2024-08-27

## 2024-08-25 RX ORDER — KETOROLAC TROMETHAMINE 30 MG/ML
15 INJECTION, SOLUTION INTRAMUSCULAR EVERY 6 HOURS
Refills: 0 | Status: DISCONTINUED | OUTPATIENT
Start: 2024-08-25 | End: 2024-08-27

## 2024-08-25 RX ORDER — NALOXONE HCL 1 MG/ML
0.04 VIAL (ML) INJECTION ONCE
Refills: 0 | Status: DISCONTINUED | OUTPATIENT
Start: 2024-08-25 | End: 2024-08-27

## 2024-08-25 RX ORDER — ASPIRIN 81 MG
81 TABLET, DELAYED RELEASE (ENTERIC COATED) ORAL DAILY
Refills: 0 | Status: DISCONTINUED | OUTPATIENT
Start: 2024-08-25 | End: 2024-08-27

## 2024-08-25 RX ORDER — ONDANSETRON 2 MG/ML
4 INJECTION, SOLUTION INTRAMUSCULAR; INTRAVENOUS EVERY 8 HOURS
Refills: 0 | Status: DISCONTINUED | OUTPATIENT
Start: 2024-08-25 | End: 2024-08-27

## 2024-08-25 RX ADMIN — HYDROMORPHONE HYDROCHLORIDE 1 MILLIGRAM(S): 2 TABLET ORAL at 22:15

## 2024-08-25 RX ADMIN — HYDROMORPHONE HYDROCHLORIDE 0.5 MILLIGRAM(S): 2 TABLET ORAL at 12:31

## 2024-08-25 RX ADMIN — METOPROLOL TARTRATE 25 MILLIGRAM(S): 100 TABLET ORAL at 03:32

## 2024-08-25 RX ADMIN — METOPROLOL TARTRATE 25 MILLIGRAM(S): 100 TABLET ORAL at 12:30

## 2024-08-25 RX ADMIN — ACETAMINOPHEN 650 MILLIGRAM(S): 325 TABLET ORAL at 23:27

## 2024-08-25 RX ADMIN — SODIUM CHLORIDE 100 MILLILITER(S): 9 INJECTION INTRAMUSCULAR; INTRAVENOUS; SUBCUTANEOUS at 20:47

## 2024-08-25 RX ADMIN — HYDROMORPHONE HYDROCHLORIDE 1 MILLIGRAM(S): 2 TABLET ORAL at 03:27

## 2024-08-25 RX ADMIN — PAROXETINE 10 MILLIGRAM(S): 10 TABLET, FILM COATED ORAL at 15:04

## 2024-08-25 RX ADMIN — HYDROMORPHONE HYDROCHLORIDE 1 MILLIGRAM(S): 2 TABLET ORAL at 15:04

## 2024-08-25 RX ADMIN — ACETAMINOPHEN 650 MILLIGRAM(S): 325 TABLET ORAL at 21:43

## 2024-08-25 RX ADMIN — HYDROMORPHONE HYDROCHLORIDE 1.2 MILLIGRAM(S): 2 TABLET ORAL at 00:30

## 2024-08-25 RX ADMIN — HYDROMORPHONE HYDROCHLORIDE 1 MILLIGRAM(S): 2 TABLET ORAL at 09:24

## 2024-08-25 RX ADMIN — ONDANSETRON 4 MILLIGRAM(S): 2 INJECTION, SOLUTION INTRAMUSCULAR; INTRAVENOUS at 03:32

## 2024-08-25 RX ADMIN — AMLODIPINE BESYLATE 5 MILLIGRAM(S): 10 TABLET ORAL at 09:24

## 2024-08-25 RX ADMIN — Medication 10 MILLIGRAM(S): at 21:44

## 2024-08-25 RX ADMIN — Medication 1 TABLET(S): at 09:24

## 2024-08-25 RX ADMIN — ENOXAPARIN SODIUM 40 MILLIGRAM(S): 100 INJECTION SUBCUTANEOUS at 12:31

## 2024-08-25 RX ADMIN — SODIUM CHLORIDE 100 MILLILITER(S): 9 INJECTION INTRAMUSCULAR; INTRAVENOUS; SUBCUTANEOUS at 03:34

## 2024-08-25 RX ADMIN — Medication 81 MILLIGRAM(S): at 09:24

## 2024-08-25 RX ADMIN — HYDROMORPHONE HYDROCHLORIDE 1 MILLIGRAM(S): 2 TABLET ORAL at 21:44

## 2024-08-25 RX ADMIN — LOSARTAN POTASSIUM 100 MILLIGRAM(S): 50 TABLET ORAL at 12:30

## 2024-08-25 RX ADMIN — KETOROLAC TROMETHAMINE 15 MILLIGRAM(S): 30 INJECTION, SOLUTION INTRAMUSCULAR at 05:59

## 2024-08-25 NOTE — PATIENT PROFILE ADULT - NSPROPOAURINARYCATHETER_GEN_A_NUR
Pt says he is sick and wants to see his Dr.  He was advised the Dr is on vacation, and won't be back until next. Author informed he can be seen at the walk in, he is requesting to speak to the nurse. no

## 2024-08-25 NOTE — ED PROVIDER NOTE - PHYSICAL EXAMINATION
Constitutional: NAD  Eyes: EOMI  Head: Normocephalic atraumatic  Mouth: MMM  Cardiac: regular rate   Resp: unlabored breathing  GI: Abd soft ND but ttp epigastrium   Neuro: grossly normal and intact

## 2024-08-25 NOTE — ED PROVIDER NOTE - OBJECTIVE STATEMENT
79F hx recurrent pancreatitis 2/2 pancreus divisum s/p attempted duct stenting p/w epigastric pain n/v today. pt states similar to her prior. no fever. no diarrhea constipation. no cp sob.

## 2024-08-25 NOTE — H&P ADULT - NSHPPHYSICALEXAM_GEN_ALL_CORE
GENERAL: NAD, comfortable in bed   HEAD:  Atraumatic, Normocephalic  EYES: EOMI, PERRL, conjunctiva and sclera clear  LUNG: Clear to auscultation bilaterally; No wheezes, rales or rhonchi  HEART: Regular rate and rhythm; No murmurs, rubs, or gallops  ABDOMEN: +BS, Soft, Epigastric tenderness,  Nondistended   EXTREMITIES:  No clubbing, cyanosis, or edema  PSYCH: normal mood and affect  NEUROLOGY: AAOx3, non-focal   SKIN: No rashes or lesions

## 2024-08-25 NOTE — ED ADULT NURSE NOTE - NSFALLUNIVINTERV_ED_ALL_ED
Bed/Stretcher in lowest position, wheels locked, appropriate side rails in place/Call bell, personal items and telephone in reach/Instruct patient to call for assistance before getting out of bed/chair/stretcher/Non-slip footwear applied when patient is off stretcher/[place] to call system/Physically safe environment - no spills, clutter or unnecessary equipment/Purposeful proactive rounding/Room/bathroom lighting operational, light cord in reach

## 2024-08-25 NOTE — PATIENT PROFILE ADULT - FALL HARM RISK - HARM RISK INTERVENTIONS

## 2024-08-25 NOTE — ED ADULT NURSE NOTE - OBJECTIVE STATEMENT
Pt BIBEMS c/o sudden onset on diffused ABD pain with nausea since 10:15pm. Pt endorsing n/v. Denies any CP, AOB, or any other complaints. Reports pmh of pancreatitis. Pt is awake and alert, a&ox4, ambulatory, no s/s of acute distress noted at this time. Resting in bed, call bell within reach, son at bedside.

## 2024-08-25 NOTE — PATIENT PROFILE ADULT - PACKS PER DAY
Patient called and needs a refill for furosemide pharmacy is [facility] in [place].  Please call her [phone] she want's to pick it up this afternoon thank you.  
0.5

## 2024-08-25 NOTE — PROGRESS NOTE ADULT - SUBJECTIVE AND OBJECTIVE BOX
HOSPITALIST PROGRESS NOTE      INTERVAL HPI/OVERNIGHT EVENTS:  As per night team, no overnight events. Patient seen and examined at bedside.     VITALS  Vital Signs Last 24 Hrs  T(C): 36.8 (25 Aug 2024 03:25), Max: 36.8 (25 Aug 2024 03:25)  T(F): 98.3 (25 Aug 2024 03:25), Max: 98.3 (25 Aug 2024 03:25)  HR: 63 (25 Aug 2024 06:58) (61 - 67)  BP: 121/62 (25 Aug 2024 06:58) (121/62 - 172/85)  BP(mean): 80 (25 Aug 2024 06:58) (80 - 83)  RR: 18 (25 Aug 2024 03:25) (17 - 18)  SpO2: 97% (25 Aug 2024 03:25) (97% - 98%)    Parameters below as of 25 Aug 2024 03:25  Patient On (Oxygen Delivery Method): room air        CAPILLARY BLOOD GLUCOSE          PHYSICAL EXAM  General: NAD, sitting comfortably in bed   HEENT: PERRL/ EOMI, no scleral icterus, MMM  Respiratory: lungs CTA b/l, no wheezes/crackles, no accessory muscle use  Cardiovascular: Regular rhythm/rate; +S1 +S2  Gastrointestinal: Soft, NTND  Genitourinary: no suprapubic tenderness  Extremities: WWP, no cyanosis, no edema, pulses equal  Neurological: A&Ox3, no gross focal deficits, follows commands  Skin: Normal temperature, warm, dry    MEDICATIONS  (STANDING):  amLODIPine   Tablet 5 milliGRAM(s) Oral daily  aspirin enteric coated 81 milliGRAM(s) Oral daily  atorvastatin 10 milliGRAM(s) Oral at bedtime  losartan 100 milliGRAM(s) Oral daily  metoprolol succinate ER 25 milliGRAM(s) Oral daily  multivitamin 1 Tablet(s) Oral daily  PARoxetine 10 milliGRAM(s) Oral daily  sodium chloride 0.9%. 1000 milliLiter(s) (100 mL/Hr) IV Continuous <Continuous>    MEDICATIONS  (PRN):  acetaminophen     Tablet .. 650 milliGRAM(s) Oral every 6 hours PRN Temp greater or equal to 38C (100.4F), Mild Pain (1 - 3)  ALPRAZolam 0.25 milliGRAM(s) Oral three times a day PRN anxiety  aluminum hydroxide/magnesium hydroxide/simethicone Suspension 30 milliLiter(s) Oral every 4 hours PRN Dyspepsia  HYDROmorphone  Injectable 1 milliGRAM(s) IV Push every 4 hours PRN Moderate Pain (4 - 6)  melatonin 3 milliGRAM(s) Oral at bedtime PRN Insomnia  ondansetron Injectable 4 milliGRAM(s) IV Push every 8 hours PRN Nausea and/or Vomiting      Sudafed (Other)  adhesives (Rash)  Betadine (Unknown)  Triple Antibiotic (Unknown)      LABS                        11.7   8.49  )-----------( 196      ( 25 Aug 2024 06:43 )             35.3     08-25    139  |  108  |  15  ----------------------------<  135<H>  4.1   |  26  |  0.67    Ca    8.7      25 Aug 2024 06:43    TPro  6.4  /  Alb  3.2<L>  /  TBili  0.4  /  DBili  x   /  AST  23  /  ALT  31  /  AlkPhos  63  08-25      Urinalysis Basic - ( 25 Aug 2024 06:43 )    Color: x / Appearance: x / SG: x / pH: x  Gluc: 135 mg/dL / Ketone: x  / Bili: x / Urobili: x   Blood: x / Protein: x / Nitrite: x   Leuk Esterase: x / RBC: x / WBC x   Sq Epi: x / Non Sq Epi: x / Bacteria: x            RADIOLOGY & ADDITIONAL TESTS: Reviewed HOSPITALIST PROGRESS NOTE      INTERVAL HPI/OVERNIGHT EVENTS:  As per night team, no overnight events. Patient seen and examined at bedside. C/o pain abd pain.     VITALS  Vital Signs Last 24 Hrs  T(C): 36.8 (25 Aug 2024 03:25), Max: 36.8 (25 Aug 2024 03:25)  T(F): 98.3 (25 Aug 2024 03:25), Max: 98.3 (25 Aug 2024 03:25)  HR: 63 (25 Aug 2024 06:58) (61 - 67)  BP: 121/62 (25 Aug 2024 06:58) (121/62 - 172/85)  BP(mean): 80 (25 Aug 2024 06:58) (80 - 83)  RR: 18 (25 Aug 2024 03:25) (17 - 18)  SpO2: 97% (25 Aug 2024 03:25) (97% - 98%)    Parameters below as of 25 Aug 2024 03:25  Patient On (Oxygen Delivery Method): room air        CAPILLARY BLOOD GLUCOSE          PHYSICAL EXAM  General: NAD, sitting comfortably in bed   HEENT: PERRL/ EOMI, no scleral icterus, dry MM  Respiratory: lungs CTA b/l, no accessory muscle use  Cardiovascular: Regular rhythm/rate; +S1 +S2  Gastrointestinal: Soft, TTP epigastric area   Extremities: WWP, no cyanosis, b/l LE edema (chronic), symmetric   Neurological: A&Ox3, no gross focal deficits, follows commands  Skin: Normal temperature, warm, dry    MEDICATIONS  (STANDING):  amLODIPine   Tablet 5 milliGRAM(s) Oral daily  aspirin enteric coated 81 milliGRAM(s) Oral daily  atorvastatin 10 milliGRAM(s) Oral at bedtime  losartan 100 milliGRAM(s) Oral daily  metoprolol succinate ER 25 milliGRAM(s) Oral daily  multivitamin 1 Tablet(s) Oral daily  PARoxetine 10 milliGRAM(s) Oral daily  sodium chloride 0.9%. 1000 milliLiter(s) (100 mL/Hr) IV Continuous <Continuous>    MEDICATIONS  (PRN):  acetaminophen     Tablet .. 650 milliGRAM(s) Oral every 6 hours PRN Temp greater or equal to 38C (100.4F), Mild Pain (1 - 3)  ALPRAZolam 0.25 milliGRAM(s) Oral three times a day PRN anxiety  aluminum hydroxide/magnesium hydroxide/simethicone Suspension 30 milliLiter(s) Oral every 4 hours PRN Dyspepsia  HYDROmorphone  Injectable 1 milliGRAM(s) IV Push every 4 hours PRN Moderate Pain (4 - 6)  melatonin 3 milliGRAM(s) Oral at bedtime PRN Insomnia  ondansetron Injectable 4 milliGRAM(s) IV Push every 8 hours PRN Nausea and/or Vomiting      Sudafed (Other)  adhesives (Rash)  Betadine (Unknown)  Triple Antibiotic (Unknown)      LABS                        11.7   8.49  )-----------( 196      ( 25 Aug 2024 06:43 )             35.3     08-25    139  |  108  |  15  ----------------------------<  135<H>  4.1   |  26  |  0.67    Ca    8.7      25 Aug 2024 06:43    TPro  6.4  /  Alb  3.2<L>  /  TBili  0.4  /  DBili  x   /  AST  23  /  ALT  31  /  AlkPhos  63  08-25      Urinalysis Basic - ( 25 Aug 2024 06:43 )    Color: x / Appearance: x / SG: x / pH: x  Gluc: 135 mg/dL / Ketone: x  / Bili: x / Urobili: x   Blood: x / Protein: x / Nitrite: x   Leuk Esterase: x / RBC: x / WBC x   Sq Epi: x / Non Sq Epi: x / Bacteria: x            RADIOLOGY & ADDITIONAL TESTS: Reviewed

## 2024-08-25 NOTE — H&P ADULT - ASSESSMENT
79 year old female with a PMH of HTN, HLD,  recurrent pancreatitis 2/2 pancreas divisum s/p attempted duct stenting presents to the ED for epigastric pain. Endorses around 9pm started to have epigastric pain, non-radiating with a severity of 8/10 associated with nausea and vomiting,  similar to previous pancreatitis episodes (last one 1 year ago). Only new change has been a new [place] drink which she had earlier in the night no other obvious triggers. Denies chest pain, palpitation, diarrhea, headache, dizziness or any other acute symptoms. Labs remarakble for lipase >5,000. Vitals stable. Received 1L-NS, Dilaudid, Zofran.   CT-ABD: Acute interstitial pancreatitis without organized peripancreatic fluid collections, without necrosis, and without vascular complications.

## 2024-08-25 NOTE — PROGRESS NOTE ADULT - PROBLEM SELECTOR PLAN 1
Acute onset of epigastric pain. History of recurrent pancreatitis 2/2 pancreas divisum s/p attempted duct stenting  - regular diet, discussed with pt to advance/order what she tolerates/wants to try  - IV-NS   - Zofran PRN  - PPI   - Pain management   - GI consulted, f/u recs Acute onset of epigastric pain. History of recurrent pancreatitis 2/2 pancreas divisum s/p attempted duct stenting  - regular diet, discussed with pt to advance/order what she tolerates/wants to try  - IV-NS   - Zofran PRN  - PPI   - Pain management: tylenol (mild), toradol (mod), dilaudid (severe) and breakthrough, naloxone PRN ordered as precuation  - no morphine as pt reports passing out   - GI consulted, f/u recs

## 2024-08-25 NOTE — PROGRESS NOTE ADULT - PROBLEM SELECTOR PLAN 2
Continue home meds   - Metoprolol   - Losartan      #DVT ppx - heparin subq     #dispo-pending clinical improvement Continue home meds   - Metoprolol   - Losartan      #DVT ppx - lovenox    #dispo-pending clinical improvement

## 2024-08-25 NOTE — H&P ADULT - NSHPLABSRESULTS_GEN_ALL_CORE
12.4   10.52 )-----------( 224      ( 24 Aug 2024 23:17 )             37.2     140  |  110<H>  |  18  ----------------------------<  132<H>     08-24  5.1   |  27  |  0.86    Ca    9.4      24 Aug 2024 23:17    TPro  7.0  /  Alb  3.7  /  TBili  0.4  /  DBili  x   /  AST  30  /  ALT  37  /  AlkPhos  67  08-24      CT-ABD  LOWER CHEST: Linear atelectasis at both lung bases. No suspicious lung nodules. No sizable pleural/pericardial effusions. The heart is mildly enlarged.    LIVER: Transient hepatic attenuation difference are seen scattered throughout the liver parenchyma during the arterial phase; these all become isodense to the surrounding liver parenchyma.  BILE DUCTS: Mild left and central pneumobilia.  GALLBLADDER: Cholecystectomy.  SPLEEN: Within normal limits.  PANCREAS: Edematous pancreatic parenchyma with moderate inflammatory stranding throughout the peripancreatic soft tissues extending to the transverse mesocolon. Scattered unorganized nonenhancing fluid seen in the peripancreatic soft tissues. No organized peripancreatic fluid collections. No CT evident vascular complications.  ADRENALS: Within normal limits.  KIDNEYS/URETERS: Small hypodense lesion within the right kidney likely represents a cyst; outpatient ultrasound imaging for confirmation. Left pelvic kidney. No evidence of obstructive uropathy or radiodense urolithiasis.    BLADDER: Within normal limits.  REPRODUCTIVE ORGANS: Bulky fibroid uterus.    BOWEL: No bowel obstruction. Appendix is normal. Mucosal thickening and enhancement involving the second duodenal segment is consistent with reactive duodenitis, secondary to acute hepatitis.  PERITONEUM/RETROPERITONEUM: Within normal limits.  VESSELS: Atherosclerotic changes.  LYMPH NODES: No lymphadenopathy.  ABDOMINAL WALL: Within normal limits.  BONES: Diffuse osseous demineralization. Degenerative changes. Posterior spinal fusion surgical changes at L4-L5, without CT evident hardware complications.    IMPRESSION:  Acute interstitial pancreatitis without organized peripancreatic fluid collections, without necrosis, and without vascular complications.

## 2024-08-25 NOTE — PROGRESS NOTE ADULT - ASSESSMENT
79 year old female with a PMH of HTN, HLD,  recurrent pancreatitis 2/2 pancreas divisum s/p attempted duct stenting presents to the ED for epigastric pain found to have pancreatitis.

## 2024-08-25 NOTE — ED ADULT NURSE REASSESSMENT NOTE - NS ED NURSE REASSESS COMMENT FT1
received pt from [facility] this AM. pt continues to complain of pain and requesting daily medications. medicated as per orders. no other complaints or discomforts at this time

## 2024-08-25 NOTE — ED PROVIDER NOTE - CLINICAL SUMMARY MEDICAL DECISION MAKING FREE TEXT BOX
likely pancreatitis will get labs lipase though possibly low if chronic panc, ct pain control IVF admit

## 2024-08-25 NOTE — H&P ADULT - PROBLEM SELECTOR PLAN 1
Acute onset of epigastric pain   History of recurrent pancreatitis 2/2 pancreas divisum s/p attempted duct stenting  - NPO - Advance as tolerated   - IV-NS   - Zofran   - PPI   - Pain management   - GI-Consult  - DVT prophylaxis

## 2024-08-26 DIAGNOSIS — I36.1 NONRHEUMATIC TRICUSPID (VALVE) INSUFFICIENCY: ICD-10-CM

## 2024-08-26 DIAGNOSIS — Q45.3 OTHER CONGENITAL MALFORMATIONS OF PANCREAS AND PANCREATIC DUCT: ICD-10-CM

## 2024-08-26 DIAGNOSIS — I35.1 NONRHEUMATIC AORTIC (VALVE) INSUFFICIENCY: ICD-10-CM

## 2024-08-26 DIAGNOSIS — I34.0 NONRHEUMATIC MITRAL (VALVE) INSUFFICIENCY: ICD-10-CM

## 2024-08-26 DIAGNOSIS — I27.20 PULMONARY HYPERTENSION, UNSPECIFIED: ICD-10-CM

## 2024-08-26 LAB
ANION GAP SERPL CALC-SCNC: 7 MMOL/L — SIGNIFICANT CHANGE UP (ref 5–17)
BASOPHILS # BLD AUTO: 0.02 K/UL — SIGNIFICANT CHANGE UP (ref 0–0.2)
BASOPHILS NFR BLD AUTO: 0.3 % — SIGNIFICANT CHANGE UP (ref 0–2)
BUN SERPL-MCNC: 12 MG/DL — SIGNIFICANT CHANGE UP (ref 7–23)
CALCIUM SERPL-MCNC: 8.3 MG/DL — LOW (ref 8.5–10.1)
CHLORIDE SERPL-SCNC: 108 MMOL/L — SIGNIFICANT CHANGE UP (ref 96–108)
CO2 SERPL-SCNC: 24 MMOL/L — SIGNIFICANT CHANGE UP (ref 22–31)
CREAT SERPL-MCNC: 0.61 MG/DL — SIGNIFICANT CHANGE UP (ref 0.5–1.3)
EGFR: 91 ML/MIN/1.73M2 — SIGNIFICANT CHANGE UP
EOSINOPHIL # BLD AUTO: 0.16 K/UL — SIGNIFICANT CHANGE UP (ref 0–0.5)
EOSINOPHIL NFR BLD AUTO: 2.2 % — SIGNIFICANT CHANGE UP (ref 0–6)
GLUCOSE SERPL-MCNC: 99 MG/DL — SIGNIFICANT CHANGE UP (ref 70–99)
HCT VFR BLD CALC: 31.8 % — LOW (ref 34.5–45)
HGB BLD-MCNC: 10.6 G/DL — LOW (ref 11.5–15.5)
IMM GRANULOCYTES NFR BLD AUTO: 0.3 % — SIGNIFICANT CHANGE UP (ref 0–0.9)
LYMPHOCYTES # BLD AUTO: 1.39 K/UL — SIGNIFICANT CHANGE UP (ref 1–3.3)
LYMPHOCYTES # BLD AUTO: 19.3 % — SIGNIFICANT CHANGE UP (ref 13–44)
MAGNESIUM SERPL-MCNC: 1.9 MG/DL — SIGNIFICANT CHANGE UP (ref 1.6–2.6)
MCHC RBC-ENTMCNC: 29.8 PG — SIGNIFICANT CHANGE UP (ref 27–34)
MCHC RBC-ENTMCNC: 33.3 GM/DL — SIGNIFICANT CHANGE UP (ref 32–36)
MCV RBC AUTO: 89.3 FL — SIGNIFICANT CHANGE UP (ref 80–100)
MONOCYTES # BLD AUTO: 0.55 K/UL — SIGNIFICANT CHANGE UP (ref 0–0.9)
MONOCYTES NFR BLD AUTO: 7.6 % — SIGNIFICANT CHANGE UP (ref 2–14)
NEUTROPHILS # BLD AUTO: 5.05 K/UL — SIGNIFICANT CHANGE UP (ref 1.8–7.4)
NEUTROPHILS NFR BLD AUTO: 70.3 % — SIGNIFICANT CHANGE UP (ref 43–77)
PHOSPHATE SERPL-MCNC: 3.5 MG/DL — SIGNIFICANT CHANGE UP (ref 2.5–4.5)
PLATELET # BLD AUTO: 183 K/UL — SIGNIFICANT CHANGE UP (ref 150–400)
POTASSIUM SERPL-MCNC: 4 MMOL/L — SIGNIFICANT CHANGE UP (ref 3.5–5.3)
POTASSIUM SERPL-SCNC: 4 MMOL/L — SIGNIFICANT CHANGE UP (ref 3.5–5.3)
RBC # BLD: 3.56 M/UL — LOW (ref 3.8–5.2)
RBC # FLD: 14.4 % — SIGNIFICANT CHANGE UP (ref 10.3–14.5)
SODIUM SERPL-SCNC: 139 MMOL/L — SIGNIFICANT CHANGE UP (ref 135–145)
WBC # BLD: 7.19 K/UL — SIGNIFICANT CHANGE UP (ref 3.8–10.5)
WBC # FLD AUTO: 7.19 K/UL — SIGNIFICANT CHANGE UP (ref 3.8–10.5)

## 2024-08-26 PROCEDURE — 99232 SBSQ HOSP IP/OBS MODERATE 35: CPT

## 2024-08-26 RX ORDER — POLYETHYLENE GLYCOL 3350 17 G/17G
17 POWDER, FOR SOLUTION ORAL DAILY
Refills: 0 | Status: DISCONTINUED | OUTPATIENT
Start: 2024-08-26 | End: 2024-08-27

## 2024-08-26 RX ORDER — SENNA 187 MG
2 TABLET ORAL AT BEDTIME
Refills: 0 | Status: DISCONTINUED | OUTPATIENT
Start: 2024-08-26 | End: 2024-08-27

## 2024-08-26 RX ADMIN — PAROXETINE 10 MILLIGRAM(S): 10 TABLET, FILM COATED ORAL at 09:25

## 2024-08-26 RX ADMIN — Medication 10 MILLIGRAM(S): at 21:40

## 2024-08-26 RX ADMIN — SODIUM CHLORIDE 75 MILLILITER(S): 9 INJECTION INTRAMUSCULAR; INTRAVENOUS; SUBCUTANEOUS at 13:18

## 2024-08-26 RX ADMIN — HYDROMORPHONE HYDROCHLORIDE 0.5 MILLIGRAM(S): 2 TABLET ORAL at 05:33

## 2024-08-26 RX ADMIN — POLYETHYLENE GLYCOL 3350 17 GRAM(S): 17 POWDER, FOR SOLUTION ORAL at 11:45

## 2024-08-26 RX ADMIN — Medication 1 TABLET(S): at 09:25

## 2024-08-26 RX ADMIN — HYDROMORPHONE HYDROCHLORIDE 0.5 MILLIGRAM(S): 2 TABLET ORAL at 07:00

## 2024-08-26 RX ADMIN — METOPROLOL TARTRATE 25 MILLIGRAM(S): 100 TABLET ORAL at 09:25

## 2024-08-26 RX ADMIN — KETOROLAC TROMETHAMINE 15 MILLIGRAM(S): 30 INJECTION, SOLUTION INTRAMUSCULAR at 13:19

## 2024-08-26 RX ADMIN — AMLODIPINE BESYLATE 5 MILLIGRAM(S): 10 TABLET ORAL at 09:25

## 2024-08-26 RX ADMIN — Medication 30 MILLILITER(S): at 18:37

## 2024-08-26 RX ADMIN — KETOROLAC TROMETHAMINE 15 MILLIGRAM(S): 30 INJECTION, SOLUTION INTRAMUSCULAR at 13:35

## 2024-08-26 RX ADMIN — LOSARTAN POTASSIUM 100 MILLIGRAM(S): 50 TABLET ORAL at 09:25

## 2024-08-26 RX ADMIN — Medication 81 MILLIGRAM(S): at 09:25

## 2024-08-26 RX ADMIN — SODIUM CHLORIDE 100 MILLILITER(S): 9 INJECTION INTRAMUSCULAR; INTRAVENOUS; SUBCUTANEOUS at 06:29

## 2024-08-26 RX ADMIN — ENOXAPARIN SODIUM 40 MILLIGRAM(S): 100 INJECTION SUBCUTANEOUS at 09:24

## 2024-08-26 RX ADMIN — KETOROLAC TROMETHAMINE 15 MILLIGRAM(S): 30 INJECTION, SOLUTION INTRAMUSCULAR at 22:49

## 2024-08-26 RX ADMIN — Medication 30 MILLILITER(S): at 22:50

## 2024-08-26 NOTE — CONSULT NOTE ADULT - ASSESSMENT
8/26/24:  Pt with above history presenting with recurrent pancreatitis as above.  No new cardiac issues.  Continue as outlined by medicine etal.  Will follow as treatment progresses and as an outpt after discharge

## 2024-08-26 NOTE — CONSULT NOTE ADULT - SUBJECTIVE AND OBJECTIVE BOX
CHIEF COMPLAINT:  Patient is a 79y old  Female who presents with a chief complaint of Acute Pancreatitis (25 Aug 2024 08:10)      HPI: 24:  79 year old female, well known to me with a PMH of HTN, HLD, MR/AI/TR with mild pulmonary HTN in the past and recurrent pancreatitis 2/2 pancreas divisum s/p attempted duct stenting presents to the ED for epigastric pain. Endorses around 9pm started to have epigastric pain, non-radiating with a severity of 8/10 associated with nausea and vomiting,  similar to previous pancreatitis episodes (last one 1 year ago). Only new change has been a new Moctail drink which she had earlier in the night no other obvious triggers. Denies chest pain, palpitation, diarrhea, headache, dizziness or any other acute symptoms. Labs remarakble for lipase >5,000. Vitals stable. Received 1L-NS, Dilaudid, Zofran.   CT-ABD: Acute interstitial pancreatitis without organized peripancreatic fluid collections, without necrosis, and without vascular complications.    24:  She is resting more comfortably thia AM without anginal chest pains or increased SOB or other cardiac symptoms.        PMHx:  PAST MEDICAL & SURGICAL HISTORY:  Hypertension  High cholesterol  GERD (gastroesophageal reflux disease)  Arthritis  Fatty liver  Pancreatitis-h/o  Osteoarthritis  Hammertoe of right foot  Anxiety  Scalp psoriasis  History of back surgery  History of   History of tonsillectomy  History of laminectomy-with resection  S/P cholecystectomy  H/O parathyroidectomy  H/O hand surgery  S/P cataract surgery-B/L    FAMILY HISTORY:   FAMILY HISTORY:  FH: breast cancer (Mother)  FHx: type 2 diabetes mellitus (Mother, Father)  FH: congestive heart failure (Father)  Family history of AIDS (Sibling)      ALLERGIES:  Allergies  Sudafed (Other)  morphine (Faint)  adhesives (Rash)  Betadine (Unknown)  Triple Antibiotic (Unknown)      REVIEW OF SYSTEMS:  10 point ROS was obtained  Pertinent positives and negatives are as above  All other review of systems is negative unless indicated above      Vital Signs Last 24 Hrs  T(C): 36.7 (25 Aug 2024 23:27), Max: 38 (25 Aug 2024 21:12)  T(F): 98.1 (25 Aug 2024 23:27), Max: 100.4 (25 Aug 2024 21:12)  HR: 69 (25 Aug 2024 21:12) (60 - 69)  BP: 138/62 (25 Aug 2024 21:12) (120/80 - 138/62)  BP(mean): 93 (25 Aug 2024 09:57) (93 - 93)  RR: 18 (25 Aug 2024 21:12) (17 - 18)  SpO2: 99% (25 Aug 2024 21:12) (95% - 99%): room air    I&O's Summary  25 Aug 2024 07:01  -  26 Aug 2024 07:00  --------------------------------------------------------  IN: 1200 mL / OUT: 0 mL / NET: 1200 mL      PHYSICAL EXAM:   Constitutional: NAD, awake and alert, well-developed  HEENT: PERR, EOMI, Normal Hearing, MMM  Neck: Soft and supple, No LAD, No JVD  Respiratory: Breath sounds are clear bilaterally, No wheezing, rales or rhonchi  Cardiovascular: S1 and S2, regular rate and rhythm, soft UZAIR at LLSB and base as before, no gallops or rubs  Gastrointestinal: Bowel Sounds present, soft, mildly tender, nondistended, no guarding, no rebound  Extremities: No peripheral edema  Vascular: 2+ peripheral pulses  Neurological: A/O x 3, no focal deficits  Musculoskeletal: 5/5 strength b/l upper and lower extremities  Skin: No rashes      MEDICATIONS  (STANDING):  amLODIPine   Tablet 5 milliGRAM(s) Oral daily  aspirin enteric coated 81 milliGRAM(s) Oral daily  atorvastatin 10 milliGRAM(s) Oral at bedtime  enoxaparin Injectable 40 milliGRAM(s) SubCutaneous every 24 hours  losartan 100 milliGRAM(s) Oral daily  metoprolol succinate ER 25 milliGRAM(s) Oral daily  multivitamin 1 Tablet(s) Oral daily  PARoxetine 10 milliGRAM(s) Oral daily  sodium chloride 0.9%. 1000 milliLiter(s) (100 mL/Hr) IV Continuous <Continuous>    MEDICATIONS  (PRN):  acetaminophen     Tablet .. 650 milliGRAM(s) Oral every 6 hours PRN Temp greater or equal to 38C (100.4F), Mild Pain (1 - 3)  ALPRAZolam 0.25 milliGRAM(s) Oral three times a day PRN anxiety  aluminum hydroxide/magnesium hydroxide/simethicone Suspension 30 milliLiter(s) Oral every 4 hours PRN Dyspepsia  HYDROmorphone  Injectable 1 milliGRAM(s) IV Push every 4 hours PRN Severe Pain (7 - 10)  HYDROmorphone  Injectable 0.5 milliGRAM(s) IV Push every 6 hours PRN breakthrough pain  ketorolac   Injectable 15 milliGRAM(s) IV Push every 6 hours PRN Moderate Pain (4 - 6)  melatonin 3 milliGRAM(s) Oral at bedtime PRN Insomnia  naloxone Injectable 0.04 milliGRAM(s) IV Push once PRN OPIOID REVERSAL / PT SEDATED  ondansetron Injectable 4 milliGRAM(s) IV Push every 8 hours PRN Nausea and/or Vomiting      LABS: All Labs Reviewed:                        10.6   7.19  )-----------( 183      ( 26 Aug 2024 05:48 )             31.8         139  |  108  |  12  ----------------------------<  99  4.0   |  24  |  0.61    Ca    8.3<L>      26 Aug 2024 05:48  Phos  3.5       Mg     1.9         TPro  6.4  /  Alb  3.2<L>  /  TBili  0.4  /  DBili  x   /  AST  23  /  ALT  31  /  AlkPhos  63      A1C with Estimated Average Glucose (24 @ 06:43): 6.2    Lipase (24 @ 23:17): >5000 U/L    BLOOD CULTURES:     LIPID PROFILE lipid profile                           @ 06:43  cholesterol           125 mg/dL  Direct LDL            --  HDL cholesterol       59 mg/dL  HDL/Total cholesterol --  Triglycerides, serum  35 mg/dL      RADIOLOGY:    CT of Abd/Pelvis: 24:  FINDINGS:  LOWER CHEST: Linear atelectasis at both lung bases. No suspicious lung nodules. No sizable pleural/pericardial effusions. The heart is mildly enlarged.  LIVER: Transient hepatic attenuation difference are seen scattered throughout the liver parenchyma during the arterial phase; these all become isodense to the surrounding liver parenchyma.  BILE DUCTS: Mild left and central pneumobilia.  GALLBLADDER: Cholecystectomy.  SPLEEN: Within normal limits.  PANCREAS: Edematous pancreatic parenchyma with moderate inflammatory stranding throughout the peripancreatic soft tissues extending to the transverse mesocolon. Scattered unorganized nonenhancing fluid seen in the peripancreatic soft tissues. No organized peripancreatic fluid collections. No CT evident vascular complications.  ADRENALS: Within normal limits.  KIDNEYS/URETERS: Small hypodense lesion within the right kidney likely represents a cyst; outpatient ultrasound imaging for confirmation. Left pelvic kidney. No evidence of obstructive uropathy or radiodense urolithiasis.  BLADDER: Within normal limits.  REPRODUCTIVE ORGANS: Bulky fibroid uterus.  BOWEL: No bowel obstruction. Appendix is normal. Mucosal thickening and enhancement involving the second duodenal segment is consistent with reactive duodenitis, secondary to acute hepatitis.  PERITONEUM/RETROPERITONEUM: Within normal limits.  VESSELS: Atherosclerotic changes.  LYMPH NODES: No lymphadenopathy.  ABDOMINAL WALL: Within normal limits.  BONES: Diffuse osseous demineralization. Degenerative changes. Posterior spinal fusion surgical changes at L4-L5, without CT evident hardware complications.  IMPRESSION: Acute interstitial pancreatitis without organized peripancreatic fluid collections, without necrosis, and without vascular complications.    EKG:      TELEMETRY:      STRESS ECH0:  Stress echo in my office last year showing normal LV size and systolic function with LVEF=65-70% with mild MR and TR with trace AI but otherwise a normal study and no stress echo evidence for ischemia    ECHO: 19:  M-Mode Measurements (cm)   LVEDd: 5.21 cm            LVESd: 2.43 cm   IVSEd: 0.96 cm   LVPWd: 1.1 cm             AO Root Dimension: 2.4 cm                        ACS: 2 cm  Doppler Measurements:                                  MV Peak E-Wave: 98.2 cm/s   TR Velocity:324 cm/s           MV Peak A-Wave: 96.3 cm/s   TR Gradient:41.9904 mmHg       MV E/A Ratio: 1.02 %   Estimated RAP:10 mmHg          MV Peak Gradient: 3.86 mmHg   RVSP:41 mmHg    Findings  Mitral Valve   Normal appearing mitral valve structure and function.   Trace mitral regurgitation is present.   EA reversal of the mitral inflow consistent with reduced compliance of the left ventricle    Aortic Valve   Mild aortic sclerosis is present with normal valvular opening.   Trace aortic regurgitation is present.    Tricuspid Valve   Mild (1+) tricuspid valve regurgitation is present.   Moderate pulmonary hypertension.    Pulmonic Valve   Normal appearing pulmonic valve structure and function.    Left Atrium   The left atrium is mildly dilated.    Left Ventricle   The left ventricle is normal in size, wall thickness, wall motion and contractility.   Estimated left ventricular ejection fraction is 65-70 %.    Right Atrium   Normal appearing right atrium.    Right Ventricle   Normal appearing right ventricle structure and function.    Pericardial Effusion   No evidence of pericardial effusion.    Pleural Effusion   No evidence of pleural effusion.    Miscellaneous   All visualized extra cardiac structures appears to be normal.    Summary   Mild aortic sclerosis is present with normal valvular opening.   Trace aortic regurgitation is present.   The left atrium is mildly dilated.   The left ventricle is normal in size, wall thickness, wall motion and contractility.   Estimated left ventricular ejection fraction is 65-70 %.   All visualized extra cardiac structures appears to be normal.   Normal appearing mitral valve structure and function.   Trace mitral regurgitation is present.   EA reversal of the mitral inflow consistent with reduced compliance of the left ventricle   No evidence of pericardial effusion.   No evidence of pleural effusion.   Normal appearing pulmonic valve structure and function.   Normal appearing right atrium.   Normal appearing right ventricle structure and function.   Mild (1+) tricuspid valve regurgitation is present.   Moderate pulmonary hypertension.    Signature   ----------------------------------------------------------------   Electronically signed by Salvador Mattson MD(Interpreting   physician) on 2019 07:59 PM   ----------------------------------------------------------------

## 2024-08-26 NOTE — PROGRESS NOTE ADULT - PROBLEM SELECTOR PLAN 2
Continue home meds   - Metoprolol   - Losartan      #DVT ppx - lovenox    #dispo-pending clinical improvement, dc tomorrow

## 2024-08-26 NOTE — PROGRESS NOTE ADULT - SUBJECTIVE AND OBJECTIVE BOX
CHIEF COMPLAINT:    SUBJECTIVE/SIGNIFICANT INTERVAL EVENTS/OVERNIGHT EVENTS:    Review of Systems: 14 Point review of systems reviewed and reported as negative unless otherwise stated in HPI    FROM H&P:  ""    PHYSICAL EXAM:    T(C): 36.9 (08-26-24 @ 07:53), Max: 38 (08-25-24 @ 21:12)  HR: 66 (08-26-24 @ 07:53) (60 - 69)  BP: 132/71 (08-26-24 @ 07:53) (120/80 - 138/62)  RR: 18 (08-26-24 @ 07:53) (17 - 18)  SpO2: 99% (08-26-24 @ 07:53) (95% - 99%)    General: AAOx3; NAD  Head: AT/NC  ENT: Moist Mucous Membranes; No Injury  Eyes: EOMI; PERRL  Neck: Non-tender; No JVD  CVS: RRR, S1&S2, No murmur, No edema  Respiratory: Lungs CTA B/L; Normal Respiratory Effort  Abdomen/GI: Soft, non-tender, non-distended, no guarding, no rebound, normal bowel sounds  : No bladder distention, No Nathan  Extremities: No cyanosis, No clubbing, No edema  MSK: No CVA tenderness, Normal ROM, No injury  Neuro: AAOx3, CNII-XII grossly intact, non-focal  Psych: Appropriate, Cooperative, No depression, No anxiety  Skin: Clean, Dry and Intact      LABS:                          10.6   7.19  )-----------( 183      ( 26 Aug 2024 05:48 )             31.8     08-26    139  |  108  |  12  ----------------------------<  99  4.0   |  24  |  0.61    Ca    8.3<L>      26 Aug 2024 05:48  Phos  3.5     08-26  Mg     1.9     08-26    TPro  6.4  /  Alb  3.2<L>  /  TBili  0.4  /  DBili  x   /  AST  23  /  ALT  31  /  AlkPhos  63  08-25      CAPILLARY BLOOD GLUCOSE              RADIOLOGY:      EKG:      ECHO:      PROCEDURES:        I personally reviewed labs, imaging, ekg, orders and vitals.    Discussed case with:  []RN  []CM/SW  []Patient  []Family  []Specialist:        MEDICATIONS  (STANDING):  amLODIPine   Tablet 5 milliGRAM(s) Oral daily  aspirin enteric coated 81 milliGRAM(s) Oral daily  atorvastatin 10 milliGRAM(s) Oral at bedtime  enoxaparin Injectable 40 milliGRAM(s) SubCutaneous every 24 hours  losartan 100 milliGRAM(s) Oral daily  metoprolol succinate ER 25 milliGRAM(s) Oral daily  multivitamin 1 Tablet(s) Oral daily  PARoxetine 10 milliGRAM(s) Oral daily  sodium chloride 0.9%. 1000 milliLiter(s) (100 mL/Hr) IV Continuous <Continuous>    MEDICATIONS  (PRN):  acetaminophen     Tablet .. 650 milliGRAM(s) Oral every 6 hours PRN Temp greater or equal to 38C (100.4F), Mild Pain (1 - 3)  ALPRAZolam 0.25 milliGRAM(s) Oral three times a day PRN anxiety  aluminum hydroxide/magnesium hydroxide/simethicone Suspension 30 milliLiter(s) Oral every 4 hours PRN Dyspepsia  HYDROmorphone  Injectable 0.5 milliGRAM(s) IV Push every 6 hours PRN breakthrough pain  HYDROmorphone  Injectable 1 milliGRAM(s) IV Push every 4 hours PRN Severe Pain (7 - 10)  ketorolac   Injectable 15 milliGRAM(s) IV Push every 6 hours PRN Moderate Pain (4 - 6)  melatonin 3 milliGRAM(s) Oral at bedtime PRN Insomnia  naloxone Injectable 0.04 milliGRAM(s) IV Push once PRN OPIOID REVERSAL / PT SEDATED  ondansetron Injectable 4 milliGRAM(s) IV Push every 8 hours PRN Nausea and/or Vomiting     CHIEF COMPLAINT: pancreatitis     SUBJECTIVE/SIGNIFICANT INTERVAL EVENTS/OVERNIGHT EVENTS: pain improved but still present requiring intermittent opioids    Review of Systems: 14 Point review of systems reviewed and reported as negative unless otherwise stated in HPI      PHYSICAL EXAM:    T(C): 36.9 (08-26-24 @ 07:53), Max: 38 (08-25-24 @ 21:12)  HR: 66 (08-26-24 @ 07:53) (60 - 69)  BP: 132/71 (08-26-24 @ 07:53) (120/80 - 138/62)  RR: 18 (08-26-24 @ 07:53) (17 - 18)  SpO2: 99% (08-26-24 @ 07:53) (95% - 99%)    General: AAOx3; NAD  Head: AT/NC  ENT: Moist Mucous Membranes; No Injury  Eyes: EOMI; PERRL  Neck: Non-tender; No JVD  CVS: RRR, S1&S2, No murmur, No edema  Respiratory: mild wheezing; Normal Respiratory Effort  Abdomen/GI: Soft, mildly tender, non-distended  : No bladder distention, No Nathan  Extremities: No cyanosis, No clubbing, No edema  MSK: No CVA tenderness, Normal ROM, No injury  Neuro: AAOx3, CNII-XII grossly intact, non-focal  Psych: Appropriate, Cooperative, No depression, No anxiety  Skin: Clean, Dry and Intact      LABS:                          10.6   7.19  )-----------( 183      ( 26 Aug 2024 05:48 )             31.8     08-26    139  |  108  |  12  ----------------------------<  99  4.0   |  24  |  0.61    Ca    8.3<L>      26 Aug 2024 05:48  Phos  3.5     08-26  Mg     1.9     08-26    TPro  6.4  /  Alb  3.2<L>  /  TBili  0.4  /  DBili  x   /  AST  23  /  ALT  31  /  AlkPhos  63  08-25      CAPILLARY BLOOD GLUCOSE              RADIOLOGY:      EKG:      ECHO:      PROCEDURES:        I personally reviewed labs, imaging, ekg, orders and vitals.    Discussed case with:  []RN  []CM/SW  []Patient  []Family  []Specialist:        MEDICATIONS  (STANDING):  amLODIPine   Tablet 5 milliGRAM(s) Oral daily  aspirin enteric coated 81 milliGRAM(s) Oral daily  atorvastatin 10 milliGRAM(s) Oral at bedtime  enoxaparin Injectable 40 milliGRAM(s) SubCutaneous every 24 hours  losartan 100 milliGRAM(s) Oral daily  metoprolol succinate ER 25 milliGRAM(s) Oral daily  multivitamin 1 Tablet(s) Oral daily  PARoxetine 10 milliGRAM(s) Oral daily  sodium chloride 0.9%. 1000 milliLiter(s) (100 mL/Hr) IV Continuous <Continuous>    MEDICATIONS  (PRN):  acetaminophen     Tablet .. 650 milliGRAM(s) Oral every 6 hours PRN Temp greater or equal to 38C (100.4F), Mild Pain (1 - 3)  ALPRAZolam 0.25 milliGRAM(s) Oral three times a day PRN anxiety  aluminum hydroxide/magnesium hydroxide/simethicone Suspension 30 milliLiter(s) Oral every 4 hours PRN Dyspepsia  HYDROmorphone  Injectable 0.5 milliGRAM(s) IV Push every 6 hours PRN breakthrough pain  HYDROmorphone  Injectable 1 milliGRAM(s) IV Push every 4 hours PRN Severe Pain (7 - 10)  ketorolac   Injectable 15 milliGRAM(s) IV Push every 6 hours PRN Moderate Pain (4 - 6)  melatonin 3 milliGRAM(s) Oral at bedtime PRN Insomnia  naloxone Injectable 0.04 milliGRAM(s) IV Push once PRN OPIOID REVERSAL / PT SEDATED  ondansetron Injectable 4 milliGRAM(s) IV Push every 8 hours PRN Nausea and/or Vomiting

## 2024-08-26 NOTE — PROGRESS NOTE ADULT - PROBLEM SELECTOR PLAN 1
Acute onset of epigastric pain. History of recurrent pancreatitis 2/2 pancreas divisum s/p attempted duct stenting  - regular diet, discussed with pt to advance/order what she tolerates/wants to try  - IV-NS - decr rate to 75cc/hr as pt wheezing  - Zofran PRN  - PPI   - Pain management: tylenol (mild), toradol (mod), dilaudid (severe) and breakthrough, naloxone PRN ordered as precuation  - no morphine as pt reports passing out   - GI consulted, f/u recs

## 2024-08-27 ENCOUNTER — TRANSCRIPTION ENCOUNTER (OUTPATIENT)
Age: 79
End: 2024-08-27

## 2024-08-27 VITALS
OXYGEN SATURATION: 95 % | TEMPERATURE: 98 F | RESPIRATION RATE: 18 BRPM | HEART RATE: 63 BPM | SYSTOLIC BLOOD PRESSURE: 141 MMHG | DIASTOLIC BLOOD PRESSURE: 75 MMHG

## 2024-08-27 LAB
ANION GAP SERPL CALC-SCNC: 5 MMOL/L — SIGNIFICANT CHANGE UP (ref 5–17)
BUN SERPL-MCNC: 11 MG/DL — SIGNIFICANT CHANGE UP (ref 7–23)
CALCIUM SERPL-MCNC: 8.6 MG/DL — SIGNIFICANT CHANGE UP (ref 8.5–10.1)
CHLORIDE SERPL-SCNC: 115 MMOL/L — HIGH (ref 96–108)
CO2 SERPL-SCNC: 23 MMOL/L — SIGNIFICANT CHANGE UP (ref 22–31)
CREAT SERPL-MCNC: 0.47 MG/DL — LOW (ref 0.5–1.3)
EGFR: 97 ML/MIN/1.73M2 — SIGNIFICANT CHANGE UP
GLUCOSE SERPL-MCNC: 85 MG/DL — SIGNIFICANT CHANGE UP (ref 70–99)
HCT VFR BLD CALC: 32.1 % — LOW (ref 34.5–45)
HGB BLD-MCNC: 10.5 G/DL — LOW (ref 11.5–15.5)
LIDOCAIN IGE QN: 94 U/L — HIGH (ref 13–75)
MCHC RBC-ENTMCNC: 29.4 PG — SIGNIFICANT CHANGE UP (ref 27–34)
MCHC RBC-ENTMCNC: 32.7 GM/DL — SIGNIFICANT CHANGE UP (ref 32–36)
MCV RBC AUTO: 89.9 FL — SIGNIFICANT CHANGE UP (ref 80–100)
PLATELET # BLD AUTO: 160 K/UL — SIGNIFICANT CHANGE UP (ref 150–400)
POTASSIUM SERPL-MCNC: 4 MMOL/L — SIGNIFICANT CHANGE UP (ref 3.5–5.3)
POTASSIUM SERPL-SCNC: 4 MMOL/L — SIGNIFICANT CHANGE UP (ref 3.5–5.3)
RBC # BLD: 3.57 M/UL — LOW (ref 3.8–5.2)
RBC # FLD: 14.2 % — SIGNIFICANT CHANGE UP (ref 10.3–14.5)
SODIUM SERPL-SCNC: 143 MMOL/L — SIGNIFICANT CHANGE UP (ref 135–145)
WBC # BLD: 5.17 K/UL — SIGNIFICANT CHANGE UP (ref 3.8–10.5)
WBC # FLD AUTO: 5.17 K/UL — SIGNIFICANT CHANGE UP (ref 3.8–10.5)

## 2024-08-27 PROCEDURE — 99239 HOSP IP/OBS DSCHRG MGMT >30: CPT

## 2024-08-27 RX ADMIN — KETOROLAC TROMETHAMINE 15 MILLIGRAM(S): 30 INJECTION, SOLUTION INTRAMUSCULAR at 10:38

## 2024-08-27 RX ADMIN — SODIUM CHLORIDE 75 MILLILITER(S): 9 INJECTION INTRAMUSCULAR; INTRAVENOUS; SUBCUTANEOUS at 10:43

## 2024-08-27 RX ADMIN — Medication 81 MILLIGRAM(S): at 10:39

## 2024-08-27 RX ADMIN — KETOROLAC TROMETHAMINE 15 MILLIGRAM(S): 30 INJECTION, SOLUTION INTRAMUSCULAR at 11:00

## 2024-08-27 RX ADMIN — Medication 30 MILLILITER(S): at 10:39

## 2024-08-27 RX ADMIN — AMLODIPINE BESYLATE 5 MILLIGRAM(S): 10 TABLET ORAL at 10:38

## 2024-08-27 RX ADMIN — LOSARTAN POTASSIUM 100 MILLIGRAM(S): 50 TABLET ORAL at 10:39

## 2024-08-27 RX ADMIN — ENOXAPARIN SODIUM 40 MILLIGRAM(S): 100 INJECTION SUBCUTANEOUS at 10:38

## 2024-08-27 RX ADMIN — Medication 1 TABLET(S): at 10:38

## 2024-08-27 RX ADMIN — PAROXETINE 10 MILLIGRAM(S): 10 TABLET, FILM COATED ORAL at 10:38

## 2024-08-27 RX ADMIN — METOPROLOL TARTRATE 25 MILLIGRAM(S): 100 TABLET ORAL at 10:38

## 2024-08-27 NOTE — DISCHARGE NOTE PROVIDER - HOSPITAL COURSE
79 year old female with a PMH of HTN, HLD,  recurrent pancreatitis 2/2 pancreas divisum s/p attempted duct stenting presents to the ED for epigastric pain. Endorses around 9pm started to have epigastric pain, non-radiating with a severity of 8/10 associated with nausea and vomiting,  similar to previous pancreatitis episodes (last one 1 year ago). Only new change has been a new Moctail drink which she had earlier in the night no other obvious triggers. Denies chest pain, palpitation, diarrhea, headache, dizziness or any other acute symptoms.    CT Abd:  IMPRESSION:  Acute interstitial pancreatitis without organized peripancreatic fluid collections, without necrosis, and without vascular complications    Pancreatitis.   - Acute onset of epigastric pain. History of recurrent pancreatitis 2/2 pancreas divisum s/p attempted duct stenting  - patient clinically improved  -Tolerating diet  - Lipase level: >5000 to 94  -patient feels better and will dc home today with follow up with GI service in 2-3 weeks for possible MRCP as outpatient  -Discussed the case the GI service over phone    Patient is seen and examined. Will dc home today.  Vital Signs Last 24 Hrs  T(C): 36.9 (27 Aug 2024 07:52), Max: 37 (26 Aug 2024 15:43)  T(F): 98.5 (27 Aug 2024 07:52), Max: 98.6 (26 Aug 2024 15:43)  HR: 63 (27 Aug 2024 07:52) (63 - 66)  BP: 141/75 (27 Aug 2024 07:52) (122/53 - 146/70)  BP(mean): --  RR: 18 (27 Aug 2024 07:52) (17 - 18)  SpO2: 95% (27 Aug 2024 07:52) (93% - 95%)    Parameters below as of 27 Aug 2024 07:52  Patient On (Oxygen Delivery Method): nasal cannula    PHYSICAL EXAM:  GENERAL: NAD, lying in bed comfortably  HEAD:  Atraumatic, Normocephalic  EYES: conjunctiva and sclera clear  ENT: Moist mucous membranes  NECK: Supple, No JVD  CHEST/LUNG: Clear to auscultation bilaterally; No rales, rhonchi, wheezing. Unlabored respirations  HEART: Regular rate and rhythm; No murmurs  ABDOMEN: Bowel sounds present; Soft, Nontender, Nondistended.   EXTREMITIES:  2+ Peripheral Pulses, brisk capillary refill. No clubbing, cyanosis, or edema  NERVOUS SYSTEM:  Alert & Oriented X3, speech clear. No deficits   MSK: FROM all 4 extremities, full and equal strength

## 2024-08-27 NOTE — PROGRESS NOTE ADULT - SUBJECTIVE AND OBJECTIVE BOX
CHIEF COMPLAINT:  Patient is a 79y old  Female who presents with a chief complaint of Acute Pancreatitis (25 Aug 2024 08:10)      HPI: 24:  79 year old female, well known to me with a PMH of HTN, HLD, MR/AI/TR with mild pulmonary HTN in the past and recurrent pancreatitis 2/2 pancreas divisum s/p attempted duct stenting presents to the ED for epigastric pain. Endorses around 9pm started to have epigastric pain, non-radiating with a severity of 8/10 associated with nausea and vomiting,  similar to previous pancreatitis episodes (last one 1 year ago). Only new change has been a new Moctail drink which she had earlier in the night no other obvious triggers. Denies chest pain, palpitation, diarrhea, headache, dizziness or any other acute symptoms. Labs remarkable for lipase >5,000. Vitals stable. Received 1L-NS, Dilaudid, Zofran.   CT-ABD: Acute interstitial pancreatitis without organized peripancreatic fluid collections, without necrosis, and without vascular complications.    24:  She is resting more comfortably thia AM without anginal chest pains or increased SOB or other cardiac symptoms.      24:  Resting comfortably with less abd pain and tolerating PO.  No new cardiac issues overnight.  For possible discharge home soon.      PMHx:  PAST MEDICAL & SURGICAL HISTORY:  Hypertension  High cholesterol  GERD (gastroesophageal reflux disease)  Arthritis  Fatty liver  Pancreatitis-h/o  Osteoarthritis  Hammertoe of right foot  Anxiety  Scalp psoriasis  History of back surgery  History of   History of tonsillectomy  History of laminectomy-with resection  S/P cholecystectomy  H/O parathyroidectomy  H/O hand surgery  S/P cataract surgery-B/L    FAMILY HISTORY:   FAMILY HISTORY:  FH: breast cancer (Mother)  FHx: type 2 diabetes mellitus (Mother, Father)  FH: congestive heart failure (Father)  Family history of AIDS (Sibling)      ALLERGIES:  Allergies  Sudafed (Other)  morphine (Faint)  adhesives (Rash)  Betadine (Unknown)  Triple Antibiotic (Unknown)      REVIEW OF SYSTEMS:  10 point ROS was obtained  Pertinent positives and negatives are as above  All other review of systems is negative unless indicated above      Vital Signs Last 24 Hrs  T(C): 36.8 (26 Aug 2024 20:12), Max: 37 (26 Aug 2024 15:43)  T(F): 98.2 (26 Aug 2024 20:12), Max: 98.6 (26 Aug 2024 15:43)  HR: 66 (26 Aug 2024 20:12) (64 - 66)  BP: 146/70 (26 Aug 2024 20:12) (122/53 - 146/70)  RR: 18 (26 Aug 2024 20:12) (17 - 18)  SpO2: 93% (26 Aug 2024 20:12) (93% - 99%): room air    I&O's Summary  25 Aug 2024 07:01  -  26 Aug 2024 07:00  --------------------------------------------------------  IN: 1200 mL / OUT: 0 mL / NET: 1200 mL      PHYSICAL EXAM:   Constitutional: NAD, awake and alert, well-developed  HEENT: PERR, EOMI, Normal Hearing, MMM  Neck: Soft and supple, No LAD, No JVD  Respiratory: Breath sounds are clear bilaterally, No wheezing, rales or rhonchi  Cardiovascular: S1 and S2, regular rate and rhythm, soft UZAIR at LLSB and base as before, no gallops or rubs  Gastrointestinal: Bowel Sounds present, soft, mildly tender, nondistended, no guarding, no rebound  Extremities: No peripheral edema  Vascular: 2+ peripheral pulses  Neurological: A/O x 3, no focal deficits  Musculoskeletal: 5/5 strength b/l upper and lower extremities  Skin: No rashes      MEDICATIONS  (STANDING):  amLODIPine   Tablet 5 milliGRAM(s) Oral daily  aspirin enteric coated 81 milliGRAM(s) Oral daily  atorvastatin 10 milliGRAM(s) Oral at bedtime  enoxaparin Injectable 40 milliGRAM(s) SubCutaneous every 24 hours  losartan 100 milliGRAM(s) Oral daily  metoprolol succinate ER 25 milliGRAM(s) Oral daily  multivitamin 1 Tablet(s) Oral daily  PARoxetine 10 milliGRAM(s) Oral daily  polyethylene glycol 3350 17 Gram(s) Oral daily  senna 2 Tablet(s) Oral at bedtime  sodium chloride 0.9%. 1000 milliLiter(s) (75 mL/Hr) IV Continuous <Continuous>    MEDICATIONS  (PRN):  acetaminophen     Tablet .. 650 milliGRAM(s) Oral every 6 hours PRN Temp greater or equal to 38C (100.4F), Mild Pain (1 - 3)  ALPRAZolam 0.25 milliGRAM(s) Oral three times a day PRN anxiety  aluminum hydroxide/magnesium hydroxide/simethicone Suspension 30 milliLiter(s) Oral every 4 hours PRN Dyspepsia  HYDROmorphone  Injectable 1 milliGRAM(s) IV Push every 4 hours PRN Severe Pain (7 - 10)  HYDROmorphone  Injectable 0.5 milliGRAM(s) IV Push every 6 hours PRN breakthrough pain  ketorolac   Injectable 15 milliGRAM(s) IV Push every 6 hours PRN Moderate Pain (4 - 6)  melatonin 3 milliGRAM(s) Oral at bedtime PRN Insomnia  naloxone Injectable 0.04 milliGRAM(s) IV Push once PRN OPIOID REVERSAL / PT SEDATED  ondansetron Injectable 4 milliGRAM(s) IV Push every 8 hours PRN Nausea and/or Vomiting      LABS: All Labs Reviewed:                       10.6   7.19  )-----------( 183      ( 26 Aug 2024 05:48 )             31.8     08-    139  |  108  |  12  ----------------------------<  99  4.0   |  24  |  0.61    Ca    8.3<L>      26 Aug 2024 05:48  Phos  3.5       Mg     1.9         TPro  6.4  /  Alb  3.2<L>  /  TBili  0.4  /  DBili  x   /  AST  23  /  ALT  31  /  AlkPhos  63      A1C with Estimated Average Glucose (24 @ 06:43): 6.2    Lipase (24 @ 23:17): >5000 U/L    BLOOD CULTURES:     LIPID PROFILE lipid profile                           @ 06:43  cholesterol           125 mg/dL  Direct LDL            --  HDL cholesterol       59 mg/dL  HDL/Total cholesterol --  Triglycerides, serum  35 mg/dL      RADIOLOGY:    CT of Abd/Pelvis: 24:  FINDINGS:  LOWER CHEST: Linear atelectasis at both lung bases. No suspicious lung nodules. No sizable pleural/pericardial effusions. The heart is mildly enlarged.  LIVER: Transient hepatic attenuation difference are seen scattered throughout the liver parenchyma during the arterial phase; these all become isodense to the surrounding liver parenchyma.  BILE DUCTS: Mild left and central pneumobilia.  GALLBLADDER: Cholecystectomy.  SPLEEN: Within normal limits.  PANCREAS: Edematous pancreatic parenchyma with moderate inflammatory stranding throughout the peripancreatic soft tissues extending to the transverse mesocolon. Scattered unorganized nonenhancing fluid seen in the peripancreatic soft tissues. No organized peripancreatic fluid collections. No CT evident vascular complications.  ADRENALS: Within normal limits.  KIDNEYS/URETERS: Small hypodense lesion within the right kidney likely represents a cyst; outpatient ultrasound imaging for confirmation. Left pelvic kidney. No evidence of obstructive uropathy or radiodense urolithiasis.  BLADDER: Within normal limits.  REPRODUCTIVE ORGANS: Bulky fibroid uterus.  BOWEL: No bowel obstruction. Appendix is normal. Mucosal thickening and enhancement involving the second duodenal segment is consistent with reactive duodenitis, secondary to acute hepatitis.  PERITONEUM/RETROPERITONEUM: Within normal limits.  VESSELS: Atherosclerotic changes.  LYMPH NODES: No lymphadenopathy.  ABDOMINAL WALL: Within normal limits.  BONES: Diffuse osseous demineralization. Degenerative changes. Posterior spinal fusion surgical changes at L4-L5, without CT evident hardware complications.  IMPRESSION: Acute interstitial pancreatitis without organized peripancreatic fluid collections, without necrosis, and without vascular complications.    EKG:      TELEMETRY:      STRESS ECH0:  Stress echo in my office last year showing normal LV size and systolic function with LVEF=65-70% with mild MR and TR with trace AI but otherwise a normal study and no stress echo evidence for ischemia    ECHO: 19:  M-Mode Measurements (cm)   LVEDd: 5.21 cm            LVESd: 2.43 cm   IVSEd: 0.96 cm   LVPWd: 1.1 cm             AO Root Dimension: 2.4 cm                        ACS: 2 cm  Doppler Measurements:                                  MV Peak E-Wave: 98.2 cm/s   TR Velocity:324 cm/s           MV Peak A-Wave: 96.3 cm/s   TR Gradient:41.9904 mmHg       MV E/A Ratio: 1.02 %   Estimated RAP:10 mmHg          MV Peak Gradient: 3.86 mmHg   RVSP:41 mmHg    Findings  Mitral Valve   Normal appearing mitral valve structure and function.   Trace mitral regurgitation is present.   EA reversal of the mitral inflow consistent with reduced compliance of the left ventricle    Aortic Valve   Mild aortic sclerosis is present with normal valvular opening.   Trace aortic regurgitation is present.    Tricuspid Valve   Mild (1+) tricuspid valve regurgitation is present.   Moderate pulmonary hypertension.    Pulmonic Valve   Normal appearing pulmonic valve structure and function.    Left Atrium   The left atrium is mildly dilated.    Left Ventricle   The left ventricle is normal in size, wall thickness, wall motion and contractility.   Estimated left ventricular ejection fraction is 65-70 %.    Right Atrium   Normal appearing right atrium.    Right Ventricle   Normal appearing right ventricle structure and function.    Pericardial Effusion   No evidence of pericardial effusion.    Pleural Effusion   No evidence of pleural effusion.    Miscellaneous   All visualized extra cardiac structures appears to be normal.    Summary   Mild aortic sclerosis is present with normal valvular opening.   Trace aortic regurgitation is present.   The left atrium is mildly dilated.   The left ventricle is normal in size, wall thickness, wall motion and contractility.   Estimated left ventricular ejection fraction is 65-70 %.   All visualized extra cardiac structures appears to be normal.   Normal appearing mitral valve structure and function.   Trace mitral regurgitation is present.   EA reversal of the mitral inflow consistent with reduced compliance of the left ventricle   No evidence of pericardial effusion.   No evidence of pleural effusion.   Normal appearing pulmonic valve structure and function.   Normal appearing right atrium.   Normal appearing right ventricle structure and function.   Mild (1+) tricuspid valve regurgitation is present.   Moderate pulmonary hypertension.    Signature   ----------------------------------------------------------------   Electronically signed by Salvador Mattson MD(Interpreting   physician) on 2019 07:59 PM   ----------------------------------------------------------------       CHIEF COMPLAINT:  Patient is a 79y old  Female who presents with a chief complaint of Acute Pancreatitis (25 Aug 2024 08:10)      HPI: 24:  79 year old female, well known to me with a PMH of HTN, HLD, MR/AI/TR with mild pulmonary HTN in the past and recurrent pancreatitis 2/2 pancreas divisum s/p attempted duct stenting presents to the ED for epigastric pain. Endorses around 9pm started to have epigastric pain, non-radiating with a severity of 8/10 associated with nausea and vomiting,  similar to previous pancreatitis episodes (last one 1 year ago). Only new change has been a new Moctail drink which she had earlier in the night no other obvious triggers. Denies chest pain, palpitation, diarrhea, headache, dizziness or any other acute symptoms. Labs remarkable for lipase >5,000. Vitals stable. Received 1L-NS, Dilaudid, Zofran.   CT-ABD: Acute interstitial pancreatitis without organized peripancreatic fluid collections, without necrosis, and without vascular complications.    24:  She is resting more comfortably thia AM without anginal chest pains or increased SOB or other cardiac symptoms.      24:  Resting comfortably with slightly more abd discomfort.  Moving bowels well and tolerating PO.  No new cardiac issues overnight.  For possible discharge home soon.      PMHx:  PAST MEDICAL & SURGICAL HISTORY:  Hypertension  High cholesterol  GERD (gastroesophageal reflux disease)  Arthritis  Fatty liver  Pancreatitis-h/o  Osteoarthritis  Hammertoe of right foot  Anxiety  Scalp psoriasis  History of back surgery  History of   History of tonsillectomy  History of laminectomy-with resection  S/P cholecystectomy  H/O parathyroidectomy  H/O hand surgery  S/P cataract surgery-B/L    FAMILY HISTORY:   FAMILY HISTORY:  FH: breast cancer (Mother)  FHx: type 2 diabetes mellitus (Mother, Father)  FH: congestive heart failure (Father)  Family history of AIDS (Sibling)      ALLERGIES:  Allergies  Sudafed (Other)  morphine (Faint)  adhesives (Rash)  Betadine (Unknown)  Triple Antibiotic (Unknown)      REVIEW OF SYSTEMS:  10 point ROS was obtained  Pertinent positives and negatives are as above  All other review of systems is negative unless indicated above      Vital Signs Last 24 Hrs  T(C): 36.8 (26 Aug 2024 20:12), Max: 37 (26 Aug 2024 15:43)  T(F): 98.2 (26 Aug 2024 20:12), Max: 98.6 (26 Aug 2024 15:43)  HR: 66 (26 Aug 2024 20:12) (64 - 66)  BP: 146/70 (26 Aug 2024 20:12) (122/53 - 146/70)  RR: 18 (26 Aug 2024 20:12) (17 - 18)  SpO2: 93% (26 Aug 2024 20:12) (93% - 99%): room air    I&O's Summary  25 Aug 2024 07:01  -  26 Aug 2024 07:00  --------------------------------------------------------  IN: 1200 mL / OUT: 0 mL / NET: 1200 mL      PHYSICAL EXAM:   Constitutional: NAD, awake and alert, well-developed  HEENT: PERR, EOMI, Normal Hearing, MMM  Neck: Soft and supple, No LAD, No JVD  Respiratory: Breath sounds are clear bilaterally, No wheezing, rales or rhonchi  Cardiovascular: S1 and S2, regular rate and rhythm, soft UZAIR at LLSB and base as before, no gallops or rubs  Gastrointestinal: Bowel Sounds present, soft, mildly tender, nondistended, no guarding, no rebound  Extremities: No peripheral edema  Vascular: 2+ peripheral pulses  Neurological: A/O x 3, no focal deficits  Musculoskeletal: 5/5 strength b/l upper and lower extremities  Skin: No rashes      MEDICATIONS  (STANDING):  amLODIPine   Tablet 5 milliGRAM(s) Oral daily  aspirin enteric coated 81 milliGRAM(s) Oral daily  atorvastatin 10 milliGRAM(s) Oral at bedtime  enoxaparin Injectable 40 milliGRAM(s) SubCutaneous every 24 hours  losartan 100 milliGRAM(s) Oral daily  metoprolol succinate ER 25 milliGRAM(s) Oral daily  multivitamin 1 Tablet(s) Oral daily  PARoxetine 10 milliGRAM(s) Oral daily  polyethylene glycol 3350 17 Gram(s) Oral daily  senna 2 Tablet(s) Oral at bedtime  sodium chloride 0.9%. 1000 milliLiter(s) (75 mL/Hr) IV Continuous <Continuous>    MEDICATIONS  (PRN):  acetaminophen     Tablet .. 650 milliGRAM(s) Oral every 6 hours PRN Temp greater or equal to 38C (100.4F), Mild Pain (1 - 3)  ALPRAZolam 0.25 milliGRAM(s) Oral three times a day PRN anxiety  aluminum hydroxide/magnesium hydroxide/simethicone Suspension 30 milliLiter(s) Oral every 4 hours PRN Dyspepsia  HYDROmorphone  Injectable 1 milliGRAM(s) IV Push every 4 hours PRN Severe Pain (7 - 10)  HYDROmorphone  Injectable 0.5 milliGRAM(s) IV Push every 6 hours PRN breakthrough pain  ketorolac   Injectable 15 milliGRAM(s) IV Push every 6 hours PRN Moderate Pain (4 - 6)  melatonin 3 milliGRAM(s) Oral at bedtime PRN Insomnia  naloxone Injectable 0.04 milliGRAM(s) IV Push once PRN OPIOID REVERSAL / PT SEDATED  ondansetron Injectable 4 milliGRAM(s) IV Push every 8 hours PRN Nausea and/or Vomiting      LABS: All Labs Reviewed:                       10.6   7.19  )-----------( 183      ( 26 Aug 2024 05:48 )             31.8     08-26    139  |  108  |  12  ----------------------------<  99  4.0   |  24  |  0.61    Ca    8.3<L>      26 Aug 2024 05:48  Phos  3.5     08-  Mg     1.9     -    TPro  6.4  /  Alb  3.2<L>  /  TBili  0.4  /  DBili  x   /  AST  23  /  ALT  31  /  AlkPhos  63  08-    A1C with Estimated Average Glucose (24 @ 06:43): 6.2    Lipase (24 @ 23:17): >5000 U/L    BLOOD CULTURES:     LIPID PROFILE lipid profile                           @ 06:43  cholesterol           125 mg/dL  Direct LDL            --  HDL cholesterol       59 mg/dL  HDL/Total cholesterol --  Triglycerides, serum  35 mg/dL      RADIOLOGY:    CT of Abd/Pelvis: 24:  FINDINGS:  LOWER CHEST: Linear atelectasis at both lung bases. No suspicious lung nodules. No sizable pleural/pericardial effusions. The heart is mildly enlarged.  LIVER: Transient hepatic attenuation difference are seen scattered throughout the liver parenchyma during the arterial phase; these all become isodense to the surrounding liver parenchyma.  BILE DUCTS: Mild left and central pneumobilia.  GALLBLADDER: Cholecystectomy.  SPLEEN: Within normal limits.  PANCREAS: Edematous pancreatic parenchyma with moderate inflammatory stranding throughout the peripancreatic soft tissues extending to the transverse mesocolon. Scattered unorganized nonenhancing fluid seen in the peripancreatic soft tissues. No organized peripancreatic fluid collections. No CT evident vascular complications.  ADRENALS: Within normal limits.  KIDNEYS/URETERS: Small hypodense lesion within the right kidney likely represents a cyst; outpatient ultrasound imaging for confirmation. Left pelvic kidney. No evidence of obstructive uropathy or radiodense urolithiasis.  BLADDER: Within normal limits.  REPRODUCTIVE ORGANS: Bulky fibroid uterus.  BOWEL: No bowel obstruction. Appendix is normal. Mucosal thickening and enhancement involving the second duodenal segment is consistent with reactive duodenitis, secondary to acute hepatitis.  PERITONEUM/RETROPERITONEUM: Within normal limits.  VESSELS: Atherosclerotic changes.  LYMPH NODES: No lymphadenopathy.  ABDOMINAL WALL: Within normal limits.  BONES: Diffuse osseous demineralization. Degenerative changes. Posterior spinal fusion surgical changes at L4-L5, without CT evident hardware complications.  IMPRESSION: Acute interstitial pancreatitis without organized peripancreatic fluid collections, without necrosis, and without vascular complications.    EKG:      TELEMETRY:      STRESS ECH0:  Stress echo in my office last year showing normal LV size and systolic function with LVEF=65-70% with mild MR and TR with trace AI but otherwise a normal study and no stress echo evidence for ischemia    ECHO: 19:  M-Mode Measurements (cm)   LVEDd: 5.21 cm            LVESd: 2.43 cm   IVSEd: 0.96 cm   LVPWd: 1.1 cm             AO Root Dimension: 2.4 cm                        ACS: 2 cm  Doppler Measurements:                                  MV Peak E-Wave: 98.2 cm/s   TR Velocity:324 cm/s           MV Peak A-Wave: 96.3 cm/s   TR Gradient:41.9904 mmHg       MV E/A Ratio: 1.02 %   Estimated RAP:10 mmHg          MV Peak Gradient: 3.86 mmHg   RVSP:41 mmHg    Findings  Mitral Valve   Normal appearing mitral valve structure and function.   Trace mitral regurgitation is present.   EA reversal of the mitral inflow consistent with reduced compliance of the left ventricle    Aortic Valve   Mild aortic sclerosis is present with normal valvular opening.   Trace aortic regurgitation is present.    Tricuspid Valve   Mild (1+) tricuspid valve regurgitation is present.   Moderate pulmonary hypertension.    Pulmonic Valve   Normal appearing pulmonic valve structure and function.    Left Atrium   The left atrium is mildly dilated.    Left Ventricle   The left ventricle is normal in size, wall thickness, wall motion and contractility.   Estimated left ventricular ejection fraction is 65-70 %.    Right Atrium   Normal appearing right atrium.    Right Ventricle   Normal appearing right ventricle structure and function.    Pericardial Effusion   No evidence of pericardial effusion.    Pleural Effusion   No evidence of pleural effusion.    Miscellaneous   All visualized extra cardiac structures appears to be normal.    Summary   Mild aortic sclerosis is present with normal valvular opening.   Trace aortic regurgitation is present.   The left atrium is mildly dilated.   The left ventricle is normal in size, wall thickness, wall motion and contractility.   Estimated left ventricular ejection fraction is 65-70 %.   All visualized extra cardiac structures appears to be normal.   Normal appearing mitral valve structure and function.   Trace mitral regurgitation is present.   EA reversal of the mitral inflow consistent with reduced compliance of the left ventricle   No evidence of pericardial effusion.   No evidence of pleural effusion.   Normal appearing pulmonic valve structure and function.   Normal appearing right atrium.   Normal appearing right ventricle structure and function.   Mild (1+) tricuspid valve regurgitation is present.   Moderate pulmonary hypertension.    Signature   ----------------------------------------------------------------   Electronically signed by Salvador Mattson MD(Interpreting   physician) on 2019 07:59 PM   ----------------------------------------------------------------       CHIEF COMPLAINT:  Patient is a 79y old  Female who presents with a chief complaint of Acute Pancreatitis (25 Aug 2024 08:10)      HPI: 24:  79 year old female, well known to me with a PMH of HTN, HLD, MR/AI/TR with mild pulmonary HTN in the past and recurrent pancreatitis 2/2 pancreas divisum s/p attempted duct stenting presents to the ED for epigastric pain. Endorses around 9pm started to have epigastric pain, non-radiating with a severity of 8/10 associated with nausea and vomiting,  similar to previous pancreatitis episodes (last one 1 year ago). Only new change has been a new Moctail drink which she had earlier in the night no other obvious triggers. Denies chest pain, palpitation, diarrhea, headache, dizziness or any other acute symptoms. Labs remarkable for lipase >5,000. Vitals stable. Received 1L-NS, Dilaudid, Zofran.   CT-ABD: Acute interstitial pancreatitis without organized peripancreatic fluid collections, without necrosis, and without vascular complications.    24:  She is resting more comfortably thia AM without anginal chest pains or increased SOB or other cardiac symptoms.      24:  Resting comfortably with slightly more abd discomfort.  Moving bowels well and tolerating PO.  No new cardiac issues overnight.  GI consult pending.  For possible discharge home soon.      PMHx:  PAST MEDICAL & SURGICAL HISTORY:  Hypertension  High cholesterol  GERD (gastroesophageal reflux disease)  Arthritis  Fatty liver  Pancreatitis-h/o  Osteoarthritis  Hammertoe of right foot  Anxiety  Scalp psoriasis  History of back surgery  History of   History of tonsillectomy  History of laminectomy-with resection  S/P cholecystectomy  H/O parathyroidectomy  H/O hand surgery  S/P cataract surgery-B/L    FAMILY HISTORY:   FAMILY HISTORY:  FH: breast cancer (Mother)  FHx: type 2 diabetes mellitus (Mother, Father)  FH: congestive heart failure (Father)  Family history of AIDS (Sibling)      ALLERGIES:  Allergies  Sudafed (Other)  morphine (Faint)  adhesives (Rash)  Betadine (Unknown)  Triple Antibiotic (Unknown)      REVIEW OF SYSTEMS:  10 point ROS was obtained  Pertinent positives and negatives are as above  All other review of systems is negative unless indicated above      Vital Signs Last 24 Hrs  T(C): 36.8 (26 Aug 2024 20:12), Max: 37 (26 Aug 2024 15:43)  T(F): 98.2 (26 Aug 2024 20:12), Max: 98.6 (26 Aug 2024 15:43)  HR: 66 (26 Aug 2024 20:12) (64 - 66)  BP: 146/70 (26 Aug 2024 20:12) (122/53 - 146/70)  RR: 18 (26 Aug 2024 20:12) (17 - 18)  SpO2: 93% (26 Aug 2024 20:12) (93% - 99%): room air    I&O's Summary  25 Aug 2024 07:01  -  26 Aug 2024 07:00  --------------------------------------------------------  IN: 1200 mL / OUT: 0 mL / NET: 1200 mL      PHYSICAL EXAM:   Constitutional: NAD, awake and alert, well-developed  HEENT: PERR, EOMI, Normal Hearing, MMM  Neck: Soft and supple, No LAD, No JVD  Respiratory: Breath sounds are clear bilaterally, No wheezing, rales or rhonchi  Cardiovascular: S1 and S2, regular rate and rhythm, soft UZAIR at LLSB and base as before, no gallops or rubs  Gastrointestinal: Bowel Sounds present, soft, mildly tender, nondistended, no guarding, no rebound  Extremities: No peripheral edema  Vascular: 2+ peripheral pulses  Neurological: A/O x 3, no focal deficits  Musculoskeletal: 5/5 strength b/l upper and lower extremities  Skin: No rashes      MEDICATIONS  (STANDING):  amLODIPine   Tablet 5 milliGRAM(s) Oral daily  aspirin enteric coated 81 milliGRAM(s) Oral daily  atorvastatin 10 milliGRAM(s) Oral at bedtime  enoxaparin Injectable 40 milliGRAM(s) SubCutaneous every 24 hours  losartan 100 milliGRAM(s) Oral daily  metoprolol succinate ER 25 milliGRAM(s) Oral daily  multivitamin 1 Tablet(s) Oral daily  PARoxetine 10 milliGRAM(s) Oral daily  polyethylene glycol 3350 17 Gram(s) Oral daily  senna 2 Tablet(s) Oral at bedtime  sodium chloride 0.9%. 1000 milliLiter(s) (75 mL/Hr) IV Continuous <Continuous>    MEDICATIONS  (PRN):  acetaminophen     Tablet .. 650 milliGRAM(s) Oral every 6 hours PRN Temp greater or equal to 38C (100.4F), Mild Pain (1 - 3)  ALPRAZolam 0.25 milliGRAM(s) Oral three times a day PRN anxiety  aluminum hydroxide/magnesium hydroxide/simethicone Suspension 30 milliLiter(s) Oral every 4 hours PRN Dyspepsia  HYDROmorphone  Injectable 1 milliGRAM(s) IV Push every 4 hours PRN Severe Pain (7 - 10)  HYDROmorphone  Injectable 0.5 milliGRAM(s) IV Push every 6 hours PRN breakthrough pain  ketorolac   Injectable 15 milliGRAM(s) IV Push every 6 hours PRN Moderate Pain (4 - 6)  melatonin 3 milliGRAM(s) Oral at bedtime PRN Insomnia  naloxone Injectable 0.04 milliGRAM(s) IV Push once PRN OPIOID REVERSAL / PT SEDATED  ondansetron Injectable 4 milliGRAM(s) IV Push every 8 hours PRN Nausea and/or Vomiting      LABS: All Labs Reviewed:                       10.6   7.19  )-----------( 183      ( 26 Aug 2024 05:48 )             31.8     08-    139  |  108  |  12  ----------------------------<  99  4.0   |  24  |  0.61    Ca    8.3<L>      26 Aug 2024 05:48  Phos  3.5       Mg     1.9         TPro  6.4  /  Alb  3.2<L>  /  TBili  0.4  /  DBili  x   /  AST  23  /  ALT  31  /  AlkPhos  63      A1C with Estimated Average Glucose (24 @ 06:43): 6.2    Lipase (24 @ 23:17): >5000 U/L    BLOOD CULTURES:     LIPID PROFILE lipid profile                           @ 06:43  cholesterol           125 mg/dL  Direct LDL            --  HDL cholesterol       59 mg/dL  HDL/Total cholesterol --  Triglycerides, serum  35 mg/dL      RADIOLOGY:    CT of Abd/Pelvis: 24:  FINDINGS:  LOWER CHEST: Linear atelectasis at both lung bases. No suspicious lung nodules. No sizable pleural/pericardial effusions. The heart is mildly enlarged.  LIVER: Transient hepatic attenuation difference are seen scattered throughout the liver parenchyma during the arterial phase; these all become isodense to the surrounding liver parenchyma.  BILE DUCTS: Mild left and central pneumobilia.  GALLBLADDER: Cholecystectomy.  SPLEEN: Within normal limits.  PANCREAS: Edematous pancreatic parenchyma with moderate inflammatory stranding throughout the peripancreatic soft tissues extending to the transverse mesocolon. Scattered unorganized nonenhancing fluid seen in the peripancreatic soft tissues. No organized peripancreatic fluid collections. No CT evident vascular complications.  ADRENALS: Within normal limits.  KIDNEYS/URETERS: Small hypodense lesion within the right kidney likely represents a cyst; outpatient ultrasound imaging for confirmation. Left pelvic kidney. No evidence of obstructive uropathy or radiodense urolithiasis.  BLADDER: Within normal limits.  REPRODUCTIVE ORGANS: Bulky fibroid uterus.  BOWEL: No bowel obstruction. Appendix is normal. Mucosal thickening and enhancement involving the second duodenal segment is consistent with reactive duodenitis, secondary to acute hepatitis.  PERITONEUM/RETROPERITONEUM: Within normal limits.  VESSELS: Atherosclerotic changes.  LYMPH NODES: No lymphadenopathy.  ABDOMINAL WALL: Within normal limits.  BONES: Diffuse osseous demineralization. Degenerative changes. Posterior spinal fusion surgical changes at L4-L5, without CT evident hardware complications.  IMPRESSION: Acute interstitial pancreatitis without organized peripancreatic fluid collections, without necrosis, and without vascular complications.    EKG:      TELEMETRY:      STRESS ECH0:  Stress echo in my office last year showing normal LV size and systolic function with LVEF=65-70% with mild MR and TR with trace AI but otherwise a normal study and no stress echo evidence for ischemia    ECHO: 19:  M-Mode Measurements (cm)   LVEDd: 5.21 cm            LVESd: 2.43 cm   IVSEd: 0.96 cm   LVPWd: 1.1 cm             AO Root Dimension: 2.4 cm                        ACS: 2 cm  Doppler Measurements:                                  MV Peak E-Wave: 98.2 cm/s   TR Velocity:324 cm/s           MV Peak A-Wave: 96.3 cm/s   TR Gradient:41.9904 mmHg       MV E/A Ratio: 1.02 %   Estimated RAP:10 mmHg          MV Peak Gradient: 3.86 mmHg   RVSP:41 mmHg    Findings  Mitral Valve   Normal appearing mitral valve structure and function.   Trace mitral regurgitation is present.   EA reversal of the mitral inflow consistent with reduced compliance of the left ventricle    Aortic Valve   Mild aortic sclerosis is present with normal valvular opening.   Trace aortic regurgitation is present.    Tricuspid Valve   Mild (1+) tricuspid valve regurgitation is present.   Moderate pulmonary hypertension.    Pulmonic Valve   Normal appearing pulmonic valve structure and function.    Left Atrium   The left atrium is mildly dilated.    Left Ventricle   The left ventricle is normal in size, wall thickness, wall motion and contractility.   Estimated left ventricular ejection fraction is 65-70 %.    Right Atrium   Normal appearing right atrium.    Right Ventricle   Normal appearing right ventricle structure and function.    Pericardial Effusion   No evidence of pericardial effusion.    Pleural Effusion   No evidence of pleural effusion.    Miscellaneous   All visualized extra cardiac structures appears to be normal.    Summary   Mild aortic sclerosis is present with normal valvular opening.   Trace aortic regurgitation is present.   The left atrium is mildly dilated.   The left ventricle is normal in size, wall thickness, wall motion and contractility.   Estimated left ventricular ejection fraction is 65-70 %.   All visualized extra cardiac structures appears to be normal.   Normal appearing mitral valve structure and function.   Trace mitral regurgitation is present.   EA reversal of the mitral inflow consistent with reduced compliance of the left ventricle   No evidence of pericardial effusion.   No evidence of pleural effusion.   Normal appearing pulmonic valve structure and function.   Normal appearing right atrium.   Normal appearing right ventricle structure and function.   Mild (1+) tricuspid valve regurgitation is present.   Moderate pulmonary hypertension.    Signature   ----------------------------------------------------------------   Electronically signed by Salvador Mattson MD(Interpreting   physician) on 2019 07:59 PM   ----------------------------------------------------------------

## 2024-08-27 NOTE — DISCHARGE NOTE PROVIDER - PROVIDER TOKENS
PROVIDER:[TOKEN:[[id number]],ESTABLISHEDPATIENT:[T]],PROVIDER:[TOKEN:[[id number]],ESTABLISHEDPATIENT:[T]]

## 2024-08-27 NOTE — DISCHARGE NOTE NURSING/CASE MANAGEMENT/SOCIAL WORK - NSDCVIVACCINE_GEN_ALL_CORE_FT
COVID-19, mRNA, LNP-S, PF, 100 mcg/ 0.5 mL dose (Moderna); 04-Nov-2021 17:08; Yanci Garcia (MAGALIS); [employer] [id number] (Exp. Date: 14-Nov-2021); IntraMuscular; Deltoid Left.; 0.25 milliLiter(s);

## 2024-08-27 NOTE — PROGRESS NOTE ADULT - PROBLEM SELECTOR PROBLEM 2
Hypertension
Hypertension
[Home] : at home, [unfilled] , at the time of the visit.
Pancreas divisum
[Medical Office: ([facility])___] : at the medical office located in 
[Other:____] : [unfilled]
[Verbal consent obtained from patient] : the patient, [unfilled]
[FreeTextEntry1] : Ms. Halima Zapien is a 57 year old woman with a diagnosis of metastatic lung cancer to multiple bone metastases, in particular two painful regions in the right upper back just medial to scapula (in the area of right pleural metastasis and spinal mets) and the lower lumbar spine, including the area of the right SI joint.  She completed palliative RT 2000 cGy to T2-T8, rib from 10/21/20 - 10/28/20 and 2000 cGy to the lumbar spine from 10/29/20 - 11/4/20.\par \par 12/8/20 - PTE\par Ms. Zapien returns for post-treatment evaluation.  She last saw Dr. Ruffin 2 weeks ago.  She continues on oral chemo.  Today, she reports feeling much better.  Pain has improved significantly, now 2-4/10.  She is still taking PRN Percocet.  She has established care with a pain management MD Dr. Nielsen ([address] [place] [address]) and was recommended CBD/THC, which she has not started yet.  She states reflux has resolved.  She stopped pantoprazole and viscous lidocaine.  She had some peeling on her back after RT, which has improved.  She continues to use Aquaphor.  \par ________________________________\par ONCOLOGIC HISTORY \par Ms. Halima Zapien is a 57 year-old female former smoker referred by Dr. 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  She presented to her PCP, Dr. Joel Townsend, in August 2020 with a palpable nodule in her left neck with unintentional weight loss.  She also noted right subscapular pain in February 2020, and it resolved, then returned in June 2020. She was referred to Dr. Ruffin and underwent CT neck, which reportedly showed possible PE and concern for metastasis.  She was referred to the ED on 8/20/20 and underwent further workup as follows:\par \par 8/20/2020, CTA CAP \par -Left lung mass consistent with malignancy suspicious for primary which broadly contacts left major fissure and also pericardium. \par -Left supraclavicular and right hilar adenopathy consistent with metastasis. \par - Diffuse osseous metastases throughout the axial skeleton, sternum, and pelvis with multilevel compression deformities suspected pathologic bases as detailed above. \par -Suspected hepatic and right pleural metastases. \par -Punctate noncalcified subpleural nodule medial right middle lobe 0.2 cm, indeterminate, possibly metastasis. \par \par 8/21/2020, PET/CT \par -FDG avid left lung mass with a right hilar lymph node metastasis, at least five liver metastases, and diffuse skeletal osseous metastases. \par -There is a pleural metastasis overlying an abnormal right posterior 7th rib. \par -There are also probable metastases to the anterior mediastinum and supraclavicular lymph nodes. \par \par 8/21/2020, Left lobe liver mass, core biopsy:\par -Metastatic adenocarcinoma, consistent with lung origin.\par \par 8/22/2020, MRI brain \par -Osseous metastatic lesions involving the left frontal bone of the calvarium extending to the left orbital roof and additional lesion in the right posterior temporal-occipital calvarium. CT had could be obtained to assess for cortical disruption if indicated clinically. \par -No brain parenchymal mass identified. However, contrast-enhanced MR imaging is more sensitive for the detection of parenchymal metastasis and is recommended when the patient is able. \par -Chronic microangiopathic disease. No acute infarct. \par \par She started systemic therapy on 9/15/2020 with Alimta/Pembro/Carboplatin under the care of Dr. Ruffin.  She developed mid to low back pain.  \par \par 9/24/2020, MRI thoracic spine ([facility])-\par -Extensive metastatic disease throughout the thoracic spine, pelvis and femurs with extension of metastatic disease into the spinal canal as well as multiple neural foramen. Abnormal hyperintense T2 signal within the spinal cord at the level of T8 is consistent with edema versus myelomalacia.\par -Left lung mass on  images. Thin, heterogeneous abnormal pleural signal at multiple thoracic levels \par \par 9/24/2020, MRI lumbar spine ([facility])-\par -Extensive metastatic disease throughout the lumbar spine with mild compression deformities at multiple lumbar levels and retropulsion of osseous material/ metastatic disease into the anterior spinal canal at all lumbar levels. Note is made of moderate-severe central canal stenosis and there is encroachment on the left L5 nerve root in the left lateral recess as well as the exiting left L5 nerve root.\par -Nonspecific distention of the gallbladder; a dedicated right upper quadrant ultrasound could be obtained for further evaluation as clinically warranted.\par \par She presented to the ED on 9/29/20 with worsening back pain and concern for cord compression on MRI.  She was seen by Dr. D'Amico of neurosurgery who recommended CT of the thoracic spine and radiation consult.  She declined to have CT done in favor of outpatient follow-up and was discharged.  She last saw Dr. Ruffin last week; plan is to start Erlotinib. She had been scheduled for second cycle of chemotherapy on 10/6/2020, but [facility] testing confirmed EGFR positivity. She is scheduled for follow-up with Dr. D'Amico on 10/14/20.  \par \par Today, she reports back pain, right subscapular, currently rated 5/ 10, but can reach 10/10. Pain is aching in quality. She also has lower back (across lower back), but states she has history of scoliosis and has suffered from chronic lower back pain. She takes Oxycodone 10 mg twice daily, as well as Percocet (5/325) every 6 hours. She states the pain meds are somewhat helpful for pain. She states activity and moving around make the pain worse and thus her normal activity is limited. She also reports opioid- induced constipation, which resolved after using laxative. She further notes weight loss of 11 pounds since July 2020. She denies further complaints, to include CP, SOB, fever, chills, weakness, bowel/ bladder dysfunction, dizziness, numbness, tingling. \par \par LMP- 5 years ago. \par \par [place]. She lives alone, but her daughter has been staying with her since she was diagnosed. 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 She gets to medical appointments via Uber/ taxi. She is a former smoker, smoked 1/4 ppd x 40 years, quit this year. Denies alcohol or illicit drug use. \par \par Family history is notable for father with throat cancer and lung cancer (smoker), uncle and brother with leukemia.

## 2024-08-27 NOTE — DISCHARGE NOTE NURSING/CASE MANAGEMENT/SOCIAL WORK - PATIENT PORTAL LINK FT
You can access the FollowMyHealth Patient Portal offered by [facility] by registering at the following website: [email or web address]. By joining [employer]’s FollowMyHealth portal, you will also be able to view your health information using other applications (apps) compatible with our system.

## 2024-08-27 NOTE — DISCHARGE NOTE PROVIDER - CARE PROVIDER_API CALL
Keshawn Neely  Cardiovascular Disease  [address], [address]  [place] [address]  Phone: [phone]  Fax: [phone]  Established Patient  Follow Up Time:     Dillon Terrell  Gastroenterology  [address] [place] [address]  Phone: [phone]  Fax: [phone]  Established Patient  Follow Up Time:

## 2024-08-27 NOTE — DISCHARGE NOTE PROVIDER - CARE PROVIDERS DIRECT ADDRESSES
[email or web address]

## 2024-08-27 NOTE — DISCHARGE NOTE NURSING/CASE MANAGEMENT/SOCIAL WORK - NSDCPEFALRISK_GEN_ALL_CORE
For information on Fall & Injury Prevention, visit: [email or web address] OR  [email or web address] OR  https://www.cdc.gov/steadi/patient.html

## 2024-08-27 NOTE — DISCHARGE NOTE PROVIDER - NSDCMRMEDTOKEN_GEN_ALL_CORE_FT
amLODIPine 5 mg oral tablet: 1 tab(s) orally once a day  Aspirin Enteric Coated 81 mg oral delayed release tablet: 1 tab(s) orally once a day (at bedtime)  Co-Q10 200 mg oral capsule: 1 cap(s) orally once a day (at bedtime)  Fish Oil 1000 mg oral capsule: 1 cap(s) orally once a day  metoprolol succinate 25 mg oral tablet, extended release: 1 tab(s) orally once a day (at bedtime)  Multiple Vitamins oral tablet: 1 tab(s) orally once a day  olmesartan 40 mg oral tablet: 1 tab(s) orally once a day  PARoxetine 10 mg oral tablet: 1 tab(s) orally once a day  PriLOSEC 20 mg oral delayed release capsule: 1 cap(s) orally once a day  simvastatin 20 mg oral tablet: 1 tab(s) orally once a day (at bedtime)  Vitamin C 500 mg oral capsule: 2 cap(s) orally 2 times a day  Vitamin D3 25 mcg (1000 intl units) oral tablet: 1 tab(s) orally once a day  Xanax 0.25 mg oral tablet: 1 tab(s) orally 3 times a day, As Needed

## 2024-08-27 NOTE — PROGRESS NOTE ADULT - ASSESSMENT
8/26/24:  Pt with above history presenting with recurrent pancreatitis as above.  No new cardiac issues.  Continue as outlined by medicine etal.  Will follow as treatment progresses and as an outpt after discharge.    8/27/24:  Resting comfortably with less abd pain and tolerating PO.  No new cardiac issues overnight.  For possible discharge home soon.  Continue as outlined by medicine etal.  Will continue to follow intermittently prn as treatment progresses and as an outpt after discharge. 8/26/24:  Pt with above history presenting with recurrent pancreatitis as above.  No new cardiac issues.  Continue as outlined by medicine etal.  Will follow as treatment progresses and as an outpt after discharge.    8/27/24:  Resting comfortably with slightly more abd discomfort.  Moving bowels well and tolerating PO.  No new cardiac issues overnight.  For possible discharge home soon.  Continue as outlined by medicine etal.  Will continue to follow intermittently prn as treatment progresses and as an outpt after discharge. 8/26/24:  Pt with above history presenting with recurrent pancreatitis as above.  No new cardiac issues.  Continue as outlined by medicine etal.  Will follow as treatment progresses and as an outpt after discharge.    8/27/24:  Resting comfortably with slightly more abd discomfort.  Moving bowels well and tolerating PO.  No new cardiac issues overnight.  GI consult pending.  For possible discharge home soon.  Continue as outlined by medicine etal.  Will continue to follow intermittently prn as treatment progresses and as an outpt after discharge.

## 2024-09-03 DIAGNOSIS — I27.20 PULMONARY HYPERTENSION, UNSPECIFIED: ICD-10-CM

## 2024-09-03 DIAGNOSIS — E78.5 HYPERLIPIDEMIA, UNSPECIFIED: ICD-10-CM

## 2024-09-03 DIAGNOSIS — Z91.048 OTHER NONMEDICINAL SUBSTANCE ALLERGY STATUS: ICD-10-CM

## 2024-09-03 DIAGNOSIS — I10 ESSENTIAL (PRIMARY) HYPERTENSION: ICD-10-CM

## 2024-09-03 DIAGNOSIS — K76.0 FATTY (CHANGE OF) LIVER, NOT ELSEWHERE CLASSIFIED: ICD-10-CM

## 2024-09-03 DIAGNOSIS — K85.10 BILIARY ACUTE PANCREATITIS WITHOUT NECROSIS OR INFECTION: ICD-10-CM

## 2024-09-03 DIAGNOSIS — F41.9 ANXIETY DISORDER, UNSPECIFIED: ICD-10-CM

## 2024-09-03 DIAGNOSIS — K21.9 GASTRO-ESOPHAGEAL REFLUX DISEASE WITHOUT ESOPHAGITIS: ICD-10-CM

## 2024-09-03 DIAGNOSIS — I34.0 NONRHEUMATIC MITRAL (VALVE) INSUFFICIENCY: ICD-10-CM

## 2024-09-03 DIAGNOSIS — Z79.82 LONG TERM (CURRENT) USE OF ASPIRIN: ICD-10-CM

## 2024-09-03 DIAGNOSIS — Z88.1 ALLERGY STATUS TO OTHER ANTIBIOTIC AGENTS: ICD-10-CM

## 2024-09-03 DIAGNOSIS — M19.90 UNSPECIFIED OSTEOARTHRITIS, UNSPECIFIED SITE: ICD-10-CM

## 2024-09-03 DIAGNOSIS — Z88.5 ALLERGY STATUS TO NARCOTIC AGENT: ICD-10-CM

## 2024-09-03 DIAGNOSIS — I07.1 RHEUMATIC TRICUSPID INSUFFICIENCY: ICD-10-CM

## 2024-10-02 ENCOUNTER — APPOINTMENT (OUTPATIENT)
Dept: GASTROENTEROLOGY | Facility: CLINIC | Age: 79
End: 2024-10-02
Payer: MEDICARE

## 2024-10-02 VITALS
BODY MASS INDEX: 26.46 KG/M2 | HEIGHT: 64 IN | SYSTOLIC BLOOD PRESSURE: 152 MMHG | WEIGHT: 155 LBS | DIASTOLIC BLOOD PRESSURE: 86 MMHG

## 2024-10-02 DIAGNOSIS — K85.90 ACUTE PANCREATITIS WITHOUT NECROSIS OR INFECTION, UNSPECIFIED: ICD-10-CM

## 2024-10-02 PROCEDURE — 99214 OFFICE O/P EST MOD 30 MIN: CPT

## 2024-10-02 NOTE — PHYSICAL EXAM
[Alert] : alert [Normal Voice/Communication] : normal voice/communication [No Acute Distress] : no acute distress [Sclera] : the sclera and conjunctiva were normal [Hearing Threshold Finger Rub Not [place]] : hearing was normal [Normal Lips/Gums] : the lips and gums were normal [Oropharynx] : the oropharynx was normal [Normal Appearance] : the appearance of the neck was normal [No Neck Mass] : no neck mass was observed [No Respiratory Distress] : no respiratory distress [Abdomen Tenderness] : non-tender [Abdomen Soft] : soft [No Clubbing, Cyanosis] : no clubbing or cyanosis of the fingernails [No Joint Swelling] : no joint swelling seen [Normal Color / Pigmentation] : normal skin color and pigmentation [] : no rash [Sensation] : the sensory exam was normal to light touch and pinprick [Motor Exam] : the motor exam was normal [Oriented To Time, Place, And Person] : oriented to person, place, and time

## 2024-10-02 NOTE — RESULTS/DATA
[TextEntry] : ERCP from 7-6-23 reviewed: minor papilla could not be located, major papillotomy performed.  secretin MRCP reviewed, non pathologic dilation of pd

## 2024-10-02 NOTE — ASSESSMENT
[FreeTextEntry1] : 79 year old woman with recurrent acute pancreatitis, with pancreatic divisum, ex heavier drinker, here for follow up.

## 2024-10-02 NOTE — PLAN
[TextEntry] : Ordered MRI/MRCP to evaluate ductal and parenchymal anatomy.  Will re-attempt ERCP for minor papillotomy as patient no longer drinking, it's been over a year, but will wait for MRI to result.    Total time spent including charting, examining/evaluating, and documentation: 31 minutes

## 2024-10-02 NOTE — HISTORY OF PRESENT ILLNESS
[FreeTextEntry1] : 79 year old woman with recurrent acute pancreatitis, here for follow up.   Patient states that she had pancreatitis again. She has not had any alcohol in over a year. She doesn't smoke. No new meds. Currently stomach feels well but she is afraid to travel by plane or go anywhere far due to fear of needing a hospitalization from these random attacks.  No post prandial symptoms. No nausea or vomiting. No abdominal pain. Good appetite. No weight loss. No jaundice.  No overt signs of GI bleeding like hematemesis, melena, hematochezia, or coffee grind emesis.

## 2024-10-07 ENCOUNTER — RX ONLY (RX ONLY)
Age: 79
End: 2024-10-07

## 2024-10-07 ENCOUNTER — OFFICE (OUTPATIENT)
Dept: URBAN - METROPOLITAN AREA CLINIC 102 | Facility: CLINIC | Age: 79
Setting detail: OPHTHALMOLOGY
End: 2024-10-07
Payer: MEDICARE

## 2024-10-07 DIAGNOSIS — H01.001: ICD-10-CM

## 2024-10-07 DIAGNOSIS — Z96.1: ICD-10-CM

## 2024-10-07 DIAGNOSIS — H43.813: ICD-10-CM

## 2024-10-07 DIAGNOSIS — H01.005: ICD-10-CM

## 2024-10-07 DIAGNOSIS — H00.14: ICD-10-CM

## 2024-10-07 DIAGNOSIS — H16.223: ICD-10-CM

## 2024-10-07 DIAGNOSIS — H01.002: ICD-10-CM

## 2024-10-07 DIAGNOSIS — H26.493: ICD-10-CM

## 2024-10-07 DIAGNOSIS — H52.4: ICD-10-CM

## 2024-10-07 DIAGNOSIS — H00.12: ICD-10-CM

## 2024-10-07 DIAGNOSIS — H01.004: ICD-10-CM

## 2024-10-07 DIAGNOSIS — H35.3131: ICD-10-CM

## 2024-10-07 PROCEDURE — 92015 DETERMINE REFRACTIVE STATE: CPT | Performed by: OPHTHALMOLOGY

## 2024-10-07 PROCEDURE — 92014 COMPRE OPH EXAM EST PT 1/>: CPT | Performed by: OPHTHALMOLOGY

## 2024-10-07 ASSESSMENT — REFRACTION_MANIFEST
OS_CYLINDER: -2.25
OD_ADD: +2.50
OD_AXIS: 090
OD_CYLINDER: -1.75
OS_ADD: +2.50
OS_SPHERE: +1.00
OD_SPHERE: +1.25
OS_AXIS: 090
OD_VA1: 20/30+
OS_VA1: 20/30+

## 2024-10-07 ASSESSMENT — KERATOMETRY
OD_AXISANGLE_DEGREES: 014
OS_K1POWER_DIOPTERS: 41.50
METHOD_AUTO_MANUAL: AUTO
OD_K1POWER_DIOPTERS: 41.25
OS_AXISANGLE_DEGREES: 173
OD_K2POWER_DIOPTERS: 42.75
OS_K2POWER_DIOPTERS: 43.50

## 2024-10-07 ASSESSMENT — REFRACTION_CURRENTRX
OS_AXIS: 091
OD_OVR_VA: 20/
OD_SPHERE: +1.00
OD_ADD: +2.00
OS_OVR_VA: 20/
OS_ADD: +2.25
OS_SPHERE: +0.50
OD_AXIS: 069
OS_CYLINDER: -1.25
OS_VPRISM_DIRECTION: PROGS
OD_VPRISM_DIRECTION: PROGS
OD_CYLINDER: -0.75

## 2024-10-07 ASSESSMENT — LID EXAM ASSESSMENTS
OD_BLEPHARITIS: RLL RUL T
OS_BLEPHARITIS: LLL LUL T

## 2024-10-07 ASSESSMENT — VISUAL ACUITY
OD_BCVA: 20/30
OS_BCVA: 20/40+2

## 2024-10-07 ASSESSMENT — TEAR BREAK UP TIME (TBUT)
OD_TBUT: T
OS_TBUT: T

## 2024-10-07 ASSESSMENT — REFRACTION_AUTOREFRACTION
OD_CYLINDER: -1.75
OD_SPHERE: +1.50
OS_AXIS: 092
OD_AXIS: 091
OS_CYLINDER: -2.25
OS_SPHERE: +1.25

## 2024-10-07 ASSESSMENT — CONFRONTATIONAL VISUAL FIELD TEST (CVF)
OS_FINDINGS: FULL
OD_FINDINGS: FULL

## 2024-10-07 ASSESSMENT — TONOMETRY
OS_IOP_MMHG: 14
OD_IOP_MMHG: 15

## 2024-10-23 ENCOUNTER — APPOINTMENT (OUTPATIENT)
Dept: MRI IMAGING | Facility: CLINIC | Age: 79
End: 2024-10-23

## 2024-10-23 ENCOUNTER — OUTPATIENT (OUTPATIENT)
Dept: OUTPATIENT SERVICES | Facility: HOSPITAL | Age: 79
LOS: 1 days | End: 2024-10-23
Payer: MEDICARE

## 2024-10-23 DIAGNOSIS — Z98.89 OTHER SPECIFIED POSTPROCEDURAL STATES: Chronic | ICD-10-CM

## 2024-10-23 DIAGNOSIS — Z98.49 CATARACT EXTRACTION STATUS, UNSPECIFIED EYE: Chronic | ICD-10-CM

## 2024-10-23 DIAGNOSIS — E89.2 POSTPROCEDURAL HYPOPARATHYROIDISM: Chronic | ICD-10-CM

## 2024-10-23 DIAGNOSIS — Z98.890 OTHER SPECIFIED POSTPROCEDURAL STATES: Chronic | ICD-10-CM

## 2024-10-23 DIAGNOSIS — Z90.49 ACQUIRED ABSENCE OF OTHER SPECIFIED PARTS OF DIGESTIVE TRACT: Chronic | ICD-10-CM

## 2024-10-23 DIAGNOSIS — Z90.89 ACQUIRED ABSENCE OF OTHER ORGANS: Chronic | ICD-10-CM

## 2024-10-23 PROCEDURE — 74183 MRI ABD W/O CNTR FLWD CNTR: CPT | Mod: 26

## 2024-10-23 PROCEDURE — 74183 MRI ABD W/O CNTR FLWD CNTR: CPT

## 2024-10-23 PROCEDURE — A9585: CPT

## 2024-10-25 ENCOUNTER — APPOINTMENT (OUTPATIENT)
Dept: OPHTHALMOLOGY | Facility: CLINIC | Age: 79
End: 2024-10-25

## 2024-11-04 ENCOUNTER — RX RENEWAL (OUTPATIENT)
Age: 79
End: 2024-11-04

## 2024-11-14 ENCOUNTER — OUTPATIENT (OUTPATIENT)
Dept: OUTPATIENT SERVICES | Facility: HOSPITAL | Age: 79
LOS: 1 days | End: 2024-11-14
Payer: MEDICARE

## 2024-11-14 VITALS
DIASTOLIC BLOOD PRESSURE: 70 MMHG | SYSTOLIC BLOOD PRESSURE: 154 MMHG | WEIGHT: 161.6 LBS | OXYGEN SATURATION: 100 % | HEIGHT: 64 IN | HEART RATE: 64 BPM | RESPIRATION RATE: 18 BRPM | TEMPERATURE: 98 F

## 2024-11-14 DIAGNOSIS — Z98.890 OTHER SPECIFIED POSTPROCEDURAL STATES: Chronic | ICD-10-CM

## 2024-11-14 DIAGNOSIS — Z98.89 OTHER SPECIFIED POSTPROCEDURAL STATES: Chronic | ICD-10-CM

## 2024-11-14 DIAGNOSIS — Z90.89 ACQUIRED ABSENCE OF OTHER ORGANS: Chronic | ICD-10-CM

## 2024-11-14 DIAGNOSIS — Z90.49 ACQUIRED ABSENCE OF OTHER SPECIFIED PARTS OF DIGESTIVE TRACT: Chronic | ICD-10-CM

## 2024-11-14 DIAGNOSIS — Z98.49 CATARACT EXTRACTION STATUS, UNSPECIFIED EYE: Chronic | ICD-10-CM

## 2024-11-14 DIAGNOSIS — K85.90 ACUTE PANCREATITIS WITHOUT NECROSIS OR INFECTION, UNSPECIFIED: ICD-10-CM

## 2024-11-14 DIAGNOSIS — E89.2 POSTPROCEDURAL HYPOPARATHYROIDISM: Chronic | ICD-10-CM

## 2024-11-14 LAB
ANION GAP SERPL CALC-SCNC: 2 MMOL/L — LOW (ref 5–17)
APTT BLD: 38.1 SEC — HIGH (ref 24.5–35.6)
BASOPHILS # BLD AUTO: 0.03 K/UL — SIGNIFICANT CHANGE UP (ref 0–0.2)
BASOPHILS NFR BLD AUTO: 0.6 % — SIGNIFICANT CHANGE UP (ref 0–2)
BUN SERPL-MCNC: 20 MG/DL — SIGNIFICANT CHANGE UP (ref 7–23)
CALCIUM SERPL-MCNC: 9.5 MG/DL — SIGNIFICANT CHANGE UP (ref 8.5–10.1)
CHLORIDE SERPL-SCNC: 110 MMOL/L — HIGH (ref 96–108)
CO2 SERPL-SCNC: 28 MMOL/L — SIGNIFICANT CHANGE UP (ref 22–31)
CREAT SERPL-MCNC: 0.8 MG/DL — SIGNIFICANT CHANGE UP (ref 0.5–1.3)
EGFR: 75 ML/MIN/1.73M2 — SIGNIFICANT CHANGE UP
EOSINOPHIL # BLD AUTO: 0.08 K/UL — SIGNIFICANT CHANGE UP (ref 0–0.5)
EOSINOPHIL NFR BLD AUTO: 1.5 % — SIGNIFICANT CHANGE UP (ref 0–6)
GLUCOSE SERPL-MCNC: 109 MG/DL — HIGH (ref 70–99)
HCT VFR BLD CALC: 39 % — SIGNIFICANT CHANGE UP (ref 34.5–45)
HGB BLD-MCNC: 12.7 G/DL — SIGNIFICANT CHANGE UP (ref 11.5–15.5)
IMM GRANULOCYTES NFR BLD AUTO: 0.2 % — SIGNIFICANT CHANGE UP (ref 0–0.9)
INR BLD: 0.9 RATIO — SIGNIFICANT CHANGE UP (ref 0.85–1.16)
LYMPHOCYTES # BLD AUTO: 1.69 K/UL — SIGNIFICANT CHANGE UP (ref 1–3.3)
LYMPHOCYTES # BLD AUTO: 31.2 % — SIGNIFICANT CHANGE UP (ref 13–44)
MCHC RBC-ENTMCNC: 28.9 PG — SIGNIFICANT CHANGE UP (ref 27–34)
MCHC RBC-ENTMCNC: 32.6 G/DL — SIGNIFICANT CHANGE UP (ref 32–36)
MCV RBC AUTO: 88.6 FL — SIGNIFICANT CHANGE UP (ref 80–100)
MONOCYTES # BLD AUTO: 0.45 K/UL — SIGNIFICANT CHANGE UP (ref 0–0.9)
MONOCYTES NFR BLD AUTO: 8.3 % — SIGNIFICANT CHANGE UP (ref 2–14)
NEUTROPHILS # BLD AUTO: 3.15 K/UL — SIGNIFICANT CHANGE UP (ref 1.8–7.4)
NEUTROPHILS NFR BLD AUTO: 58.2 % — SIGNIFICANT CHANGE UP (ref 43–77)
PLATELET # BLD AUTO: 230 K/UL — SIGNIFICANT CHANGE UP (ref 150–400)
POTASSIUM SERPL-MCNC: 4.9 MMOL/L — SIGNIFICANT CHANGE UP (ref 3.5–5.3)
POTASSIUM SERPL-SCNC: 4.9 MMOL/L — SIGNIFICANT CHANGE UP (ref 3.5–5.3)
PROTHROM AB SERPL-ACNC: 10.6 SEC — SIGNIFICANT CHANGE UP (ref 9.9–13.4)
RBC # BLD: 4.4 M/UL — SIGNIFICANT CHANGE UP (ref 3.8–5.2)
RBC # FLD: 13.7 % — SIGNIFICANT CHANGE UP (ref 10.3–14.5)
SODIUM SERPL-SCNC: 140 MMOL/L — SIGNIFICANT CHANGE UP (ref 135–145)
WBC # BLD: 5.41 K/UL — SIGNIFICANT CHANGE UP (ref 3.8–10.5)
WBC # FLD AUTO: 5.41 K/UL — SIGNIFICANT CHANGE UP (ref 3.8–10.5)

## 2024-11-14 PROCEDURE — 36415 COLL VENOUS BLD VENIPUNCTURE: CPT

## 2024-11-14 PROCEDURE — 93010 ELECTROCARDIOGRAM REPORT: CPT

## 2024-11-14 PROCEDURE — 85025 COMPLETE CBC W/AUTO DIFF WBC: CPT

## 2024-11-14 PROCEDURE — 85730 THROMBOPLASTIN TIME PARTIAL: CPT

## 2024-11-14 PROCEDURE — 93005 ELECTROCARDIOGRAM TRACING: CPT

## 2024-11-14 PROCEDURE — 85610 PROTHROMBIN TIME: CPT

## 2024-11-14 PROCEDURE — 80048 BASIC METABOLIC PNL TOTAL CA: CPT

## 2024-11-14 PROCEDURE — 99214 OFFICE O/P EST MOD 30 MIN: CPT | Mod: 25

## 2024-11-14 NOTE — H&P PST ADULT - NSANTHSNORERD_ENT_A_CORE
tablet Take 1 tablet by mouth 2 times daily 30 tablet 2    docusate sodium (COLACE) 100 MG capsule Take 1 capsule by mouth 2 times daily as needed for Constipation 60 capsule 2     No current facility-administered medications for this visit.        Allergies    No Known Allergies     /84 (Site: Left Upper Arm, Position: Sitting, Cuff Size: Large Adult)   Pulse 84   Wt (!) 151.5 kg (334 lb)   LMP 07/07/2024   BMI 57.33 kg/m²     BP Readings from Last 3 Encounters:   08/19/24 134/84   08/07/24 132/86   06/19/24 128/70       Wt Readings from Last 6 Encounters:   08/19/24 (!) 151.5 kg (334 lb)   08/07/24 (!) 152.4 kg (336 lb)   07/01/24 (!) 154.2 kg (340 lb)   06/19/24 (!) 153.8 kg (339 lb)   12/17/23 (!) 145.2 kg (320 lb)   10/09/23 (!) 151.8 kg (334 lb 9.6 oz)       Physical Exam  Vitals reviewed.   Constitutional:       General: She is not in acute distress.     Appearance: Normal appearance. She is obese.   HENT:      Head: Normocephalic and atraumatic.      Mouth/Throat:      Mouth: Mucous membranes are moist.   Eyes:      Extraocular Movements: Extraocular movements intact.      Pupils: Pupils are equal, round, and reactive to light.      Comments: Wearing glasses   Cardiovascular:      Rate and Rhythm: Normal rate and regular rhythm.      Heart sounds: Normal heart sounds.   Pulmonary:      Effort: Pulmonary effort is normal.      Breath sounds: Normal breath sounds.   Abdominal:      General: Abdomen is flat.      Palpations: Abdomen is soft.   Musculoskeletal:         General: No swelling. Normal range of motion.      Cervical back: Normal range of motion.   Skin:     General: Skin is warm and dry.   Neurological:      Mental Status: She is alert and oriented to person, place, and time. Mental status is at baseline.   Psychiatric:         Mood and Affect: Mood normal.         Judgment: Judgment normal.         Return in about 7 months (around 3/19/2025) for Elevated prolactin.   Labs prior to next 
Yes

## 2024-11-14 NOTE — H&P PST ADULT - OTHER CARE PROVIDERS
Freda  and physicians office calling in response to a referral that was received for patient to establish care with Medical Oncology.     Outreach was made to schedule a consultation.    A consultation was scheduled for patient during this call. Patient is scheduled on 3/15/24 at 12:40pm with Dr. Garcia at the [facility]     [phone]

## 2024-11-14 NOTE — H&P PST ADULT - HISTORY OF PRESENT ILLNESS
Patient is a 77 year old female who presents to [facility] in no acute distress with chief complaint of chronic pancreatitis. She explains how she had a recent "flare up" requiring hospitalization. A stent was attempted to be placed without success and it was determined to reattempt once inflammation has subsided. Denies pain or discomfort at this time, N/V/D, fever, chills. She is scheduled for endoscopic retrograde cholangiopancreatography.  Patient is a 79 year old female with chronic pancreatitis. She explains how she had a recent "flare up" requiring hospitalization. A stent was attempted to be placed before but without success. She denies pain or discomfort at this time, N/V/D, fever, chills. She is scheduled for endoscopic retrograde cholangiopancreatography.

## 2024-11-14 NOTE — H&P PST ADULT - ASSESSMENT
Patient is a 77 year old female who presents to [facility] in no acute distress with chief complaint of chronic pancreatitis. She explains how she had a recent "flare up" requiring hospitalization. A stent was attempted to be placed without success and it was determined to reattempt once inflammation has subsided. Denies pain or discomfort at this time, N/V/D, fever, chills. She is scheduled for endoscopic retrograde cholangiopancreatography.     1.  Dr Mckay office will instruct patient regarding bowel prep and medication regimen on day of procedure.  2. Endoscopy booking will call patient the day before surgery for arrival instructions   3. NPO post midnight  4. CBC, BMP, coags, EKG, done   5. Patient instructed to have responsible adult accompany/ drive pt home after procedure  6. Covid test performed today by patient and instructed to quarantine after covid test    Patient is a 78 y/o female with chronic pancreatitis. She explains how she had a recent "flare up" requiring hospitalization. She is scheduled for endoscopic retrograde cholangiopancreatography.     Plan:  1. NPO post midnight  2. Follow preop medication instructions from Dr. Mckay  3. CBC, SMA7, PT/INR/PTT, EKG done today

## 2024-11-19 ENCOUNTER — TRANSCRIPTION ENCOUNTER (OUTPATIENT)
Age: 79
End: 2024-11-19

## 2024-11-19 ENCOUNTER — APPOINTMENT (OUTPATIENT)
Dept: GASTROENTEROLOGY | Facility: HOSPITAL | Age: 79
End: 2024-11-19

## 2024-11-19 ENCOUNTER — OUTPATIENT (OUTPATIENT)
Dept: INPATIENT UNIT | Facility: HOSPITAL | Age: 79
LOS: 1 days | Discharge: HOME | End: 2024-11-19
Payer: MEDICARE

## 2024-11-19 VITALS
TEMPERATURE: 98 F | SYSTOLIC BLOOD PRESSURE: 130 MMHG | RESPIRATION RATE: 16 BRPM | DIASTOLIC BLOOD PRESSURE: 62 MMHG | OXYGEN SATURATION: 98 % | HEART RATE: 65 BPM

## 2024-11-19 VITALS
HEART RATE: 64 BPM | RESPIRATION RATE: 16 BRPM | SYSTOLIC BLOOD PRESSURE: 147 MMHG | HEIGHT: 64 IN | WEIGHT: 158.07 LBS | DIASTOLIC BLOOD PRESSURE: 59 MMHG | TEMPERATURE: 98 F | OXYGEN SATURATION: 99 %

## 2024-11-19 DIAGNOSIS — Z98.89 OTHER SPECIFIED POSTPROCEDURAL STATES: Chronic | ICD-10-CM

## 2024-11-19 DIAGNOSIS — Z98.49 CATARACT EXTRACTION STATUS, UNSPECIFIED EYE: Chronic | ICD-10-CM

## 2024-11-19 DIAGNOSIS — E89.2 POSTPROCEDURAL HYPOPARATHYROIDISM: Chronic | ICD-10-CM

## 2024-11-19 DIAGNOSIS — Z98.890 OTHER SPECIFIED POSTPROCEDURAL STATES: Chronic | ICD-10-CM

## 2024-11-19 DIAGNOSIS — Z90.49 ACQUIRED ABSENCE OF OTHER SPECIFIED PARTS OF DIGESTIVE TRACT: Chronic | ICD-10-CM

## 2024-11-19 DIAGNOSIS — K85.90 ACUTE PANCREATITIS WITHOUT NECROSIS OR INFECTION, UNSPECIFIED: ICD-10-CM

## 2024-11-19 DIAGNOSIS — Z90.89 ACQUIRED ABSENCE OF OTHER ORGANS: Chronic | ICD-10-CM

## 2024-11-19 PROCEDURE — C2617: CPT

## 2024-11-19 PROCEDURE — 43274 ERCP DUCT STENT PLACEMENT: CPT

## 2024-11-19 PROCEDURE — 76000 FLUOROSCOPY <1 HR PHYS/QHP: CPT

## 2024-11-19 PROCEDURE — C1889: CPT

## 2024-11-19 PROCEDURE — C2625: CPT

## 2024-11-19 PROCEDURE — C1769: CPT

## 2024-11-19 RX ORDER — SECRETIN ACETATE (HUMAN) 16 MCG
16 VIAL (EA) INTRAVENOUS ONCE
Refills: 0 | Status: COMPLETED | OUTPATIENT
Start: 2024-11-19 | End: 2024-11-19

## 2024-11-19 RX ORDER — HYDRALAZINE HYDROCHLORIDE 50 MG/1
5 TABLET, FILM COATED ORAL ONCE
Refills: 0 | Status: COMPLETED | OUTPATIENT
Start: 2024-11-19 | End: 2024-11-19

## 2024-11-19 RX ORDER — MELOXICAM 7.5 MG
1 TABLET ORAL
Refills: 0 | DISCHARGE

## 2024-11-19 RX ORDER — ACETAMINOPHEN 500 MG
1000 TABLET ORAL ONCE
Refills: 0 | Status: COMPLETED | OUTPATIENT
Start: 2024-11-19 | End: 2024-11-19

## 2024-11-19 RX ORDER — FENTANYL CITRAT/DEXTROSE 5%/PF 1250MCG/50
25 PATIENT CONTROLLED ANALGESIA SYRINGE INTRAVENOUS
Refills: 0 | Status: DISCONTINUED | OUTPATIENT
Start: 2024-11-19 | End: 2024-11-19

## 2024-11-19 RX ORDER — ONDANSETRON HYDROCHLORIDE 2 MG/ML
4 INJECTION, SOLUTION INTRAMUSCULAR; INTRAVENOUS ONCE
Refills: 0 | Status: COMPLETED | OUTPATIENT
Start: 2024-11-19 | End: 2024-11-19

## 2024-11-19 RX ADMIN — Medication 1000 MILLIGRAM(S): at 16:55

## 2024-11-19 RX ADMIN — Medication 1000 MILLILITER(S): at 15:29

## 2024-11-19 RX ADMIN — Medication 1000 MILLILITER(S): at 16:34

## 2024-11-19 RX ADMIN — Medication 16 MICROGRAM(S): at 15:27

## 2024-11-19 RX ADMIN — Medication 400 MILLIGRAM(S): at 16:37

## 2024-11-19 RX ADMIN — Medication 1000 MILLILITER(S): at 13:00

## 2024-11-19 RX ADMIN — HYDRALAZINE HYDROCHLORIDE 5 MILLIGRAM(S): 50 TABLET, FILM COATED ORAL at 16:25

## 2024-11-19 RX ADMIN — ONDANSETRON HYDROCHLORIDE 4 MILLIGRAM(S): 2 INJECTION, SOLUTION INTRAMUSCULAR; INTRAVENOUS at 16:53

## 2024-11-19 RX ADMIN — Medication 25 MICROGRAM(S): at 17:30

## 2024-11-19 RX ADMIN — Medication 25 MICROGRAM(S): at 17:06

## 2024-11-19 NOTE — ASU PATIENT PROFILE, ADULT - NSICDXPASTMEDICALHX_GEN_ALL_CORE_FT
Problem: Pain  Goal: Verbalizes/displays adequate comfort level or baseline comfort level  4/10/2024 0522 by Sandi Card RN  Outcome: Progressing  4/9/2024 1806 by Silas Guillaume RN  Outcome: Progressing     Problem: Skin/Tissue Integrity  Goal: Absence of new skin breakdown  Description: 1.  Monitor for areas of redness and/or skin breakdown  2.  Assess vascular access sites hourly  3.  Every 4-6 hours minimum:  Change oxygen saturation probe site  4.  Every 4-6 hours:  If on nasal continuous positive airway pressure, respiratory therapy assess nares and determine need for appliance change or resting period.  4/10/2024 0522 by Sandi Card RN  Outcome: Progressing  4/9/2024 1806 by Silas Guillaume RN  Outcome: Progressing     Problem: Safety - Adult  Goal: Free from fall injury  4/10/2024 0522 by Sandi Card RN  Outcome: Progressing  4/9/2024 1806 by Silas Guillaume RN  Outcome: Progressing     Problem: ABCDS Injury Assessment  Goal: Absence of physical injury  4/10/2024 0522 by Sandi Card RN  Outcome: Progressing  4/9/2024 1806 by Silas Guillaume RN  Outcome: Progressing     Problem: Discharge Planning  Goal: Discharge to home or other facility with appropriate resources  4/10/2024 0522 by Sandi Card RN  Outcome: Progressing  4/9/2024 1806 by Silas Guillaume, RN  Outcome: Progressing      PAST MEDICAL HISTORY:  Anxiety     Arthritis     Fatty liver     GERD (gastroesophageal reflux disease)     Hammertoe of right foot     High cholesterol     Hypertension     Osteoarthritis     Pancreatitis h/o    Scalp psoriasis

## 2024-11-19 NOTE — ASU DISCHARGE PLAN (ADULT/PEDIATRIC) - FINANCIAL ASSISTANCE
[facility] provides services at a reduced cost to those who are determined to be eligible through [facility]’s financial assistance program. Information regarding [facility]’s financial assistance program can be found by going to [email or web address] or by calling [phone].

## 2024-11-21 ENCOUNTER — APPOINTMENT (OUTPATIENT)
Dept: OPHTHALMOLOGY | Facility: CLINIC | Age: 79
End: 2024-11-21
Payer: MEDICARE

## 2024-11-21 PROCEDURE — 99204 OFFICE O/P NEW MOD 45 MIN: CPT

## 2024-11-26 DIAGNOSIS — K86.89 OTHER SPECIFIED DISEASES OF PANCREAS: ICD-10-CM

## 2024-11-26 DIAGNOSIS — K85.10 BILIARY ACUTE PANCREATITIS WITHOUT NECROSIS OR INFECTION: ICD-10-CM

## 2024-11-26 DIAGNOSIS — Z87.891 PERSONAL HISTORY OF NICOTINE DEPENDENCE: ICD-10-CM

## 2024-11-26 DIAGNOSIS — I10 ESSENTIAL (PRIMARY) HYPERTENSION: ICD-10-CM

## 2024-12-24 PROBLEM — F10.90 ALCOHOL USE: Status: ACTIVE | Noted: 2020-10-08

## 2025-01-22 ENCOUNTER — APPOINTMENT (OUTPATIENT)
Dept: GASTROENTEROLOGY | Facility: CLINIC | Age: 80
End: 2025-01-22
Payer: MEDICARE

## 2025-01-22 VITALS
HEIGHT: 64 IN | BODY MASS INDEX: 27.31 KG/M2 | WEIGHT: 160 LBS | DIASTOLIC BLOOD PRESSURE: 72 MMHG | SYSTOLIC BLOOD PRESSURE: 148 MMHG

## 2025-01-22 DIAGNOSIS — Q45.3 OTHER CONGENITAL MALFORMATIONS OF PANCREAS AND PANCREATIC DUCT: ICD-10-CM

## 2025-01-22 DIAGNOSIS — K85.90 ACUTE PANCREATITIS WITHOUT NECROSIS OR INFECTION, UNSPECIFIED: ICD-10-CM

## 2025-01-22 PROCEDURE — 99214 OFFICE O/P EST MOD 30 MIN: CPT

## 2025-01-22 NOTE — ED STATDOCS - DR. NAME
2110: informed MD on call regarding patients left leg. Patient has a abrasion from a fall and the scab looks black and leg below abrasion is red, inflamed and tight. MD read message and no response or orders at this time.     \"Day team will elevate in am, pt seen by two providers, ED AND admitting. Pass on to day team nurses for provider to reassess in morning \"   Kay

## 2025-01-28 NOTE — CONSULT NOTE ADULT - REASON FOR ADMISSION
[facility] provides services at a reduced cost to those who are determined to be eligible through [facility]’s financial assistance program. Information regarding [facility]’s financial assistance program can be found by going to [email or web address] or by calling [phone]. Acute Pancreatitis

## 2025-02-12 ENCOUNTER — OUTPATIENT (OUTPATIENT)
Dept: OUTPATIENT SERVICES | Facility: HOSPITAL | Age: 80
LOS: 1 days | End: 2025-02-12
Payer: MEDICARE

## 2025-02-12 VITALS
HEIGHT: 64 IN | SYSTOLIC BLOOD PRESSURE: 142 MMHG | WEIGHT: 154.32 LBS | HEART RATE: 61 BPM | DIASTOLIC BLOOD PRESSURE: 76 MMHG | OXYGEN SATURATION: 100 % | TEMPERATURE: 98 F

## 2025-02-12 DIAGNOSIS — Z98.89 OTHER SPECIFIED POSTPROCEDURAL STATES: Chronic | ICD-10-CM

## 2025-02-12 DIAGNOSIS — Z98.49 CATARACT EXTRACTION STATUS, UNSPECIFIED EYE: Chronic | ICD-10-CM

## 2025-02-12 DIAGNOSIS — R93.89 ABNORMAL FINDINGS ON DIAGNOSTIC IMAGING OF OTHER SPECIFIED BODY STRUCTURES: ICD-10-CM

## 2025-02-12 DIAGNOSIS — E89.2 POSTPROCEDURAL HYPOPARATHYROIDISM: Chronic | ICD-10-CM

## 2025-02-12 DIAGNOSIS — Z90.49 ACQUIRED ABSENCE OF OTHER SPECIFIED PARTS OF DIGESTIVE TRACT: Chronic | ICD-10-CM

## 2025-02-12 DIAGNOSIS — Z98.890 OTHER SPECIFIED POSTPROCEDURAL STATES: Chronic | ICD-10-CM

## 2025-02-12 DIAGNOSIS — Z90.89 ACQUIRED ABSENCE OF OTHER ORGANS: Chronic | ICD-10-CM

## 2025-02-12 LAB
A1C WITH ESTIMATED AVERAGE GLUCOSE RESULT: 6.1 % — HIGH (ref 4–5.6)
ANION GAP SERPL CALC-SCNC: 4 MMOL/L — LOW (ref 5–17)
APTT BLD: 40.5 SEC — HIGH (ref 24.5–35.6)
BASOPHILS # BLD AUTO: 0.01 K/UL — SIGNIFICANT CHANGE UP (ref 0–0.2)
BASOPHILS NFR BLD AUTO: 0.2 % — SIGNIFICANT CHANGE UP (ref 0–2)
BUN SERPL-MCNC: 21 MG/DL — SIGNIFICANT CHANGE UP (ref 7–23)
CALCIUM SERPL-MCNC: 10 MG/DL — SIGNIFICANT CHANGE UP (ref 8.5–10.1)
CHLORIDE SERPL-SCNC: 104 MMOL/L — SIGNIFICANT CHANGE UP (ref 96–108)
CO2 SERPL-SCNC: 26 MMOL/L — SIGNIFICANT CHANGE UP (ref 22–31)
CREAT SERPL-MCNC: 0.74 MG/DL — SIGNIFICANT CHANGE UP (ref 0.5–1.3)
EGFR: 83 ML/MIN/1.73M2 — SIGNIFICANT CHANGE UP
EOSINOPHIL # BLD AUTO: 0.09 K/UL — SIGNIFICANT CHANGE UP (ref 0–0.5)
EOSINOPHIL NFR BLD AUTO: 1.8 % — SIGNIFICANT CHANGE UP (ref 0–6)
ESTIMATED AVERAGE GLUCOSE: 128 MG/DL — HIGH (ref 68–114)
GLUCOSE SERPL-MCNC: 120 MG/DL — HIGH (ref 70–99)
HCT VFR BLD CALC: 40.3 % — SIGNIFICANT CHANGE UP (ref 34.5–45)
HGB BLD-MCNC: 13.3 G/DL — SIGNIFICANT CHANGE UP (ref 11.5–15.5)
IMM GRANULOCYTES # BLD AUTO: 0.01 K/UL — SIGNIFICANT CHANGE UP (ref 0–0.07)
IMM GRANULOCYTES NFR BLD AUTO: 0.2 % — SIGNIFICANT CHANGE UP (ref 0–0.9)
INR BLD: 0.91 RATIO — SIGNIFICANT CHANGE UP (ref 0.85–1.16)
LYMPHOCYTES # BLD AUTO: 1.48 K/UL — SIGNIFICANT CHANGE UP (ref 1–3.3)
LYMPHOCYTES NFR BLD AUTO: 29.2 % — SIGNIFICANT CHANGE UP (ref 13–44)
MCHC RBC-ENTMCNC: 28.5 PG — SIGNIFICANT CHANGE UP (ref 27–34)
MCHC RBC-ENTMCNC: 33 G/DL — SIGNIFICANT CHANGE UP (ref 32–36)
MCV RBC AUTO: 86.5 FL — SIGNIFICANT CHANGE UP (ref 80–100)
MONOCYTES # BLD AUTO: 0.33 K/UL — SIGNIFICANT CHANGE UP (ref 0–0.9)
MONOCYTES NFR BLD AUTO: 6.5 % — SIGNIFICANT CHANGE UP (ref 2–14)
NEUTROPHILS # BLD AUTO: 3.15 K/UL — SIGNIFICANT CHANGE UP (ref 1.8–7.4)
NEUTROPHILS NFR BLD AUTO: 62.1 % — SIGNIFICANT CHANGE UP (ref 43–77)
NRBC # BLD AUTO: 0 K/UL — SIGNIFICANT CHANGE UP (ref 0–0)
NRBC # FLD: 0 K/UL — SIGNIFICANT CHANGE UP (ref 0–0)
NRBC BLD AUTO-RTO: 0 /100 WBCS — SIGNIFICANT CHANGE UP (ref 0–0)
PLATELET # BLD AUTO: 219 K/UL — SIGNIFICANT CHANGE UP (ref 150–400)
PMV BLD: 10.5 FL — SIGNIFICANT CHANGE UP (ref 7–13)
POTASSIUM SERPL-MCNC: 4.6 MMOL/L — SIGNIFICANT CHANGE UP (ref 3.5–5.3)
POTASSIUM SERPL-SCNC: 4.6 MMOL/L — SIGNIFICANT CHANGE UP (ref 3.5–5.3)
PROTHROM AB SERPL-ACNC: 10.8 SEC — SIGNIFICANT CHANGE UP (ref 9.9–13.4)
RBC # BLD: 4.66 M/UL — SIGNIFICANT CHANGE UP (ref 3.8–5.2)
RBC # FLD: 13.6 % — SIGNIFICANT CHANGE UP (ref 10.3–14.5)
SODIUM SERPL-SCNC: 134 MMOL/L — LOW (ref 135–145)
WBC # BLD: 5.07 K/UL — SIGNIFICANT CHANGE UP (ref 3.8–10.5)
WBC # FLD AUTO: 5.07 K/UL — SIGNIFICANT CHANGE UP (ref 3.8–10.5)

## 2025-02-12 PROCEDURE — 85025 COMPLETE CBC W/AUTO DIFF WBC: CPT

## 2025-02-12 PROCEDURE — 93005 ELECTROCARDIOGRAM TRACING: CPT

## 2025-02-12 PROCEDURE — 80048 BASIC METABOLIC PNL TOTAL CA: CPT

## 2025-02-12 PROCEDURE — 85610 PROTHROMBIN TIME: CPT

## 2025-02-12 PROCEDURE — 85730 THROMBOPLASTIN TIME PARTIAL: CPT

## 2025-02-12 PROCEDURE — 93010 ELECTROCARDIOGRAM REPORT: CPT

## 2025-02-12 PROCEDURE — 99214 OFFICE O/P EST MOD 30 MIN: CPT | Mod: 25

## 2025-02-12 PROCEDURE — 36415 COLL VENOUS BLD VENIPUNCTURE: CPT

## 2025-02-12 PROCEDURE — 83036 HEMOGLOBIN GLYCOSYLATED A1C: CPT

## 2025-02-12 NOTE — H&P PST ADULT - NSICDXPASTSURGICALHX_GEN_ALL_CORE_FT
PAST SURGICAL HISTORY:  H/O hand surgery     H/O parathyroidectomy     History of back surgery     History of      History of laminectomy with resection    History of tonsillectomy     S/P cataract surgery B/L    S/P cholecystectomy      PAST SURGICAL HISTORY:  H/O hand surgery     H/O parathyroidectomy     History of back surgery     History of      History of ERCP     History of laminectomy with resection    History of tonsillectomy     S/P cataract surgery B/L    S/P cholecystectomy

## 2025-02-12 NOTE — H&P PST ADULT - MUSCULOSKELETAL
A STENT JERROD FRNTR 3MM 12MM RX COR STRL LF - [id number] was deployed in the circumflex artery.   The stent was deployed at 18 angela for 28 seconds at 5/19/2024 12:24 PM . negative normal/ROM intact/normal gait/strength 5/5 bilateral upper extremities/strength 5/5 bilateral lower extremities details… normal/ROM intact/no calf tenderness/normal gait/strength 5/5 bilateral upper extremities/strength 5/5 bilateral lower extremities/back exam

## 2025-02-12 NOTE — H&P PST ADULT - HISTORY OF PRESENT ILLNESS
Patient is a 79 year old female with chronic pancreatitis. She explains how she had a recent "flare up" requiring hospitalization. A stent was attempted to be placed before but without success. She denies pain or discomfort at this time, N/V/D, fever, chills. She is scheduled for endoscopic retrograde cholangiopancreatography stent pull.  Patient is a 79 year old female with PMH of pancreas divisum and chronic pancreatitis. She is s/p ERCP with stent placement on November 2024 due to pancreatitis flare with symptoms of severe abdominal pain, nausea, lightheaded, vomiting.  Patient reports feeling well since stent was placed.  Denies nausea, vomiting, abdominal pain, chest pain, palpitations, shortness of breath. She is scheduled for endoscopic retrograde cholangiopancreatography (ERCP) stent pull.

## 2025-02-12 NOTE — H&P PST ADULT - NSICDXPASTMEDICALHX_GEN_ALL_CORE_FT
PAST MEDICAL HISTORY:  Anxiety     Arthritis     Fatty liver     GERD (gastroesophageal reflux disease)     Hammertoe of right foot     High cholesterol     Hypertension     Osteoarthritis     Pancreatitis h/o    Scalp psoriasis      PAST MEDICAL HISTORY:  Anxiety     Arthritis     Fatty liver     GERD (gastroesophageal reflux disease)     Hammertoe of right foot     High cholesterol     Hypertension     Osteoarthritis     Pancreas divisum     Pancreatitis h/o    Scalp psoriasis

## 2025-02-12 NOTE — H&P PST ADULT - RESPIRATORY
normal/clear to auscultation bilaterally/no wheezes/no rales/no rhonchi normal/clear to auscultation bilaterally/no wheezes/no rales/no rhonchi/respirations non-labored

## 2025-02-12 NOTE — H&P PST ADULT - ASSESSMENT
Patient is a 80 y/o female with chronic pancreatitis. She explains how she had a recent "flare up" requiring hospitalization. She is scheduled for endoscopic retrograde cholangiopancreatography.     Plan:  1. NPO post midnight  2. Follow preop medication instructions from Dr. Mckay  3. CBC, SMA7, PT/INR/PTT, done today   EKG on chart 11/2024         Patient is a 79 year old female scheduled for endoscopic retrograde cholangiopancreatography (ERCP) stent pull with Dr. Mckay   1.  Dr Mckay  office  will instruct patient regarding bowel prep and  medication regimen on day of procedure.  2. Endoscopy booking will call patient the day before surgery for arrival instructions   3. NPO post midnight  4. CBC BMP coags  EKG done   5. Patient instructed to have responsible adult accompany/ drive pt home after procedure

## 2025-02-12 NOTE — H&P PST ADULT - GASTROINTESTINAL
details… Labs personally reviewed:                        13.9   4.82  )-----------( 187      ( 17 Nov 2020 22:58 )             42.6       11-17    139  |  102  |  19  ----------------------------<  170<H>  3.5   |  21<L>  |  0.99    Ca    9.0      17 Nov 2020 22:58    TPro  7.3  /  Alb  3.9  /  TBili  0.5  /  DBili  x   /  AST  56<H>  /  ALT  42  /  AlkPhos  50  11-17    LIVER FUNCTIONS - ( 17 Nov 2020 22:58 )  Alb: 3.9 g/dL / Pro: 7.3 g/dL / ALK PHOS: 50 U/L / ALT: 42 U/L / AST: 56 U/L / GGT: x           PT/INR - ( 17 Nov 2020 22:58 )   PT: 22.8 sec;   INR: 1.96 ratio       PTT - ( 17 Nov 2020 22:58 )  PTT:38.9 sec    Imaging reviewed-no PE but concern for COVID19 pneumonia    EKG pending result in sunrise normal/soft/nontender/nondistended/normal active bowel sounds normal/soft/nontender/normal active bowel sounds/no guarding

## 2025-02-13 DIAGNOSIS — R93.89 ABNORMAL FINDINGS ON DIAGNOSTIC IMAGING OF OTHER SPECIFIED BODY STRUCTURES: ICD-10-CM

## 2025-03-06 ENCOUNTER — APPOINTMENT (OUTPATIENT)
Dept: GASTROENTEROLOGY | Facility: HOSPITAL | Age: 80
End: 2025-03-06

## 2025-03-06 ENCOUNTER — TRANSCRIPTION ENCOUNTER (OUTPATIENT)
Age: 80
End: 2025-03-06

## 2025-03-06 ENCOUNTER — OUTPATIENT (OUTPATIENT)
Dept: INPATIENT UNIT | Facility: HOSPITAL | Age: 80
LOS: 1 days | Discharge: HOME | End: 2025-03-06
Payer: MEDICARE

## 2025-03-06 VITALS
DIASTOLIC BLOOD PRESSURE: 72 MMHG | RESPIRATION RATE: 16 BRPM | SYSTOLIC BLOOD PRESSURE: 150 MMHG | WEIGHT: 154.32 LBS | HEIGHT: 64 IN | OXYGEN SATURATION: 98 % | TEMPERATURE: 98 F | HEART RATE: 60 BPM

## 2025-03-06 VITALS
TEMPERATURE: 99 F | RESPIRATION RATE: 15 BRPM | HEART RATE: 60 BPM | DIASTOLIC BLOOD PRESSURE: 78 MMHG | OXYGEN SATURATION: 99 % | SYSTOLIC BLOOD PRESSURE: 154 MMHG

## 2025-03-06 DIAGNOSIS — E89.2 POSTPROCEDURAL HYPOPARATHYROIDISM: Chronic | ICD-10-CM

## 2025-03-06 DIAGNOSIS — R93.89 ABNORMAL FINDINGS ON DIAGNOSTIC IMAGING OF OTHER SPECIFIED BODY STRUCTURES: ICD-10-CM

## 2025-03-06 DIAGNOSIS — Z98.89 OTHER SPECIFIED POSTPROCEDURAL STATES: Chronic | ICD-10-CM

## 2025-03-06 DIAGNOSIS — Z90.89 ACQUIRED ABSENCE OF OTHER ORGANS: Chronic | ICD-10-CM

## 2025-03-06 DIAGNOSIS — Z98.890 OTHER SPECIFIED POSTPROCEDURAL STATES: Chronic | ICD-10-CM

## 2025-03-06 DIAGNOSIS — Z90.49 ACQUIRED ABSENCE OF OTHER SPECIFIED PARTS OF DIGESTIVE TRACT: Chronic | ICD-10-CM

## 2025-03-06 DIAGNOSIS — Z98.49 CATARACT EXTRACTION STATUS, UNSPECIFIED EYE: Chronic | ICD-10-CM

## 2025-03-06 PROCEDURE — 76000 FLUOROSCOPY <1 HR PHYS/QHP: CPT

## 2025-03-06 PROCEDURE — 43276 ERCP STENT EXCHANGE W/DILATE: CPT

## 2025-03-06 PROCEDURE — C2617: CPT

## 2025-03-06 PROCEDURE — C1769: CPT

## 2025-03-06 PROCEDURE — C2625: CPT

## 2025-03-06 RX ORDER — ACETAMINOPHEN 500 MG/5ML
650 LIQUID (ML) ORAL ONCE
Refills: 0 | Status: COMPLETED | OUTPATIENT
Start: 2025-03-06 | End: 2025-03-06

## 2025-03-06 RX ORDER — FENTANYL CITRATE-0.9 % NACL/PF 100MCG/2ML
25 SYRINGE (ML) INTRAVENOUS
Refills: 0 | Status: DISCONTINUED | OUTPATIENT
Start: 2025-03-06 | End: 2025-03-06

## 2025-03-06 RX ORDER — ONDANSETRON HCL/PF 4 MG/2 ML
4 VIAL (ML) INJECTION ONCE
Refills: 0 | Status: DISCONTINUED | OUTPATIENT
Start: 2025-03-06 | End: 2025-03-06

## 2025-03-06 RX ORDER — SODIUM CHLORIDE 9 G/1000ML
1000 INJECTION, SOLUTION INTRAVENOUS
Refills: 0 | Status: DISCONTINUED | OUTPATIENT
Start: 2025-03-06 | End: 2025-03-06

## 2025-03-06 RX ORDER — SODIUM CHLORIDE 9 G/1000ML
1000 INJECTION, SOLUTION INTRAVENOUS ONCE
Refills: 0 | Status: COMPLETED | OUTPATIENT
Start: 2025-03-06 | End: 2025-03-06

## 2025-03-06 RX ORDER — HYDROMORPHONE/SOD CHLOR,ISO/PF 2 MG/10 ML
0.25 SYRINGE (ML) INJECTION
Refills: 0 | Status: DISCONTINUED | OUTPATIENT
Start: 2025-03-06 | End: 2025-03-06

## 2025-03-06 RX ADMIN — Medication 650 MILLIGRAM(S): at 13:22

## 2025-03-06 RX ADMIN — Medication 650 MILLIGRAM(S): at 13:35

## 2025-03-06 RX ADMIN — SODIUM CHLORIDE 1000 MILLILITER(S): 9 INJECTION, SOLUTION INTRAVENOUS at 11:38

## 2025-03-06 NOTE — ASU DISCHARGE PLAN (ADULT/PEDIATRIC) - FINANCIAL ASSISTANCE
[facility] provides services at a reduced cost to those who are determined to be eligible through [facility]’s financial assistance program. Information regarding [facility]’s financial assistance program can be found by going to [email or web address] or by calling [phone].

## 2025-03-06 NOTE — ASU PATIENT PROFILE, ADULT - NSICDXPASTMEDICALHX_GEN_ALL_CORE_FT
PAST MEDICAL HISTORY:  Anxiety     Arthritis     Fatty liver     GERD (gastroesophageal reflux disease)     Hammertoe of right foot     High cholesterol     Hypertension     Osteoarthritis     Pancreas divisum     Pancreatitis h/o    Scalp psoriasis

## 2025-03-06 NOTE — ASU PATIENT PROFILE, ADULT - NSICDXPASTSURGICALHX_GEN_ALL_CORE_FT
PAST SURGICAL HISTORY:  H/O hand surgery     H/O parathyroidectomy     History of back surgery     History of      History of ERCP     History of laminectomy with resection    History of tonsillectomy     S/P cataract surgery B/L    S/P cholecystectomy

## 2025-03-06 NOTE — ASU PREOP CHECKLIST - AS BP NONINV METHOD
Per Pharmacy there are no changes in medication, dosage, sig., or quantity.  The medication are set up as pending and waiting for your approval. The preferred pharmacy has been set up and verified.    
Pharmacy requesting refill for Atorvastatin   Last office visit 3/20/18  Last filled 6/20/18 #90 with 0 refills   Last labs FLP 3/23/18    Refilled per protocol.       
electronic

## 2025-03-09 ENCOUNTER — EMERGENCY (EMERGENCY)
Facility: HOSPITAL | Age: 80
LOS: 0 days | Discharge: HOME | End: 2025-03-09
Attending: EMERGENCY MEDICINE
Payer: MEDICARE

## 2025-03-09 VITALS
OXYGEN SATURATION: 97 % | TEMPERATURE: 98 F | SYSTOLIC BLOOD PRESSURE: 165 MMHG | DIASTOLIC BLOOD PRESSURE: 66 MMHG | RESPIRATION RATE: 18 BRPM | WEIGHT: 153 LBS | HEART RATE: 68 BPM

## 2025-03-09 VITALS — HEIGHT: 64 IN

## 2025-03-09 DIAGNOSIS — Z98.890 OTHER SPECIFIED POSTPROCEDURAL STATES: Chronic | ICD-10-CM

## 2025-03-09 DIAGNOSIS — Z90.89 ACQUIRED ABSENCE OF OTHER ORGANS: Chronic | ICD-10-CM

## 2025-03-09 DIAGNOSIS — Z98.49 CATARACT EXTRACTION STATUS, UNSPECIFIED EYE: Chronic | ICD-10-CM

## 2025-03-09 DIAGNOSIS — S80.12XA CONTUSION OF LEFT LOWER LEG, INITIAL ENCOUNTER: ICD-10-CM

## 2025-03-09 DIAGNOSIS — K76.0 FATTY (CHANGE OF) LIVER, NOT ELSEWHERE CLASSIFIED: ICD-10-CM

## 2025-03-09 DIAGNOSIS — K21.9 GASTRO-ESOPHAGEAL REFLUX DISEASE WITHOUT ESOPHAGITIS: ICD-10-CM

## 2025-03-09 DIAGNOSIS — E78.5 HYPERLIPIDEMIA, UNSPECIFIED: ICD-10-CM

## 2025-03-09 DIAGNOSIS — Z90.49 ACQUIRED ABSENCE OF OTHER SPECIFIED PARTS OF DIGESTIVE TRACT: Chronic | ICD-10-CM

## 2025-03-09 DIAGNOSIS — E89.2 POSTPROCEDURAL HYPOPARATHYROIDISM: Chronic | ICD-10-CM

## 2025-03-09 DIAGNOSIS — Z98.89 OTHER SPECIFIED POSTPROCEDURAL STATES: Chronic | ICD-10-CM

## 2025-03-09 DIAGNOSIS — M19.90 UNSPECIFIED OSTEOARTHRITIS, UNSPECIFIED SITE: ICD-10-CM
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
## 2025-03-09 DIAGNOSIS — I10 ESSENTIAL (PRIMARY) HYPERTENSION: ICD-10-CM
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
## 2025-03-09 PROCEDURE — 93971 EXTREMITY STUDY: CPT | Mod: LT

## 2025-03-09 PROCEDURE — 99284 EMERGENCY DEPT VISIT MOD MDM: CPT

## 2025-03-09 PROCEDURE — 99284 EMERGENCY DEPT VISIT MOD MDM: CPT | Mod: 25

## 2025-03-09 PROCEDURE — 93971 EXTREMITY STUDY: CPT | Mod: 26,LT

## 2025-03-09 RX ORDER — ACETAMINOPHEN 500 MG/5ML
975 LIQUID (ML) ORAL ONCE
Refills: 0 | Status: COMPLETED | OUTPATIENT
Start: 2025-03-09 | End: 2025-03-09

## 2025-03-09 RX ADMIN — Medication 975 MILLIGRAM(S): at 17:32

## 2025-03-09 NOTE — ED ADULT TRIAGE NOTE - CHIEF COMPLAINT QUOTE
Pt presents to ED c/o left calf pain and swelling that started this morning. Denies fever, chills, redness to leg and use of blood thinner. Pt states she was sent from urgent care for r/o DVT.

## 2025-03-09 NOTE — ED STATDOCS - CLINICAL SUMMARY MEDICAL DECISION MAKING FREE TEXT BOX
Pt states L calf pain, went  for a brisk walks and played pickle ball. Denies falls. No chest pain, SOB. US r/o DVT. No signs of infection.

## 2025-03-09 NOTE — ED STATDOCS - NS ED MD DISPO DISCHARGE CCDA
No change in foot neuropathy but the buttock pain is improved, frequency less, no side effects.   Patient/Caregiver provided printed discharge information.

## 2025-03-09 NOTE — ED ADULT NURSE NOTE - AGENT'S NAME
We have put in a referral to pulmonology.  Please let us know if you do not hear on that appointment date and time.  Also please let me know if you are unable to purchase your symbicort that I have sent in with generic request.  
Jamie Govea (son)

## 2025-03-09 NOTE — ED STATDOCS - ATTENDING APP SHARED VISIT CONTRIBUTION OF CARE
I,Jac Ramires MD,  performed the initial face to face bedside interview with this patient regarding history of present illness, review of symptoms and relevant past medical, social and family history.  I completed an independent physical examination.  I was the initial provider who evaluated this patient. I have signed out the follow up of any pending tests (i.e. labs, radiological studies) to the ACP.  I have communicated the patient’s plan of care and disposition with the ACP.  The history, relevant review of systems, past medical and surgical history, medical decision making, and physical examination was documented by the scribe in my presence and I attest to the accuracy of the documentation.

## 2025-03-09 NOTE — ED ADULT TRIAGE NOTE - GLASGOW COMA SCALE: BEST MOTOR RESPONSE, MLM
Received update from [facility]:    Stelara Syringe 45mg (Loading and Maintenance) -  Approved - Pending  Assistance Review    Authorization Number: [id number]  Valid from 09/08/2021 to 12/31/2021    No script needed at this time, included on the assistance application for one year.      Pharmacy Coverage [employer]   Patient's Co-Pay: $3,471.90     Co pay Assistance Information   Assistance available through [employer] if medical coverage doesn't cover.  9093 medical benefits investigation needed.  Please send referral and message to pool.  Need completed and signed application.  No proof of income needed.   Income guidelines: HH=1: $51,520, HH=2: $69,680      Additional Information:   Placed a call to [facility], [phone], spoke to Michelle to check status, still not complete, it was sent to \"same day processing\" a week ago, but apparently they are back logged and are not getting same day processing done on the same day.     (M6) obeys commands

## 2025-03-09 NOTE — ED STATDOCS - PATIENT PORTAL LINK FT
You can access the FollowMyHealth Patient Portal offered by [facility] by registering at the following website: [email or web address]. By joining [facility]’s FollowMyHealth portal, you will also be able to view your health information using other applications (apps) compatible with our system.

## 2025-03-09 NOTE — ED STATDOCS - MUSCULOSKELETAL, MLM
range of motion is not limited. L leg red, not warm, good pulses, full ROM, mild L calf ttp, no edema

## 2025-03-09 NOTE — ED STATDOCS - PROGRESS NOTE DETAILS
Us findings reviewed with patient. Advised alternating heat and ice. Activities to tolerance. Also motrin & tylenol as needed. Return precautions discussed including numbness and/or discoloration of foot. Will dc home. - Phi Amor PA-C 78 y/o F with PMH of HTN, HLD, GERD presents with left calf 78 y/o F with PMH of HTN, HLD, GERD presents with left calf pain while walking this morning. Denies trauma. Reports pain as severe. Denies CP, SOB. Advised to go to ED by outside UC. PE: Well appearing. PV: Mild edema, redness over left calf. +TTP left calf. Sensation intact to light touch in lower extremities. 5/5 strength in lower extremities. A/P: r/o DVT, plan for US, reassess. - Phi Amor PA-C

## 2025-03-10 PROBLEM — Q45.3 OTHER CONGENITAL MALFORMATIONS OF PANCREAS AND PANCREATIC DUCT: Chronic | Status: ACTIVE | Noted: 2025-02-12

## 2025-03-13 DIAGNOSIS — Z88.6 ALLERGY STATUS TO ANALGESIC AGENT: ICD-10-CM

## 2025-03-13 DIAGNOSIS — Z87.891 PERSONAL HISTORY OF NICOTINE DEPENDENCE: ICD-10-CM

## 2025-03-13 DIAGNOSIS — I10 ESSENTIAL (PRIMARY) HYPERTENSION: ICD-10-CM

## 2025-03-13 DIAGNOSIS — Z46.59 ENCOUNTER FOR FITTING AND ADJUSTMENT OF OTHER GASTROINTESTINAL APPLIANCE AND DEVICE: ICD-10-CM

## 2025-03-13 DIAGNOSIS — Z88.1 ALLERGY STATUS TO OTHER ANTIBIOTIC AGENTS: ICD-10-CM

## 2025-03-13 DIAGNOSIS — Z88.8 ALLERGY STATUS TO OTHER DRUGS, MEDICAMENTS AND BIOLOGICAL SUBSTANCES: ICD-10-CM

## 2025-03-13 DIAGNOSIS — E78.00 PURE HYPERCHOLESTEROLEMIA, UNSPECIFIED: ICD-10-CM

## 2025-03-13 DIAGNOSIS — Z79.82 LONG TERM (CURRENT) USE OF ASPIRIN: ICD-10-CM

## 2025-03-13 DIAGNOSIS — K21.9 GASTRO-ESOPHAGEAL REFLUX DISEASE WITHOUT ESOPHAGITIS: ICD-10-CM

## 2025-03-13 DIAGNOSIS — Q45.3 OTHER CONGENITAL MALFORMATIONS OF PANCREAS AND PANCREATIC DUCT: ICD-10-CM

## 2025-03-13 DIAGNOSIS — Z88.5 ALLERGY STATUS TO NARCOTIC AGENT: ICD-10-CM

## 2025-03-13 DIAGNOSIS — Z91.09 OTHER ALLERGY STATUS, OTHER THAN TO DRUGS AND BIOLOGICAL SUBSTANCES: ICD-10-CM

## 2025-04-15 ENCOUNTER — OFFICE (OUTPATIENT)
Dept: URBAN - METROPOLITAN AREA CLINIC 102 | Facility: CLINIC | Age: 80
Setting detail: OPHTHALMOLOGY
End: 2025-04-15
Payer: MEDICARE

## 2025-04-15 DIAGNOSIS — H01.002: ICD-10-CM

## 2025-04-15 DIAGNOSIS — H01.004: ICD-10-CM

## 2025-04-15 DIAGNOSIS — H01.001: ICD-10-CM

## 2025-04-15 DIAGNOSIS — H35.3131: ICD-10-CM

## 2025-04-15 PROCEDURE — 92014 COMPRE OPH EXAM EST PT 1/>: CPT | Performed by: OPHTHALMOLOGY

## 2025-04-15 PROCEDURE — 92134 CPTRZ OPH DX IMG PST SGM RTA: CPT | Performed by: OPHTHALMOLOGY

## 2025-04-15 ASSESSMENT — REFRACTION_AUTOREFRACTION
OD_SPHERE: +1.50
OS_SPHERE: +1.25
OD_CYLINDER: -2.00
OS_CYLINDER: -2.25
OS_AXIS: 083
OD_AXIS: 086

## 2025-04-15 ASSESSMENT — TEAR BREAK UP TIME (TBUT)
OS_TBUT: T
OD_TBUT: T

## 2025-04-15 ASSESSMENT — VISUAL ACUITY
OD_BCVA: 20/25+2
OS_BCVA: 20/25+2

## 2025-04-15 ASSESSMENT — REFRACTION_MANIFEST
OD_VA1: 20/30+
OS_SPHERE: +1.00
OS_VA1: 20/30+
OS_ADD: +2.50
OD_CYLINDER: -1.75
OS_AXIS: 090
OS_CYLINDER: -2.25
OD_SPHERE: +1.25
OD_AXIS: 090
OD_ADD: +2.50

## 2025-04-15 ASSESSMENT — REFRACTION_CURRENTRX
OS_ADD: +2.25
OD_CYLINDER: -0.75
OD_VPRISM_DIRECTION: PROGS
OD_SPHERE: +1.00
OS_AXIS: 091
OS_VPRISM_DIRECTION: PROGS
OD_OVR_VA: 20/
OS_CYLINDER: -1.25
OS_OVR_VA: 20/
OD_ADD: +2.00
OS_SPHERE: +0.50
OD_AXIS: 069

## 2025-04-15 ASSESSMENT — LID EXAM ASSESSMENTS
OD_BLEPHARITIS: RLL RUL T
OS_BLEPHARITIS: LLL LUL T

## 2025-04-15 ASSESSMENT — KERATOMETRY
OD_AXISANGLE_DEGREES: 003
OS_K1POWER_DIOPTERS: 41.75
METHOD_AUTO_MANUAL: AUTO
OD_K2POWER_DIOPTERS: 42.75
OS_K2POWER_DIOPTERS: 43.00
OD_K1POWER_DIOPTERS: 41.50
OS_AXISANGLE_DEGREES: 170

## 2025-04-15 ASSESSMENT — CONFRONTATIONAL VISUAL FIELD TEST (CVF)
OD_FINDINGS: FULL
OS_FINDINGS: FULL

## 2025-04-15 ASSESSMENT — TONOMETRY
OD_IOP_MMHG: 15
OS_IOP_MMHG: 16

## 2025-06-25 ENCOUNTER — APPOINTMENT (OUTPATIENT)
Dept: GASTROENTEROLOGY | Facility: CLINIC | Age: 80
End: 2025-06-25

## 2025-06-25 VITALS
DIASTOLIC BLOOD PRESSURE: 78 MMHG | HEIGHT: 64 IN | BODY MASS INDEX: 27.31 KG/M2 | WEIGHT: 160 LBS | SYSTOLIC BLOOD PRESSURE: 126 MMHG

## 2025-06-25 PROBLEM — Z09 FOLLOW-UP EXAM: Status: ACTIVE | Noted: 2025-06-25

## 2025-06-25 PROCEDURE — 99214 OFFICE O/P EST MOD 30 MIN: CPT

## 2025-08-20 ENCOUNTER — RESULT REVIEW (OUTPATIENT)
Age: 80
End: 2025-08-20

## 2025-08-20 ENCOUNTER — APPOINTMENT (OUTPATIENT)
Dept: GASTROENTEROLOGY | Facility: AMBULATORY MEDICAL SERVICES | Age: 80
End: 2025-08-20

## 2025-08-20 DIAGNOSIS — K25.0 ACUTE GASTRIC ULCER WITH HEMORRHAGE: ICD-10-CM

## 2025-08-20 RX ORDER — OMEPRAZOLE 40 MG/1
40 CAPSULE, DELAYED RELEASE ORAL
Qty: 120 | Refills: 0 | Status: ACTIVE | COMMUNITY
Start: 2025-08-20 | End: 1900-01-01